# Patient Record
Sex: MALE | Race: ASIAN | NOT HISPANIC OR LATINO | URBAN - METROPOLITAN AREA
[De-identification: names, ages, dates, MRNs, and addresses within clinical notes are randomized per-mention and may not be internally consistent; named-entity substitution may affect disease eponyms.]

---

## 2024-10-27 ENCOUNTER — INPATIENT (INPATIENT)
Facility: HOSPITAL | Age: 69
LOS: 8 days | Discharge: ANOTHER HOSPITAL | End: 2024-11-05
Attending: INTERNAL MEDICINE | Admitting: INTERNAL MEDICINE
Payer: MEDICARE

## 2024-10-27 VITALS
OXYGEN SATURATION: 99 % | HEART RATE: 96 BPM | RESPIRATION RATE: 28 BRPM | DIASTOLIC BLOOD PRESSURE: 79 MMHG | SYSTOLIC BLOOD PRESSURE: 145 MMHG

## 2024-10-27 DIAGNOSIS — J81.0 ACUTE PULMONARY EDEMA: ICD-10-CM

## 2024-10-27 LAB
ADD ON TEST-SPECIMEN IN LAB: SIGNIFICANT CHANGE UP
ALBUMIN SERPL ELPH-MCNC: 3.4 G/DL — SIGNIFICANT CHANGE UP (ref 3.3–5)
ALBUMIN SERPL ELPH-MCNC: 4 G/DL — SIGNIFICANT CHANGE UP (ref 3.3–5)
ALP SERPL-CCNC: 68 U/L — SIGNIFICANT CHANGE UP (ref 40–120)
ALP SERPL-CCNC: 78 U/L — SIGNIFICANT CHANGE UP (ref 40–120)
ALT FLD-CCNC: 33 U/L — SIGNIFICANT CHANGE UP (ref 4–41)
ALT FLD-CCNC: 44 U/L — HIGH (ref 4–41)
ANION GAP SERPL CALC-SCNC: 14 MMOL/L — SIGNIFICANT CHANGE UP (ref 7–14)
ANION GAP SERPL CALC-SCNC: 14 MMOL/L — SIGNIFICANT CHANGE UP (ref 7–14)
APPEARANCE UR: CLEAR — SIGNIFICANT CHANGE UP
APTT BLD: 25 SEC — SIGNIFICANT CHANGE UP (ref 24.5–35.6)
AST SERPL-CCNC: 43 U/L — HIGH (ref 4–40)
AST SERPL-CCNC: 48 U/L — HIGH (ref 4–40)
B PERT DNA SPEC QL NAA+PROBE: SIGNIFICANT CHANGE UP
B PERT+PARAPERT DNA PNL SPEC NAA+PROBE: SIGNIFICANT CHANGE UP
BASOPHILS # BLD AUTO: 0.04 K/UL — SIGNIFICANT CHANGE UP (ref 0–0.2)
BASOPHILS NFR BLD AUTO: 0.2 % — SIGNIFICANT CHANGE UP (ref 0–2)
BILIRUB SERPL-MCNC: 0.8 MG/DL — SIGNIFICANT CHANGE UP (ref 0.2–1.2)
BILIRUB SERPL-MCNC: 0.8 MG/DL — SIGNIFICANT CHANGE UP (ref 0.2–1.2)
BILIRUB UR-MCNC: NEGATIVE — SIGNIFICANT CHANGE UP
BLOOD GAS ARTERIAL COMPREHENSIVE RESULT: SIGNIFICANT CHANGE UP
BLOOD GAS VENOUS COMPREHENSIVE RESULT: SIGNIFICANT CHANGE UP
BUN SERPL-MCNC: 21 MG/DL — SIGNIFICANT CHANGE UP (ref 7–23)
BUN SERPL-MCNC: 23 MG/DL — SIGNIFICANT CHANGE UP (ref 7–23)
C PNEUM DNA SPEC QL NAA+PROBE: SIGNIFICANT CHANGE UP
CALCIUM SERPL-MCNC: 8.4 MG/DL — SIGNIFICANT CHANGE UP (ref 8.4–10.5)
CALCIUM SERPL-MCNC: 9.1 MG/DL — SIGNIFICANT CHANGE UP (ref 8.4–10.5)
CHLORIDE SERPL-SCNC: 100 MMOL/L — SIGNIFICANT CHANGE UP (ref 98–107)
CHLORIDE SERPL-SCNC: 101 MMOL/L — SIGNIFICANT CHANGE UP (ref 98–107)
CK MB CFR SERPL CALC: 9.4 NG/ML — HIGH
CO2 SERPL-SCNC: 22 MMOL/L — SIGNIFICANT CHANGE UP (ref 22–31)
CO2 SERPL-SCNC: 22 MMOL/L — SIGNIFICANT CHANGE UP (ref 22–31)
COLOR SPEC: YELLOW — SIGNIFICANT CHANGE UP
CREAT SERPL-MCNC: 1.19 MG/DL — SIGNIFICANT CHANGE UP (ref 0.5–1.3)
CREAT SERPL-MCNC: 1.33 MG/DL — HIGH (ref 0.5–1.3)
DIFF PNL FLD: NEGATIVE — SIGNIFICANT CHANGE UP
EGFR: 58 ML/MIN/1.73M2 — LOW
EGFR: 66 ML/MIN/1.73M2 — SIGNIFICANT CHANGE UP
EOSINOPHIL # BLD AUTO: 0.01 K/UL — SIGNIFICANT CHANGE UP (ref 0–0.5)
EOSINOPHIL NFR BLD AUTO: 0.1 % — SIGNIFICANT CHANGE UP (ref 0–6)
FLUAV AG NPH QL: SIGNIFICANT CHANGE UP
FLUAV SUBTYP SPEC NAA+PROBE: SIGNIFICANT CHANGE UP
FLUBV AG NPH QL: SIGNIFICANT CHANGE UP
FLUBV RNA SPEC QL NAA+PROBE: SIGNIFICANT CHANGE UP
GLUCOSE BLDC GLUCOMTR-MCNC: 203 MG/DL — HIGH (ref 70–99)
GLUCOSE BLDC GLUCOMTR-MCNC: 232 MG/DL — HIGH (ref 70–99)
GLUCOSE SERPL-MCNC: 221 MG/DL — HIGH (ref 70–99)
GLUCOSE SERPL-MCNC: 252 MG/DL — HIGH (ref 70–99)
GLUCOSE UR QL: 100 MG/DL
HADV DNA SPEC QL NAA+PROBE: SIGNIFICANT CHANGE UP
HCOV 229E RNA SPEC QL NAA+PROBE: SIGNIFICANT CHANGE UP
HCOV HKU1 RNA SPEC QL NAA+PROBE: SIGNIFICANT CHANGE UP
HCOV NL63 RNA SPEC QL NAA+PROBE: SIGNIFICANT CHANGE UP
HCOV OC43 RNA SPEC QL NAA+PROBE: SIGNIFICANT CHANGE UP
HCT VFR BLD CALC: 37.5 % — LOW (ref 39–50)
HGB BLD-MCNC: 12.5 G/DL — LOW (ref 13–17)
HMPV RNA SPEC QL NAA+PROBE: SIGNIFICANT CHANGE UP
HPIV1 RNA SPEC QL NAA+PROBE: SIGNIFICANT CHANGE UP
HPIV2 RNA SPEC QL NAA+PROBE: SIGNIFICANT CHANGE UP
HPIV3 RNA SPEC QL NAA+PROBE: SIGNIFICANT CHANGE UP
HPIV4 RNA SPEC QL NAA+PROBE: SIGNIFICANT CHANGE UP
IANC: 14.7 K/UL — HIGH (ref 1.8–7.4)
IMM GRANULOCYTES NFR BLD AUTO: 0.4 % — SIGNIFICANT CHANGE UP (ref 0–0.9)
INR BLD: 1.16 RATIO — SIGNIFICANT CHANGE UP (ref 0.85–1.16)
KETONES UR-MCNC: NEGATIVE MG/DL — SIGNIFICANT CHANGE UP
LEUKOCYTE ESTERASE UR-ACNC: NEGATIVE — SIGNIFICANT CHANGE UP
LYMPHOCYTES # BLD AUTO: 11.1 % — LOW (ref 13–44)
LYMPHOCYTES # BLD AUTO: 2.01 K/UL — SIGNIFICANT CHANGE UP (ref 1–3.3)
M PNEUMO DNA SPEC QL NAA+PROBE: SIGNIFICANT CHANGE UP
MCHC RBC-ENTMCNC: 28.8 PG — SIGNIFICANT CHANGE UP (ref 27–34)
MCHC RBC-ENTMCNC: 33.3 GM/DL — SIGNIFICANT CHANGE UP (ref 32–36)
MCV RBC AUTO: 86.4 FL — SIGNIFICANT CHANGE UP (ref 80–100)
MONOCYTES # BLD AUTO: 1.35 K/UL — HIGH (ref 0–0.9)
MONOCYTES NFR BLD AUTO: 7.4 % — SIGNIFICANT CHANGE UP (ref 2–14)
NEUTROPHILS # BLD AUTO: 14.7 K/UL — HIGH (ref 1.8–7.4)
NEUTROPHILS NFR BLD AUTO: 80.8 % — HIGH (ref 43–77)
NITRITE UR-MCNC: NEGATIVE — SIGNIFICANT CHANGE UP
NRBC # BLD: 0 /100 WBCS — SIGNIFICANT CHANGE UP (ref 0–0)
NRBC # FLD: 0 K/UL — SIGNIFICANT CHANGE UP (ref 0–0)
PH UR: 6 — SIGNIFICANT CHANGE UP (ref 5–8)
PHOSPHATE SERPL-MCNC: 2.9 MG/DL — SIGNIFICANT CHANGE UP (ref 2.5–4.5)
PLATELET # BLD AUTO: 426 K/UL — HIGH (ref 150–400)
POTASSIUM SERPL-MCNC: 3.3 MMOL/L — LOW (ref 3.5–5.3)
POTASSIUM SERPL-MCNC: 3.9 MMOL/L — SIGNIFICANT CHANGE UP (ref 3.5–5.3)
POTASSIUM SERPL-SCNC: 3.3 MMOL/L — LOW (ref 3.5–5.3)
POTASSIUM SERPL-SCNC: 3.9 MMOL/L — SIGNIFICANT CHANGE UP (ref 3.5–5.3)
PROCALCITONIN SERPL-MCNC: 0.89 NG/ML — HIGH (ref 0.02–0.1)
PROT SERPL-MCNC: 6.6 G/DL — SIGNIFICANT CHANGE UP (ref 6–8.3)
PROT SERPL-MCNC: 7.7 G/DL — SIGNIFICANT CHANGE UP (ref 6–8.3)
PROT UR-MCNC: NEGATIVE MG/DL — SIGNIFICANT CHANGE UP
PROTHROM AB SERPL-ACNC: 13.4 SEC — SIGNIFICANT CHANGE UP (ref 9.9–13.4)
RAPID RVP RESULT: SIGNIFICANT CHANGE UP
RBC # BLD: 4.34 M/UL — SIGNIFICANT CHANGE UP (ref 4.2–5.8)
RBC # FLD: 12.7 % — SIGNIFICANT CHANGE UP (ref 10.3–14.5)
RSV RNA NPH QL NAA+NON-PROBE: SIGNIFICANT CHANGE UP
RSV RNA SPEC QL NAA+PROBE: SIGNIFICANT CHANGE UP
RV+EV RNA SPEC QL NAA+PROBE: SIGNIFICANT CHANGE UP
SARS-COV-2 RNA SPEC QL NAA+PROBE: SIGNIFICANT CHANGE UP
SARS-COV-2 RNA SPEC QL NAA+PROBE: SIGNIFICANT CHANGE UP
SODIUM SERPL-SCNC: 136 MMOL/L — SIGNIFICANT CHANGE UP (ref 135–145)
SODIUM SERPL-SCNC: 137 MMOL/L — SIGNIFICANT CHANGE UP (ref 135–145)
SP GR SPEC: 1.02 — SIGNIFICANT CHANGE UP (ref 1–1.03)
TROPONIN T, HIGH SENSITIVITY RESULT: 148 NG/L — CRITICAL HIGH
TROPONIN T, HIGH SENSITIVITY RESULT: 190 NG/L — CRITICAL HIGH
UROBILINOGEN FLD QL: 0.2 MG/DL — SIGNIFICANT CHANGE UP (ref 0.2–1)
WBC # BLD: 18.18 K/UL — HIGH (ref 3.8–10.5)
WBC # FLD AUTO: 18.18 K/UL — HIGH (ref 3.8–10.5)

## 2024-10-27 PROCEDURE — 99291 CRITICAL CARE FIRST HOUR: CPT | Mod: GC

## 2024-10-27 PROCEDURE — 70498 CT ANGIOGRAPHY NECK: CPT | Mod: 26,MC

## 2024-10-27 PROCEDURE — 99285 EMERGENCY DEPT VISIT HI MDM: CPT

## 2024-10-27 PROCEDURE — 71045 X-RAY EXAM CHEST 1 VIEW: CPT | Mod: 26

## 2024-10-27 PROCEDURE — 71045 X-RAY EXAM CHEST 1 VIEW: CPT | Mod: 26,77

## 2024-10-27 PROCEDURE — 70450 CT HEAD/BRAIN W/O DYE: CPT | Mod: 26,MC,XU

## 2024-10-27 PROCEDURE — 70496 CT ANGIOGRAPHY HEAD: CPT | Mod: 26,MC

## 2024-10-27 PROCEDURE — 0042T: CPT

## 2024-10-27 RX ORDER — ASPIRIN/MAG CARB/ALUMINUM AMIN 325 MG
324 TABLET ORAL ONCE
Refills: 0 | Status: COMPLETED | OUTPATIENT
Start: 2024-10-27 | End: 2024-10-27

## 2024-10-27 RX ORDER — VANCOMYCIN HYDROCHLORIDE 50 MG/ML
KIT ORAL
Refills: 0 | Status: DISCONTINUED | OUTPATIENT
Start: 2024-10-27 | End: 2024-10-28

## 2024-10-27 RX ORDER — ACETAMINOPHEN 500 MG
1000 TABLET ORAL ONCE
Refills: 0 | Status: COMPLETED | OUTPATIENT
Start: 2024-10-27 | End: 2024-10-27

## 2024-10-27 RX ORDER — CLOPIDOGREL 75 MG/1
600 TABLET ORAL ONCE
Refills: 0 | Status: COMPLETED | OUTPATIENT
Start: 2024-10-27 | End: 2024-10-27

## 2024-10-27 RX ORDER — ASPIRIN/MAG CARB/ALUMINUM AMIN 325 MG
81 TABLET ORAL DAILY
Refills: 0 | Status: DISCONTINUED | OUTPATIENT
Start: 2024-10-27 | End: 2024-11-05

## 2024-10-27 RX ORDER — PIPERACILLIN AND TAZOBACTAM .5; 4 G/20ML; G/20ML
3.38 INJECTION, POWDER, LYOPHILIZED, FOR SOLUTION INTRAVENOUS ONCE
Refills: 0 | Status: COMPLETED | OUTPATIENT
Start: 2024-10-28 | End: 2024-10-28

## 2024-10-27 RX ORDER — PIPERACILLIN AND TAZOBACTAM .5; 4 G/20ML; G/20ML
3.38 INJECTION, POWDER, LYOPHILIZED, FOR SOLUTION INTRAVENOUS EVERY 8 HOURS
Refills: 0 | Status: COMPLETED | OUTPATIENT
Start: 2024-10-28 | End: 2024-10-31

## 2024-10-27 RX ORDER — HYDRALAZINE HYDROCHLORIDE 50 MG/1
10 TABLET, FILM COATED ORAL ONCE
Refills: 0 | Status: COMPLETED | OUTPATIENT
Start: 2024-10-27 | End: 2024-10-27

## 2024-10-27 RX ORDER — OLANZAPINE 20 MG/1
2.5 TABLET ORAL ONCE
Refills: 0 | Status: COMPLETED | OUTPATIENT
Start: 2024-10-27 | End: 2024-10-27

## 2024-10-27 RX ORDER — HYDRALAZINE HYDROCHLORIDE 50 MG/1
5 TABLET, FILM COATED ORAL EVERY 6 HOURS
Refills: 0 | Status: DISCONTINUED | OUTPATIENT
Start: 2024-10-27 | End: 2024-11-03

## 2024-10-27 RX ORDER — HYDRALAZINE HYDROCHLORIDE 50 MG/1
5 TABLET, FILM COATED ORAL ONCE
Refills: 0 | Status: COMPLETED | OUTPATIENT
Start: 2024-10-27 | End: 2024-10-27

## 2024-10-27 RX ORDER — CLOPIDOGREL 75 MG/1
75 TABLET ORAL DAILY
Refills: 0 | Status: DISCONTINUED | OUTPATIENT
Start: 2024-10-28 | End: 2024-11-05

## 2024-10-27 RX ORDER — VANCOMYCIN HYDROCHLORIDE 50 MG/ML
1000 KIT ORAL EVERY 12 HOURS
Refills: 0 | Status: DISCONTINUED | OUTPATIENT
Start: 2024-10-28 | End: 2024-10-28

## 2024-10-27 RX ORDER — NITROGLYCERIN 0.6MG/HR
400 PATCH, TRANSDERMAL 24 HOURS TRANSDERMAL
Qty: 50 | Refills: 0 | Status: DISCONTINUED | OUTPATIENT
Start: 2024-10-27 | End: 2024-10-27

## 2024-10-27 RX ORDER — CHLORHEXIDINE GLUCONATE 40 MG/ML
1 SOLUTION TOPICAL DAILY
Refills: 0 | Status: DISCONTINUED | OUTPATIENT
Start: 2024-10-27 | End: 2024-11-05

## 2024-10-27 RX ORDER — FUROSEMIDE 40 MG
40 TABLET ORAL ONCE
Refills: 0 | Status: COMPLETED | OUTPATIENT
Start: 2024-10-27 | End: 2024-10-27

## 2024-10-27 RX ORDER — PIPERACILLIN AND TAZOBACTAM .5; 4 G/20ML; G/20ML
3.38 INJECTION, POWDER, LYOPHILIZED, FOR SOLUTION INTRAVENOUS ONCE
Refills: 0 | Status: COMPLETED | OUTPATIENT
Start: 2024-10-27 | End: 2024-10-27

## 2024-10-27 RX ORDER — VANCOMYCIN HYDROCHLORIDE 50 MG/ML
1000 KIT ORAL ONCE
Refills: 0 | Status: COMPLETED | OUTPATIENT
Start: 2024-10-27 | End: 2024-10-27

## 2024-10-27 RX ORDER — ALBUTEROL 90 MCG
2 AEROSOL (GRAM) INHALATION
Refills: 0 | Status: DISCONTINUED | OUTPATIENT
Start: 2024-10-27 | End: 2024-11-05

## 2024-10-27 RX ORDER — HYDRALAZINE HYDROCHLORIDE 50 MG/1
5 TABLET, FILM COATED ORAL EVERY 6 HOURS
Refills: 0 | Status: DISCONTINUED | OUTPATIENT
Start: 2024-10-27 | End: 2024-10-27

## 2024-10-27 RX ORDER — INSULIN LISPRO 100/ML
VIAL (ML) SUBCUTANEOUS
Refills: 0 | Status: DISCONTINUED | OUTPATIENT
Start: 2024-10-27 | End: 2024-10-28

## 2024-10-27 RX ORDER — IPRATROPIUM BROMIDE AND ALBUTEROL SULFATE .5; 2.5 MG/3ML; MG/3ML
3 SOLUTION RESPIRATORY (INHALATION) EVERY 6 HOURS
Refills: 0 | Status: DISCONTINUED | OUTPATIENT
Start: 2024-10-27 | End: 2024-11-05

## 2024-10-27 RX ORDER — INSULIN LISPRO 100/ML
VIAL (ML) SUBCUTANEOUS AT BEDTIME
Refills: 0 | Status: DISCONTINUED | OUTPATIENT
Start: 2024-10-27 | End: 2024-10-28

## 2024-10-27 RX ORDER — NITROGLYCERIN 0.6MG/HR
400 PATCH, TRANSDERMAL 24 HOURS TRANSDERMAL
Qty: 50 | Refills: 0 | Status: DISCONTINUED | OUTPATIENT
Start: 2024-10-27 | End: 2024-10-28

## 2024-10-27 RX ADMIN — PIPERACILLIN AND TAZOBACTAM 200 GRAM(S): .5; 4 INJECTION, POWDER, LYOPHILIZED, FOR SOLUTION INTRAVENOUS at 15:45

## 2024-10-27 RX ADMIN — HYDRALAZINE HYDROCHLORIDE 5 MILLIGRAM(S): 50 TABLET, FILM COATED ORAL at 20:27

## 2024-10-27 RX ADMIN — Medication 120 MICROGRAM(S)/MIN: at 11:14

## 2024-10-27 RX ADMIN — HYDRALAZINE HYDROCHLORIDE 10 MILLIGRAM(S): 50 TABLET, FILM COATED ORAL at 18:07

## 2024-10-27 RX ADMIN — Medication 400 MILLIGRAM(S): at 21:58

## 2024-10-27 RX ADMIN — Medication 40 MILLIGRAM(S): at 15:00

## 2024-10-27 RX ADMIN — IPRATROPIUM BROMIDE AND ALBUTEROL SULFATE 3 MILLILITER(S): .5; 2.5 SOLUTION RESPIRATORY (INHALATION) at 21:16

## 2024-10-27 RX ADMIN — Medication 120 MICROGRAM(S)/MIN: at 15:00

## 2024-10-27 RX ADMIN — VANCOMYCIN HYDROCHLORIDE 250 MILLIGRAM(S): KIT at 17:42

## 2024-10-27 RX ADMIN — IPRATROPIUM BROMIDE AND ALBUTEROL SULFATE 3 MILLILITER(S): .5; 2.5 SOLUTION RESPIRATORY (INHALATION) at 15:26

## 2024-10-27 RX ADMIN — OLANZAPINE 2.5 MILLIGRAM(S): 20 TABLET ORAL at 20:20

## 2024-10-27 RX ADMIN — Medication 1000 MILLIGRAM(S): at 22:28

## 2024-10-27 RX ADMIN — Medication 324 MILLIGRAM(S): at 14:05

## 2024-10-27 RX ADMIN — PIPERACILLIN AND TAZOBACTAM 25 GRAM(S): .5; 4 INJECTION, POWDER, LYOPHILIZED, FOR SOLUTION INTRAVENOUS at 20:19

## 2024-10-27 RX ADMIN — CLOPIDOGREL 600 MILLIGRAM(S): 75 TABLET ORAL at 14:05

## 2024-10-27 RX ADMIN — Medication 40 MILLIGRAM(S): at 11:13

## 2024-10-27 NOTE — H&P ADULT - NSHPLABSRESULTS_GEN_ALL_CORE
< from: CT Angio Neck w/ IV Cont (10.27.24 @ 12:20) >    IMPRESSION:    1. BRAIN:  No intracranial hemorrhage is appreciated.  No intracranial   mass lesion is appreciated.    2.  RIGHT CAROTID SYSTEM:    Atherosclerotic plaque common carotid and   internal carotid artery with maximal stenosis approximately 50%.    3.   LEFT CAROTID SYSTEM:     Atherosclerotic plaque common carotid and   internal carotid artery with maximal stenosis approximately 90%.    4.   VERTEBRAL CIRCULATION:    Patent. 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 Flow directionality of the left vertebral artery is not   determined by this technique. Consider ultrasound in this evaluation, as   required    5.  ANTERIOR INTRACRANIAL CIRCULATION:Intracranial atherosclerosis   cavernous and clinoid segments of the internal carotid arteries, moderate   on the right and severe on the left.    6.  POSTERIOR INTRACRANIAL CIRCULATION:    Patent right vertebral basilar   and posterior cerebral arteries. 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    7.  No large vessel occlusion    8.  BRAIN PERFUSION:   No acute infarction of the brain is convincingly   demonstrated.  Relative ischemia is demonstrated in the left cerebral   hemispheric anterior corona radiata white matter.    9. Pulmonary edema pattern    < end of copied text >

## 2024-10-27 NOTE — CONSULT NOTE ADULT - ASSESSMENT
INCOMPLETE    Assessment: ***. Initial VS in ED: ***. Exam: ***.      mRS: ***  LKN: ***  NIHSS: ***    *** a tenecteplase candidate due to ***.  *** a mechanical thrombectomy candidate due to ***.    Impression: ***    Recommendations:    Diagnostics:  [] MRI brain without contrast   [] MRA brain without contrast; MRA neck with contrast   [] TTE w/ bubble   [] Implantable loop recorder  [] Lipid panel, HbA1c  [] CBC, CMP, coagulation panel, troponin    Medications:  [] ASA 81 mg PO (325 per rectum if fails dysphagia)   [] Atorvastatin 80mg (titrate to LDL < 70)   [] Lovenox SQ for DVT prophylaxis     Miscellaneous:  [] NPO unless passes dysphagia screen; swallow eval if fails  [] Keep BP permissive up to 220/110 for 48 hours followed by gradual normotension over 2-3 days   [] Telemonitoring  [] Neurological checks and vital signs per unit protocol  [] BGM goals 140-180  [] PT/OT; S/S evaluation    * Case and plan not final until Attending attestation * 69 RH Swedish speaking male PMHx COPD, HTN, DM presented for hypoxic respiratory distress due to COPD exacerbation vs cardiogenic etiology. In ED, on BiPAP, given lasix, and started nitro drip due to /110. Code stroke called for right side weakness first noticed by family this AM, LKN yesterday evening. Initial VS in ED: /110 prior to nitroglycerin drip, during neurology exam /106. Exam: On BiPAP, AAOx1, following simple commands with occasional confusion, left gaze preference, mild R facial droop, right upper and lower extremity drift, no sensory changes. CTA demonstrated diffuse intracranial and extracranial atherosclerosis, including notably 90% maximal stenosis in carotid bulb and focal occlusion in proximal left vertebral artery. CTA also noted for segmental occlusion of left subclavian artery between thoracic aorta and left vertebral origin.     pre-mRS: 2  LKN: 10/26 @2100  NIHSS: 7 (LOC question, L gaze preference, R facial, RUE/RLE drift, aphasia)    Not a tenecteplase candidate due to out of window.  Not a mechanical thrombectomy candidate due to no retrievable large vessel occlusion.    Impression: Right hemiparesis likely due to left brain dysfunction. Mechanism large artery atherosclerosis vs ESUS. Encephalopathy likely metabolic in setting of acute hypoxic respiratory distress.    Recommendations:    Diagnostics:  [] MRI brain without contrast   [] TTE without bubble study  [] Vertebral artery doppler to rule out subclavian steal in setting of segmental subclavian artery occlusion, would consult vascular surgery if imaging is suggestive of flow reversal  [] Lipid panel, HbA1c  [] CBC, CMP, coagulation panel, troponin    Medications:  [] ASA 81 mg PO. If unable to tolerate PO due to failed dysphagia screen or need for BiPAP would administer 325 mg rectally  [] Load plavix 300 mg when patient tolerating PO, continue plavix 75 mg for 21 days per CHANCE trial  [] If patient to be started on therapeutic anticoagulation from cardiac standpoint, would defer DAPT until after patient is off alternative anticoagulation  [] Atorvastatin 80mg (titrate to LDL < 70)   [] Lovenox SQ for DVT prophylaxis     Miscellaneous:  [] NPO unless passes dysphagia screen; swallow eval if fails  [] S/p nitroglycerin drip patient is currently normotensive. Would hold antihypertensive medication unless needed to treat other medical conditions  [] BP goal 140-180 systolic   [] Telemonitoring  [] Neurological checks q4h  [] BGM goals 140-180  [] PT/OT; S/S evaluation    Discussed with telestroke attending Dr. Castano. To be formally seen with attending on AM rounds. Case and plan not final until Attending attestation 69 RH Serbian speaking male PMHx COPD, HTN, DM presented for hypoxic respiratory distress due to COPD exacerbation vs cardiogenic etiology. In ED, on BiPAP, given lasix, and started nitro drip due to /110. Code stroke called for right side weakness first noticed by family this AM, LKN yesterday evening. Initial VS in ED: /110 prior to nitroglycerin drip, during neurology exam /106. Exam: On BiPAP, AAOx1, following simple commands with occasional confusion, left gaze preference, mild R facial droop, right upper and lower extremity drift, no sensory changes. CTA demonstrated diffuse intracranial and extracranial atherosclerosis, including notably 90% maximal stenosis in carotid bulb and focal occlusion in proximal left vertebral artery. CTA also noted for segmental occlusion of left subclavian artery between thoracic aorta and left vertebral origin.     pre-mRS: 2  LKN: 10/26 @2100  NIHSS: 7 (LOC question, L gaze preference, R facial, RUE/RLE drift, aphasia)    Not a tenecteplase candidate due to out of window.  Not a mechanical thrombectomy candidate due to no retrievable large vessel occlusion.    Impression: Right hemiparesis likely due to left brain dysfunction. Mechanism large artery atherosclerosis vs ESUS. Encephalopathy likely metabolic in setting of acute hypoxic respiratory distress.    Recommendations:    Diagnostics:  [] MRI brain without contrast   [] TTE without bubble study  [] Loop recorder prior to discharge  [] Vertebral artery doppler to rule out subclavian steal in setting of segmental subclavian artery occlusion, would consult vascular surgery if imaging is suggestive of flow reversal  [] Lipid panel, HbA1c  [] CBC, CMP, coagulation panel, troponin    Medications:  [] ASA 81 mg PO. If unable to tolerate PO due to failed dysphagia screen or need for BiPAP would administer 325 mg rectally  [] Load plavix 300 mg when patient tolerating PO, continue plavix 75 mg for 21 days per CHANCE trial  [] If patient to be started on therapeutic anticoagulation from cardiac standpoint, would defer DAPT until after patient is off alternative anticoagulation  [] Atorvastatin 80mg (titrate to LDL < 70)   [] Lovenox SQ for DVT prophylaxis     Miscellaneous:  [] NPO unless passes dysphagia screen; swallow eval if fails  [] S/p nitroglycerin drip patient is currently normotensive. Would hold antihypertensive medication unless needed to treat other medical conditions  [] BP goal 140-180 systolic   [] Telemonitoring  [] Neurological checks q4h  [] BGM goals 140-180  [] PT/OT; S/S evaluation    Discussed with telestroke attending Dr. Castano. To be formally seen with attending on AM rounds. Case and plan not final until Attending attestation 69 RH Azeri speaking male PMHx COPD, HTN, DM presented for hypoxic respiratory distress due to COPD exacerbation vs cardiogenic etiology. In ED, on BiPAP, given lasix, and started nitro drip due to /110. Code stroke called for right side weakness first noticed by family this AM, LKN yesterday evening. Initial VS in ED: /110 prior to nitroglycerin drip, during neurology exam /106. Exam: On BiPAP, AAOx1, following simple commands with occasional confusion, left gaze preference, mild R facial droop, right upper and lower extremity drift, no sensory changes. CTA demonstrated diffuse intracranial and extracranial atherosclerosis, including notably 90% maximal stenosis in carotid bulb and focal occlusion in proximal left vertebral artery. CTA also noted for segmental occlusion of left subclavian artery between thoracic aorta and left vertebral origin.     pre-mRS: 2  LKN: 10/26 @2100  NIHSS: 7 (LOC question, L gaze preference, R facial, RUE/RLE drift, aphasia)    Not a tenecteplase candidate due to out of window.  Not a mechanical thrombectomy candidate due to no retrievable large vessel occlusion.    Impression: Right hemiparesis likely due to left brain dysfunction. Mechanism large artery atherosclerosis vs ESUS. Encephalopathy likely metabolic in setting of acute hypoxic respiratory failure.    Recommendations:    Diagnostics:  [] MRI brain without contrast   [] TTE without bubble study  [] Loop recorder prior to discharge  [] Vertebral artery doppler to rule out subclavian steal in setting of segmental subclavian artery occlusion, would consult vascular surgery if imaging is suggestive of flow reversal  [] Lipid panel, HbA1c  [] CBC, CMP, coagulation panel, troponin    Medications:  [] ASA 81 mg PO. If unable to tolerate PO due to failed dysphagia screen or need for BiPAP would administer 325 mg rectally  [] Load plavix 300 mg when patient tolerating PO, continue plavix 75 mg for 21 days per CHANCE trial  [] If patient to be started on therapeutic anticoagulation from cardiac standpoint, would defer DAPT until after patient is off alternative anticoagulation  [] Atorvastatin 80mg (titrate to LDL < 70)   [] Lovenox SQ for DVT prophylaxis     Miscellaneous:  [] NPO unless passes dysphagia screen; swallow eval if fails  [] S/p nitroglycerin drip patient is currently normotensive. Would hold antihypertensive medication unless needed to treat other medical conditions  [] BP goal 140-180 systolic   [] Telemonitoring  [] Neurological checks q4h  [] BGM goals 140-180  [] PT/OT; S/S evaluation    Discussed with telestroke attending Dr. Castano. To be formally seen with attending on AM rounds. Case and plan not final until Attending attestation

## 2024-10-27 NOTE — ED PROVIDER NOTE - CLINICAL SUMMARY MEDICAL DECISION MAKING FREE TEXT BOX
DO Ez, EM Attendin-year-old male with a past medical history of hypertension, diabetes, presenting due to concern of difficulty breathing this morning.  At home patient was complaining of worsening shortness of breath. Differential diagnosis includes but is not limited to cardiogenic shock vs. SCAPE vs ACS. Pt presenting in acute distress, BP elevated transition to BiPAP on arrival to the ED.  Started on nitro glycerin drip at 400 to lower BP.  Given 40 of Lasix IV push.  EKG with T wave inversion in lead V5 V6, no prior EKGs available to compare, QTc prolonged at 487. labs sent including cardiac biomarkers. will consult CCU. while being stabilized c/f facial droop and post hemiparesis w/ gaze pref., once more hemodynamically stable on bipap, code stroke called. CTs performed. awaiting CCU for final disposition. per neuro admit for stroke management.

## 2024-10-27 NOTE — ED PROVIDER NOTE - PROGRESS NOTE DETAILS
MD CHO:  I was called for code cva eval for this pt as primary provider Dr Hui was involved with a critically ill patient and unavailable.  On my exam, pt has motor deficit to rue and rle.  Per pt's sons, he woke up without fnd at 6am.  Then at 7am developed inability to move rue or rle at all.  Now able to move but much weaker vs left.  I activated code cva and called neurology to make aware of pt.  I updated primary team with regards to code cva activation. Gabriela PGY3: Patient off nitro gtt as of 20 mins ago; Most recent pressure 105/85. Still tolerating BiPAP at 12/8. Gabriela PGY3: Patient accepted by CCU under Dr. Alon iraheta for admission.  Spoke with neurology resident who recommends anticoagulation. CCU is still deciding whether or not to start heparin but is okay with aspirin and Plavix loading.

## 2024-10-27 NOTE — H&P ADULT - HISTORY OF PRESENT ILLNESS
Patient is a 75 year old Albanian speaking male with PMH of HTN, HLD, DM, presents with shortness of breath over 3-4 days which acutely worsened today. Patient also initially complained of chest pain and noted with elevated BP to 190/110. Acute management in the ED included BiPAP, lasix. Patient was placed on nitroglycerin drip due to concern for possible ACS. Chest xray noted with bilateral pulmonary edema which appears cardiac in origin. Patient's respiratory status stabilized on BiPAP. While in the ED, patient's sons reported that patient also began to have right arm weakness first noticed this morning. During this time, patient also appeared slightly confused and required assistance to walk. Patient was last seen at his baseline yesterday evening at 2100 when he was AAOx3 and did not have difficulties using his right side. Code stroke was called for right sided weakness.   Upon stroke team assessment, patient is on BiPAP and does not appear in respiratory distress. BP at time of assessment is 130s systolic (nitroglycerin drip was previously discontinued). Patient denies history of stroke. Patient takes aspirin for cardioprotection. Ambulates independently at baseline, lives with family and requires some assistance with taking medications.     CCU consulted because patient requiring BIPAP for difficulty breathing and requiring nitro gtt for BP elevated to 220/110.

## 2024-10-27 NOTE — H&P ADULT - NSHPPHYSICALEXAM_GEN_ALL_CORE
PHYSICAL EXAM:  GENERAL: NAD, well-developed  HEAD:  Atraumatic, Normocephalic  EYES: EOMI, PERRLA, conjunctiva and sclera clear  NECK: Supple, No JVD  CHEST/LUNG: bilateral wheezing and crackles  HEART: Regular rate and rhythm; S1, S2; No murmurs, rubs, or gallops  ABDOMEN: Softly distended abodmen  EXTREMITIES:  2+ Peripheral Pulses, No clubbing, cyanosis, or edema  PSYCH: A&Ox3  NEUROLOGY: non-focal  SKIN: No rashes or lesions

## 2024-10-27 NOTE — ED ADULT NURSE NOTE - OBJECTIVE STATEMENT
Group Topic: BH CBT    Date: 1/11/2023  Start Time: 1300  End Time: 1330  Facilitators: MEHNAZ Peterson    Focus: Resilience   Number in attendance: 8    Method: Group  Attendance: Present  Participation: Minimal  Patient Response: Quiet  Mood: Normal  Affect: Type: Euthymic (normal mood)   Range: Blunted/flat   Congruency: Congruent   Stability: Stable  Behavior/Socialization: Withdrawn  Thought Process: Unremarkable  Task Performance: Needs cueing  Patient Evaluation: Encouragement - needs prompts       70 y/o M arrives to [facility] as a notification for SOB on CPAP. A&Ox4, ambulatory at baseline, neg chest pain, neg N/V/D, denies recent fever. C/o SOB and dry cough x2 days, worsening this AM. Pt is Croatian-speaking, prefers for son Maxwell to translate. Arrives with bilateral 18g IVs. Facilitator RN at bedside. Also with weakness to right arm since 07:00 AM. PHx: DM, HTN.

## 2024-10-27 NOTE — ED ADULT NURSE NOTE - HOW PATIENT ADDRESSED, PROFILE
Maxwell Carac Pregnancy And Lactation Text: This medication is Pregnancy Category X and contraindicated in pregnancy and in women who may become pregnant. It is unknown if this medication is excreted in breast milk.

## 2024-10-27 NOTE — PATIENT PROFILE ADULT - FALL HARM RISK - RISK INTERVENTIONS
Assistance OOB with selected safe patient handling equipment/Communicate Fall Risk and Risk Factors to all staff, patient, and family/Reinforce activity limits and safety measures with patient and family/Visual Cue: Yellow wristband/Bed in lowest position, wheels locked, appropriate side rails in place/Call bell, personal items and telephone in reach/Instruct patient to call for assistance before getting out of bed or chair/Non-slip footwear when patient is out of bed/Bode to call system/Physically safe environment - no spills, clutter or unnecessary equipment/Purposeful Proactive Rounding/Room/bathroom lighting operational, light cord in reach

## 2024-10-27 NOTE — ED ADULT TRIAGE NOTE - CHIEF COMPLAINT QUOTE
pt notification for respiratory distress, rule out pulmonary edema. SpO2 80% on RA. Improved to 99% on CPAP. Pt brought directly to [facility]

## 2024-10-27 NOTE — ED ADULT NURSE REASSESSMENT NOTE - NS ED NURSE REASSESS COMMENT FT1
Pt assessed by MD Cho for stroke eval for right UE & right LE weakness since 07:00 AM (LKN 06:00 AM) as per son Maxwell. Code stroke called at 11:39. CT completed. Float RN at bedside. Neuro following.

## 2024-10-27 NOTE — H&P ADULT - CRITICAL CARE ATTENDING COMMENT
Maxwell Taylor is a 75-year-old Chinese speaking man with history of HTN, HLD and DM2. He presented with shortness of breath over 3-4 days which acutely worsened 10/27/24. There was chest pain with /110 mm Hg. Acute management in the ED included BiPAP, Lasix and nitroglycerin drip due to concern for hypertensive urgency due to ACS. Chest Xray noted bilateral pulmonary edema. There was right arm weakness first noticed the morning of 10/27/24. During this time, patient also appeared slightly confused and required assistance to walk. Right hemiparesis likely due to left brain dysfunction, with large artery atherosclerosis vs ESUS.. CTA demonstrated diffuse intracranial and extracranial atherosclerosis, including notably 90% maximal stenosis in carotid bulb and focal occlusion in proximal left vertebral artery. CTA also noted for segmental occlusion of left subclavian artery between thoracic aorta and left vertebral origin. CTA noted no hemorrhage, no intracranial lesions. There was 50% right Carotid stenosis and 90% left Carotid stenosis. He was out of the time window for tenecteplase administration and was not a mechanical thrombectomy candidate due to no retrievable large vessel occlusion. He presented with Acute Pulmonary Edema in the setting of HTN emergency with acute stroke. Pulmonary management includes BiPAP 14/7 and Fio2 35% with aim to maintain SPO2 >95%. ECG with NSR with ST depression in multiple leads, indications subendocardial ischemia, though may be cardiac manifestation of acute stroke. There were PVCs in pattern of bigeminy on telemetry. Initial HSTropT 148 ng/L. The patient received load of aspirin and clopidogrel (Plavix). Heparin was not given in order to avoid risk of hemorrhagic conversion of acute stroke. Atorvastatin (Lipitor) 80 mg daily started. Maintain nitroglycerin gtt to reduce systolic BP to no less than 140 mm Hg. TTE in progress.

## 2024-10-27 NOTE — ED ADULT NURSE NOTE - TEMPLATE
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
General

## 2024-10-27 NOTE — ED PROVIDER NOTE - PHYSICAL EXAMINATION
POCUS: b lines throughout all lung fields, R pleural effusion> L pleural effusion    General: unwell appearing, moderate resp distress  Head: Atraumatic, normocephalic  Eyes: EOM grossly in tact, no scleral icterus, no discharge  Respiratory: inc resp effort, crackles throughout all lung fields  CV: RRR, good s1/s2, no S3, Extremities warm and well perfused  Abdominal: Soft, non-distended, non-tender, no masses  Extremities: No LE edema, no deformities  Skin: warm and dry. No rashes

## 2024-10-27 NOTE — ED ADULT NURSE REASSESSMENT NOTE - NS ED NURSE REASSESS COMMENT FT1
Mobile Critical Care RN: Pt A&Ox4. English speaking- requesting son Maxwell at bedside to translate. No c/o pain at this time. No neurological deficits on neuro exam. No drift on b/l upper and lower extremities. 3mm pupils, equal, brisk. No s/s respiratory distress. Maintaining SpO2 >96% on BiPAP 12/8 40%. B/l crackles. Afebrile. NSR on cardiac monitor. Nitro gtt held per CCU MD Smith for hypotension. Maintaining MAPs >65. No edema noted. +2 radial and DP pulses. Skin intact. Soft abdomen, non tender. Last BM this AM. 500cc clear yellow urine in bedside urinal. B/l AC 18g flushing w/ +blood return. CCU consulted. Pending dispo

## 2024-10-27 NOTE — ED PROVIDER NOTE - DATE/TIME 3
Hospitalist Progress Note    Name : Tor Muller      Sex : male   MRN : [id number]    : 1963 Age : 57 year old   Date of admission : 2020      Today's date : 12/3/2020  _______________________________________________________________________________          Assessment/Plan:     Acute right lower extremity DVT and right-sided PE:  Besides prolonged to periods of sitting down (patient is an  and spends long periods sitting down working most days), no other clear risk factors.  Presenting symptoms have improved.  No clinical signs of cor pulmonale.  Transition to oral apixaban.  Hypercoagulable workup not done prior to starting anticoagulation.  Recommend treatment duration of at least 6 months.  Will recommend hematology follow-up at discharge    Right lower extremity cellulitis:  With fever.  Improving.  Continue antibiotics.  Anticipate discharge on oral antibiotics.    Mild anemia:  Hemoglobin stable    Obesity:  BMI more than 30                 Consults: None    Diet: General    Code status: Full Resuscitation    Venous Thromboembolism (VTE) prophylaxis: Apixaban      Length of stay:  LOS: 2 days       Anticipated Discharge Destination: Home      Estimated number of days until discharge: 1 - would like to watch at least 24 hours since last fever.  Would also like to watch blood culture at least 24 hours     Medical Milestones for Discharge:  Remains afebrile, negative blood culture.     Team communication: Plan discussed with care team                Subjective:     Chief complaint:  DVT/PE      Summary:  Tor Muller a pleasant 57-year-old gentleman with no significant past medical history who presented with right lower extremity edema.  He was diagnosed with extensive DVT of the right lower extremity pain.  Case was discussed with Interventional Radiology who ordered CT venogram of the abdomen pelvis in which revealed no evidence of clot throughout the IVC.  Due to lack of severe  symptoms and lack of clot extension into the iliac veins, IR did not recommend thrombolysis.  He was incidentally noted to have pulmonary embolus in the right pulmonary artery.  After discussion with Interventional Radiology, Dr. Ifeoma Powell, it was decided to continue current management and if patient developed additional respiratory symptoms, CT scan of the chest could be obtained to assess PE burden.  He developed fever with temp up to 102.7° F on 12/02/2020.  This coincided with increased redness of the right lower extremity.  He was started on IV cefepime and vancomycin.       Pt seen and examined.  Had minimal pain behind right knee.  Fever has subsided      ROS:   Constitutional: No fever or chills  CVS: No chest pain  Resp: No SOB, no chest pain   GI: No abdominal pain, no nausea, no vomiting    Pertinent Reviewed:    Past Medical History: Yes  Past Surgical History: Yes  Social History: Yes  Family History: Yes      Objective:     Vital signs: Blood pressure 132/80, pulse 78, temperature 99.5 °F (37.5 °C), temperature source Oral, resp. rate 18, height 6' (1.829 m), weight 102.4 kg, SpO2 94 %.      Intake/Output Summary (Last 24 hours) at 12/3/2020 1247  Last data filed at 12/3/2020 1009  Gross per 24 hour   Intake 2514 ml   Output 1700 ml   Net 814 ml       Physical Exam:   General Alert and interactive, NAD   Respiratory Unlabored breathing. Lung fields clear bilaterally   Cardiac HS I and II. Rhythm regular. Rate normal. Pulses+   GI/abd Non-distended. Soft. Non-tender. BS+   Ext/MSK Right lower extremity slightly bigger in girth compared to the left    Neuro No focal weakness   Skin Skin of the right leg slightly red and warm    Psych A/O x 3. Affect normal       Labs: reviewed.     Recent Labs     12/02/20  0337 12/02/20  2019 12/03/20  0702   WBC 11.0 9.7 10.0   HGB 12.6* 12.1* 12.1*   HCT 38.0* 36.6* 36.3*   RBC 4.33* 4.17* 4.16*   MCV 87.8 87.8 87.3       Sodium (mmol/L)   Date Value   12/03/2020  138     Potassium (mmol/L)   Date Value   12/03/2020 4.0     Chloride (mmol/L)   Date Value   12/03/2020 105     Glucose (mg/dL)   Date Value   12/03/2020 126 (H)     Calcium (mg/dL)   Date Value   12/03/2020 8.7     Carbon Dioxide (mmol/L)   Date Value   12/03/2020 25     BUN (mg/dL)   Date Value   12/03/2020 16     Creatinine (mg/dL)   Date Value   12/03/2020 0.84       GOT/AST (Units/L)   Date Value   12/01/2020 47 (H)     GPT/ALT (Units/L)   Date Value   12/01/2020 83 (H)     No results found for: GGTP  Alkaline Phosphatase (Units/L)   Date Value   12/01/2020 63     Bilirubin, Total (mg/dL)   Date Value   12/01/2020 0.6           Microbiology: Reviewed - blood culture NGTD       Imaging: Reviewed.     CT VENOGRAM ABDOMEN AND PELVIS   Final Result   Impression:   1. CT venogram demonstrates no evidence of clot throughout the IVC. There   is clot identified in the right femoral vein and right common femoral vein.      2. Incidentally noted there is a pulmonary embolus in the right pulmonary   artery.      Results were communicated to Sly Souza on 12/2/20 at 12:00.\"               IR Consult Service to IR   Final Result      US Lower Extremity Venous Duplex Right   Final Result   IMPRESSION: Extensive acute and occlusive right lower extremity deep venous   thrombosis.      XR Chest AP or PA   Final Result   IMPRESSION:  Some minimal atelectasis is seen in the left lung base                  Medications: Reviewed.       • apixaBAN  10 mg Oral 2 times per day   • VANCOMYCIN - PHARMACIST MONITORED   Does not apply See Admin Instructions   • cefepime (MAXIPIME) IVPB  1,000 mg Intravenous 3 times per day   • vancomycin (VANCOCIN) IVPB  1,250 mg Intravenous 2 times per day   • sodium chloride (PF)  2 mL Intracatheter 2 times per day   • Potassium Standard Replacement Protocol   Does not apply See Admin Instructions   • Magnesium Standard Replacement Protocol   Does not apply See Admin Instructions   • Phosphorus  Standard Replacement Protocol   Does not apply See Admin Instructions         Allergies: Reviewed.    ALLERGIES:  No Known Allergies                            Fe Molina MD  Hospitalist       12/3/2020  12:47 PM   27-Oct-2024 13:37

## 2024-10-27 NOTE — ED ADULT NURSE NOTE - NSFALLRISKINTERV_ED_ALL_ED

## 2024-10-27 NOTE — H&P ADULT - ASSESSMENT
Patient is a 75 year old Persian speaking male with PMH of HTN, HLD, DM, presents with Acute Pulmonary Edema in the setting of HTN emergency with possible acute stroke.    PLAN:  NEURO:   #Code stroke in the ER  -CTA: No hemorrhage, no intracranial lesions  -Rt Carotid: 50% stenosis  -Lt Carotid: 90% stenosis  -vertebral circulation: left subclavian artery with segmental occulsion betweeen the throacic aorta and the left vertebral origin  -Anterior intracranial circulation: atherosclerosis, moderate on rt and severe on left  -posterior circulation: patent rt basilar and posterior arteries, left vertebral with reveraold of flow from the basilar to its PICA  -on exam patient drifts to the left side, sort of ignores the right side  -right hand weaker than left  -will continue with neuro checks  -will follow up with neuro recommendations  -holding off on heparin gtt as per neuro    PULMONARY:  Upon arrival to CCU, patient with a flash pulmonary edema  -BIPAP 14/7 and Fio2 35%  -maintain      Patient is a 75 year old Tajik speaking male with PMH of HTN, HLD, DM, presents with Acute Pulmonary Edema in the setting of HTN emergency with possible acute stroke.    PLAN:  NEURO:   #Code stroke in the ER  -CTA: No hemorrhage, no intracranial lesions  -Rt Carotid: 50% stenosis  -Lt Carotid: 90% stenosis  -vertebral circulation: left subclavian artery with segmental occulsion betweeen the throacic aorta and the left vertebral origin  -Anterior intracranial circulation: atherosclerosis, moderate on rt and severe on left  -posterior circulation: patent rt basilar and posterior arteries, left vertebral with reveraold of flow from the basilar to its PICA  -on exam patient drifts to the left side, sort of ignores the right side  -right hand weaker than left  -will continue with neuro checks  -will follow up with neuro recommendations  -holding off on heparin gtt as per neuro  -loaded with asa and plavix in ER    PULMONARY:  Upon arrival to CCU, patient with a flash pulmonary edema  -BIPAP 14/7 and Fio2 35%  -maintain SPO2 >95%  -will repeat CXR in am    #Concern for Aspiration   -patient with cough after medications were given  -speech and swallow ordered    CARDIAC:  EKG with NSR LVH, ST depressions in multiple leads  bigeminy and PVCx on tele  #NSTEMI:  -elevated troponin of 148  NSR on tele  loaded with ASA and plavix in the ER, as per neuro, would hold hep gtt for now  -lipitor 80 added  -nitro gtt to reduce systolic BP from 180 to 140 goal systolic  -ECHO done, results are pending  -rachna ltrend enzymes  -would defer cardiac cath in setting of possible acute stroke for now      HTN Emergency:  continue nitro gtt  goal SBP 140s    HLD:  lipito 80  lipid profile pending    GI:  Abdomen is distended    :  Normal renal  Patient is a 75 year old Lao speaking male with PMH of HTN, HLD, DM, presents with Acute Pulmonary Edema in the setting of HTN emergency with possible acute stroke.    PLAN:  NEURO:   #Code stroke in the ER  -CTA: No hemorrhage, no intracranial lesions  -Rt Carotid: 50% stenosis  -Lt Carotid: 90% stenosis  -vertebral circulation: left subclavian artery with segmental occulsion betweeen the throacic aorta and the left vertebral origin  -Anterior intracranial circulation: atherosclerosis, moderate on rt and severe on left  -posterior circulation: patent rt basilar and posterior arteries, left vertebral with reveraold of flow from the basilar to its PICA  -on exam patient drifts to the left side, sort of ignores the right side  -right hand weaker than left  -will continue with neuro checks  -will follow up with neuro recommendations  -holding off on heparin gtt as per neuro  -loaded with asa and plavix in ER    PULMONARY:  Upon arrival to CCU, patient with a flash pulmonary edema  -BIPAP 14/7 and Fio2 35%  -maintain SPO2 >95%  -will repeat CXR in am    #Concern for Aspiration   -patient with cough after medications were given  -speech and swallow ordered    CARDIAC:  EKG with NSR LVH, ST depressions in multiple leads  bigeminy and PVCx on tele  #NSTEMI:  -elevated troponin of 148  NSR on tele  loaded with ASA and plavix in the ER, as per neuro, would hold hep gtt for now  -lipitor 80 added  -nitro gtt to reduce systolic BP from 180 to 140 goal systolic  -ECHO done, results are pending  -rachna ltrend enzymes  -would defer cardiac cath in setting of possible acute stroke for now      HTN Emergency:  continue nitro gtt  goal SBP 140s    HLD:  lipito 80  lipid profile pending    GI:  Abdomen is distended    :  Normal renal indices  strict intake and output  indwelling catheter    ID:  Leukocytosis  elevated temp  Pan cultured  vanc/zosyn initiated  MRSA swab done  COVID and flu neg    ENDO:  DM  HGBA1c is pending    VTE PPX  HSQ Patient is a 75 year old Turkish speaking male with PMH of HTN, HLD, DM, presents with Acute Pulmonary Edema in the setting of HTN emergency with possible acute stroke.    PLAN:  NEURO:   #Code stroke in the ER  -patient with aphasia and gazing to left side  -CTA: No hemorrhage, no intracranial lesions  -Rt Carotid: 50% stenosis  -Lt Carotid: 90% stenosis  -vertebral circulation: left subclavian artery with segmental occulsion betweeen the throacic aorta and the left vertebral origin  -Anterior intracranial circulation: atherosclerosis, moderate on rt and severe on left  -posterior circulation: patent rt basilar and posterior arteries, left vertebral with reveraold of flow from the basilar to its PICA  -on exam patient drifts to the left side, sort of ignores the right side  -right hand weaker than left  -will continue with neuro checks  -will follow up with neuro recommendations  -holding off on heparin gtt as per neuro  -loaded with asa and plavix in ER    PULMONARY:  Upon arrival to CCU, patient with a flash pulmonary edema  -BIPAP 14/7 and Fio2 35%  -maintain SPO2 >95%  -will repeat CXR in am    #Concern for Aspiration   -patient with cough after medications were given  -speech and swallow ordered    CARDIAC:  EKG with NSR LVH, ST depressions in multiple leads  bigeminy and PVCx on tele  #NSTEMI:  -elevated troponin of 148  NSR on tele  loaded with ASA and plavix in the ER, as per neuro, would hold hep gtt for now  -lipitor 80 added  -nitro gtt to reduce systolic BP from 180 to 140 goal systolic  -ECHO done, results are pending  -rachna ltrend enzymes  -would defer cardiac cath in setting of possible acute stroke for now      HTN Emergency:  continue nitro gtt  goal SBP 140s    HLD:  lipito 80  lipid profile pending    GI:  Abdomen is distended    :  Normal renal indices  strict intake and output  indwelling catheter    ID:  Leukocytosis  elevated temp  Pan cultured  vanc/zosyn initiated  MRSA swab done  COVID and flu neg    ENDO:  DM  HGBA1c is pending    VTE PPX  HSQ

## 2024-10-27 NOTE — ED ADULT NURSE NOTE - NSFALLFACTORS_ED_ALL_ED
PHYSICAL THERAPY TREATMENT NOTE - INPATIENT    Room Number: 457/457-A       Presenting Problem:  (weakness, confusion)    Problem List  Active Problems:    Altered mental status    Goals of care, counseling/discussion    Advance care planning      ASSESSM Encouraged pt for left ankle pumps . Pt using left UE throughout session. PT will continue to follow this admission progressing as approp. D/c plans pending progress and further acute management.      Presently pt will require 24 hr assisted skilled Weakness '6-Clicks' INPATIENT SHORT FORM - BASIC MOBILITY  How much difficulty does the patient currently have. ..  -   Turning over in bed (including adjusting bedclothes, sheets and blankets)?: Unable   -   Sitting down on and standing up from a chair with arms (e

## 2024-10-27 NOTE — H&P ADULT - NSHPOUTPATIENTPROVIDERS_GEN_ALL_CORE
Problem: Pain:  Goal: Pain level will decrease  Description: Pain level will decrease  Outcome: Ongoing  Goal: Control of acute pain  Description: Control of acute pain  Outcome: Ongoing  Goal: Control of chronic pain  Description: Control of chronic pain  Outcome: Ongoing     Problem: Wound:  Goal: Will show signs of wound healing; wound closure and no evidence of infection  Description: Will show signs of wound healing; wound closure and no evidence of infection  Outcome: Ongoing     Problem: Blood Glucose:  Goal: Ability to maintain appropriate glucose levels will improve  Description: Ability to maintain appropriate glucose levels will improve  Outcome: Ongoing Dr. Odette Gomez

## 2024-10-27 NOTE — ED PROVIDER NOTE - ATTENDING CONTRIBUTION TO CARE
Ez JARRETT: Please see the HPI, ROS, PE and MDM as authored by me. I personally made the management plan, and take responsibility for the patient's management.

## 2024-10-27 NOTE — CONSULT NOTE ADULT - SUBJECTIVE AND OBJECTIVE BOX
**STROKE CODE CONSULT NOTE**    Last known well time/Time of onset of symptoms: 10/26 @2100    HPI: Maxwell Taylor is 69-year-old right handed male with PMHx COPD, DM, HTN who presented to the ED due to shortness of breath over several days which acutely worsened today requiring BiPAP. While in the ED, family noted that this morning patient appeared confused and was unable to move his right arm while getting dressed. He also seemed weak and unable to walk.  Patient was last seen at his baseline yesterday evening at 2100 when he was AAOx3 and did not have difficulties using his right side.     PAST MEDICAL & SURGICAL HISTORY:      FAMILY HISTORY:      SOCIAL HISTORY:  Smoking Cessation: Discussed    ROS:  Constitutional: No fever, weight loss or fatigue  Eyes: No eye pain, visual disturbances, or discharge  ENMT:  No difficulty hearing, tinnitus, vertigo; No sinus or throat pain  Neck: No pain or stiffness  Respiratory: No cough, wheezing, chills or hemoptysis  Cardiovascular: No chest pain, palpitations, shortness of breath, dizziness or leg swelling  Gastrointestinal: No abdominal pain. No nausea, vomiting or hematemesis; No diarrhea or constipation. Nohematochezia.  Genitourinary: No dysuria, frequency, hematuria or incontinence  Neurological: As per HPI  Skin: No itching, burning, rashes or lesions   Endocrine: No heat or cold intolerance; No hair loss  Musculoskeletal: No joint pain or swelling; No muscle, back or extremity pain  Psychiatric: No depression, anxiety, mood swings or difficulty sleeping  Heme/Lymph: No easy bruising or bleeding gums    MEDICATIONS  (STANDING):  nitroglycerin  Infusion 400 MICROgram(s)/Min (120 mL/Hr) IV Continuous <Continuous>    MEDICATIONS  (PRN):      Allergies    No Known Allergies    Intolerances        Vital Signs Last 24 Hrs  T(C): 36.7 (27 Oct 2024 11:32), Max: 36.7 (27 Oct 2024 11:32)  T(F): 98 (27 Oct 2024 11:32), Max: 98 (27 Oct 2024 11:32)  HR: 92 (27 Oct 2024 11:32) (89 - 98)  BP: 179/90 (27 Oct 2024 11:32) (145/79 - 190/110)  BP(mean): 107 (27 Oct 2024 11:32) (107 - 139)  RR: 27 (27 Oct 2024 11:32) (25 - 30)  SpO2: 100% (27 Oct 2024 11:32) (96% - 100%)    Parameters below as of 27 Oct 2024 11:32  Patient On (Oxygen Delivery Method): BiPAP/CPAP        PHYSICAL EXAM:  Constitutional: WDWN; NAD  Cardiovascular: RRR, no appreciable murmurs; no carotid bruits  Neurologic:  Mental status: Awake, alert and oriented x3.  Recent and remote memory intact.  Naming, repetition and comprehension intact.  Attention/concentration intact.  No dysarthria, no aphasia.  Fund of knowledge appropriate.    Cranial nerves: Fundoscopic exam demonstrated no abnormalities, pupils equally round and reactive to light, visual fields full, no nystagmus, extraocular muscles intact, V1 through V3 intact bilaterally and symmetric, face symmetric, hearing intact to finger rub, palate elevation symmetric, tongue was midline, sternocleidomastoid/shoulder shrug strength bilaterally 5/5.    Motor:  Normal bulk and tone, strength 5/5 in bilateral upper and lower extremities.   strength 5/5.  Rapid alternating movements intact and symmetric.   Sensation: Intact to light touch, proprioception, and pinprick.  No neglect.   Coordination: No dysmetria on finger-to-nose and heel-to-shin.  No clumsiness.  Reflexes: 2+ in upper and lower extremities, downgoing toes bilaterally  Gait: Narrow and steady. No ataxia.  Romberg negative    NIHSS:    Fingerstick Blood Glucose: CAPILLARY BLOOD GLUCOSE  184 (27 Oct 2024 11:52)      POCT Blood Glucose.: 184 mg/dL (27 Oct 2024 10:57)       LABS:                        12.5   18.18 )-----------( 426      ( 27 Oct 2024 11:00 )             37.5     10-27    137  |  101  |  23  ----------------------------<  221[H]  3.9   |  22  |  1.19    Ca    9.1      27 Oct 2024 11:00    TPro  7.7  /  Alb  4.0  /  TBili  0.8  /  DBili  x   /  AST  48[H]  /  ALT  44[H]  /  AlkPhos  78  10-27    PT/INR - ( 27 Oct 2024 11:00 )   PT: 13.4 sec;   INR: 1.16 ratio         PTT - ( 27 Oct 2024 11:00 )  PTT:25.0 sec  CARDIAC MARKERS ( 27 Oct 2024 11:00 )  x     / x     / x     / x     / 14.7 ng/mL      Urinalysis Basic - ( 27 Oct 2024 11:00 )    Color: x / Appearance: x / SG: x / pH: x  Gluc: 221 mg/dL / Ketone: x  / Bili: x / Urobili: x   Blood: x / Protein: x / Nitrite: x   Leuk Esterase: x / RBC: x / WBC x   Sq Epi: x / Non Sq Epi: x / Bacteria: x        RADIOLOGY & ADDITIONAL STUDIES:    IV-tenecteplase(Y/N):                                   Bolus time:  Reason IV-tenecteplase not given:   **STROKE CODE CONSULT NOTE**    Last known well time/Time of onset of symptoms: 10/26 @2100    Interview conducted with patient's sons translating at the bedside.  HPI: Maxwell Taylor is 69-year-old right handed Kinyarwanda-speaking male with PMHx COPD, DM, HTN who presented to the ED due to shortness of breath over 3-4 days which acutely worsened today requiring BiPAP. While in the ED, patient's sons reported that this morning when the patient was attempting to get dressed he was unable to move his right arm. He also appeared slightly confused and required assistance to walk. Patient was last seen at his baseline yesterday evening at 2100 when he was AAOx3 and did not have difficulties using his right side. Initial BP in the ED Patient takes aspirin for cardioprotection. Denies history of stroke.    PAST MEDICAL & SURGICAL HISTORY:      FAMILY HISTORY:      SOCIAL HISTORY:  Smoking Cessation: Discussed    ROS:  Constitutional: No fever, weight loss or fatigue  Eyes: No eye pain, visual disturbances, or discharge  ENMT:  No difficulty hearing, tinnitus, vertigo; No sinus or throat pain  Neck: No pain or stiffness  Respiratory: No cough, wheezing, chills or hemoptysis  Cardiovascular: No chest pain, palpitations, shortness of breath, dizziness or leg swelling  Gastrointestinal: No abdominal pain. No nausea, vomiting or hematemesis; No diarrhea or constipation. Nohematochezia.  Genitourinary: No dysuria, frequency, hematuria or incontinence  Neurological: As per HPI  Skin: No itching, burning, rashes or lesions   Endocrine: No heat or cold intolerance; No hair loss  Musculoskeletal: No joint pain or swelling; No muscle, back or extremity pain  Psychiatric: No depression, anxiety, mood swings or difficulty sleeping  Heme/Lymph: No easy bruising or bleeding gums    MEDICATIONS  (STANDING):  nitroglycerin  Infusion 400 MICROgram(s)/Min (120 mL/Hr) IV Continuous <Continuous>    MEDICATIONS  (PRN):      Allergies    No Known Allergies    Intolerances        Vital Signs Last 24 Hrs  T(C): 36.7 (27 Oct 2024 11:32), Max: 36.7 (27 Oct 2024 11:32)  T(F): 98 (27 Oct 2024 11:32), Max: 98 (27 Oct 2024 11:32)  HR: 92 (27 Oct 2024 11:32) (89 - 98)  BP: 179/90 (27 Oct 2024 11:32) (145/79 - 190/110)  BP(mean): 107 (27 Oct 2024 11:32) (107 - 139)  RR: 27 (27 Oct 2024 11:32) (25 - 30)  SpO2: 100% (27 Oct 2024 11:32) (96% - 100%)    Parameters below as of 27 Oct 2024 11:32  Patient On (Oxygen Delivery Method): BiPAP/CPAP        PHYSICAL EXAM:  Constitutional: WDWN; NAD  Cardiovascular: RRR, no appreciable murmurs; no carotid bruits  Neurologic:  Mental status: Awake, alert and oriented x3.  Recent and remote memory intact.  Naming, repetition and comprehension intact.  Attention/concentration intact.  No dysarthria, no aphasia.  Fund of knowledge appropriate.    Cranial nerves: Fundoscopic exam demonstrated no abnormalities, pupils equally round and reactive to light, visual fields full, no nystagmus, extraocular muscles intact, V1 through V3 intact bilaterally and symmetric, face symmetric, hearing intact to finger rub, palate elevation symmetric, tongue was midline, sternocleidomastoid/shoulder shrug strength bilaterally 5/5.    Motor:  Normal bulk and tone, strength 5/5 in bilateral upper and lower extremities.   strength 5/5.  Rapid alternating movements intact and symmetric.   Sensation: Intact to light touch, proprioception, and pinprick.  No neglect.   Coordination: No dysmetria on finger-to-nose and heel-to-shin.  No clumsiness.  Reflexes: 2+ in upper and lower extremities, downgoing toes bilaterally  Gait: Narrow and steady. No ataxia.  Romberg negative    NIHSS:    Fingerstick Blood Glucose: CAPILLARY BLOOD GLUCOSE  184 (27 Oct 2024 11:52)      POCT Blood Glucose.: 184 mg/dL (27 Oct 2024 10:57)       LABS:                        12.5   18.18 )-----------( 426      ( 27 Oct 2024 11:00 )             37.5     10-27    137  |  101  |  23  ----------------------------<  221[H]  3.9   |  22  |  1.19    Ca    9.1      27 Oct 2024 11:00    TPro  7.7  /  Alb  4.0  /  TBili  0.8  /  DBili  x   /  AST  48[H]  /  ALT  44[H]  /  AlkPhos  78  10-27    PT/INR - ( 27 Oct 2024 11:00 )   PT: 13.4 sec;   INR: 1.16 ratio         PTT - ( 27 Oct 2024 11:00 )  PTT:25.0 sec  CARDIAC MARKERS ( 27 Oct 2024 11:00 )  x     / x     / x     / x     / 14.7 ng/mL      Urinalysis Basic - ( 27 Oct 2024 11:00 )    Color: x / Appearance: x / SG: x / pH: x  Gluc: 221 mg/dL / Ketone: x  / Bili: x / Urobili: x   Blood: x / Protein: x / Nitrite: x   Leuk Esterase: x / RBC: x / WBC x   Sq Epi: x / Non Sq Epi: x / Bacteria: x        RADIOLOGY & ADDITIONAL STUDIES:    IV-tenecteplase(Y/N):                                   Bolus time:  Reason IV-tenecteplase not given:   **STROKE CODE CONSULT NOTE**    Last known well time/Time of onset of symptoms: 10/26 @2100    Interview conducted with patient's sons translating at the bedside.  HPI: Maxwell Taylor is 69-year-old right handed Turkish-speaking male with PMHx COPD, DM, HTN who presented to the ED due to shortness of breath over 3-4 days which acutely worsened today requiring BiPAP. While in the ED, patient's sons reported that this morning when the patient was attempting to get dressed he was unable to move his right arm. He also appeared slightly confused and required assistance to walk. Patient was last seen at his baseline yesterday evening at 2100 when he was AAOx3 and did not have difficulties using his right side. Initial BP in the ED Patient takes aspirin for cardioprotection. Denies history of stroke.    In ED, patient received...    pre-mRS: 2  BP: 190/110  FS: 221    PAST MEDICAL & SURGICAL HISTORY:  COPD  HTN  DM      FAMILY HISTORY:      SOCIAL HISTORY:  Smoking Cessation:    ROS:  +shortness of breath    MEDICATIONS  (STANDING):  nitroglycerin  Infusion 400 MICROgram(s)/Min (120 mL/Hr) IV Continuous <Continuous>    MEDICATIONS  (PRN):      Allergies    No Known Allergies    Intolerances        Vital Signs Last 24 Hrs  T(C): 36.7 (27 Oct 2024 11:32), Max: 36.7 (27 Oct 2024 11:32)  T(F): 98 (27 Oct 2024 11:32), Max: 98 (27 Oct 2024 11:32)  HR: 92 (27 Oct 2024 11:32) (89 - 98)  BP: 179/90 (27 Oct 2024 11:32) (145/79 - 190/110)  BP(mean): 107 (27 Oct 2024 11:32) (107 - 139)  RR: 27 (27 Oct 2024 11:32) (25 - 30)  SpO2: 100% (27 Oct 2024 11:32) (96% - 100%)    Parameters below as of 27 Oct 2024 11:32  Patient On (Oxygen Delivery Method): BiPAP/CPAP        PHYSICAL EXAM:  Constitutional: On BiPAP, no apparent respiratory distress    Neurologic:  Mental status: Awake and alert, oriented only to self. Follows some simple commands but has some difficulty following simple and complex commands. Naming and repetition intact. Occasional word finding difficulties.    Cranial nerves: Pupils equally round and reactive to light, visual fields full, no nystagmus. Left gaze preference, V1 through V3 intact bilaterally and symmetric, R facial droop with smiling, tongue was midline, unable to assess palate due to BiPAP machine, sternocleidomastoid/shoulder shrug strength bilaterally 5/5.    Motor:  Normal bulk and tone, mild drift of right upper and lower extremities.   Strength testing            Deltoid(C5)  Biceps(C6)    Triceps(C7)     Wrist Extension    Wrist Flexion (C8)     Interossei  (T1)       R            4                 4                        4                     4-                              4               4                      4  L             5                 5                        5                     5                              5                5                      5              Hip Flexion(L2/3)    Hip Extension (L4/5)   Knee Flexion (L4/5/S1)    Knee Extension (L3/4)   Dorsiflexion (L4/5)   Plantar Flexion (S1)  R              4+                            5                              5                                   4+                               5                           4+  L              5                               5                              5                                   4+                               5                          5    Sensation - Light touch/temperature intact and symmetric in fingers and toes     DTR's -             Biceps      Triceps     Brachioradialis      Patellar    Ankle    Toes/plantar response  R             2+             2+                  2+                3+         2+                 Down  L              2+             2+                 2+                3+         2+                 Down    Coordination - There is no dysmetria on finger-to-nose, unable to perform HTS due to confusion    Gait and station - Deferred due to patient requiring BiPAP    NIHSS: 7    Fingerstick Blood Glucose: CAPILLARY BLOOD GLUCOSE  184 (27 Oct 2024 11:52)      POCT Blood Glucose.: 184 mg/dL (27 Oct 2024 10:57)       LABS:                        12.5   18.18 )-----------( 426      ( 27 Oct 2024 11:00 )             37.5     10-27    137  |  101  |  23  ----------------------------<  221[H]  3.9   |  22  |  1.19    Ca    9.1      27 Oct 2024 11:00    TPro  7.7  /  Alb  4.0  /  TBili  0.8  /  DBili  x   /  AST  48[H]  /  ALT  44[H]  /  AlkPhos  78  10-27    PT/INR - ( 27 Oct 2024 11:00 )   PT: 13.4 sec;   INR: 1.16 ratio         PTT - ( 27 Oct 2024 11:00 )  PTT:25.0 sec  CARDIAC MARKERS ( 27 Oct 2024 11:00 )  x     / x     / x     / x     / 14.7 ng/mL      Urinalysis Basic - ( 27 Oct 2024 11:00 )    Color: x / Appearance: x / SG: x / pH: x  Gluc: 221 mg/dL / Ketone: x  / Bili: x / Urobili: x   Blood: x / Protein: x / Nitrite: x   Leuk Esterase: x / RBC: x / WBC x   Sq Epi: x / Non Sq Epi: x / Bacteria: x        RADIOLOGY & ADDITIONAL STUDIES:    < from: CT Angio Neck w/ IV Cont (10.27.24 @ 12:20) >  IMPRESSION:    1. BRAIN:  No intracranial hemorrhage is appreciated.  No intracranial   mass lesion is appreciated.    2.  RIGHT CAROTID SYSTEM:    Atherosclerotic plaque common carotid and   internal carotid artery with maximal stenosis approximately 50%.    3.   LEFT CAROTID SYSTEM:     Atherosclerotic plaque common carotid and   internal carotid artery with maximal stenosis approximately 90%.    4.   VERTEBRAL CIRCULATION:    Patent. 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 Flow directionality of the left vertebral artery is not   determined by this technique. Consider ultrasound in this evaluation, as   required    5.  ANTERIOR INTRACRANIAL CIRCULATION:Intracranial atherosclerosis   cavernous and clinoid segments of the internal carotid arteries, moderate   on the right and severe on the left.    6.  POSTERIOR INTRACRANIAL CIRCULATION:    Patent right vertebral basilar   and posterior cerebral arteries. 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    7.  No large vessel occlusion    8.  BRAIN PERFUSION:   No acute infarction of the brain is convincingly   demonstrated.  Relative ischemia is demonstrated in the left cerebral   hemispheric anterior corona radiata white matter.    9. Pulmonary edema pattern     **STROKE CODE CONSULT NOTE**    Last known well time/Time of onset of symptoms: 10/26 @2100    Patient's sons at bedside assisting with Armenian translation.  HPI: Maxwell Taylor is 69-year-old right handed Armenian-speaking male with PMHx COPD, DM, HTN who presented to the ED due to shortness of breath over 3-4 days which acutely worsened today. Patient also initially complained of chest pain and noted with elevated BP to 190/110. Acute management in the ED included BiPAP, lasix. Patient was placed on nitroglycerin drip due to concern for possible ACS. Chest xray noted with bilateral pulmonary edema which appears cardiac in origin. Patient's respiratory status stabilized on BiPAP.  While in the ED, patient's sons reported that patient also began to have right arm weakness first noticed this morning. During this time, patient also appeared slightly confused and required assistance to walk. Patient was last seen at his baseline yesterday evening at 2100 when he was AAOx3 and did not have difficulties using his right side. Code stroke was called for right sided weakness.     Upon stroke team assessment, patient is on BiPAP and does not appear in respiratory distress. BP at time of assessment is 130s systolic (nitroglycerin drip was previously discontinued). Patient denies history of stroke. Patient takes aspirin for cardioprotection. Ambulates independently at baseline, lives with family and requires some assistance with taking medications.     pre-mRS: 2  BP: 190/110  FS: 221    PAST MEDICAL & SURGICAL HISTORY:  COPD  HTN  DM      FAMILY HISTORY:      SOCIAL HISTORY:  Smoking Cessation:    ROS:  +shortness of breath    MEDICATIONS  (STANDING):  nitroglycerin  Infusion 400 MICROgram(s)/Min (120 mL/Hr) IV Continuous <Continuous>    MEDICATIONS  (PRN):      Allergies    No Known Allergies    Intolerances        Vital Signs Last 24 Hrs  T(C): 36.7 (27 Oct 2024 11:32), Max: 36.7 (27 Oct 2024 11:32)  T(F): 98 (27 Oct 2024 11:32), Max: 98 (27 Oct 2024 11:32)  HR: 92 (27 Oct 2024 11:32) (89 - 98)  BP: 179/90 (27 Oct 2024 11:32) (145/79 - 190/110)  BP(mean): 107 (27 Oct 2024 11:32) (107 - 139)  RR: 27 (27 Oct 2024 11:32) (25 - 30)  SpO2: 100% (27 Oct 2024 11:32) (96% - 100%)    Parameters below as of 27 Oct 2024 11:32  Patient On (Oxygen Delivery Method): BiPAP/CPAP        PHYSICAL EXAM:  Constitutional: On BiPAP, no apparent respiratory distress    Neurologic:  Mental status: Awake and alert, oriented only to self. Follows some simple commands but has some difficulty following simple and complex commands. Naming and repetition intact. Occasional word finding difficulties.    Cranial nerves: Pupils equally round and reactive to light, visual fields full, no nystagmus. Left gaze preference, V1 through V3 intact bilaterally and symmetric, R facial droop with smiling, tongue was midline, unable to assess palate due to BiPAP machine, sternocleidomastoid/shoulder shrug strength bilaterally 5/5.    Motor:  Normal bulk and tone, mild drift of right upper and lower extremities.   Strength testing            Deltoid(C5)  Biceps(C6)    Triceps(C7)     Wrist Extension    Wrist Flexion (C8)     Interossei  (T1)       R            4                 4                        4                     4-                              4               4                      4  L             5                 5                        5                     5                              5                5                      5              Hip Flexion(L2/3)    Hip Extension (L4/5)   Knee Flexion (L4/5/S1)    Knee Extension (L3/4)   Dorsiflexion (L4/5)   Plantar Flexion (S1)  R              4+                            5                              5                                   4+                               5                           4+  L              5                               5                              5                                   4+                               5                          5    Sensation - Light touch/temperature intact and symmetric in fingers and toes     DTR's -             Biceps      Triceps     Brachioradialis      Patellar    Ankle    Toes/plantar response  R             2+             2+                  2+                3+         2+                 Down  L              2+             2+                 2+                3+         2+                 Down    Coordination - There is no dysmetria on finger-to-nose, unable to perform HTS due to confusion    Gait and station - Deferred due to patient requiring BiPAP    NIHSS: 7    Fingerstick Blood Glucose: CAPILLARY BLOOD GLUCOSE  184 (27 Oct 2024 11:52)      POCT Blood Glucose.: 184 mg/dL (27 Oct 2024 10:57)       LABS:                        12.5   18.18 )-----------( 426      ( 27 Oct 2024 11:00 )             37.5     10-27    137  |  101  |  23  ----------------------------<  221[H]  3.9   |  22  |  1.19    Ca    9.1      27 Oct 2024 11:00    TPro  7.7  /  Alb  4.0  /  TBili  0.8  /  DBili  x   /  AST  48[H]  /  ALT  44[H]  /  AlkPhos  78  10-27    PT/INR - ( 27 Oct 2024 11:00 )   PT: 13.4 sec;   INR: 1.16 ratio         PTT - ( 27 Oct 2024 11:00 )  PTT:25.0 sec  CARDIAC MARKERS ( 27 Oct 2024 11:00 )  x     / x     / x     / x     / 14.7 ng/mL      Urinalysis Basic - ( 27 Oct 2024 11:00 )    Color: x / Appearance: x / SG: x / pH: x  Gluc: 221 mg/dL / Ketone: x  / Bili: x / Urobili: x   Blood: x / Protein: x / Nitrite: x   Leuk Esterase: x / RBC: x / WBC x   Sq Epi: x / Non Sq Epi: x / Bacteria: x        RADIOLOGY & ADDITIONAL STUDIES:    < from: CT Angio Neck w/ IV Cont (10.27.24 @ 12:20) >  IMPRESSION:    1. BRAIN:  No intracranial hemorrhage is appreciated.  No intracranial   mass lesion is appreciated.    2.  RIGHT CAROTID SYSTEM:    Atherosclerotic plaque common carotid and   internal carotid artery with maximal stenosis approximately 50%.    3.   LEFT CAROTID SYSTEM:     Atherosclerotic plaque common carotid and   internal carotid artery with maximal stenosis approximately 90%.    4.   VERTEBRAL CIRCULATION:    Patent. 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 Flow directionality of the left vertebral artery is not   determined by this technique. Consider ultrasound in this evaluation, as   required    5.  ANTERIOR INTRACRANIAL CIRCULATION:Intracranial atherosclerosis   cavernous and clinoid segments of the internal carotid arteries, moderate   on the right and severe on the left.    6.  POSTERIOR INTRACRANIAL CIRCULATION:    Patent right vertebral basilar   and posterior cerebral arteries. 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    7.  No large vessel occlusion    8.  BRAIN PERFUSION:   No acute infarction of the brain is convincingly   demonstrated.  Relative ischemia is demonstrated in the left cerebral   hemispheric anterior corona radiata white matter.    9. Pulmonary edema pattern

## 2024-10-27 NOTE — H&P ADULT - NSHPLANGTRANSLATORFT_GEN_A_CORE
Family preferrred to translate given patient likely had a stroke Family preferred to translate given patient likely had a stroke

## 2024-10-27 NOTE — ED PROVIDER NOTE - OBJECTIVE STATEMENT
DO Ez, EM Attendin-year-old male with a past medical history of hypertension, diabetes, presenting due to concern of difficulty breathing this morning.  At home patient was complaining of worsening shortness of breath.  Family called EMS initial oxygen saturation was 80% on room air.  Placed on CPAP with improvement.  On arrival to the ED patient complaining of chest pain, shortness of breath.  States is improved with the CPAP. According to EMS initial BP was 140s over 90s. On arrival to the ED initial /110.  Patient in moderate respiratory distress tachypneic to 30s, O2 saturation 98% on CPAP. Per EMS some concern for lethargy which they state is improving.,

## 2024-10-28 ENCOUNTER — TRANSCRIPTION ENCOUNTER (OUTPATIENT)
Age: 69
End: 2024-10-28

## 2024-10-28 LAB
A1C WITH ESTIMATED AVERAGE GLUCOSE RESULT: 9.4 % — HIGH (ref 4–5.6)
ADD ON TEST-SPECIMEN IN LAB: SIGNIFICANT CHANGE UP
ADD ON TEST-SPECIMEN IN LAB: SIGNIFICANT CHANGE UP
ALBUMIN SERPL ELPH-MCNC: 3.5 G/DL — SIGNIFICANT CHANGE UP (ref 3.3–5)
ALP SERPL-CCNC: 65 U/L — SIGNIFICANT CHANGE UP (ref 40–120)
ALT FLD-CCNC: 30 U/L — SIGNIFICANT CHANGE UP (ref 4–41)
ANION GAP SERPL CALC-SCNC: 13 MMOL/L — SIGNIFICANT CHANGE UP (ref 7–14)
AST SERPL-CCNC: 39 U/L — SIGNIFICANT CHANGE UP (ref 4–40)
BASOPHILS # BLD AUTO: 0.03 K/UL — SIGNIFICANT CHANGE UP (ref 0–0.2)
BASOPHILS NFR BLD AUTO: 0.2 % — SIGNIFICANT CHANGE UP (ref 0–2)
BILIRUB SERPL-MCNC: 0.8 MG/DL — SIGNIFICANT CHANGE UP (ref 0.2–1.2)
BUN SERPL-MCNC: 22 MG/DL — SIGNIFICANT CHANGE UP (ref 7–23)
CALCIUM SERPL-MCNC: 8.5 MG/DL — SIGNIFICANT CHANGE UP (ref 8.4–10.5)
CHLORIDE SERPL-SCNC: 99 MMOL/L — SIGNIFICANT CHANGE UP (ref 98–107)
CHOLEST SERPL-MCNC: 130 MG/DL — SIGNIFICANT CHANGE UP
CK SERPL-CCNC: 770 U/L — HIGH (ref 30–200)
CO2 SERPL-SCNC: 24 MMOL/L — SIGNIFICANT CHANGE UP (ref 22–31)
CREAT SERPL-MCNC: 1.47 MG/DL — HIGH (ref 0.5–1.3)
EGFR: 51 ML/MIN/1.73M2 — LOW
EOSINOPHIL # BLD AUTO: 0.05 K/UL — SIGNIFICANT CHANGE UP (ref 0–0.5)
EOSINOPHIL NFR BLD AUTO: 0.4 % — SIGNIFICANT CHANGE UP (ref 0–6)
ESTIMATED AVERAGE GLUCOSE: 223 — SIGNIFICANT CHANGE UP
GLUCOSE BLDC GLUCOMTR-MCNC: 141 MG/DL — HIGH (ref 70–99)
GLUCOSE BLDC GLUCOMTR-MCNC: 185 MG/DL — HIGH (ref 70–99)
GLUCOSE BLDC GLUCOMTR-MCNC: 185 MG/DL — HIGH (ref 70–99)
GLUCOSE BLDC GLUCOMTR-MCNC: 236 MG/DL — HIGH (ref 70–99)
GLUCOSE SERPL-MCNC: 196 MG/DL — HIGH (ref 70–99)
HCT VFR BLD CALC: 33.9 % — LOW (ref 39–50)
HDLC SERPL-MCNC: 26 MG/DL — LOW
HGB BLD-MCNC: 11.1 G/DL — LOW (ref 13–17)
IANC: 9.01 K/UL — HIGH (ref 1.8–7.4)
IMM GRANULOCYTES NFR BLD AUTO: 0.5 % — SIGNIFICANT CHANGE UP (ref 0–0.9)
LACTATE SERPL-SCNC: 1.4 MMOL/L — SIGNIFICANT CHANGE UP (ref 0.5–2)
LIPID PNL WITH DIRECT LDL SERPL: 87 MG/DL — SIGNIFICANT CHANGE UP
LYMPHOCYTES # BLD AUTO: 17.2 % — SIGNIFICANT CHANGE UP (ref 13–44)
LYMPHOCYTES # BLD AUTO: 2.14 K/UL — SIGNIFICANT CHANGE UP (ref 1–3.3)
MAGNESIUM SERPL-MCNC: 1.9 MG/DL — SIGNIFICANT CHANGE UP (ref 1.6–2.6)
MCHC RBC-ENTMCNC: 28.5 PG — SIGNIFICANT CHANGE UP (ref 27–34)
MCHC RBC-ENTMCNC: 32.7 GM/DL — SIGNIFICANT CHANGE UP (ref 32–36)
MCV RBC AUTO: 86.9 FL — SIGNIFICANT CHANGE UP (ref 80–100)
MONOCYTES # BLD AUTO: 1.13 K/UL — HIGH (ref 0–0.9)
MONOCYTES NFR BLD AUTO: 9.1 % — SIGNIFICANT CHANGE UP (ref 2–14)
MRSA PCR RESULT.: SIGNIFICANT CHANGE UP
NEUTROPHILS # BLD AUTO: 9.01 K/UL — HIGH (ref 1.8–7.4)
NEUTROPHILS NFR BLD AUTO: 72.6 % — SIGNIFICANT CHANGE UP (ref 43–77)
NON HDL CHOLESTEROL: 104 MG/DL — SIGNIFICANT CHANGE UP
NRBC # BLD: 0 /100 WBCS — SIGNIFICANT CHANGE UP (ref 0–0)
NRBC # FLD: 0 K/UL — SIGNIFICANT CHANGE UP (ref 0–0)
PHOSPHATE SERPL-MCNC: 4.1 MG/DL — SIGNIFICANT CHANGE UP (ref 2.5–4.5)
PLATELET # BLD AUTO: 383 K/UL — SIGNIFICANT CHANGE UP (ref 150–400)
POTASSIUM SERPL-MCNC: 3.2 MMOL/L — LOW (ref 3.5–5.3)
POTASSIUM SERPL-SCNC: 3.2 MMOL/L — LOW (ref 3.5–5.3)
PROT SERPL-MCNC: 6.4 G/DL — SIGNIFICANT CHANGE UP (ref 6–8.3)
RBC # BLD: 3.9 M/UL — LOW (ref 4.2–5.8)
RBC # FLD: 13.3 % — SIGNIFICANT CHANGE UP (ref 10.3–14.5)
S AUREUS DNA NOSE QL NAA+PROBE: SIGNIFICANT CHANGE UP
SODIUM SERPL-SCNC: 136 MMOL/L — SIGNIFICANT CHANGE UP (ref 135–145)
TRIGL SERPL-MCNC: 87 MG/DL — SIGNIFICANT CHANGE UP
TROPONIN T, HIGH SENSITIVITY RESULT: 236 NG/L — CRITICAL HIGH
WBC # BLD: 12.42 K/UL — HIGH (ref 3.8–10.5)
WBC # FLD AUTO: 12.42 K/UL — HIGH (ref 3.8–10.5)

## 2024-10-28 PROCEDURE — 71045 X-RAY EXAM CHEST 1 VIEW: CPT | Mod: 26

## 2024-10-28 PROCEDURE — 99291 CRITICAL CARE FIRST HOUR: CPT

## 2024-10-28 PROCEDURE — 99223 1ST HOSP IP/OBS HIGH 75: CPT

## 2024-10-28 RX ORDER — HEPARIN SODIUM 10000 [USP'U]/ML
5000 INJECTION INTRAVENOUS; SUBCUTANEOUS EVERY 8 HOURS
Refills: 0 | Status: DISCONTINUED | OUTPATIENT
Start: 2024-10-28 | End: 2024-10-31

## 2024-10-28 RX ORDER — INSULIN LISPRO 100/ML
VIAL (ML) SUBCUTANEOUS EVERY 6 HOURS
Refills: 0 | Status: DISCONTINUED | OUTPATIENT
Start: 2024-10-28 | End: 2024-10-29

## 2024-10-28 RX ORDER — ACETAMINOPHEN 500 MG
650 TABLET ORAL EVERY 6 HOURS
Refills: 0 | Status: DISCONTINUED | OUTPATIENT
Start: 2024-10-28 | End: 2024-11-05

## 2024-10-28 RX ORDER — POTASSIUM CHLORIDE 10 MEQ
10 TABLET, EXTENDED RELEASE ORAL ONCE
Refills: 0 | Status: COMPLETED | OUTPATIENT
Start: 2024-10-28 | End: 2024-10-28

## 2024-10-28 RX ORDER — FUROSEMIDE 40 MG
80 TABLET ORAL ONCE
Refills: 0 | Status: COMPLETED | OUTPATIENT
Start: 2024-10-28 | End: 2024-10-28

## 2024-10-28 RX ORDER — POTASSIUM CHLORIDE 10 MEQ
40 TABLET, EXTENDED RELEASE ORAL EVERY 4 HOURS
Refills: 0 | Status: DISCONTINUED | OUTPATIENT
Start: 2024-10-28 | End: 2024-10-28

## 2024-10-28 RX ORDER — FUROSEMIDE 40 MG
80 TABLET ORAL
Refills: 0 | Status: DISCONTINUED | OUTPATIENT
Start: 2024-10-28 | End: 2024-10-30

## 2024-10-28 RX ORDER — MAGNESIUM SULFATE IN 0.9% NACL 2 G/50 ML
1 INTRAVENOUS SOLUTION, PIGGYBACK (ML) INTRAVENOUS ONCE
Refills: 0 | Status: COMPLETED | OUTPATIENT
Start: 2024-10-28 | End: 2024-10-28

## 2024-10-28 RX ORDER — CARVEDILOL 25 MG/1
12.5 TABLET, FILM COATED ORAL EVERY 12 HOURS
Refills: 0 | Status: DISCONTINUED | OUTPATIENT
Start: 2024-10-28 | End: 2024-10-29

## 2024-10-28 RX ADMIN — PIPERACILLIN AND TAZOBACTAM 25 GRAM(S): .5; 4 INJECTION, POWDER, LYOPHILIZED, FOR SOLUTION INTRAVENOUS at 02:40

## 2024-10-28 RX ADMIN — HEPARIN SODIUM 5000 UNIT(S): 10000 INJECTION INTRAVENOUS; SUBCUTANEOUS at 17:03

## 2024-10-28 RX ADMIN — IPRATROPIUM BROMIDE AND ALBUTEROL SULFATE 3 MILLILITER(S): .5; 2.5 SOLUTION RESPIRATORY (INHALATION) at 21:00

## 2024-10-28 RX ADMIN — Medication 4: at 07:51

## 2024-10-28 RX ADMIN — Medication 80 MILLIGRAM(S): at 21:16

## 2024-10-28 RX ADMIN — PIPERACILLIN AND TAZOBACTAM 25 GRAM(S): .5; 4 INJECTION, POWDER, LYOPHILIZED, FOR SOLUTION INTRAVENOUS at 14:45

## 2024-10-28 RX ADMIN — Medication 81 MILLIGRAM(S): at 14:46

## 2024-10-28 RX ADMIN — IPRATROPIUM BROMIDE AND ALBUTEROL SULFATE 3 MILLILITER(S): .5; 2.5 SOLUTION RESPIRATORY (INHALATION) at 08:06

## 2024-10-28 RX ADMIN — CHLORHEXIDINE GLUCONATE 1 APPLICATION(S): 40 SOLUTION TOPICAL at 12:06

## 2024-10-28 RX ADMIN — Medication 100 GRAM(S): at 04:10

## 2024-10-28 RX ADMIN — HEPARIN SODIUM 5000 UNIT(S): 10000 INJECTION INTRAVENOUS; SUBCUTANEOUS at 09:51

## 2024-10-28 RX ADMIN — CARVEDILOL 12.5 MILLIGRAM(S): 25 TABLET, FILM COATED ORAL at 16:19

## 2024-10-28 RX ADMIN — Medication 2: at 17:03

## 2024-10-28 RX ADMIN — CLOPIDOGREL 75 MILLIGRAM(S): 75 TABLET ORAL at 14:46

## 2024-10-28 RX ADMIN — HYDRALAZINE HYDROCHLORIDE 5 MILLIGRAM(S): 50 TABLET, FILM COATED ORAL at 08:57

## 2024-10-28 RX ADMIN — PIPERACILLIN AND TAZOBACTAM 25 GRAM(S): .5; 4 INJECTION, POWDER, LYOPHILIZED, FOR SOLUTION INTRAVENOUS at 22:02

## 2024-10-28 RX ADMIN — Medication 80 MILLIGRAM(S): at 09:51

## 2024-10-28 RX ADMIN — Medication 100 MILLIEQUIVALENT(S): at 05:06

## 2024-10-28 RX ADMIN — VANCOMYCIN HYDROCHLORIDE 250 MILLIGRAM(S): KIT at 05:59

## 2024-10-28 RX ADMIN — IPRATROPIUM BROMIDE AND ALBUTEROL SULFATE 3 MILLILITER(S): .5; 2.5 SOLUTION RESPIRATORY (INHALATION) at 04:43

## 2024-10-28 RX ADMIN — PIPERACILLIN AND TAZOBACTAM 25 GRAM(S): .5; 4 INJECTION, POWDER, LYOPHILIZED, FOR SOLUTION INTRAVENOUS at 07:50

## 2024-10-28 RX ADMIN — Medication 2: at 12:05

## 2024-10-28 RX ADMIN — Medication 650 MILLIGRAM(S): at 21:13

## 2024-10-28 RX ADMIN — Medication 650 MILLIGRAM(S): at 20:13

## 2024-10-28 RX ADMIN — IPRATROPIUM BROMIDE AND ALBUTEROL SULFATE 3 MILLILITER(S): .5; 2.5 SOLUTION RESPIRATORY (INHALATION) at 16:04

## 2024-10-28 NOTE — SWALLOW BEDSIDE ASSESSMENT ADULT - SLP PATIENT PROFILE REVIEW
yes Progress Note-CCU 10/28: "Patient is a 75 year old Sami speaking male with PMH of HTN, HLD, DM, presents with Acute Pulmonary Edema in the setting of HTN emergency with possible acute stroke."

## 2024-10-28 NOTE — PROGRESS NOTE ADULT - CRITICAL CARE ATTENDING COMMENT
75 year old man with history of HTN who presented with hypertensive emergency and right sided weakness complicated with acute diastolic heart failure and flash pulmonary edema. Placed on BIPAP and transferred to CCU for further care.    TTE 10/27/24:  CONCLUSIONS:   1. Left ventricular systolic function is mildly decreased with an ejection fraction visually estimated at 45 to 50 %. Regional wall motion abnormalities present.   2. Basal and mid anterior wall, basal and mid anterolateral wall, basal and mid inferolateral wall, and basal inferior segment are abnormal.   3. Left atrium is normal in size.   4. The right atrium is normal in size.   5. Normal right ventricular cavity size, with normal wall thickness, and probably normal right ventricular systolic function.  **Left Ventricle:  Left ventricular systolic function is mildly decreased with an ejection fraction visually estimated at 45 to 50%. There are regional wall motion abnormalities present.    Meds:  ASA  Atorvastatin  Plavix  SQ heparin  IV hydral  Insulin  Vanco  Zosyn    #Neuro- Acute CVA and carotid disease  Neuro input appreciated  #Pulm- BiPAP as needed  Continue Lasix  Resume 80 mg IV BID  #CV- Hypertensive emergency with demand ischemia  No ac per neuro- will we reevaluate  Acute on chronic combined systolic and diastolic heart failure  Resume IV diuretic  Continue DAPT  #GI- NPO awaiting speech and swallow  #Renal- monitor Cr  #ID- Elevated WBC, febrile  Continue Zosyn and Vanco- dose by level.  Await CX  #Endo- Sliding scale, follow FS  #DVT ppx- on sq heparin

## 2024-10-28 NOTE — PROGRESS NOTE ADULT - SUBJECTIVE AND OBJECTIVE BOX
Patient is a 69y old  Male who presents with a chief complaint of concern for stroke (27 Oct 2024 16:04)    HPI:  Patient is a 75 year old Romanian speaking male with PMH of HTN, HLD, DM, presents with shortness of breath over 3-4 days which acutely worsened today. Patient also initially complained of chest pain and noted with elevated BP to 190/110. Acute management in the ED included BiPAP, lasix. Patient was placed on nitroglycerin drip due to concern for possible ACS. Chest xray noted with bilateral pulmonary edema which appears cardiac in origin. Patient's respiratory status stabilized on BiPAP. While in the ED, patient's sons reported that patient also began to have right arm weakness first noticed this morning. During this time, patient also appeared slightly confused and required assistance to walk. Patient was last seen at his baseline yesterday evening at 2100 when he was AAOx3 and did not have difficulties using his right side. Code stroke was called for right sided weakness.   Upon stroke team assessment, patient is on BiPAP and does not appear in respiratory distress. BP at time of assessment is 130s systolic (nitroglycerin drip was previously discontinued). Patient denies history of stroke. Patient takes aspirin for cardioprotection. Ambulates independently at baseline, lives with family and requires some assistance with taking medications.     CCU consulted because patient requiring BIPAP for difficulty breathing and requiring nitro gtt for BP elevated to 220/110.   (27 Oct 2024 16:04)       INTERVAL HPI/OVERNIGHT EVENTS:   No overnight events   Afebrile, hemodynamically stable     Subjective:    ICU Vital Signs Last 24 Hrs  T(C): 37.3 (28 Oct 2024 04:00), Max: 38.4 (27 Oct 2024 21:35)  T(F): 99.1 (28 Oct 2024 04:00), Max: 101.1 (27 Oct 2024 21:35)  HR: 72 (28 Oct 2024 07:00) (68 - 119)  BP: 122/66 (27 Oct 2024 19:59) (83/70 - 190/110)  BP(mean): 82 (27 Oct 2024 19:59) (75 - 139)  ABP: 141/50 (28 Oct 2024 07:00) (115/40 - 235/77)  ABP(mean): 75 (28 Oct 2024 07:00) (60 - 117)  RR: 24 (28 Oct 2024 07:00) (20 - 32)  SpO2: 100% (28 Oct 2024 07:00) (95% - 100%)    O2 Parameters below as of 28 Oct 2024 07:00  Patient On (Oxygen Delivery Method): BiPAP/CPAP          I&O's Summary    27 Oct 2024 07:01  -  28 Oct 2024 07:00  --------------------------------------------------------  IN: 1046 mL / OUT: 2610 mL / NET: -1564 mL          Daily Height in cm: 160.02 (27 Oct 2024 15:00)    Daily Weight in k.1 (28 Oct 2024 06:00)    Adult Advanced Hemodynamics Last 24 Hrs  CVP(mm Hg): --  CVP(cm H2O): --  CO: --  CI: --  PA: --  PA(mean): --  PCWP: --  SVR: --  SVRI: --  PVR: --  PVRI: --    EKG/Telemetry Events:    MEDICATIONS  (STANDING):  albuterol    90 MICROgram(s) HFA Inhaler 2 Puff(s) Inhalation two times a day  albuterol/ipratropium for Nebulization 3 milliLiter(s) Nebulizer every 6 hours  aspirin  chewable 81 milliGRAM(s) Oral daily  atorvastatin 80 milliGRAM(s) Oral at bedtime  chlorhexidine 2% Cloths 1 Application(s) Topical daily  clopidogrel Tablet 75 milliGRAM(s) Oral daily  dextrose 50% Injectable 25 Gram(s) IV Push once  dextrose 50% Injectable 12.5 Gram(s) IV Push once  insulin lispro (ADMELOG) corrective regimen sliding scale   SubCutaneous three times a day before meals  insulin lispro (ADMELOG) corrective regimen sliding scale   SubCutaneous at bedtime  nitroglycerin  Infusion 400 MICROgram(s)/Min (120 mL/Hr) IV Continuous <Continuous>  piperacillin/tazobactam IVPB.- 3.375 Gram(s) IV Intermittent once  piperacillin/tazobactam IVPB.. 3.375 Gram(s) IV Intermittent every 8 hours  vancomycin  IVPB 1000 milliGRAM(s) IV Intermittent every 12 hours  vancomycin  IVPB        MEDICATIONS  (PRN):  hydrALAZINE Injectable 5 milliGRAM(s) IV Push every 6 hours PRN HTN      PHYSICAL EXAM:  GENERAL:   HEAD:  Atraumatic, Normocephalic  EYES: EOMI, PERRLA, conjunctiva and sclera clear  NECK: Supple, No JVD, Normal thyroid, no enlarged nodes  NERVOUS SYSTEM:  Alert & Awake.   CHEST/LUNG: B/L good air entry; No rales, rhonchi, or wheezing  HEART: S1S2 normal, no S3, Regular rate and rhythm; No murmurs  ABDOMEN: Soft, Nontender, Nondistended; Bowel sounds present  EXTREMITIES:  2+ Peripheral Pulses, No clubbing, cyanosis, or edema  LYMPH: No lymphadenopathy noted  SKIN: No rashes or lesions    LABS:                        11.1   12.42 )-----------( 383      ( 28 Oct 2024 03:06 )             33.9     10-28    136  |  99  |  22  ----------------------------<  196[H]  3.2[L]   |  24  |  1.47[H]    Ca    8.5      28 Oct 2024 03:06  Phos  4.1     10-28  Mg     1.90     10-28    TPro  6.4  /  Alb  3.5  /  TBili  0.8  /  DBili  x   /  AST  39  /  ALT  30  /  AlkPhos  65  10-28    LIVER FUNCTIONS - ( 28 Oct 2024 03:06 )  Alb: 3.5 g/dL / Pro: 6.4 g/dL / ALK PHOS: 65 U/L / ALT: 30 U/L / AST: 39 U/L / GGT: x           PT/INR - ( 27 Oct 2024 11:00 )   PT: 13.4 sec;   INR: 1.16 ratio         PTT - ( 27 Oct 2024 11:00 )  PTT:25.0 sec  CAPILLARY BLOOD GLUCOSE  184 (27 Oct 2024 11:52)      POCT Blood Glucose.: 236 mg/dL (28 Oct 2024 07:43)  POCT Blood Glucose.: 232 mg/dL (27 Oct 2024 22:22)  POCT Blood Glucose.: 203 mg/dL (27 Oct 2024 16:28)  POCT Blood Glucose.: 184 mg/dL (27 Oct 2024 10:57)    ABG - ( 27 Oct 2024 21:44 )  pH, Arterial: 7.49  pH, Blood: x     /  pCO2: 31    /  pO2: 152   / HCO3: 24    / Base Excess: 0.8   /  SaO2: 98.2              CKMB Units: 9.4 ng/mL (10-27 @ 21:41)  CKMB Units: 14.7 ng/mL (10-27 @ 11:00)    CARDIAC MARKERS ( 27 Oct 2024 21:41 )  x     / x     / x     / x     / 9.4 ng/mL  CARDIAC MARKERS ( 27 Oct 2024 11:00 )  x     / x     / x     / x     / 14.7 ng/mL      Urinalysis Basic - ( 28 Oct 2024 03:06 )    Color: x / Appearance: x / SG: x / pH: x  Gluc: 196 mg/dL / Ketone: x  / Bili: x / Urobili: x   Blood: x / Protein: x / Nitrite: x   Leuk Esterase: x / RBC: x / WBC x   Sq Epi: x / Non Sq Epi: x / Bacteria: x          RADIOLOGY & ADDITIONAL TESTS:  CXR:        Care Discussed with Consultants/Other Providers [ x] YES  [ ] NO           Patient is a 69y old  Male who presents with a chief complaint of concern for stroke (27 Oct 2024 16:04)    HPI:  Patient is a 75 year old Hebrew speaking male with PMH of HTN, HLD, DM, presents with shortness of breath over 3-4 days which acutely worsened today. Patient also initially complained of chest pain and noted with elevated BP to 190/110. Acute management in the ED included BiPAP, lasix. Patient was placed on nitroglycerin drip due to concern for possible ACS. Chest xray noted with bilateral pulmonary edema which appears cardiac in origin. Patient's respiratory status stabilized on BiPAP. While in the ED, patient's sons reported that patient also began to have right arm weakness first noticed this morning. During this time, patient also appeared slightly confused and required assistance to walk. Patient was last seen at his baseline yesterday evening at 2100 when he was AAOx3 and did not have difficulties using his right side. Code stroke was called for right sided weakness.   Upon stroke team assessment, patient is on BiPAP and does not appear in respiratory distress. BP at time of assessment is 130s systolic (nitroglycerin drip was previously discontinued). Patient denies history of stroke. Patient takes aspirin for cardioprotection. Ambulates independently at baseline, lives with family and requires some assistance with taking medications.     CCU consulted because patient requiring BIPAP for difficulty breathing and requiring nitro gtt for BP elevated to 220/110.   (27 Oct 2024 16:04)       INTERVAL HPI/OVERNIGHT EVENTS:   Received hydralazine 5 IVP for SBP in 260s. Salty fever 101.1 around 9:30 PM, IV tylenol given. Pt got zyprexa around 8 PM for agitation. Pt is unable to be wean off from BIPAP but has been off nitro gtt since midnight. Discrepancy in BP was noted - R side SBP 180s-200 and left side SBP 120s c/f subclavian steal syndrome. Overnight Tele showed several episodes of PVCs with ST depression which was known.     Subjective:    ICU Vital Signs Last 24 Hrs  T(C): 37.3 (28 Oct 2024 04:00), Max: 38.4 (27 Oct 2024 21:35)  T(F): 99.1 (28 Oct 2024 04:00), Max: 101.1 (27 Oct 2024 21:35)  HR: 72 (28 Oct 2024 07:00) (68 - 119)  BP: 122/66 (27 Oct 2024 19:59) (83/70 - 190/110)  BP(mean): 82 (27 Oct 2024 19:59) (75 - 139)  ABP: 141/50 (28 Oct 2024 07:00) (115/40 - 235/77)  ABP(mean): 75 (28 Oct 2024 07:00) (60 - 117)  RR: 24 (28 Oct 2024 07:00) (20 - 32)  SpO2: 100% (28 Oct 2024 07:00) (95% - 100%)    O2 Parameters below as of 28 Oct 2024 07:00  Patient On (Oxygen Delivery Method): BiPAP/CPAP          I&O's Summary    27 Oct 2024 07:01  -  28 Oct 2024 07:00  --------------------------------------------------------  IN: 1046 mL / OUT: 2610 mL / NET: -1564 mL          Daily Height in cm: 160.02 (27 Oct 2024 15:00)    Daily Weight in k.1 (28 Oct 2024 06:00)    Adult Advanced Hemodynamics Last 24 Hrs  CVP(mm Hg): --  CVP(cm H2O): --  CO: --  CI: --  PA: --  PA(mean): --  PCWP: --  SVR: --  SVRI: --  PVR: --  PVRI: --    EKG/Telemetry Events:    MEDICATIONS  (STANDING):  albuterol    90 MICROgram(s) HFA Inhaler 2 Puff(s) Inhalation two times a day  albuterol/ipratropium for Nebulization 3 milliLiter(s) Nebulizer every 6 hours  aspirin  chewable 81 milliGRAM(s) Oral daily  atorvastatin 80 milliGRAM(s) Oral at bedtime  chlorhexidine 2% Cloths 1 Application(s) Topical daily  clopidogrel Tablet 75 milliGRAM(s) Oral daily  dextrose 50% Injectable 25 Gram(s) IV Push once  dextrose 50% Injectable 12.5 Gram(s) IV Push once  insulin lispro (ADMELOG) corrective regimen sliding scale   SubCutaneous three times a day before meals  insulin lispro (ADMELOG) corrective regimen sliding scale   SubCutaneous at bedtime  nitroglycerin  Infusion 400 MICROgram(s)/Min (120 mL/Hr) IV Continuous <Continuous>  piperacillin/tazobactam IVPB.- 3.375 Gram(s) IV Intermittent once  piperacillin/tazobactam IVPB.. 3.375 Gram(s) IV Intermittent every 8 hours  vancomycin  IVPB 1000 milliGRAM(s) IV Intermittent every 12 hours  vancomycin  IVPB        MEDICATIONS  (PRN):  hydrALAZINE Injectable 5 milliGRAM(s) IV Push every 6 hours PRN HTN      PHYSICAL EXAM:  GENERAL: lethargic, drifts to left side   HEAD:  Atraumatic, Normocephalic  EYES: Eyes closed, unable to examine  NERVOUS SYSTEM:  lethargic, drifts to left side   CHEST/LUNG: coarse breath sounds b/l with crackles in b/l bases   HEART: S1S2 normal, regular rate and rhythm; No murmurs  ABDOMEN: Soft, mildly distended, +BS  EXTREMITIES:  No b/l LE edema      LABS:                        11.1   12.42 )-----------( 383      ( 28 Oct 2024 03:06 )             33.9     10-    136  |  99  |  22  ----------------------------<  196[H]  3.2[L]   |  24  |  1.47[H]    Ca    8.5      28 Oct 2024 03:06  Phos  4.1     10-28  Mg     1.90     10-28    TPro  6.4  /  Alb  3.5  /  TBili  0.8  /  DBili  x   /  AST  39  /  ALT  30  /  AlkPhos  65  10-28    LIVER FUNCTIONS - ( 28 Oct 2024 03:06 )  Alb: 3.5 g/dL / Pro: 6.4 g/dL / ALK PHOS: 65 U/L / ALT: 30 U/L / AST: 39 U/L / GGT: x           PT/INR - ( 27 Oct 2024 11:00 )   PT: 13.4 sec;   INR: 1.16 ratio         PTT - ( 27 Oct 2024 11:00 )  PTT:25.0 sec  CAPILLARY BLOOD GLUCOSE  184 (27 Oct 2024 11:52)      POCT Blood Glucose.: 236 mg/dL (28 Oct 2024 07:43)  POCT Blood Glucose.: 232 mg/dL (27 Oct 2024 22:22)  POCT Blood Glucose.: 203 mg/dL (27 Oct 2024 16:28)  POCT Blood Glucose.: 184 mg/dL (27 Oct 2024 10:57)    ABG - ( 27 Oct 2024 21:44 )  pH, Arterial: 7.49  pH, Blood: x     /  pCO2: 31    /  pO2: 152   / HCO3: 24    / Base Excess: 0.8   /  SaO2: 98.2              CKMB Units: 9.4 ng/mL (10-27 @ 21:41)  CKMB Units: 14.7 ng/mL (10-27 @ 11:00)    CARDIAC MARKERS ( 27 Oct 2024 21:41 )  x     / x     / x     / x     / 9.4 ng/mL  CARDIAC MARKERS ( 27 Oct 2024 11:00 )  x     / x     / x     / x     / 14.7 ng/mL      Urinalysis Basic - ( 28 Oct 2024 03:06 )    Color: x / Appearance: x / SG: x / pH: x  Gluc: 196 mg/dL / Ketone: x  / Bili: x / Urobili: x   Blood: x / Protein: x / Nitrite: x   Leuk Esterase: x / RBC: x / WBC x   Sq Epi: x / Non Sq Epi: x / Bacteria: x          RADIOLOGY & ADDITIONAL TESTS:  CXR:        Care Discussed with Consultants/Other Providers [ x] YES  [ ] NO

## 2024-10-28 NOTE — DISCHARGE NOTE PROVIDER - HOSPITAL COURSE
Patient is a 75 year old Urdu speaking male with PMH of HTN, HLD, DM, presents with shortness of breath over 3-4 days which acutely worsened. At home, he was having symptoms of stroke. He was taken to [facility] and CODE stroke was called. CT head was negative for ICH; negative for intracranial mass lesion. CTA Head/Neck 10/27 was significant for   Rt Carotid: 50% stenosis Lt Carotid: 90% stenosis Vertebral circulation: L subclavian artery w/ segmental occulsion btwn throacic aorta and L vertebral originAnterior intracranial circulation: +Atherosclerosis, moderate on R and severe on L. Posterior intracranial circulation: Patent R basilar and posterior arteries, L vertebral w/ reversal of flow from the basilar to its PICA. Neurology following and treating for a probable acute stroke. Patient simultaneously had a systolic blood pressure of     ECHO: LV systolic function mildly decreased with EF 45-50 %. +Regional wall motion abnormalities present. Basal and mid anterior wall, basal and mid anterolateral wall, basal and mid inferolateral wall, and basal inferior segment are abnormal. Normal RV.   Patient is a 75 year old French speaking male with PMH of HTN, HLD, DM, presents with shortness of breath over 3-4 days which acutely worsened. At home, he was having symptoms of stroke. He was taken to [facility] and CODE stroke was called. Patient is currently being treated as acute stroke.  CT head was negative for ICH; negative for intracranial mass lesion.   CTA Head/Neck 10/27 was significant for   Rt Carotid: 50% stenosis Lt Carotid: 90% stenosis Vertebral circulation: L subclavian artery w/ segmental occulsion btwn throacic aorta and L vertebral originAnterior intracranial circulation: +Atherosclerosis, moderate on R and severe on L. Posterior intracranial circulation: Patent R basilar and posterior arteries, L vertebral w/ reversal of flow from the basilar to its PICA. Neurology following and treating for a probable acute stroke. Patient simultaneously had a systolic blood pressure of   ECHO: LV systolic function mildly decreased with EF 45-50 %. +Regional wall motion abnormalities present. Basal and mid anterior wall, basal and mid anterolateral wall, basal and mid inferolateral wall, and basal inferior segment are abnormal. Normal RV.    In addition to a stroke, patient had flash pulmonary edema in setting of hypertensive emergency. He was treated with BIPAP and hydralazine IV pushes. Patient is now tolerating the nasal canula. He had speech and swallow today, awaiting results. He was started on coreg 12.5 bid. BP is stable. Patient is a 75 year old Turkmen speaking male with PMH of HTN, HLD, DM, presents with shortness of breath over 3-4 days which acutely worsened. At home, he was having symptoms of stroke. He was taken to [facility] and CODE stroke was called. Patient is currently being treated as acute stroke.  CT head was negative for ICH; negative for intracranial mass lesion.  CTA Head/Neck 10/27 was significant for   Rt Carotid: 50% stenosis Lt Carotid: 90% stenosis Vertebral circulation: L subclavian artery w/ segmental occlusion between thoracic aorta and L vertebral origin. Anterior intracranial circulation: +Atherosclerosis, moderate on R and severe on L. Posterior intracranial circulation: Patent R basilar and posterior arteries, L vertebral w/ reversal of flow from the basilar to its PICA. Neurology following and treating for a probable acute stroke. Patient simultaneously had a systolic blood pressure of     ECHO: LV systolic function mildly decreased with EF 45-50 %. +Regional wall motion abnormalities present. Basal and mid anterior wall, basal and mid anterolateral wall, basal and mid inferolateral wall, and basal inferior segment are abnormal. Normal RV.    In addition to a stroke, patient had flash pulmonary edema in setting of hypertensive emergency. He was treated with BIPAP, lasix, nitroglycerin IV infusion, and given hydralazine IV pushes. Patient is now tolerating the room air. The nitroglycerin IV infusion was stopped. The patient was started on coreg 12.5 twice per day. BP is stable.    The patient has residual right sided weakness of he right arm and leg. He had speech and swallow evaluation, with recommendation for soft and bite sized diet. Speech and Swallow specialist recommends speech therapy.     The patient was started on a heparin IV infusion for DVT prophylaxis.     Awaiting a repeat CTA on Tuesday 11/5.   74 yo M Turkmen speaking w/ PMH of HTN, HLD, DM, p/w shortness of breath admitted for NSTEMI and flash pulmonary edema due to HTN emergency. taken to CCU required nitro drip. Pt also noted to have Right sided weakness 2/2 CVA CTA RT carotid 50%. LT carotid 90% the left subclavian artery has segmental occlusion between the thoracic aorta and the left vertebral origin. IC atherosclerosis of IC moderate on Rt and severe on LT. RT vertebral occlusion. placed on heparin drip. MRI LT MCA infarcts. MRA head Apparent moderate stenosis of the distal left precavernous ICA and moderate to severe stenosis of the distal left cavernous ICA. Diminished flow related signal within the supraclinoid left ICA compared to the right Somewhat diminished flow related signal within the left M1 segment with normal flow-related enhancement of the more distal left MCA. MRA NECK: Approximately 70% short segment stenosis of the origin of the right internal carotid artery. Mildly diminished flow related signal and enhancement of the more distal vessel. Lack of flow related enhancement of the left subclavian artery beginning just distal to its origin. The vessel reconstitutes and demonstrates normal flow-related enhancement more distally.         Repeat CTA NS to decide AC vs stent vs aggressive intervention. CTA tomm   74 yo M Lao speaking w/ PMH of HTN, HLD, DM, p/w shortness of breath admitted for NSTEMI and flash pulmonary edema due to HTN emergency. taken to CCU required nitro drip. Pt also noted to have Right sided weakness 2/2 CVA CTA RT carotid 50%. LT carotid 90% the left subclavian artery has segmental occlusion between the thoracic aorta and the left vertebral origin. IC atherosclerosis of IC moderate on Rt and severe on LT. RT vertebral occlusion. placed on heparin drip. MRI LT MCA infarcts. MRA head Apparent moderate stenosis of the distal left precavernous ICA and moderate to severe stenosis of the distal left cavernous ICA. Diminished flow related signal within the supraclinoid left ICA compared to the right Somewhat diminished flow related signal within the left M1 segment with normal flow-related enhancement of the more distal left MCA. MRA NECK: Approximately 70% short segment stenosis of the origin of the right internal carotid artery. Mildly diminished flow related signal and enhancement of the more distal vessel. Lack of flow related enhancement of the left subclavian artery beginning just distal to its origin. The vessel reconstitutes and demonstrates normal flow-related enhancement more distally. Repeat CTA with LT ICA high grade stenosis. Pt transferred to [facility] for carotid stent with NS.

## 2024-10-28 NOTE — SWALLOW BEDSIDE ASSESSMENT ADULT - H & P REVIEW
yes Neuro Consult 10/27: "Impression: Right hemiparesis likely due to left brain dysfunction. Mechanism large artery atherosclerosis vs ESUS. Encephalopathy likely metabolic in setting of acute hypoxic respiratory failure."

## 2024-10-28 NOTE — PROGRESS NOTE ADULT - ASSESSMENT
Patient is a 75 year old Syriac speaking male with PMH of HTN, HLD, DM, presents with Acute Pulmonary Edema in the setting of HTN emergency with possible acute stroke.    PLAN:  NEURO:   #Code stroke in the ER  -patient with aphasia and gazing to left side  -CTA: No hemorrhage, no intracranial lesions  -Rt Carotid: 50% stenosis  -Lt Carotid: 90% stenosis  -vertebral circulation: left subclavian artery with segmental occulsion betweeen the throacic aorta and the left vertebral origin  -Anterior intracranial circulation: atherosclerosis, moderate on rt and severe on left  -posterior circulation: patent rt basilar and posterior arteries, left vertebral with reveraold of flow from the basilar to its PICA  -on exam patient drifts to the left side, sort of ignores the right side  -right hand weaker than left  -will continue with neuro checks  -will follow up with neuro recommendations  -holding off on heparin gtt as per neuro  -loaded with asa and plavix in ER    PULMONARY:  Upon arrival to CCU, patient with a flash pulmonary edema  -BIPAP 14/7 and Fio2 35%  -maintain SPO2 >95%  -will repeat CXR in am    #Concern for Aspiration   -patient with cough after medications were given  -speech and swallow ordered    CARDIAC:  EKG with NSR LVH, ST depressions in multiple leads  bigeminy and PVCx on tele  #NSTEMI:  -elevated troponin of 148  NSR on tele  loaded with ASA and plavix in the ER, as per neuro, would hold hep gtt for now  -lipitor 80 added  -nitro gtt to reduce systolic BP from 180 to 140 goal systolic  -ECHO done, results are pending  -rachna ltrend enzymes  -would defer cardiac cath in setting of possible acute stroke for now      HTN Emergency:  continue nitro gtt  goal SBP 140s    HLD:  lipito 80  lipid profile pending    GI:  Abdomen is distended    :  Normal renal indices  strict intake and output  indwelling catheter    ID:  Leukocytosis  elevated temp  Pan cultured  vanc/zosyn initiated  MRSA swab done  COVID and flu neg    ENDO:  DM  HGBA1c is pending    VTE PPX  HSQ Patient is a 75 year old Maori speaking male with PMH of HTN, HLD, DM, presents with Acute Pulmonary Edema in the setting of HTN emergency with possible acute stroke.    PLAN:  NEURO:   #Code stroke in the ER  -patient with aphasia and gazing to left side  -CTA: No hemorrhage, no intracranial lesions  -Rt Carotid: 50% stenosis  -Lt Carotid: 90% stenosis  -vertebral circulation: left subclavian artery with segmental occulsion betweeen the throacic aorta and the left vertebral origin  -Anterior intracranial circulation: atherosclerosis, moderate on rt and severe on left  -posterior circulation: patent rt basilar and posterior arteries, left vertebral with reveraold of flow from the basilar to its PICA  -on exam patient drifts to the left side, sort of ignores the right side, right hand weaker than left  -will continue with neuro checks Q4  - f/u PT/OT/S&S recs   - f/u A1C, lipid panel   - neuro following - recommended MRI brain (pending), TTE (performed, see results below), Loop Recorder prior to discharge, Vertebral artery doppler to r/o subclavian steal i.s.o segmental subclavian artery occlusion and consult vascular if suggestive of flow reversal   -holding off on heparin gtt as per neuro due to concern for hemorrhagic conversion of stroke  -loaded with asa and plavix in ER per neuro  - c/w ASA, plavix and statin     PULMONARY:  Upon arrival to CCU, patient with a flash pulmonary edema  -BIPAP 14/7 and Fio2 35%  -maintain SPO2 >95%  -pending repeat CXR in AM     #Concern for Aspiration   -patient with cough after medications were given  -speech and swallow ordered    CARDIAC:  EKG with NSR LVH, ST depressions in multiple leads  bigeminy and PVCx on tele  #NSTEMI:  -elevated troponin of 148  NSR on tele  -loaded with ASA and plavix in the ER, as per neuro, would hold hep gtt for now given concern for hemorrhagic conversion of the stroke   - c/w lipitor 80, ASA 81, and plavix 75  - was on nitro gtt previously to reduce systolic BP from 180 to 140 goal systolic, now off nitro gtt since midnight, BP within goal   -TTE 10/27 showed mildly decreased LVSF w/ EF of 45 to 50 %, with regional wall motion abnormalities. Basal and mid anterior wall, basal and mid anterolateral wall, basal and mid inferolateral wall, and basal inferior segment are abnormal.  -Troponin 104 -> 148 -> 190   -would defer cardiac cath in setting of possible acute stroke for now      HTN Emergency:  - was on nitro gtt to maintaince SBPs 140-180s, now off nitro gtt since midnight       HLD:  - c/w lipitor 80  - lipid profile pending    GI:  - NPO pending S&S eval   - PE revealed soft, mildly distended abdomen      :  Normal renal indices  strict intake and output  indwelling catheter    ID:  Leukocytosis  elevated temp  Pan cultured  vanc/zosyn initiated  MRSA swab done  COVID and flu neg    ENDO:  DM  HGBA1c is pending    VTE PPX  HSQ Patient is a 75 year old Hungarian speaking male with PMH of HTN, HLD, DM, presents with Acute Pulmonary Edema in the setting of HTN emergency with possible acute stroke.    PLAN:  NEURO:   #Code stroke in the ER  -patient with aphasia and gazing to left side  -CTA: No hemorrhage, no intracranial lesions  -Rt Carotid: 50% stenosis  -Lt Carotid: 90% stenosis  -vertebral circulation: left subclavian artery with segmental occulsion betweeen the throacic aorta and the left vertebral origin  -Anterior intracranial circulation: atherosclerosis, moderate on rt and severe on left  -posterior circulation: patent rt basilar and posterior arteries, left vertebral with reveraold of flow from the basilar to its PICA  -on exam patient drifts to the left side, sort of ignores the right side, right hand weaker than left  -will continue with neuro checks Q4  - f/u PT/OT/S&S recs   - f/u A1C, lipid panel   - neuro following - recommended MRI brain (pending), TTE (performed, see results below), Loop Recorder prior to discharge, Vertebral artery doppler to r/o subclavian steal i.s.o segmental subclavian artery occlusion and consult vascular if suggestive of flow reversal   -holding off on heparin gtt as per neuro due to concern for hemorrhagic conversion of stroke  -loaded with asa and plavix in ER per neuro  - c/w ASA, plavix and statin     PULMONARY:  # flash pulmonary edema  - CXR in ED showed discrete right perihilar opacities with bilateral effusions   - s/p lasix 80 in ED   - currently on BIPAP 14/7 and Fio2 30%  - maintain SPO2 >95%  - pending repeat CXR in AM   - start lasix 80 BID     #Concern for Aspiration   - patient with cough after medications were given  - speech and swallow ordered  - NPO pending S&S eval     CARDIAC:  EKG with NSR LVH, ST depressions in multiple leads  bigeminy and PVCx on tele  #NSTEMI:  -elevated troponin 104 -> 148 ->190   NSR on tele  -loaded with ASA and plavix in the ER, as per neuro, would hold hep gtt for now given concern for hemorrhagic conversion of the stroke   - c/w lipitor 80, ASA 81, and plavix 75  - was on nitro gtt previously to reduce systolic BP from 180 to 140 goal systolic, now off nitro gtt since midnight  -TTE 10/27 showed mildly decreased LVSF w/ EF of 45 to 50 %, with regional wall motion abnormalities. Basal and mid anterior wall, basal and mid anterolateral wall, basal and mid inferolateral wall, and basal inferior segment are abnormal.  -Troponin 104 -> 148 -> 190   -would defer cardiac cath in setting of possible acute stroke for now  - continue to trend troponin     #HTN Emergency:  - was on nitro gtt to maintain SBPs 140-180s, now off nitro gtt since midnight   - on hydralazine PRN   - start lasix 80 BID   - CTM   - may consider adding anti-hypertensive agents if requiring multiple Hydralazine PRN       #HLD:  - c/w lipitor 80  - lipid profile pending    GI:  # Diet   - NPO pending S&S eval     #PE revealed soft, mildly distended abdomen  - CTM       RENAL:   #NOVA   - Cr uptrending 1.19 -> 1.33 -> 1.47, likely due to hypertensive emergency   - Strict Is and Os   - Indwelling catheter in place   - CTM     ID:  #Leukocytosis  #Aspiration PNA?  - spiked fever overnight   - f/u BCx   - f/u MRSA swab   - Full RVP neg   - c/w vanc and zosyn for broad coverage       ENDO:  #DM  - f/u HGBA1c   - ISS     VTE PPX  - HSQ Patient is a 75 year old Indonesian speaking male with PMH of HTN, HLD, DM, presents with Acute Pulmonary Edema in the setting of HTN emergency with possible acute stroke.    PLAN:  NEURO:   #Code stroke in the ER  -patient with aphasia and gazing to left side  -CTA: No hemorrhage, no intracranial lesions  -Rt Carotid: 50% stenosis  -Lt Carotid: 90% stenosis  -vertebral circulation: left subclavian artery with segmental occulsion betweeen the throacic aorta and the left vertebral origin  -Anterior intracranial circulation: atherosclerosis, moderate on rt and severe on left  -posterior circulation: patent rt basilar and posterior arteries, left vertebral with reveraold of flow from the basilar to its PICA  -on exam patient drifts to the left side, sort of ignores the right side, right hand weaker than left  -will continue with neuro checks Q4  - f/u PT/OT/S&S recs   - f/u A1C, lipid panel   - neuro following - recommended MRI brain (pending), TTE (performed, see results below), Loop Recorder prior to discharge, Vertebral artery doppler to r/o subclavian steal i.s.o segmental subclavian artery occlusion and consult vascular if suggestive of flow reversal   -holding off on heparin gtt as per neuro due to concern for hemorrhagic conversion of possible stroke  -loaded with asa and plavix in ER per neuro  - c/w ASA, plavix and statin     PULMONARY:  # flash pulmonary edema  - CXR in ED showed discrete right perihilar opacities with bilateral effusions   - s/p lasix 80 in ED   - currently on BIPAP 14/7 and Fio2 30%  - maintain SPO2 >95%  - pending repeat CXR in AM   - start lasix 80 BID     #Concern for Aspiration   - patient with cough after medications were given  - speech and swallow ordered  - NPO pending S&S eval     CARDIAC:  EKG with NSR LVH, ST depressions in multiple leads  bigeminy and PVCx on tele  #NSTEMI:  -elevated troponin 104 -> 148 ->190   NSR on tele  -loaded with ASA and plavix in the ER, as per neuro, would hold hep gtt for now given concern for hemorrhagic conversion of the possible stroke   - c/w lipitor 80, ASA 81, and plavix 75  - was on nitro gtt previously to reduce systolic BP from 180 to 140 goal systolic, now off nitro gtt since midnight  -TTE 10/27 showed mildly decreased LVSF w/ EF of 45 to 50 %, with regional wall motion abnormalities. Basal and mid anterior wall, basal and mid anterolateral wall, basal and mid inferolateral wall, and basal inferior segment are abnormal.  -Troponin 104 -> 148 -> 190   -would defer cardiac cath in setting of possible acute stroke for now  - continue to trend troponin     #HTN Emergency:  - was on nitro gtt to maintain SBPs 140-180s, now off nitro gtt since midnight   - on hydralazine PRN   - start lasix 80 BID   - CTM   - may consider adding anti-hypertensive agents if requiring multiple Hydralazine PRN       #HLD:  - c/w lipitor 80  - lipid profile pending    GI:  # Diet   - NPO pending S&S eval     #PE revealed soft, mildly distended abdomen  - CTM       RENAL:   #NOVA   - Cr uptrending 1.19 -> 1.33 -> 1.47, likely due to hypertensive emergency   - Strict Is and Os   - Indwelling catheter in place   - CTM     ID:  #Leukocytosis  #Aspiration PNA?  - spiked fever overnight   - f/u BCx   - f/u MRSA swab   - Full RVP neg   - c/w vanc and zosyn for broad coverage       ENDO:  #DM  - f/u HGBA1c   - ISS     VTE PPX  - HSQ

## 2024-10-28 NOTE — DISCHARGE NOTE PROVIDER - NSDCMRMEDTOKEN_GEN_ALL_CORE_FT
Aspir 81 oral delayed release tablet: 1 tab(s) orally once a day  Basaglar KwikPen 100 units/mL subcutaneous solution: 15 unit(s) subcutaneous once a day (at bedtime)  gabapentin 300 mg oral capsule: 1 cap(s) orally 2 times a day  Lipitor 20 mg oral tablet: 1 tab(s) orally once a day  metFORMIN 850 mg oral tablet: 1 tab(s) orally once a day  repaglinide 2 mg oral tablet: 1 tab(s) orally 3 times a day   acetaminophen 325 mg oral tablet: 2 tab(s) orally every 6 hours As needed Temp greater or equal to 38C (100.4F), Moderate Pain (4 - 6), Severe Pain (7 - 10)  albuterol 90 mcg/inh inhalation aerosol: 2 puff(s) inhaled 2 times a day  aspirin 81 mg oral tablet, chewable: 1 tab(s) orally once a day  atorvastatin 80 mg oral tablet: 1 tab(s) orally once a day (at bedtime)  carvedilol 3.125 mg oral tablet: 1 tab(s) orally every 12 hours  clopidogrel 75 mg oral tablet: 1 tab(s) orally once a day  insulin glargine 100 units/mL subcutaneous solution: 12 unit(s) subcutaneous once a day (at bedtime)  insulin lispro 100 units/mL injectable solution: 10 injectable 3 times a day

## 2024-10-28 NOTE — SWALLOW BEDSIDE ASSESSMENT ADULT - COMMENTS
Progress Note-CCU 10/28: "Patient is a 75 year old Hebrew speaking male with PMH of HTN, HLD, DM, presents with Acute Pulmonary Edema in the setting of HTN emergency with possible acute stroke."    Neuro Consult 10/27: "Impression: Right hemiparesis likely due to left brain dysfunction. Mechanism large artery atherosclerosis vs ESUS. Encephalopathy likely metabolic in setting of acute hypoxic respiratory failure."    CTA Head 10/27: "IMPRESSION: 1. BRAIN:  No intracranial hemorrhage is appreciated.  No intracranial mass lesion is appreciated. 2.  RIGHT CAROTID SYSTEM:    Atherosclerotic plaque common carotid and internal carotid artery with maximal stenosis approximately 50%. 3.   LEFT CAROTID SYSTEM:     Atherosclerotic plaque common carotid and internal carotid artery with maximal stenosis approximately 90%. 4.   VERTEBRAL CIRCULATION:    Patent. 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 Flow directionality of the left vertebral artery is not determined by this technique. Consider ultrasound in this evaluation, as required 5.  ANTERIOR INTRACRANIAL CIRCULATION:     Intracranial atherosclerosis cavernous and clinoid segments of the internal carotid arteries, moderate on the right and severe on the left. 6.  POSTERIOR INTRACRANIAL CIRCULATION:    Patent right vertebral basilar and posterior cerebral arteries. 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 7.  No large vessel occlusion 8.  BRAIN PERFUSION:   No acute infarction of the brain is convincingly demonstrated.  Relative ischemia is demonstrated in the left cerebral hemispheric anterior corona radiata white matter. 9. Pulmonary edema pattern"    CXR 10/27: "IMPRESSION: Discrete right perihilar opacities with bilateral effusions more likely cardiogenic in origin although infection not excluded." CTA Head 10/27: "IMPRESSION: 1. BRAIN:  No intracranial hemorrhage is appreciated.  No intracranial mass lesion is appreciated. 2.  RIGHT CAROTID SYSTEM:    Atherosclerotic plaque common carotid and internal carotid artery with maximal stenosis approximately 50%. 3.   LEFT CAROTID SYSTEM:     Atherosclerotic plaque common carotid and internal carotid artery with maximal stenosis approximately 90%. 4.   VERTEBRAL CIRCULATION:    Patent. 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 Flow directionality of the left vertebral artery is not determined by this technique. Consider ultrasound in this evaluation, as required 5.  ANTERIOR INTRACRANIAL CIRCULATION:     Intracranial atherosclerosis cavernous and clinoid segments of the internal carotid arteries, moderate on the right and severe on the left. 6.  POSTERIOR INTRACRANIAL CIRCULATION:    Patent right vertebral basilar and posterior cerebral arteries. 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 7.  No large vessel occlusion 8.  BRAIN PERFUSION:   No acute infarction of the brain is convincingly demonstrated.  Relative ischemia is demonstrated in the left cerebral hemispheric anterior corona radiata white matter. 9. Pulmonary edema pattern"    CXR 10/27: "IMPRESSION: Discrete right perihilar opacities with bilateral effusions more likely cardiogenic in origin although infection not excluded."    Order for Clinical Swallow Evaluation received and chart review completed. SLP communicated with RN, Adore, upon arrival to Unit. Per RN, patient, "lethargic and on BiPAP," requiring high oxygen demand and unable to be weaned to high-flow/nasal cannula at this time for PO acceptance. Medical team advised to re-consult this department as patient becomes medically optimized and achieves nasal cannula/room air status to assess for safe oral feeding diet.

## 2024-10-28 NOTE — DISCHARGE NOTE PROVIDER - NSDCACTIVITY_GEN_ALL_CORE
Bathing allowed/Showering allowed/Walking - Indoors allowed/Walking - Outdoors allowed/Follow Instructions Provided by your Surgical Team/Activity as tolerated

## 2024-10-28 NOTE — DISCHARGE NOTE PROVIDER - NSDCCPCAREPLAN_GEN_ALL_CORE_FT
PRINCIPAL DISCHARGE DIAGNOSIS  Diagnosis: Stroke  Assessment and Plan of Treatment:       SECONDARY DISCHARGE DIAGNOSES  Diagnosis: Weakness  Assessment and Plan of Treatment:     Diagnosis: Acute pulmonary edema  Assessment and Plan of Treatment:      PRINCIPAL DISCHARGE DIAGNOSIS  Diagnosis: CVA (cerebrovascular accident)  Assessment and Plan of Treatment: You were found to have weakness of RT side and MRI showed a LT sided stroke.      SECONDARY DISCHARGE DIAGNOSES  Diagnosis: Hypertensive emergency  Assessment and Plan of Treatment: Your blood pressure was noted to be elevated your BP meds were adjusted.    Diagnosis: Non-ST elevation MI (NSTEMI)  Assessment and Plan of Treatment: Your heart enzymes were eleavted and Echo showed abnormal motion of the heart. You were seen by cardiology and further angiogram was not recommmended.     PRINCIPAL DISCHARGE DIAGNOSIS  Diagnosis: CVA (cerebrovascular accident)  Assessment and Plan of Treatment: You were found to have weakness of RT side and MRI showed a LT sided stroke. You were noted to have a severely occluded Internal carotid and a stent was placed.      SECONDARY DISCHARGE DIAGNOSES  Diagnosis: Hypertensive emergency  Assessment and Plan of Treatment: Your blood pressure was noted to be elevated your BP meds were adjusted.    Diagnosis: Non-ST elevation MI (NSTEMI)  Assessment and Plan of Treatment: Your heart enzymes were eleavted and Echo showed abnormal motion of the heart. You were seen by cardiology and further angiogram was not recommmended.

## 2024-10-28 NOTE — DISCHARGE NOTE PROVIDER - NSDCFUADDAPPT_GEN_ALL_CORE_FT
APPTS ARE READY TO BE MADE: [x] YES    Best Family or Patient Contact (if needed):    Additional Information about above appointments (if needed):    1: Reza Sena  2: Norma Andino or Nancy Reilly  3:     Other comments or requests:

## 2024-10-29 ENCOUNTER — RESULT REVIEW (OUTPATIENT)
Age: 69
End: 2024-10-29

## 2024-10-29 DIAGNOSIS — Z86.79 PERSONAL HISTORY OF OTHER DISEASES OF THE CIRCULATORY SYSTEM: ICD-10-CM

## 2024-10-29 LAB
ADD ON TEST-SPECIMEN IN LAB: SIGNIFICANT CHANGE UP
ALBUMIN SERPL ELPH-MCNC: 3.2 G/DL — LOW (ref 3.3–5)
ALP SERPL-CCNC: 59 U/L — SIGNIFICANT CHANGE UP (ref 40–120)
ALT FLD-CCNC: 29 U/L — SIGNIFICANT CHANGE UP (ref 4–41)
ANION GAP SERPL CALC-SCNC: 16 MMOL/L — HIGH (ref 7–14)
AST SERPL-CCNC: 29 U/L — SIGNIFICANT CHANGE UP (ref 4–40)
BASOPHILS # BLD AUTO: 0.06 K/UL — SIGNIFICANT CHANGE UP (ref 0–0.2)
BASOPHILS NFR BLD AUTO: 0.6 % — SIGNIFICANT CHANGE UP (ref 0–2)
BILIRUB SERPL-MCNC: 0.9 MG/DL — SIGNIFICANT CHANGE UP (ref 0.2–1.2)
BUN SERPL-MCNC: 27 MG/DL — HIGH (ref 7–23)
CALCIUM SERPL-MCNC: 8.7 MG/DL — SIGNIFICANT CHANGE UP (ref 8.4–10.5)
CHLORIDE SERPL-SCNC: 99 MMOL/L — SIGNIFICANT CHANGE UP (ref 98–107)
CO2 SERPL-SCNC: 24 MMOL/L — SIGNIFICANT CHANGE UP (ref 22–31)
CREAT SERPL-MCNC: 1.73 MG/DL — HIGH (ref 0.5–1.3)
EGFR: 42 ML/MIN/1.73M2 — LOW
EOSINOPHIL # BLD AUTO: 0.25 K/UL — SIGNIFICANT CHANGE UP (ref 0–0.5)
EOSINOPHIL NFR BLD AUTO: 2.3 % — SIGNIFICANT CHANGE UP (ref 0–6)
GLUCOSE BLDC GLUCOMTR-MCNC: 146 MG/DL — HIGH (ref 70–99)
GLUCOSE BLDC GLUCOMTR-MCNC: 154 MG/DL — HIGH (ref 70–99)
GLUCOSE BLDC GLUCOMTR-MCNC: 193 MG/DL — HIGH (ref 70–99)
GLUCOSE BLDC GLUCOMTR-MCNC: 248 MG/DL — HIGH (ref 70–99)
GLUCOSE SERPL-MCNC: 147 MG/DL — HIGH (ref 70–99)
HCT VFR BLD CALC: 36.2 % — LOW (ref 39–50)
HGB BLD-MCNC: 11.7 G/DL — LOW (ref 13–17)
IANC: 6.62 K/UL — SIGNIFICANT CHANGE UP (ref 1.8–7.4)
IMM GRANULOCYTES NFR BLD AUTO: 0.3 % — SIGNIFICANT CHANGE UP (ref 0–0.9)
LYMPHOCYTES # BLD AUTO: 2.73 K/UL — SIGNIFICANT CHANGE UP (ref 1–3.3)
LYMPHOCYTES # BLD AUTO: 25.3 % — SIGNIFICANT CHANGE UP (ref 13–44)
MAGNESIUM SERPL-MCNC: 2.3 MG/DL — SIGNIFICANT CHANGE UP (ref 1.6–2.6)
MCHC RBC-ENTMCNC: 28.2 PG — SIGNIFICANT CHANGE UP (ref 27–34)
MCHC RBC-ENTMCNC: 32.3 GM/DL — SIGNIFICANT CHANGE UP (ref 32–36)
MCV RBC AUTO: 87.2 FL — SIGNIFICANT CHANGE UP (ref 80–100)
MONOCYTES # BLD AUTO: 1.11 K/UL — HIGH (ref 0–0.9)
MONOCYTES NFR BLD AUTO: 10.3 % — SIGNIFICANT CHANGE UP (ref 2–14)
NEUTROPHILS # BLD AUTO: 6.62 K/UL — SIGNIFICANT CHANGE UP (ref 1.8–7.4)
NEUTROPHILS NFR BLD AUTO: 61.2 % — SIGNIFICANT CHANGE UP (ref 43–77)
NRBC # BLD: 0 /100 WBCS — SIGNIFICANT CHANGE UP (ref 0–0)
NRBC # FLD: 0 K/UL — SIGNIFICANT CHANGE UP (ref 0–0)
PHOSPHATE SERPL-MCNC: 4.6 MG/DL — HIGH (ref 2.5–4.5)
PLATELET # BLD AUTO: 425 K/UL — HIGH (ref 150–400)
POTASSIUM SERPL-MCNC: 3 MMOL/L — LOW (ref 3.5–5.3)
POTASSIUM SERPL-SCNC: 3 MMOL/L — LOW (ref 3.5–5.3)
PROT SERPL-MCNC: 6.6 G/DL — SIGNIFICANT CHANGE UP (ref 6–8.3)
RBC # BLD: 4.15 M/UL — LOW (ref 4.2–5.8)
RBC # FLD: 13.4 % — SIGNIFICANT CHANGE UP (ref 10.3–14.5)
SODIUM SERPL-SCNC: 139 MMOL/L — SIGNIFICANT CHANGE UP (ref 135–145)
TROPONIN T, HIGH SENSITIVITY RESULT: 312 NG/L — CRITICAL HIGH
WBC # BLD: 10.8 K/UL — HIGH (ref 3.8–10.5)
WBC # FLD AUTO: 10.8 K/UL — HIGH (ref 3.8–10.5)

## 2024-10-29 PROCEDURE — 70551 MRI BRAIN STEM W/O DYE: CPT | Mod: 26

## 2024-10-29 PROCEDURE — 99291 CRITICAL CARE FIRST HOUR: CPT

## 2024-10-29 PROCEDURE — 93880 EXTRACRANIAL BILAT STUDY: CPT | Mod: 26

## 2024-10-29 RX ORDER — POTASSIUM CHLORIDE 10 MEQ
40 TABLET, EXTENDED RELEASE ORAL EVERY 4 HOURS
Refills: 0 | Status: DISCONTINUED | OUTPATIENT
Start: 2024-10-29 | End: 2024-10-29

## 2024-10-29 RX ORDER — INSULIN LISPRO 100/ML
VIAL (ML) SUBCUTANEOUS
Refills: 0 | Status: DISCONTINUED | OUTPATIENT
Start: 2024-10-29 | End: 2024-10-30

## 2024-10-29 RX ORDER — GLUCAGON INJECTION, SOLUTION 1 MG/.2ML
1 INJECTION, SOLUTION SUBCUTANEOUS ONCE
Refills: 0 | Status: DISCONTINUED | OUTPATIENT
Start: 2024-10-29 | End: 2024-11-05

## 2024-10-29 RX ORDER — INSULIN LISPRO 100/ML
VIAL (ML) SUBCUTANEOUS AT BEDTIME
Refills: 0 | Status: DISCONTINUED | OUTPATIENT
Start: 2024-10-29 | End: 2024-10-30

## 2024-10-29 RX ORDER — CARVEDILOL 25 MG/1
6.25 TABLET, FILM COATED ORAL EVERY 12 HOURS
Refills: 0 | Status: DISCONTINUED | OUTPATIENT
Start: 2024-10-29 | End: 2024-10-30

## 2024-10-29 RX ORDER — POTASSIUM CHLORIDE 10 MEQ
40 TABLET, EXTENDED RELEASE ORAL
Refills: 0 | Status: COMPLETED | OUTPATIENT
Start: 2024-10-29 | End: 2024-10-29

## 2024-10-29 RX ADMIN — Medication 40 MILLIEQUIVALENT(S): at 10:06

## 2024-10-29 RX ADMIN — IPRATROPIUM BROMIDE AND ALBUTEROL SULFATE 3 MILLILITER(S): .5; 2.5 SOLUTION RESPIRATORY (INHALATION) at 21:53

## 2024-10-29 RX ADMIN — Medication 1: at 11:57

## 2024-10-29 RX ADMIN — Medication 2: at 21:24

## 2024-10-29 RX ADMIN — Medication 81 MILLIGRAM(S): at 11:56

## 2024-10-29 RX ADMIN — Medication 80 MILLIGRAM(S): at 11:57

## 2024-10-29 RX ADMIN — Medication 40 MILLIEQUIVALENT(S): at 08:12

## 2024-10-29 RX ADMIN — HEPARIN SODIUM 5000 UNIT(S): 10000 INJECTION INTRAVENOUS; SUBCUTANEOUS at 17:01

## 2024-10-29 RX ADMIN — IPRATROPIUM BROMIDE AND ALBUTEROL SULFATE 3 MILLILITER(S): .5; 2.5 SOLUTION RESPIRATORY (INHALATION) at 15:02

## 2024-10-29 RX ADMIN — Medication 80 MILLIGRAM(S): at 20:59

## 2024-10-29 RX ADMIN — CHLORHEXIDINE GLUCONATE 1 APPLICATION(S): 40 SOLUTION TOPICAL at 11:57

## 2024-10-29 RX ADMIN — CARVEDILOL 6.25 MILLIGRAM(S): 25 TABLET, FILM COATED ORAL at 05:27

## 2024-10-29 RX ADMIN — HEPARIN SODIUM 5000 UNIT(S): 10000 INJECTION INTRAVENOUS; SUBCUTANEOUS at 00:00

## 2024-10-29 RX ADMIN — PIPERACILLIN AND TAZOBACTAM 25 GRAM(S): .5; 4 INJECTION, POWDER, LYOPHILIZED, FOR SOLUTION INTRAVENOUS at 14:19

## 2024-10-29 RX ADMIN — PIPERACILLIN AND TAZOBACTAM 25 GRAM(S): .5; 4 INJECTION, POWDER, LYOPHILIZED, FOR SOLUTION INTRAVENOUS at 21:00

## 2024-10-29 RX ADMIN — Medication 80 MILLIGRAM(S): at 00:01

## 2024-10-29 RX ADMIN — CLOPIDOGREL 75 MILLIGRAM(S): 75 TABLET ORAL at 11:56

## 2024-10-29 RX ADMIN — HEPARIN SODIUM 5000 UNIT(S): 10000 INJECTION INTRAVENOUS; SUBCUTANEOUS at 08:12

## 2024-10-29 RX ADMIN — PIPERACILLIN AND TAZOBACTAM 25 GRAM(S): .5; 4 INJECTION, POWDER, LYOPHILIZED, FOR SOLUTION INTRAVENOUS at 05:29

## 2024-10-29 RX ADMIN — Medication 40 MILLIEQUIVALENT(S): at 05:31

## 2024-10-29 RX ADMIN — Medication 80 MILLIGRAM(S): at 21:00

## 2024-10-29 RX ADMIN — Medication 1: at 17:01

## 2024-10-29 RX ADMIN — IPRATROPIUM BROMIDE AND ALBUTEROL SULFATE 3 MILLILITER(S): .5; 2.5 SOLUTION RESPIRATORY (INHALATION) at 03:25

## 2024-10-29 RX ADMIN — CARVEDILOL 6.25 MILLIGRAM(S): 25 TABLET, FILM COATED ORAL at 17:02

## 2024-10-29 NOTE — OCCUPATIONAL THERAPY INITIAL EVALUATION ADULT - PLANNED THERAPY INTERVENTIONS, OT EVAL
ADL retraining/balance training/bed mobility training/cognitive, visual perceptual/fine motor coordination training/motor coordination training/neuromuscular re-education/orthotic fitting/training/parent/caregiver training.../ROM/strengthening/transfer training

## 2024-10-29 NOTE — OCCUPATIONAL THERAPY INITIAL EVALUATION ADULT - PERTINENT HX OF CURRENT PROBLEM, REHAB EVAL
As per H&P: "69 RH Turkmen speaking male PMHx COPD, HTN, DM presented for hypoxic respiratory distress due to COPD exacerbation vs cardiogenic etiology. In ED, on BiPAP, given lasix, and started nitro drip due to /110. Code stroke called for right side weakness first noticed by family this AM, LKN yesterday evening. Initial VS in ED: /110 prior to nitroglycerin drip, during neurology exam /106. Exam: On BiPAP, AAOx1, following simple commands with occasional confusion, left gaze preference, mild R facial droop, right upper and lower extremity drift, no sensory changes. CTA demonstrated diffuse intracranial and extracranial atherosclerosis, including notably 90% maximal stenosis in carotid bulb and focal occlusion in proximal left vertebral artery. CTA also noted for segmental occlusion of left subclavian artery between thoracic aorta and left vertebral origin. "    Admit dx: Acute Pulmonary edema, Code stroke  X-Ray Chest: "Resolving pulmonary edema"

## 2024-10-29 NOTE — OCCUPATIONAL THERAPY INITIAL EVALUATION ADULT - GENERAL OBSERVATIONS, REHAB EVAL
Pt found supine in bed +IV +kaye +pulse ox +3L O2 via n.c. Pt OK to be seen for OT per RN Meghan. SpO2 99% on O2, HR 78 BPM.

## 2024-10-29 NOTE — PHYSICAL THERAPY INITIAL EVALUATION ADULT - MD/RN NOTIFIED
HISTORY OF PRESENT ILLNESS  Cristian Patel is a 39 y.o. female. HPI   Pt is c/o sinus congestion, ST, fever, chills, HA, and malaise x 1 days. Denies CP, SOB, cough, rash, otalgia, edema, palpitations, dizziness, and N/v/D. Allergies   Allergen Reactions    Latex Anaphylaxis    Iodine Anaphylaxis    Monistat 1 (Tioconazole) [Tioconazole] Swelling     Caused swelling in vaginal area    Doxycycline Hives    Erythromycin Hives    Sulfa (Sulfonamide Antibiotics) Hives       Current Outpatient Medications   Medication Sig    acetaminophen (TYLENOL) 325 mg tablet Take  by mouth every four (4) hours as needed for Pain.  amoxicillin-clavulanate (AUGMENTIN) 600-42.9 mg/5 mL suspension 7mL bid    buPROPion XL (WELLBUTRIN XL) 150 mg tablet Take 1 Tab by mouth every morning.  clonazePAM (KLONOPIN) 1 mg tablet TAKE 1 TABLET BY MOUTH TWICE A DAY    PNV No12-Iron-FA-DSS-OM-3 29 mg iron-1 mg -50 mg CPKD Take  by mouth.  ibuprofen (MOTRIN) 800 mg tablet 800 mg. No current facility-administered medications for this visit. Review of Systems   Constitutional: Positive for chills and malaise/fatigue. Negative for fever. HENT: Positive for congestion and sore throat. Negative for ear pain. Respiratory: Positive for cough. Negative for sputum production, shortness of breath and wheezing. Cardiovascular: Negative. Negative for chest pain, palpitations and leg swelling. Genitourinary: Negative. Musculoskeletal: Negative. Skin: Negative. Negative for itching and rash. Neurological: Negative. Negative for headaches. Psychiatric/Behavioral: Negative. Visit Vitals  /85 (BP 1 Location: Left arm, BP Patient Position: Sitting)   Pulse 76   Temp 98.1 °F (36.7 °C) (Oral)   Resp 16   Ht 5' 8\" (1.727 m)   Wt 244 lb (110.7 kg)   SpO2 96%   BMI 37.10 kg/m²       Physical Exam   Constitutional: She is oriented to person, place, and time. She appears well-developed and well-nourished. No distress. HENT:   Head: Normocephalic and atraumatic. Right Ear: Tympanic membrane, external ear and ear canal normal.   Left Ear: Tympanic membrane, external ear and ear canal normal.   Nose: Mucosal edema and rhinorrhea present. Right sinus exhibits maxillary sinus tenderness. Right sinus exhibits no frontal sinus tenderness. Left sinus exhibits maxillary sinus tenderness. Left sinus exhibits no frontal sinus tenderness. Mouth/Throat: Uvula is midline and mucous membranes are normal. Posterior oropharyngeal erythema present. No oropharyngeal exudate or posterior oropharyngeal edema. Cardiovascular: Normal rate, regular rhythm, normal heart sounds and intact distal pulses. Exam reveals no gallop and no friction rub. No murmur heard. Pulmonary/Chest: Effort normal and breath sounds normal. No respiratory distress. She has no wheezes. She has no rales. Lymphadenopathy:     She has no cervical adenopathy. Neurological: She is alert and oriented to person, place, and time. Skin: Skin is warm and dry. She is not diaphoretic. Psychiatric: She has a normal mood and affect. Her behavior is normal.       ASSESSMENT and PLAN    ICD-10-CM ICD-9-CM    1. Acute non-recurrent maxillary sinusitis J01.00 461.0 amoxicillin-clavulanate (AUGMENTIN) 600-42.9 mg/5 mL suspension     Follow-up and Dispositions    · Return if symptoms worsen or fail to improve. Pt expressed understanding of visit summary and plans for any follow ups or referrals as well as any medications prescribed. yes

## 2024-10-29 NOTE — PROGRESS NOTE ADULT - ASSESSMENT
Patient is a 75 year old Indonesian speaking male with PMH of HTN, HLD, DM, presents with Acute Pulmonary Edema in the setting of HTN emergency with possible acute stroke.    PLAN:  NEURO:   #Code stroke in the ER  -patient with aphasia and gazing to left side  -CTA: No hemorrhage, no intracranial lesions  -Rt Carotid: 50% stenosis  -Lt Carotid: 90% stenosis  -vertebral circulation: left subclavian artery with segmental occulsion betweeen the throacic aorta and the left vertebral origin  -Anterior intracranial circulation: atherosclerosis, moderate on rt and severe on left  -posterior circulation: patent rt basilar and posterior arteries, left vertebral with reveraold of flow from the basilar to its PICA  -on exam patient drifts to the left side, sort of ignores the right side, right hand weaker than left  -will continue with neuro checks Q4  - f/u PT/OT/S&S recs   - f/u A1C, lipid panel   - neuro following - recommended MRI brain (pending), TTE (performed, see results below), Loop Recorder prior to discharge, Vertebral artery doppler to r/o subclavian steal i.s.o segmental subclavian artery occlusion and consult vascular if suggestive of flow reversal   -holding off on heparin gtt as per neuro due to concern for hemorrhagic conversion of possible stroke  -loaded with asa and plavix in ER per neuro  - c/w ASA, plavix and statin     PULMONARY:  # flash pulmonary edema  - CXR in ED showed discrete right perihilar opacities with bilateral effusions   - s/p lasix 80 in ED   - currently on BIPAP 14/7 and Fio2 30%  - maintain SPO2 >95%  - pending repeat CXR in AM   - start lasix 80 BID     #Concern for Aspiration   - patient with cough after medications were given  - speech and swallow ordered  - NPO pending S&S eval     CARDIAC:  EKG with NSR LVH, ST depressions in multiple leads  bigeminy and PVCx on tele  #NSTEMI:  -elevated troponin 104 -> 148 ->190   NSR on tele  -loaded with ASA and plavix in the ER, as per neuro, would hold hep gtt for now given concern for hemorrhagic conversion of the possible stroke   - c/w lipitor 80, ASA 81, and plavix 75  - was on nitro gtt previously to reduce systolic BP from 180 to 140 goal systolic, now off nitro gtt since midnight  -TTE 10/27 showed mildly decreased LVSF w/ EF of 45 to 50 %, with regional wall motion abnormalities. Basal and mid anterior wall, basal and mid anterolateral wall, basal and mid inferolateral wall, and basal inferior segment are abnormal.  -Troponin 104 -> 148 -> 190   -would defer cardiac cath in setting of possible acute stroke for now  - continue to trend troponin     #HTN Emergency:  - was on nitro gtt to maintain SBPs 140-180s, now off nitro gtt since midnight   - on hydralazine PRN   - start lasix 80 BID   - CTM   - may consider adding anti-hypertensive agents if requiring multiple Hydralazine PRN       #HLD:  - c/w lipitor 80  - lipid profile pending    GI:  # Diet   - NPO pending S&S eval     #PE revealed soft, mildly distended abdomen  - CTM       RENAL:   #NOVA   - Cr uptrending 1.19 -> 1.33 -> 1.47, likely due to hypertensive emergency   - Strict Is and Os   - Indwelling catheter in place   - CTM     ID:  #Leukocytosis  #Aspiration PNA?  - spiked fever overnight   - f/u BCx   - f/u MRSA swab   - Full RVP neg   - c/w vanc and zosyn for broad coverage       ENDO:  #DM  - f/u HGBA1c   - ISS     VTE PPX  - HSQ Patient is a 69 year old Belarusian speaking male with PMH of HTN, HLD, DM, presents with Acute Pulmonary Edema in the setting of HTN emergency with acute stroke.    PLAN:  NEURO:   #Code stroke in the ER  -patient with aphasia and gazing to left side  -CTA: No hemorrhage, no intracranial lesions  -Rt Carotid: 50% stenosis  -Lt Carotid: 90% stenosis  -vertebral circulation: left subclavian artery with segmental occulsion betweeen the throacic aorta and the left vertebral origin  -Anterior intracranial circulation: atherosclerosis, moderate on rt and severe on left  -posterior circulation: patent rt basilar and posterior arteries, left vertebral with reveraold of flow from the basilar to its PICA  - today's PE revealed right side neglect, RUE/RLE weakness, slurred speech   -will continue with neuro checks Q4  - A1C 9.4   - lipid profile - LDL 87  - f/u PT/OT/S&S recs   - neuro following - recommended MRI brain (see results below), TTE (performed, see results below), Loop Recorder prior to discharge, Vertebral artery doppler to r/o subclavian steal i.s.o segmental subclavian artery occlusion and consult vascular if suggestive of flow reversal   -holding off on heparin gtt as per neuro due to concern for hemorrhagic conversion of possible stroke  - MRI of Brain 10/29 showed Acute infarcts in the high left MCA watershed distribution. Irregular flow-void in the cavernous segment of the left ICA. Chronic ischemic changes.  -loaded with asa and plavix in ER per neuro  - c/w ASA, plavix and statin   - neuro following, appreciate recs   - pending vertebral artery doppler     PULMONARY:  # flash pulmonary edema  - CXR in ED showed discrete right perihilar opacities with bilateral effusions   - s/p lasix 80 in ED   - CXR from 10/28 AM showed Mild resolution of the bilateral pulmonary opacities likely pulmonary edema. Probable small left effusion  - 10/28 - Weaned down from BIPAP to 3L of NC  - pending repeat CXR in AM   - c/w lasix 80 BID     #Concern for Aspiration   - patient with cough after medications were given  - pt passed bedside dysphagia screening   - NPO except meds, pending S&S eval     CARDIAC:  EKG with NSR LVH, ST depressions in multiple leads  bigeminy and PVCx on tele  #NSTEMI:  #elevated troponin   NSR on tele  -loaded with ASA and plavix in the ER, as per neuro, would hold hep gtt for now given concern for hemorrhagic conversion of the possible stroke   - c/w lipitor 80, ASA 81, and plavix 75  - was on nitro gtt previously to reduce systolic BP from 180 to 140 goal systolic, now off nitro gtt since 10/28  -TTE 10/27 showed mildly decreased LVSF w/ EF of 45 to 50 %, with regional wall motion abnormalities. Basal and mid anterior wall, basal and mid anterolateral wall, basal and mid inferolateral wall, and basal inferior segment are abnormal.  -Troponin 104 -> 148 -> 190 -> 236 -> 312   - would defer cardiac cath in setting of possible acute stroke for now  - continue to trend troponin daily     #HTN Emergency:  - was on nitro gtt to maintain SBPs 140-180s, now off nitro gtt since 10/28  - on hydralazine PRN   - c/w lasix 80 BID   - started Coreg 12.5 BID, decreased to 6.25 BID to maintain SBP 140s       #HLD:  - c/w lipitor 80  - lipid profile showed LDL 87     GI:  # Diet   - passed bedside dysphagia screening  - NPO except meds pending S&S eval     #PE revealed soft, mildly distended abdomen  - Today's PE revealed non-distended abdomen   - CTM       RENAL:   #NOVA   - Cr uptrending 1.19 -> 1.33 -> 1.47 -> 1.73, likely due to hypertensive emergency   - Strict Is and Os   - Indwelling catheter in place   - CTM     ID:  #Leukocytosis  #Aspiration PNA?  - spiked fever overnight   - BCx - NG at 24 hrs  - MRSA swab - neg  - Full RVP neg   - d/c vanc given neg MRSA swab   - c/w zosyn       ENDO:  #DM  -  HGBA1c 10/28 - 9.4   - ISS     VTE PPX  - HSQ

## 2024-10-29 NOTE — SWALLOW BEDSIDE ASSESSMENT ADULT - COMMENTS
Of Note: SLP service attempted to see patient yesterday 10/28; however, patient on BIPAP with plan to follow as patient becomes medically optimized/ schedule permits. CCU informed SLP service patient weaned to nasal cannula yesterday and has been tolerating. SLP attempted to see patient earlier today; however, patient leaving unit for MRI.     Patient received upright awake/ alert in bed, family present at bedside. Patient primary language Ukrainian , son provided translation at bedside per patient/ family wishes. Patient noted to communicate with basic English. Speech language evaluation also completed (see note).

## 2024-10-29 NOTE — SPEECH LANGUAGE PATHOLOGY EVALUATION - COMMENTS
Of Note: SLP service attempted to see patient yesterday 10/28; however, patient on BIPAP with plan to follow as patient becomes medically optimized/ schedule permits. CCU informed SLP service patient weaned to nasal cannula yesterday and has been tolerating. SLP attempted to see patient earlier today for a bedside swallow evaluation; however, patient leaving unit for MRI. Discussed with CCU, speech language evaluation to also be completed given stroke workup.     Patient received upright awake/ alert in bed, family present at bedside. Patient primary language Lithuanian , son provided translation at bedside per patient/ family wishes for bedside swallow evaluation. Patient noted to communicate with basic English. SLP called [employer] and utilized  for speech language evaluation (ID #[id number]) with family providing assistance as needed. (See bedside swallow evaluation). Per patient's son, patient has been significant improvement in speech/ language skills since admission; however, not yet at baseline. Patient may benefit from speech language therapy as schedule permits while inpatient and comprehensive speech language evaluation/therapy pending discharge plans (i.e., Rehab center vs. Homecare vs. Outpatient at [facility] [phone])

## 2024-10-29 NOTE — CONSULT NOTE ADULT - SUBJECTIVE AND OBJECTIVE BOX
HPI:  Patient is a 75 year old Arabic speaking male with PMH of HTN, HLD, DM, presents with shortness of breath over 3-4 days which acutely worsened today. Patient also initially complained of chest pain and noted with elevated BP to 190/110. Acute management in the ED included BiPAP, lasix. Patient was placed on nitroglycerin drip due to concern for possible ACS. Chest xray noted with bilateral pulmonary edema which appears cardiac in origin. Patient's respiratory status stabilized on BiPAP. While in the ED, patient's sons reported that patient also began to have right arm weakness first noticed this morning. During this time, patient also appeared slightly confused and required assistance to walk. Patient was last seen at his baseline yesterday evening at 2100 when he was AAOx3 and did not have difficulties using his right side. Code stroke was called for right sided weakness.   Upon stroke team assessment, patient is on BiPAP and does not appear in respiratory distress. BP at time of assessment is 130s systolic (nitroglycerin drip was previously discontinued). Patient denies history of stroke. Patient takes aspirin for cardioprotection. Ambulates independently at baseline, lives with family and requires some assistance with taking medications.     CCU consulted because patient requiring BIPAP for difficulty breathing and requiring nitro gtt for BP elevated to 220/110.   (27 Oct 2024 16:04)    PAST MEDICAL & SURGICAL HISTORY:  Diabetes mellitus      HTN (hypertension)      Hyperlipidemia        FAMILY HISTORY:    Home Medications:  Aspir 81 oral delayed release tablet: 1 tab(s) orally once a day (27 Oct 2024 16:20)  Basaglar KwikPen 100 units/mL subcutaneous solution: 15 unit(s) subcutaneous once a day (at bedtime) (27 Oct 2024 16:22)  gabapentin 300 mg oral capsule: 1 cap(s) orally 2 times a day (27 Oct 2024 16:21)  Lipitor 20 mg oral tablet: 1 tab(s) orally once a day (27 Oct 2024 16:20)  metFORMIN 850 mg oral tablet: 1 tab(s) orally once a day (27 Oct 2024 16:21)  repaglinide 2 mg oral tablet: 1 tab(s) orally 3 times a day (27 Oct 2024 16:20)      MEDICATIONS  (STANDING):  albuterol    90 MICROgram(s) HFA Inhaler 2 Puff(s) Inhalation two times a day  albuterol/ipratropium for Nebulization 3 milliLiter(s) Nebulizer every 6 hours  aspirin  chewable 81 milliGRAM(s) Oral daily  atorvastatin 80 milliGRAM(s) Oral at bedtime  carvedilol 6.25 milliGRAM(s) Oral every 12 hours  chlorhexidine 2% Cloths 1 Application(s) Topical daily  clopidogrel Tablet 75 milliGRAM(s) Oral daily  dextrose 50% Injectable 25 Gram(s) IV Push once  dextrose 50% Injectable 12.5 Gram(s) IV Push once  furosemide   Injectable 80 milliGRAM(s) IV Push two times a day  heparin   Injectable 5000 Unit(s) SubCutaneous every 8 hours  insulin lispro (ADMELOG) corrective regimen sliding scale   SubCutaneous every 6 hours  piperacillin/tazobactam IVPB.. 3.375 Gram(s) IV Intermittent every 8 hours    MEDICATIONS  (PRN):  acetaminophen     Tablet .. 650 milliGRAM(s) Oral every 6 hours PRN Temp greater or equal to 38C (100.4F), Moderate Pain (4 - 6), Severe Pain (7 - 10)  hydrALAZINE Injectable 5 milliGRAM(s) IV Push every 6 hours PRN HTN    Vital Signs Last 24 Hrs  T(C): 36.7 (29 Oct 2024 16:00), Max: 38.1 (28 Oct 2024 20:00)  T(F): 98.1 (29 Oct 2024 16:00), Max: 100.5 (28 Oct 2024 20:00)  HR: 87 (29 Oct 2024 17:00) (60 - 93)  BP: --  BP(mean): --  RR: 27 (29 Oct 2024 17:00) (19 - 30)  SpO2: 100% (29 Oct 2024 17:00) (94% - 100%)    Parameters below as of 29 Oct 2024 17:00  Patient On (Oxygen Delivery Method): nasal cannula  O2 Flow (L/min): 3      PHYSICAL EXAM:  Awake Alert Oriented x 3 No distress, Speech is clear Slovenian speaking  PERRL, EOMI, Tongue midline, mild lower facial  Motor:             RUE 0/5        LUE 5/5             RLE 4-/5         LLE 5/5  Sensory inac to light touch  No dysmetria  No drift    LABS:  Urinalysis Basic - ( 29 Oct 2024 03:16 )    Color: x / Appearance: x / SG: x / pH: x  Gluc: 147 mg/dL / Ketone: x  / Bili: x / Urobili: x   Blood: x / Protein: x / Nitrite: x   Leuk Esterase: x / RBC: x / WBC x   Sq Epi: x / Non Sq Epi: x / Bacteria: x                              11.7   10.80 )-----------( 425      ( 29 Oct 2024 03:16 )             36.2     10-29    139  |  99  |  27[H]  ----------------------------<  147[H]  3.0[L]   |  24  |  1.73[H]    Ca    8.7      29 Oct 2024 03:16  Phos  4.6     10-29  Mg     2.30     10-29    TPro  6.6  /  Alb  3.2[L]  /  TBili  0.9  /  DBili  x   /  AST  29  /  ALT  29  /  AlkPhos  59  10-29      RADIOLOGY:  < from: MR Head No Cont (10.29.24 @ 10:56) >    IMPRESSION:    1.  Acute infarcts in the high left MCA watershed distribution.  2.  Irregular flow-void in the cavernous segment of the left ICA. This   could be attributed to slow/turbulent flow. A severe stenosis is not   entirely excluded. Correlate with recent CTA of the head for further   evaluation.  3.  Chronic ischemic changes.    < end of copied text >  < from: CT Angio Neck w/ IV Cont (10.27.24 @ 12:20) >    IMPRESSION:    1. BRAIN:  No intracranial hemorrhage is appreciated.  No intracranial   mass lesion is appreciated.    2.  RIGHT CAROTID SYSTEM:    Atherosclerotic plaque common carotid and   internal carotid artery with maximal stenosis approximately 50%.    3.   LEFT CAROTID SYSTEM:     Atherosclerotic plaque common carotid and   internal carotid artery with maximal stenosis approximately 90%.    4.   VERTEBRAL CIRCULATION:    Patent. 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 Flow directionality of the left vertebral artery is not   determined by this technique. Consider ultrasound in this evaluation, as   required    5.  ANTERIOR INTRACRANIAL CIRCULATION:Intracranial atherosclerosis   cavernous and clinoid segments of the internal carotid arteries, moderate   on the right and severe on the left.    6.  POSTERIOR INTRACRANIAL CIRCULATION:    Patent right vertebral basilar   and posterior cerebral arteries. 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    7.  No large vessel occlusion    8.  BRAIN PERFUSION:   No acute infarction of the brain is convincingly   demonstrated.  Relative ischemia is demonstrated in the left cerebral   hemispheric anterior corona radiata white matter.    9. Pulmonary edema pattern

## 2024-10-29 NOTE — PROGRESS NOTE ADULT - SUBJECTIVE AND OBJECTIVE BOX
Patient is a 69y old  Male who presents with a chief complaint of concern for stroke (28 Oct 2024 16:48)    HPI:  Patient is a 69 year old Arabic speaking male with PMH of HTN, HLD, DM, presents with shortness of breath over 3-4 days which acutely worsened today. Patient also initially complained of chest pain and noted with elevated BP to 190/110. Acute management in the ED included BiPAP, lasix. Patient was placed on nitroglycerin drip due to concern for possible ACS. Chest xray noted with bilateral pulmonary edema which appears cardiac in origin. Patient's respiratory status stabilized on BiPAP. While in the ED, patient's sons reported that patient also began to have right arm weakness first noticed this morning. During this time, patient also appeared slightly confused and required assistance to walk. Patient was last seen at his baseline yesterday evening at 2100 when he was AAOx3 and did not have difficulties using his right side. Code stroke was called for right sided weakness.   Upon stroke team assessment, patient is on BiPAP and does not appear in respiratory distress. BP at time of assessment is 130s systolic (nitroglycerin drip was previously discontinued). Patient denies history of stroke. Patient takes aspirin for cardioprotection. Ambulates independently at baseline, lives with family and requires some assistance with taking medications.     CCU consulted because patient requiring BIPAP for difficulty breathing and requiring nitro gtt for BP elevated to 220/110.   (27 Oct 2024 16:04)       INTERVAL HPI/OVERNIGHT EVENTS: 1.9L UOP with net negative 1.5 L, stable on NC     Subjective:    ICU Vital Signs Last 24 Hrs  T(C): 37.1 (29 Oct 2024 04:00), Max: 38.1 (28 Oct 2024 20:00)  T(F): 98.7 (29 Oct 2024 04:00), Max: 100.5 (28 Oct 2024 20:00)  HR: 74 (29 Oct 2024 06:59) (60 - 96)  BP: --  BP(mean): --  ABP: 150/62 (29 Oct 2024 06:00) (118/42 - 204/71)  ABP(mean): 90 (29 Oct 2024 06:00) (63 - 107)  RR: 24 (29 Oct 2024 06:59) (19 - 34)  SpO2: 95% (29 Oct 2024 06:59) (94% - 100%)    O2 Parameters below as of 29 Oct 2024 06:59  Patient On (Oxygen Delivery Method): nasal cannula  O2 Flow (L/min): 3        I&O's Summary    28 Oct 2024 07:01  -  29 Oct 2024 07:00  --------------------------------------------------------  IN: 360 mL / OUT: 1915 mL / NET: -1555 mL          Daily     Daily Weight in k.8 (29 Oct 2024 04:00)    Adult Advanced Hemodynamics Last 24 Hrs  CVP(mm Hg): --  CVP(cm H2O): --  CO: --  CI: --  PA: --  PA(mean): --  PCWP: --  SVR: --  SVRI: --  PVR: --  PVRI: --    EKG/Telemetry Events:    MEDICATIONS  (STANDING):  albuterol    90 MICROgram(s) HFA Inhaler 2 Puff(s) Inhalation two times a day  albuterol/ipratropium for Nebulization 3 milliLiter(s) Nebulizer every 6 hours  aspirin  chewable 81 milliGRAM(s) Oral daily  atorvastatin 80 milliGRAM(s) Oral at bedtime  carvedilol 6.25 milliGRAM(s) Oral every 12 hours  chlorhexidine 2% Cloths 1 Application(s) Topical daily  clopidogrel Tablet 75 milliGRAM(s) Oral daily  dextrose 50% Injectable 25 Gram(s) IV Push once  dextrose 50% Injectable 12.5 Gram(s) IV Push once  furosemide   Injectable 80 milliGRAM(s) IV Push two times a day  heparin   Injectable 5000 Unit(s) SubCutaneous every 8 hours  insulin lispro (ADMELOG) corrective regimen sliding scale   SubCutaneous every 6 hours  piperacillin/tazobactam IVPB.. 3.375 Gram(s) IV Intermittent every 8 hours  potassium chloride   Powder 40 milliEquivalent(s) Oral every 2 hours    MEDICATIONS  (PRN):  acetaminophen     Tablet .. 650 milliGRAM(s) Oral every 6 hours PRN Temp greater or equal to 38C (100.4F), Moderate Pain (4 - 6), Severe Pain (7 - 10)  hydrALAZINE Injectable 5 milliGRAM(s) IV Push every 6 hours PRN HTN      PHYSICAL EXAM:  GENERAL:   HEAD:  Atraumatic, Normocephalic  EYES: EOMI, PERRLA, conjunctiva and sclera clear  NECK: Supple, No JVD, Normal thyroid, no enlarged nodes  NERVOUS SYSTEM:  Alert & Awake.   CHEST/LUNG: B/L good air entry; No rales, rhonchi, or wheezing  HEART: S1S2 normal, no S3, Regular rate and rhythm; No murmurs  ABDOMEN: Soft, Nontender, Nondistended; Bowel sounds present  EXTREMITIES:  2+ Peripheral Pulses, No clubbing, cyanosis, or edema  LYMPH: No lymphadenopathy noted  SKIN: No rashes or lesions    LABS:                        11.7   10.80 )-----------( 425      ( 29 Oct 2024 03:16 )             36.2     10    139  |  99  |  27[H]  ----------------------------<  147[H]  3.0[L]   |  24  |  1.73[H]    Ca    8.7      29 Oct 2024 03:16  Phos  4.6     10-  Mg     2.30     10-29    TPro  6.6  /  Alb  3.2[L]  /  TBili  0.9  /  DBili  x   /  AST  29  /  ALT  29  /  AlkPhos  59  10-29    LIVER FUNCTIONS - ( 29 Oct 2024 03:16 )  Alb: 3.2 g/dL / Pro: 6.6 g/dL / ALK PHOS: 59 U/L / ALT: 29 U/L / AST: 29 U/L / GGT: x           PT/INR - ( 27 Oct 2024 11:00 )   PT: 13.4 sec;   INR: 1.16 ratio         PTT - ( 27 Oct 2024 11:00 )  PTT:25.0 sec  CAPILLARY BLOOD GLUCOSE      POCT Blood Glucose.: 146 mg/dL (29 Oct 2024 05:29)  POCT Blood Glucose.: 141 mg/dL (28 Oct 2024 23:54)  POCT Blood Glucose.: 185 mg/dL (28 Oct 2024 16:52)  POCT Blood Glucose.: 185 mg/dL (28 Oct 2024 11:58)  POCT Blood Glucose.: 236 mg/dL (28 Oct 2024 07:43)    ABG - ( 27 Oct 2024 21:44 )  pH, Arterial: 7.49  pH, Blood: x     /  pCO2: 31    /  pO2: 152   / HCO3: 24    / Base Excess: 0.8   /  SaO2: 98.2                CARDIAC MARKERS ( 27 Oct 2024 21:41 )  x     / x     / x     / x     / 9.4 ng/mL  CARDIAC MARKERS ( 27 Oct 2024 11:00 )  x     / x     / x     / x     / 14.7 ng/mL      Urinalysis Basic - ( 29 Oct 2024 03:16 )    Color: x / Appearance: x / SG: x / pH: x  Gluc: 147 mg/dL / Ketone: x  / Bili: x / Urobili: x   Blood: x / Protein: x / Nitrite: x   Leuk Esterase: x / RBC: x / WBC x   Sq Epi: x / Non Sq Epi: x / Bacteria: x          RADIOLOGY & ADDITIONAL TESTS:  CXR:        Care Discussed with Consultants/Other Providers [ x] YES  [ ] NO           Patient is a 69y old  Male who presents with a chief complaint of concern for stroke (28 Oct 2024 16:48)    HPI:  Patient is a 69 year old Arabic speaking male with PMH of HTN, HLD, DM, presents with shortness of breath over 3-4 days which acutely worsened today. Patient also initially complained of chest pain and noted with elevated BP to 190/110. Acute management in the ED included BiPAP, lasix. Patient was placed on nitroglycerin drip due to concern for possible ACS. Chest xray noted with bilateral pulmonary edema which appears cardiac in origin. Patient's respiratory status stabilized on BiPAP. While in the ED, patient's sons reported that patient also began to have right arm weakness first noticed this morning. During this time, patient also appeared slightly confused and required assistance to walk. Patient was last seen at his baseline yesterday evening at 2100 when he was AAOx3 and did not have difficulties using his right side. Code stroke was called for right sided weakness.   Upon stroke team assessment, patient is on BiPAP and does not appear in respiratory distress. BP at time of assessment is 130s systolic (nitroglycerin drip was previously discontinued). Patient denies history of stroke. Patient takes aspirin for cardioprotection. Ambulates independently at baseline, lives with family and requires some assistance with taking medications.     CCU consulted because patient requiring BIPAP for difficulty breathing and requiring nitro gtt for BP elevated to 220/110.   (27 Oct 2024 16:04)       INTERVAL HPI/OVERNIGHT EVENTS: 1.9L UOP with net negative 1.5 L, stable on NC     Subjective: Pt seen and examined at bedside, reports feelings better. MS improved compared to yesterday, currently AOX2 (self and place)     ICU Vital Signs Last 24 Hrs  T(C): 37.1 (29 Oct 2024 04:00), Max: 38.1 (28 Oct 2024 20:00)  T(F): 98.7 (29 Oct 2024 04:00), Max: 100.5 (28 Oct 2024 20:00)  HR: 74 (29 Oct 2024 06:59) (60 - 96)  BP: --  BP(mean): --  ABP: 150/62 (29 Oct 2024 06:00) (118/42 - 204/71)  ABP(mean): 90 (29 Oct 2024 06:00) (63 - 107)  RR: 24 (29 Oct 2024 06:59) (19 - 34)  SpO2: 95% (29 Oct 2024 06:59) (94% - 100%)    O2 Parameters below as of 29 Oct 2024 06:59  Patient On (Oxygen Delivery Method): nasal cannula  O2 Flow (L/min): 3        I&O's Summary    28 Oct 2024 07:01  -  29 Oct 2024 07:00  --------------------------------------------------------  IN: 360 mL / OUT: 1915 mL / NET: -1555 mL          Daily     Daily Weight in k.8 (29 Oct 2024 04:00)    Adult Advanced Hemodynamics Last 24 Hrs  CVP(mm Hg): --  CVP(cm H2O): --  CO: --  CI: --  PA: --  PA(mean): --  PCWP: --  SVR: --  SVRI: --  PVR: --  PVRI: --    EKG/Telemetry Events:    MEDICATIONS  (STANDING):  albuterol    90 MICROgram(s) HFA Inhaler 2 Puff(s) Inhalation two times a day  albuterol/ipratropium for Nebulization 3 milliLiter(s) Nebulizer every 6 hours  aspirin  chewable 81 milliGRAM(s) Oral daily  atorvastatin 80 milliGRAM(s) Oral at bedtime  carvedilol 6.25 milliGRAM(s) Oral every 12 hours  chlorhexidine 2% Cloths 1 Application(s) Topical daily  clopidogrel Tablet 75 milliGRAM(s) Oral daily  dextrose 50% Injectable 25 Gram(s) IV Push once  dextrose 50% Injectable 12.5 Gram(s) IV Push once  furosemide   Injectable 80 milliGRAM(s) IV Push two times a day  heparin   Injectable 5000 Unit(s) SubCutaneous every 8 hours  insulin lispro (ADMELOG) corrective regimen sliding scale   SubCutaneous every 6 hours  piperacillin/tazobactam IVPB.. 3.375 Gram(s) IV Intermittent every 8 hours  potassium chloride   Powder 40 milliEquivalent(s) Oral every 2 hours    MEDICATIONS  (PRN):  acetaminophen     Tablet .. 650 milliGRAM(s) Oral every 6 hours PRN Temp greater or equal to 38C (100.4F), Moderate Pain (4 - 6), Severe Pain (7 - 10)  hydrALAZINE Injectable 5 milliGRAM(s) IV Push every 6 hours PRN HTN      PHYSICAL EXAM:  GENERAL: lying in the bed in NAD  HEAD:  Atraumatic, Normocephalic  EYES: conjunctiva and sclera clear  NERVOUS SYSTEM:  Alert & Awake. AOX2, per  ID [id number]- pt has slurred speech and tends to repeat words   CHEST/LUNG: mild crackles in b/l lung base  HEART: RRR, no M/G/R   ABDOMEN: Soft, non-tender, non-distended   EXTREMITIES: No b/l LE edema, unable to move RUE and RLE      LABS:                        11.7   10.80 )-----------( 425      ( 29 Oct 2024 03:16 )             36.2     10-    139  |  99  |  27[H]  ----------------------------<  147[H]  3.0[L]   |  24  |  1.73[H]    Ca    8.7      29 Oct 2024 03:16  Phos  4.6     10-  Mg     2.30     10-    TPro  6.6  /  Alb  3.2[L]  /  TBili  0.9  /  DBili  x   /  AST  29  /  ALT  29  /  AlkPhos  59  10-29    LIVER FUNCTIONS - ( 29 Oct 2024 03:16 )  Alb: 3.2 g/dL / Pro: 6.6 g/dL / ALK PHOS: 59 U/L / ALT: 29 U/L / AST: 29 U/L / GGT: x           PT/INR - ( 27 Oct 2024 11:00 )   PT: 13.4 sec;   INR: 1.16 ratio         PTT - ( 27 Oct 2024 11:00 )  PTT:25.0 sec  CAPILLARY BLOOD GLUCOSE      POCT Blood Glucose.: 146 mg/dL (29 Oct 2024 05:29)  POCT Blood Glucose.: 141 mg/dL (28 Oct 2024 23:54)  POCT Blood Glucose.: 185 mg/dL (28 Oct 2024 16:52)  POCT Blood Glucose.: 185 mg/dL (28 Oct 2024 11:58)  POCT Blood Glucose.: 236 mg/dL (28 Oct 2024 07:43)    ABG - ( 27 Oct 2024 21:44 )  pH, Arterial: 7.49  pH, Blood: x     /  pCO2: 31    /  pO2: 152   / HCO3: 24    / Base Excess: 0.8   /  SaO2: 98.2                CARDIAC MARKERS ( 27 Oct 2024 21:41 )  x     / x     / x     / x     / 9.4 ng/mL  CARDIAC MARKERS ( 27 Oct 2024 11:00 )  x     / x     / x     / x     / 14.7 ng/mL      Urinalysis Basic - ( 29 Oct 2024 03:16 )    Color: x / Appearance: x / SG: x / pH: x  Gluc: 147 mg/dL / Ketone: x  / Bili: x / Urobili: x   Blood: x / Protein: x / Nitrite: x   Leuk Esterase: x / RBC: x / WBC x   Sq Epi: x / Non Sq Epi: x / Bacteria: x          RADIOLOGY & ADDITIONAL TESTS:  CXR:        Care Discussed with Consultants/Other Providers [ x] YES  [ ] NO

## 2024-10-29 NOTE — PHYSICAL THERAPY INITIAL EVALUATION ADULT - IMPAIRMENTS FOUND, PT EVAL
cognitive impairment/decreased midline orientation/ergonomics and body mechanics/fine motor/gait, locomotion, and balance/gross motor/muscle strength/neuromotor development and sensory integration/poor safety awareness/ROM/tone

## 2024-10-29 NOTE — SWALLOW BEDSIDE ASSESSMENT ADULT - H & P REVIEW
CTA Head 10/27: IMPRESSION: 1. BRAIN: No intracranial hemorrhage is appreciated. No intracranial mass lesion is appreciated. 2.  RIGHT CAROTID SYSTEM: Atherosclerotic plaque common carotid and internal carotid artery with maximal stenosis approximately 50%. 3. LEFT CAROTID SYSTEM: Atherosclerotic plaque common carotid and internal carotid artery with maximal stenosis approximately 90%. 4. VERTEBRAL CIRCULATION: Patent. 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 Flow directionality of the left vertebral artery is not determined by this technique. Consider ultrasound in this evaluation, as required 5. ANTERIOR INTRACRANIAL CIRCULATION: Intracranial atherosclerosis cavernous and clinoid segments of the internal carotid arteries, moderate on the right and severe on the left. 6.  POSTERIOR INTRACRANIAL CIRCULATION: Patent right vertebral basilar and posterior cerebral arteries. 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 7. No large vessel occlusion 8. BRAIN PERFUSION: No acute infarction of the brain is convincingly demonstrated. Relative ischemia is demonstrated in the left cerebral hemispheric anterior corona radiata white matter. 9. Pulmonary edema pattern/yes

## 2024-10-29 NOTE — SWALLOW BEDSIDE ASSESSMENT ADULT - COMMENTS
CXR 10/28: IMPRESSION: Resolving pulmonary edema.    Of Note: SLP service attempted to see patient yesterday 10/28; however, patient on BIPAP with plan to follow as patient becomes medically optimized/ schedule permits. CCU informed SLP service patient weaned to nasal cannula yesterday and has been tolerating.    Patient received in CCU on stretcher; per RN patient awaiting transport for MRI but ok to see patient prior while patient waiting. RN reports patient took medications earlier without difficulty. Son reports patient without baseline dysphagia. RN assisted in repositioning patient upright in bed; however, at that time transport arrived to take patient to MRI therefore bedside swallow evaluation NOT completed at this time. Discussed with CCU team will follow up as schedule permits. Per discussion with CCU team, plan to place order for speech language evaluation as well to be completed as schedule permits.

## 2024-10-29 NOTE — SWALLOW BEDSIDE ASSESSMENT ADULT - H & P REVIEW
Neuro 10/27, "Impression: Right hemiparesis likely due to left brain dysfunction. Mechanism large artery atherosclerosis vs ESUS. Encephalopathy likely metabolic in setting of acute hypoxic respiratory failure."/yes

## 2024-10-29 NOTE — PHYSICAL THERAPY INITIAL EVALUATION ADULT - PERTINENT HX OF CURRENT PROBLEM, REHAB EVAL
Pt is a 69 year old male presenting with SOB, chest pain, and hypertension associated with right side weakness. CXR showed discrete right perihilar opacities with bilateral effusions. CTA head and neck negative for acute findings; however, significant for relative ischemia in the left cerebral hemispheric anterior corona radiata white matter. EKG significant for ST depressions in multiple leads. Pt admitted for hypertensive emergency, NSTEMI, and possible acute stroke.

## 2024-10-29 NOTE — OCCUPATIONAL THERAPY INITIAL EVALUATION ADULT - FINE MOTOR COORDINATION EXAM
left upper extremity fine motor coordination was WFL. Pt unable to / "squeeze" hand with right upper extremity. No active movement of right upper extremity digits observed./Left UE

## 2024-10-29 NOTE — SPEECH LANGUAGE PATHOLOGY EVALUATION - SLP DIAGNOSIS
Mild expressive language skills and functional receptive language skills characterized by adequate auditory comprehension for simple directives/ basic questions, adequate repetition skills at the word and phrase level, mild anomia noted (delay in response, benefited from extended weight time) with patient ultimately achieving success and fluent language out put. Patient noted to be switching between Armenian and English. Motor speech skills characterized by adequate articulation and rate of speech noted at the word and conversational level. Mild expressive language skills and functional receptive language skills characterized by adequate auditory comprehension for simple directives/ basic questions, adequate repetition skills at the word and phrase level, mild anomia noted (delay in response, benefited from extended weight time) with patient ultimately achieving success and fluent language out put. Patient noted to be switching between Maltese and English. Limited assessment of motor speech skills secondary to patient reporting having had a bowel movement and requesting to be cleaned; however, motor speech skills characterized by adequate articulation and rate of speech noted at the word and conversational level during language tasks.

## 2024-10-29 NOTE — OCCUPATIONAL THERAPY INITIAL EVALUATION ADULT - DIAGNOSIS, OT EVAL
Pt with impaired right upper extremity ROM and impaired cognition impacting ability to complete ADLs, IADLs, functional mobility/transfers.

## 2024-10-29 NOTE — OCCUPATIONAL THERAPY INITIAL EVALUATION ADULT - NSOTDISCHREC_GEN_A_CORE
Unable to make formal discharge recommendation at this time due to limited functional evaluation; OT to continue to follow

## 2024-10-29 NOTE — SWALLOW BEDSIDE ASSESSMENT ADULT - SLP PATIENT PROFILE REVIEW
yes  CCU 10/29, "Patient is a 69 year old Romansh speaking male with PMH of HTN, HLD, DM, presents with shortness of breath over 3-4 days which acutely worsened today. Patient also initially complained of chest pain and noted with elevated BP to 190/110. Acute management in the ED included BiPAP, lasix. Patient was placed on nitroglycerin drip due to concern for possible ACS. Chest xray noted with bilateral pulmonary edema which appears cardiac in origin. Patient's respiratory status stabilized on BiPAP. While in the ED, patient's sons reported that patient also began to have right arm weakness first noticed this morning. During this time, patient also appeared slightly confused and required assistance to walk. Patient was last seen at his baseline yesterday evening at 2100 when he was AAOx3 and did not have difficulties using his right side. Code stroke was called for right sided weakness.  Upon stroke team assessment, patient is on BiPAP and does not appear in respiratory distress. BP at time of assessment is 130s systolic (nitroglycerin drip was previously discontinued). Patient denies history of stroke. Patient takes aspirin for cardioprotection. Ambulates independently at baseline, lives with family and requires some assistance with taking medications. CCU consulted because patient requiring BIPAP for difficulty breathing and requiring nitro gtt for BP elevated to 220/110... #Concern for Aspiration - patient with cough after medications were given - pt passed bedside dysphagia screening - NPO except meds, pending S&S eval... #Aspiration PNA? - spiked fever overnight."/yes

## 2024-10-29 NOTE — OCCUPATIONAL THERAPY INITIAL EVALUATION ADULT - LEVEL OF INDEPENDENCE: SUPINE/SIT, REHAB EVAL
Deferred out of bed activity at this time due to pt with high systolic BP (200). EVE Christianson notified and aware./unable to perform

## 2024-10-29 NOTE — OCCUPATIONAL THERAPY INITIAL EVALUATION ADULT - RANGE OF MOTION EXAMINATION, UPPER EXTREMITY
Pt with no active movement of right upper extremity shoulder to digits/Left UE Active ROM was WFL (within functional limits)/Right UE Passive ROM was WFL  (within functional limits)

## 2024-10-29 NOTE — PROGRESS NOTE ADULT - CRITICAL CARE ATTENDING COMMENT
75 year old man with history of HTN who presented with hypertensive emergency and right sided weakness complicated with acute diastolic heart failure and flash pulmonary edema. Placed on BIPAP and transferred to CCU for further care.    TTE 10/27/24:  CONCLUSIONS:   1. Left ventricular systolic function is mildly decreased with an ejection fraction visually estimated at 45 to 50 %. Regional wall motion abnormalities present.   2. Basal and mid anterior wall, basal and mid anterolateral wall, basal and mid inferolateral wall, and basal inferior segment are abnormal.   3. Left atrium is normal in size.   4. The right atrium is normal in size.   5. Normal right ventricular cavity size, with normal wall thickness, and probably normal right ventricular systolic function.  **Left Ventricle:  Left ventricular systolic function is mildly decreased with an ejection fraction visually estimated at 45 to 50%. There are regional wall motion abnormalities present.    Meds:  ASA  Atorvastatin  Plavix  SQ heparin  Coreg 6.25 mg BID  Lasix 80 mg IV BID  Insulin  Zosyn    #Neuro- Acute CVA and carotid disease  Neuro input appreciated    #Pulm- BiPAP as needed  Continue Lasix    #CV- Hypertensive emergency with demand ischemia  No ac per neuro- will we reevaluate  Acute on chronic combined systolic and diastolic heart failure  Resumed IV diuretic    #Renal- monitor Cr, NOVA worsening- may reduce lasix pending volume status    #ID- Elevated WBC, febrile  Continue Zosyn  Await CX    #Endo- Sliding scale, follow FS    #DVT ppx- on sq heparin

## 2024-10-29 NOTE — PHYSICAL THERAPY INITIAL EVALUATION ADULT - ADDITIONAL COMMENTS
Pt confused; does not respond to questions when asked regarding previous level of function and living situation. Per H&P, patient lives with family and was previously independent with ambulation.

## 2024-10-29 NOTE — CONSULT NOTE ADULT - PROBLEM SELECTOR RECOMMENDATION 9
- Will review case with both Dr. Promise Collins and Dr. Farhat Ulrich to determine if angiogram with stent vs CEA is needed  - C/w ASA and Plavix for now  - Neurosurgery will follow up   - Neuro checks q 4 hours

## 2024-10-29 NOTE — SWALLOW BEDSIDE ASSESSMENT ADULT - CONSISTENCIES ADMINISTERED
~8 oz/thin liquid 2 oz/pureed half cracker/regular solid 3 oz/mildly thick 2 oz/moderately thick 3 tsp trials/soft & bite-sized

## 2024-10-29 NOTE — CONSULT NOTE ADULT - ASSESSMENT
69 RH Maltese speaking male PMHx COPD, HTN, DM presented for hypoxic respiratory distress due to COPD exacerbation vs cardiogenic etiology. In ED, on BiPAP, given lasix, and started nitro drip due to /110. Code stroke called for right side weakness first noticed by family this AM, LKN yesterday evening. Initial VS in ED: /110 prior to nitroglycerin drip, during neurology exam /106. Exam: On BiPAP, AAOx1, following simple commands with occasional confusion, left gaze preference, mild R facial droop, right upper and lower extremity drift, no sensory changes. CTA demonstrated diffuse intracranial and extracranial atherosclerosis, including notably 90% maximal stenosis in carotid bulb and focal occlusion in proximal left vertebral artery. CTA also noted for segmental occlusion of left subclavian artery between thoracic aorta and left vertebral origin.     Patient loaded with Plavix 600mg and ASA 325mg, on Plavix 75mg and ASA 81mg  Right Carotid Doppler 50% and left Carotid Dopplers 90%      MRI brain showed high left MCA infarct

## 2024-10-29 NOTE — PHYSICAL THERAPY INITIAL EVALUATION ADULT - LEVEL OF INDEPENDENCE: SUPINE/SIT, REHAB EVAL
Deferred as SBP increased to 190-200 with minimal movement in bed during evaluation and patient currently soiled. EVE Avitia aware.

## 2024-10-29 NOTE — SWALLOW BEDSIDE ASSESSMENT ADULT - SLP PATIENT PROFILE REVIEW
CCU 10/29, "Patient is a 69 year old Setswana speaking male with PMH of HTN, HLD, DM, presents with shortness of breath over 3-4 days which acutely worsened today. Patient also initially complained of chest pain and noted with elevated BP to 190/110. Acute management in the ED included BiPAP, lasix. Patient was placed on nitroglycerin drip due to concern for possible ACS. Chest xray noted with bilateral pulmonary edema which appears cardiac in origin. Patient's respiratory status stabilized on BiPAP. While in the ED, patient's sons reported that patient also began to have right arm weakness first noticed this morning. During this time, patient also appeared slightly confused and required assistance to walk. Patient was last seen at his baseline yesterday evening at 2100 when he was AAOx3 and did not have difficulties using his right side. Code stroke was called for right sided weakness.  Upon stroke team assessment, patient is on BiPAP and does not appear in respiratory distress. BP at time of assessment is 130s systolic (nitroglycerin drip was previously discontinued). Patient denies history of stroke. Patient takes aspirin for cardioprotection. Ambulates independently at baseline, lives with family and requires some assistance with taking medications. CCU consulted because patient requiring BIPAP for difficulty breathing and requiring nitro gtt for BP elevated to 220/110."/yes

## 2024-10-29 NOTE — SPEECH LANGUAGE PATHOLOGY EVALUATION - SLP PATIENT PROFILE REVIEW
CCU 10/29, "Patient is a 69 year old Kiswahili speaking male with PMH of HTN, HLD, DM, presents with shortness of breath over 3-4 days which acutely worsened today. Patient also initially complained of chest pain and noted with elevated BP to 190/110. Acute management in the ED included BiPAP, lasix. Patient was placed on nitroglycerin drip due to concern for possible ACS. Chest xray noted with bilateral pulmonary edema which appears cardiac in origin. Patient's respiratory status stabilized on BiPAP. While in the ED, patient's sons reported that patient also began to have right arm weakness first noticed this morning. During this time, patient also appeared slightly confused and required assistance to walk. Patient was last seen at his baseline yesterday evening at 2100 when he was AAOx3 and did not have difficulties using his right side. Code stroke was called for right sided weakness.  Upon stroke team assessment, patient is on BiPAP and does not appear in respiratory distress. BP at time of assessment is 130s systolic (nitroglycerin drip was previously discontinued). Patient denies history of stroke. Patient takes aspirin for cardioprotection. Ambulates independently at baseline, lives with family and requires some assistance with taking medications. CCU consulted because patient requiring BIPAP for difficulty breathing and requiring nitro gtt for BP elevated to 220/110... #Concern for Aspiration - patient with cough after medications were given - pt passed bedside dysphagia screening - NPO except meds, pending S&S eval... #Aspiration PNA? - spiked fever overnight."/yes Oriented - self; Oriented - place; Oriented - time

## 2024-10-29 NOTE — SPEECH LANGUAGE PATHOLOGY EVALUATION - SLP AUTOMATIC SPEECH
with extended wait time (i.e., initial days provided patient completed sequence with success)/counting/days of the week

## 2024-10-29 NOTE — SPEECH LANGUAGE PATHOLOGY EVALUATION - SLP PERTINENT HISTORY OF CURRENT PROBLEM
MR Head 10/29: IMPRESSION: 1.  Acute infarcts in the high left MCA watershed distribution. 2.  Irregular flow-void in the cavernous segment of the left ICA. This could be attributed to slow/turbulent flow. A severe stenosis is not entirely excluded. Correlate with recent CTA of the head for further evaluation. 3.  Chronic ischemic changes.

## 2024-10-29 NOTE — PHYSICAL THERAPY INITIAL EVALUATION ADULT - GENERAL OBSERVATIONS, REHAB EVAL
Upon entry, pt semi-supine in bed in NAD; + telemetry, + nasal cannula, + arterial line. /77 HR 80 bpm SpO2 99% on 3L. Pt left as received with all tubes/lines intact, bed alarm on, call bell in reach and in NAD.

## 2024-10-29 NOTE — OCCUPATIONAL THERAPY INITIAL EVALUATION ADULT - ADDITIONAL COMMENTS
Pt is confused and is poor historian. Pt reports he was not ambulating prior to admission and was unable to provide social history. As per EMR, pt lives with his wife. "Patient was last seen at his baseline yesterday evening at 2100 when he was AAOx3 and did not have difficulties using his right side." As per RN, RN reports pt's son reports right upper extremity was weak at baseline but become increasingly weaker upon hospital admission. Please follow care coordination notes for more information regarding social history.   Patient left semisupine in bed in NAD, HOB 30 degrees. All lines intact and bed alarm on.

## 2024-10-29 NOTE — SWALLOW BEDSIDE ASSESSMENT ADULT - SWALLOW EVAL: PROGNOSIS
4. Moderate pharyngeal dysphagia suspected for thin liquids characterized by adequate initiation of the pharyngeal swallow and hyolaryngeal excursion upon digital palpation with a delayed cough (subjectively weak) post swallow following ~6 oz of thin liquids. Patient given additional 2 oz with delayed cough noted again.

## 2024-10-30 LAB
ADD ON TEST-SPECIMEN IN LAB: SIGNIFICANT CHANGE UP
ALBUMIN SERPL ELPH-MCNC: 3.4 G/DL — SIGNIFICANT CHANGE UP (ref 3.3–5)
ALP SERPL-CCNC: 63 U/L — SIGNIFICANT CHANGE UP (ref 40–120)
ALT FLD-CCNC: 37 U/L — SIGNIFICANT CHANGE UP (ref 4–41)
ANION GAP SERPL CALC-SCNC: 18 MMOL/L — HIGH (ref 7–14)
AST SERPL-CCNC: 38 U/L — SIGNIFICANT CHANGE UP (ref 4–40)
BASOPHILS # BLD AUTO: 0.05 K/UL — SIGNIFICANT CHANGE UP (ref 0–0.2)
BASOPHILS NFR BLD AUTO: 0.5 % — SIGNIFICANT CHANGE UP (ref 0–2)
BILIRUB SERPL-MCNC: 0.7 MG/DL — SIGNIFICANT CHANGE UP (ref 0.2–1.2)
BUN SERPL-MCNC: 38 MG/DL — HIGH (ref 7–23)
CALCIUM SERPL-MCNC: 9.4 MG/DL — SIGNIFICANT CHANGE UP (ref 8.4–10.5)
CHLORIDE SERPL-SCNC: 97 MMOL/L — LOW (ref 98–107)
CHOLEST SERPL-MCNC: 138 MG/DL — SIGNIFICANT CHANGE UP
CO2 SERPL-SCNC: 22 MMOL/L — SIGNIFICANT CHANGE UP (ref 22–31)
CREAT SERPL-MCNC: 1.87 MG/DL — HIGH (ref 0.5–1.3)
EGFR: 38 ML/MIN/1.73M2 — LOW
EOSINOPHIL # BLD AUTO: 0.25 K/UL — SIGNIFICANT CHANGE UP (ref 0–0.5)
EOSINOPHIL NFR BLD AUTO: 2.7 % — SIGNIFICANT CHANGE UP (ref 0–6)
GLUCOSE BLDC GLUCOMTR-MCNC: 237 MG/DL — HIGH (ref 70–99)
GLUCOSE BLDC GLUCOMTR-MCNC: 257 MG/DL — HIGH (ref 70–99)
GLUCOSE BLDC GLUCOMTR-MCNC: 278 MG/DL — HIGH (ref 70–99)
GLUCOSE BLDC GLUCOMTR-MCNC: 279 MG/DL — HIGH (ref 70–99)
GLUCOSE SERPL-MCNC: 240 MG/DL — HIGH (ref 70–99)
HCT VFR BLD CALC: 39.9 % — SIGNIFICANT CHANGE UP (ref 39–50)
HDLC SERPL-MCNC: 28 MG/DL — LOW
HGB BLD-MCNC: 12.5 G/DL — LOW (ref 13–17)
IANC: 5.84 K/UL — SIGNIFICANT CHANGE UP (ref 1.8–7.4)
IMM GRANULOCYTES NFR BLD AUTO: 0.4 % — SIGNIFICANT CHANGE UP (ref 0–0.9)
LIPID PNL WITH DIRECT LDL SERPL: 77 MG/DL — SIGNIFICANT CHANGE UP
LYMPHOCYTES # BLD AUTO: 2.02 K/UL — SIGNIFICANT CHANGE UP (ref 1–3.3)
LYMPHOCYTES # BLD AUTO: 22 % — SIGNIFICANT CHANGE UP (ref 13–44)
MAGNESIUM SERPL-MCNC: 2.5 MG/DL — SIGNIFICANT CHANGE UP (ref 1.6–2.6)
MCHC RBC-ENTMCNC: 27.5 PG — SIGNIFICANT CHANGE UP (ref 27–34)
MCHC RBC-ENTMCNC: 31.3 G/DL — LOW (ref 32–36)
MCV RBC AUTO: 87.9 FL — SIGNIFICANT CHANGE UP (ref 80–100)
MONOCYTES # BLD AUTO: 0.97 K/UL — HIGH (ref 0–0.9)
MONOCYTES NFR BLD AUTO: 10.6 % — SIGNIFICANT CHANGE UP (ref 2–14)
NEUTROPHILS # BLD AUTO: 5.84 K/UL — SIGNIFICANT CHANGE UP (ref 1.8–7.4)
NEUTROPHILS NFR BLD AUTO: 63.8 % — SIGNIFICANT CHANGE UP (ref 43–77)
NON HDL CHOLESTEROL: 110 MG/DL — SIGNIFICANT CHANGE UP
NRBC # BLD: 0 /100 WBCS — SIGNIFICANT CHANGE UP (ref 0–0)
NRBC # FLD: 0 K/UL — SIGNIFICANT CHANGE UP (ref 0–0)
PHOSPHATE SERPL-MCNC: 4.7 MG/DL — HIGH (ref 2.5–4.5)
PLATELET # BLD AUTO: 504 K/UL — HIGH (ref 150–400)
POTASSIUM SERPL-MCNC: 3.9 MMOL/L — SIGNIFICANT CHANGE UP (ref 3.5–5.3)
POTASSIUM SERPL-SCNC: 3.9 MMOL/L — SIGNIFICANT CHANGE UP (ref 3.5–5.3)
PROT SERPL-MCNC: 7.2 G/DL — SIGNIFICANT CHANGE UP (ref 6–8.3)
RBC # BLD: 4.54 M/UL — SIGNIFICANT CHANGE UP (ref 4.2–5.8)
RBC # FLD: 13.3 % — SIGNIFICANT CHANGE UP (ref 10.3–14.5)
SODIUM SERPL-SCNC: 137 MMOL/L — SIGNIFICANT CHANGE UP (ref 135–145)
TRIGL SERPL-MCNC: 163 MG/DL — HIGH
TROPONIN T, HIGH SENSITIVITY RESULT: 298 NG/L — CRITICAL HIGH
TSH SERPL-MCNC: 1.27 UIU/ML — SIGNIFICANT CHANGE UP (ref 0.27–4.2)
WBC # BLD: 9.17 K/UL — SIGNIFICANT CHANGE UP (ref 3.8–10.5)
WBC # FLD AUTO: 9.17 K/UL — SIGNIFICANT CHANGE UP (ref 3.8–10.5)

## 2024-10-30 PROCEDURE — 99291 CRITICAL CARE FIRST HOUR: CPT

## 2024-10-30 PROCEDURE — 71045 X-RAY EXAM CHEST 1 VIEW: CPT | Mod: 26

## 2024-10-30 PROCEDURE — 99233 SBSQ HOSP IP/OBS HIGH 50: CPT

## 2024-10-30 RX ORDER — CARVEDILOL 25 MG/1
12.5 TABLET, FILM COATED ORAL EVERY 12 HOURS
Refills: 0 | Status: DISCONTINUED | OUTPATIENT
Start: 2024-10-30 | End: 2024-11-03

## 2024-10-30 RX ORDER — CARVEDILOL 25 MG/1
12.5 TABLET, FILM COATED ORAL EVERY 12 HOURS
Refills: 0 | Status: DISCONTINUED | OUTPATIENT
Start: 2024-10-30 | End: 2024-10-30

## 2024-10-30 RX ORDER — INSULIN LISPRO 100/ML
2 VIAL (ML) SUBCUTANEOUS
Refills: 0 | Status: DISCONTINUED | OUTPATIENT
Start: 2024-10-30 | End: 2024-10-31

## 2024-10-30 RX ORDER — INSULIN LISPRO 100/ML
VIAL (ML) SUBCUTANEOUS
Refills: 0 | Status: DISCONTINUED | OUTPATIENT
Start: 2024-10-30 | End: 2024-10-31

## 2024-10-30 RX ORDER — POTASSIUM CHLORIDE 10 MEQ
20 TABLET, EXTENDED RELEASE ORAL ONCE
Refills: 0 | Status: COMPLETED | OUTPATIENT
Start: 2024-10-30 | End: 2024-10-30

## 2024-10-30 RX ORDER — INSULIN GLARGINE,HUM.REC.ANLOG 100/ML
4 VIAL (ML) SUBCUTANEOUS AT BEDTIME
Refills: 0 | Status: DISCONTINUED | OUTPATIENT
Start: 2024-10-30 | End: 2024-10-31

## 2024-10-30 RX ADMIN — CLOPIDOGREL 75 MILLIGRAM(S): 75 TABLET ORAL at 12:21

## 2024-10-30 RX ADMIN — Medication 81 MILLIGRAM(S): at 12:21

## 2024-10-30 RX ADMIN — Medication 80 MILLIGRAM(S): at 22:08

## 2024-10-30 RX ADMIN — Medication 2: at 07:50

## 2024-10-30 RX ADMIN — IPRATROPIUM BROMIDE AND ALBUTEROL SULFATE 3 MILLILITER(S): .5; 2.5 SOLUTION RESPIRATORY (INHALATION) at 16:27

## 2024-10-30 RX ADMIN — IPRATROPIUM BROMIDE AND ALBUTEROL SULFATE 3 MILLILITER(S): .5; 2.5 SOLUTION RESPIRATORY (INHALATION) at 22:09

## 2024-10-30 RX ADMIN — HEPARIN SODIUM 5000 UNIT(S): 10000 INJECTION INTRAVENOUS; SUBCUTANEOUS at 07:51

## 2024-10-30 RX ADMIN — CHLORHEXIDINE GLUCONATE 1 APPLICATION(S): 40 SOLUTION TOPICAL at 12:23

## 2024-10-30 RX ADMIN — CARVEDILOL 6.25 MILLIGRAM(S): 25 TABLET, FILM COATED ORAL at 05:21

## 2024-10-30 RX ADMIN — PIPERACILLIN AND TAZOBACTAM 25 GRAM(S): .5; 4 INJECTION, POWDER, LYOPHILIZED, FOR SOLUTION INTRAVENOUS at 05:22

## 2024-10-30 RX ADMIN — Medication 20 MILLIEQUIVALENT(S): at 05:21

## 2024-10-30 RX ADMIN — HEPARIN SODIUM 5000 UNIT(S): 10000 INJECTION INTRAVENOUS; SUBCUTANEOUS at 17:13

## 2024-10-30 RX ADMIN — IPRATROPIUM BROMIDE AND ALBUTEROL SULFATE 3 MILLILITER(S): .5; 2.5 SOLUTION RESPIRATORY (INHALATION) at 03:36

## 2024-10-30 RX ADMIN — Medication 4 UNIT(S): at 22:12

## 2024-10-30 RX ADMIN — IPRATROPIUM BROMIDE AND ALBUTEROL SULFATE 3 MILLILITER(S): .5; 2.5 SOLUTION RESPIRATORY (INHALATION) at 09:17

## 2024-10-30 RX ADMIN — Medication 3: at 11:55

## 2024-10-30 RX ADMIN — Medication 3: at 16:56

## 2024-10-30 RX ADMIN — HEPARIN SODIUM 5000 UNIT(S): 10000 INJECTION INTRAVENOUS; SUBCUTANEOUS at 01:42

## 2024-10-30 RX ADMIN — Medication 80 MILLIGRAM(S): at 05:21

## 2024-10-30 RX ADMIN — PIPERACILLIN AND TAZOBACTAM 25 GRAM(S): .5; 4 INJECTION, POWDER, LYOPHILIZED, FOR SOLUTION INTRAVENOUS at 13:16

## 2024-10-30 RX ADMIN — CARVEDILOL 12.5 MILLIGRAM(S): 25 TABLET, FILM COATED ORAL at 17:25

## 2024-10-30 RX ADMIN — PIPERACILLIN AND TAZOBACTAM 25 GRAM(S): .5; 4 INJECTION, POWDER, LYOPHILIZED, FOR SOLUTION INTRAVENOUS at 22:07

## 2024-10-30 NOTE — PROGRESS NOTE ADULT - SUBJECTIVE AND OBJECTIVE BOX
Patient is a 69y old  Male who presents with a chief complaint of concern for stroke (29 Oct 2024 17:53)    HPI:  Patient is a 75 year old Thai speaking male with PMH of HTN, HLD, DM, presents with shortness of breath over 3-4 days which acutely worsened today. Patient also initially complained of chest pain and noted with elevated BP to 190/110. Acute management in the ED included BiPAP, lasix. Patient was placed on nitroglycerin drip due to concern for possible ACS. Chest xray noted with bilateral pulmonary edema which appears cardiac in origin. Patient's respiratory status stabilized on BiPAP. While in the ED, patient's sons reported that patient also began to have right arm weakness first noticed this morning. During this time, patient also appeared slightly confused and required assistance to walk. Patient was last seen at his baseline yesterday evening at 2100 when he was AAOx3 and did not have difficulties using his right side. Code stroke was called for right sided weakness.   Upon stroke team assessment, patient is on BiPAP and does not appear in respiratory distress. BP at time of assessment is 130s systolic (nitroglycerin drip was previously discontinued). Patient denies history of stroke. Patient takes aspirin for cardioprotection. Ambulates independently at baseline, lives with family and requires some assistance with taking medications.     CCU consulted because patient requiring BIPAP for difficulty breathing and requiring nitro gtt for BP elevated to 220/110.   (27 Oct 2024 16:04)       INTERVAL HPI/OVERNIGHT EVENTS:   No overnight events   Afebrile, hemodynamically stable     Subjective:    ICU Vital Signs Last 24 Hrs  T(C): 37.5 (30 Oct 2024 04:15), Max: 37.5 (30 Oct 2024 04:15)  T(F): 99.5 (30 Oct 2024 04:15), Max: 99.5 (30 Oct 2024 04:15)  HR: 68 (30 Oct 2024 07:00) (62 - 94)  BP: --  BP(mean): --  ABP: 136/60 (30 Oct 2024 07:00) (130/54 - 188/76)  ABP(mean): 83 (30 Oct 2024 07:00) (71 - 114)  RR: 24 (30 Oct 2024 07:00) (15 - 30)  SpO2: 96% (30 Oct 2024 07:00) (94% - 100%)    O2 Parameters below as of 30 Oct 2024 07:00  Patient On (Oxygen Delivery Method): nasal cannula  O2 Flow (L/min): 3        I&O's Summary    29 Oct 2024 07:01  -  30 Oct 2024 07:00  --------------------------------------------------------  IN: 610 mL / OUT: 2435 mL / NET: -1825 mL          Daily     Daily     Adult Advanced Hemodynamics Last 24 Hrs  CVP(mm Hg): --  CVP(cm H2O): --  CO: --  CI: --  PA: --  PA(mean): --  PCWP: --  SVR: --  SVRI: --  PVR: --  PVRI: --    EKG/Telemetry Events:    MEDICATIONS  (STANDING):  albuterol    90 MICROgram(s) HFA Inhaler 2 Puff(s) Inhalation two times a day  albuterol/ipratropium for Nebulization 3 milliLiter(s) Nebulizer every 6 hours  aspirin  chewable 81 milliGRAM(s) Oral daily  atorvastatin 80 milliGRAM(s) Oral at bedtime  carvedilol 6.25 milliGRAM(s) Oral every 12 hours  chlorhexidine 2% Cloths 1 Application(s) Topical daily  clopidogrel Tablet 75 milliGRAM(s) Oral daily  dextrose 5%. 1000 milliLiter(s) (100 mL/Hr) IV Continuous <Continuous>  dextrose 5%. 1000 milliLiter(s) (50 mL/Hr) IV Continuous <Continuous>  dextrose 50% Injectable 25 Gram(s) IV Push once  dextrose 50% Injectable 12.5 Gram(s) IV Push once  furosemide   Injectable 80 milliGRAM(s) IV Push two times a day  glucagon  Injectable 1 milliGRAM(s) IntraMuscular once  heparin   Injectable 5000 Unit(s) SubCutaneous every 8 hours  insulin lispro (ADMELOG) corrective regimen sliding scale   SubCutaneous at bedtime  insulin lispro (ADMELOG) corrective regimen sliding scale   SubCutaneous three times a day before meals  piperacillin/tazobactam IVPB.. 3.375 Gram(s) IV Intermittent every 8 hours    MEDICATIONS  (PRN):  acetaminophen     Tablet .. 650 milliGRAM(s) Oral every 6 hours PRN Temp greater or equal to 38C (100.4F), Moderate Pain (4 - 6), Severe Pain (7 - 10)  dextrose Oral Gel 15 Gram(s) Oral once PRN Blood Glucose LESS THAN 70 milliGRAM(s)/deciliter  hydrALAZINE Injectable 5 milliGRAM(s) IV Push every 6 hours PRN HTN      PHYSICAL EXAM:  GENERAL:   HEAD:  Atraumatic, Normocephalic  EYES: EOMI, PERRLA, conjunctiva and sclera clear  NECK: Supple, No JVD, Normal thyroid, no enlarged nodes  NERVOUS SYSTEM:  Alert & Awake.   CHEST/LUNG: B/L good air entry; No rales, rhonchi, or wheezing  HEART: S1S2 normal, no S3, Regular rate and rhythm; No murmurs  ABDOMEN: Soft, Nontender, Nondistended; Bowel sounds present  EXTREMITIES:  2+ Peripheral Pulses, No clubbing, cyanosis, or edema  LYMPH: No lymphadenopathy noted  SKIN: No rashes or lesions    LABS:                        12.5   9.17  )-----------( 504      ( 30 Oct 2024 04:18 )             39.9     10-30    137  |  97[L]  |  38[H]  ----------------------------<  240[H]  3.9   |  22  |  1.87[H]    Ca    9.4      30 Oct 2024 04:18  Phos  4.7     10-30  Mg     2.50     10-30    TPro  7.2  /  Alb  3.4  /  TBili  0.7  /  DBili  x   /  AST  38  /  ALT  37  /  AlkPhos  63  10-30    LIVER FUNCTIONS - ( 30 Oct 2024 04:18 )  Alb: 3.4 g/dL / Pro: 7.2 g/dL / ALK PHOS: 63 U/L / ALT: 37 U/L / AST: 38 U/L / GGT: x             CAPILLARY BLOOD GLUCOSE      POCT Blood Glucose.: 248 mg/dL (29 Oct 2024 21:15)  POCT Blood Glucose.: 193 mg/dL (29 Oct 2024 16:53)  POCT Blood Glucose.: 154 mg/dL (29 Oct 2024 11:30)            Urinalysis Basic - ( 30 Oct 2024 04:18 )    Color: x / Appearance: x / SG: x / pH: x  Gluc: 240 mg/dL / Ketone: x  / Bili: x / Urobili: x   Blood: x / Protein: x / Nitrite: x   Leuk Esterase: x / RBC: x / WBC x   Sq Epi: x / Non Sq Epi: x / Bacteria: x          RADIOLOGY & ADDITIONAL TESTS:  CXR:        Care Discussed with Consultants/Other Providers [ x] YES  [ ] NO           Patient is a 69y old  Male who presents with a chief complaint of concern for stroke (29 Oct 2024 17:53)    HPI:  Patient is a 75 year old French speaking male with PMH of HTN, HLD, DM, presents with shortness of breath over 3-4 days which acutely worsened today. Patient also initially complained of chest pain and noted with elevated BP to 190/110. Acute management in the ED included BiPAP, lasix. Patient was placed on nitroglycerin drip due to concern for possible ACS. Chest xray noted with bilateral pulmonary edema which appears cardiac in origin. Patient's respiratory status stabilized on BiPAP. While in the ED, patient's sons reported that patient also began to have right arm weakness first noticed this morning. During this time, patient also appeared slightly confused and required assistance to walk. Patient was last seen at his baseline yesterday evening at 2100 when he was AAOx3 and did not have difficulties using his right side. Code stroke was called for right sided weakness.   Upon stroke team assessment, patient is on BiPAP and does not appear in respiratory distress. BP at time of assessment is 130s systolic (nitroglycerin drip was previously discontinued). Patient denies history of stroke. Patient takes aspirin for cardioprotection. Ambulates independently at baseline, lives with family and requires some assistance with taking medications.     CCU consulted because patient requiring BIPAP for difficulty breathing and requiring nitro gtt for BP elevated to 220/110.   (27 Oct 2024 16:04)       INTERVAL HPI/OVERNIGHT EVENTS: Pt hemodynamically stable overnight, mental status wax and weans per night team. Tolerates BIPAP well overnight, was placed 3L of NC since 6 am and now saturating well on RA. UOP 2.4 L for the past 24 hrs and net negative 1.8L.     Subjective: Pt seen and examined at beside. Pt was eating breakfast with the left arm. AOX1 (to place), slow to answer questions. Unable to move the RUE or RLE. Conversation conducted through French  ID [id number].     ICU Vital Signs Last 24 Hrs  T(C): 37.5 (30 Oct 2024 04:15), Max: 37.5 (30 Oct 2024 04:15)  T(F): 99.5 (30 Oct 2024 04:15), Max: 99.5 (30 Oct 2024 04:15)  HR: 68 (30 Oct 2024 07:00) (62 - 94)  BP: --  BP(mean): --  ABP: 136/60 (30 Oct 2024 07:00) (130/54 - 188/76)  ABP(mean): 83 (30 Oct 2024 07:00) (71 - 114)  RR: 24 (30 Oct 2024 07:00) (15 - 30)  SpO2: 96% (30 Oct 2024 07:00) (94% - 100%)    O2 Parameters below as of 30 Oct 2024 07:00  Patient On (Oxygen Delivery Method): nasal cannula  O2 Flow (L/min): 3        I&O's Summary    29 Oct 2024 07:01  -  30 Oct 2024 07:00  --------------------------------------------------------  IN: 610 mL / OUT: 2435 mL / NET: -1825 mL          Daily     Daily     Adult Advanced Hemodynamics Last 24 Hrs  CVP(mm Hg): --  CVP(cm H2O): --  CO: --  CI: --  PA: --  PA(mean): --  PCWP: --  SVR: --  SVRI: --  PVR: --  PVRI: --    EKG/Telemetry Events:    MEDICATIONS  (STANDING):  albuterol    90 MICROgram(s) HFA Inhaler 2 Puff(s) Inhalation two times a day  albuterol/ipratropium for Nebulization 3 milliLiter(s) Nebulizer every 6 hours  aspirin  chewable 81 milliGRAM(s) Oral daily  atorvastatin 80 milliGRAM(s) Oral at bedtime  carvedilol 6.25 milliGRAM(s) Oral every 12 hours  chlorhexidine 2% Cloths 1 Application(s) Topical daily  clopidogrel Tablet 75 milliGRAM(s) Oral daily  dextrose 5%. 1000 milliLiter(s) (100 mL/Hr) IV Continuous <Continuous>  dextrose 5%. 1000 milliLiter(s) (50 mL/Hr) IV Continuous <Continuous>  dextrose 50% Injectable 25 Gram(s) IV Push once  dextrose 50% Injectable 12.5 Gram(s) IV Push once  furosemide   Injectable 80 milliGRAM(s) IV Push two times a day  glucagon  Injectable 1 milliGRAM(s) IntraMuscular once  heparin   Injectable 5000 Unit(s) SubCutaneous every 8 hours  insulin lispro (ADMELOG) corrective regimen sliding scale   SubCutaneous at bedtime  insulin lispro (ADMELOG) corrective regimen sliding scale   SubCutaneous three times a day before meals  piperacillin/tazobactam IVPB.. 3.375 Gram(s) IV Intermittent every 8 hours    MEDICATIONS  (PRN):  acetaminophen     Tablet .. 650 milliGRAM(s) Oral every 6 hours PRN Temp greater or equal to 38C (100.4F), Moderate Pain (4 - 6), Severe Pain (7 - 10)  dextrose Oral Gel 15 Gram(s) Oral once PRN Blood Glucose LESS THAN 70 milliGRAM(s)/deciliter  hydrALAZINE Injectable 5 milliGRAM(s) IV Push every 6 hours PRN HTN      PHYSICAL EXAM:  GENERAL: NAD, comfortably sitting up on the bed   HEAD:  Atraumatic, Normocephalic  EYES:  conjunctiva and sclera clear  NERVOUS SYSTEM:  Alert & Awake. AOX1 to place, unable to move RUE and RLE   CHEST/LUNG: B/L good air entry; minimal crackles at the b/l lung bases  HEART: RRR, no m/g/r  ABDOMEN: Soft, Nontender, Nondistended  EXTREMITIES:  No b/l LE edema     LABS:                        12.5   9.17  )-----------( 504      ( 30 Oct 2024 04:18 )             39.9     10-30    137  |  97[L]  |  38[H]  ----------------------------<  240[H]  3.9   |  22  |  1.87[H]    Ca    9.4      30 Oct 2024 04:18  Phos  4.7     10-30  Mg     2.50     10-30    TPro  7.2  /  Alb  3.4  /  TBili  0.7  /  DBili  x   /  AST  38  /  ALT  37  /  AlkPhos  63  10-30    LIVER FUNCTIONS - ( 30 Oct 2024 04:18 )  Alb: 3.4 g/dL / Pro: 7.2 g/dL / ALK PHOS: 63 U/L / ALT: 37 U/L / AST: 38 U/L / GGT: x             CAPILLARY BLOOD GLUCOSE      POCT Blood Glucose.: 248 mg/dL (29 Oct 2024 21:15)  POCT Blood Glucose.: 193 mg/dL (29 Oct 2024 16:53)  POCT Blood Glucose.: 154 mg/dL (29 Oct 2024 11:30)            Urinalysis Basic - ( 30 Oct 2024 04:18 )    Color: x / Appearance: x / SG: x / pH: x  Gluc: 240 mg/dL / Ketone: x  / Bili: x / Urobili: x   Blood: x / Protein: x / Nitrite: x   Leuk Esterase: x / RBC: x / WBC x   Sq Epi: x / Non Sq Epi: x / Bacteria: x          RADIOLOGY & ADDITIONAL TESTS:  CXR:        Care Discussed with Consultants/Other Providers [ x] YES  [ ] NO

## 2024-10-30 NOTE — PROGRESS NOTE ADULT - CRITICAL CARE ATTENDING COMMENT
75 year old man with history of HTN who presented with hypertensive emergency and right sided weakness complicated with acute diastolic heart failure and flash pulmonary edema. Placed on BIPAP and transferred to CCU for further care.    TTE 10/27/24:  CONCLUSIONS:   1. Left ventricular systolic function is mildly decreased with an ejection fraction visually estimated at 45 to 50 %. Regional wall motion abnormalities present.   2. Basal and mid anterior wall, basal and mid anterolateral wall, basal and mid inferolateral wall, and basal inferior segment are abnormal.   3. Left atrium is normal in size.   4. The right atrium is normal in size.   5. Normal right ventricular cavity size, with normal wall thickness, and probably normal right ventricular systolic function.  **Left Ventricle:  Left ventricular systolic function is mildly decreased with an ejection fraction visually estimated at 45 to 50%. There are regional wall motion abnormalities present.    Meds:  ASA  Atorvastatin  Plavix  SQ heparin  Coreg 6.25 mg BID  Lasix 80 mg IV BID  Insulin  Zosyn    #Neuro- Acute CVA and carotid disease  Neuro input appreciated    #Pulm- BiPAP as needed  Improved    #CV- Hypertensive emergency with demand ischemia  No ac per neuro- will we reevaluate  Acute on chronic combined systolic and diastolic heart failure  WIll hold IV diuresis now ans reassess  Continue Coreg- will discuss with neuro- BP goals to initiate GDMT    #Renal- monitor Cr, NOVA worsening- dc lasix    #ID- Elevated WBC, febrile  Continue Zosyn  Await CX    #Endo- Sliding scale, follow FS    #DVT ppx- on sq heparin

## 2024-10-30 NOTE — PROGRESS NOTE ADULT - SUBJECTIVE AND OBJECTIVE BOX
Neurology - Consult Note    -  Spectra: [phone] ([facility]), [id number] ([facility]  -    HPI: Patient PEPE KIM is a 69y (1955) wo/man with a PMHx significant for ***      Review of Systems:  INCOMPLETE   CONSTITUTIONAL: No fevers or chills  EYES AND ENT: No visual changes or no throat pain   NECK: No pain or stiffness  RESPIRATORY: No hemoptysis or shortness of breath  CARDIOVASCULAR: No chest pain or palpitations  GASTROINTESTINAL: No melena or hematochezia  GENITOURINARY: No dysuria or hematuria  NEUROLOGICAL: +As stated in HPI above  SKIN: No itching, burning, rashes, or lesions   All other review of systems is negative unless indicated above.    Allergies:  No Known Allergies      PMHx/PSHx/Family Hx: As above, otherwise see below   Diabetes mellitus    HTN (hypertension)    HLD (hyperlipidemia)    Hyperlipidemia        Social Hx:  No current use of tobacco, alcohol, or illicit drugs  Lives with ***    Medications:  MEDICATIONS  (STANDING):  albuterol    90 MICROgram(s) HFA Inhaler 2 Puff(s) Inhalation two times a day  albuterol/ipratropium for Nebulization 3 milliLiter(s) Nebulizer every 6 hours  aspirin  chewable 81 milliGRAM(s) Oral daily  atorvastatin 80 milliGRAM(s) Oral at bedtime  carvedilol 12.5 milliGRAM(s) Oral every 12 hours  chlorhexidine 2% Cloths 1 Application(s) Topical daily  clopidogrel Tablet 75 milliGRAM(s) Oral daily  dextrose 5%. 1000 milliLiter(s) (100 mL/Hr) IV Continuous <Continuous>  dextrose 5%. 1000 milliLiter(s) (50 mL/Hr) IV Continuous <Continuous>  dextrose 50% Injectable 25 Gram(s) IV Push once  dextrose 50% Injectable 12.5 Gram(s) IV Push once  glucagon  Injectable 1 milliGRAM(s) IntraMuscular once  heparin   Injectable 5000 Unit(s) SubCutaneous every 8 hours  insulin lispro (ADMELOG) corrective regimen sliding scale   SubCutaneous three times a day before meals  piperacillin/tazobactam IVPB.. 3.375 Gram(s) IV Intermittent every 8 hours    MEDICATIONS  (PRN):  acetaminophen     Tablet .. 650 milliGRAM(s) Oral every 6 hours PRN Temp greater or equal to 38C (100.4F), Moderate Pain (4 - 6), Severe Pain (7 - 10)  dextrose Oral Gel 15 Gram(s) Oral once PRN Blood Glucose LESS THAN 70 milliGRAM(s)/deciliter  hydrALAZINE Injectable 5 milliGRAM(s) IV Push every 6 hours PRN HTN      Vitals:  T(C): 37.5 (10-30-24 @ 04:15), Max: 37.5 (10-30-24 @ 04:15)  HR: 85 (10-30-24 @ 11:00) (62 - 94)  BP: --  RR: 20 (10-30-24 @ 11:00) (15 - 30)  SpO2: 96% (10-30-24 @ 11:00) (90% - 100%)    Physical Examination:   General - NAD  Eyes: Normal Fundoscopic exam   Heart and lungs: normal heart sounds, clear lungs    Neurologic Exam:  Mental status - Awake, Alert, Oriented to self only, says year is 2025 does not correct, doesn't know day or month, knows he is in hospital,  doesn't know the name of hospital. Speech mildly dysarthric, nonfluent, able to name, unable to repeat sentence, follows most simple commands    Cranial nerves - PERRL, Decreased BTT on right, EOMI, face sensation (V1-V3) intact b/l, R NLF and slow to activation (Per family, face appears baseline), hearing intact b/l, palate with symmetric elevation, trapezius 5/5 strength b/l, tongue midline on protrusion with full lateral movement    Motor -Difficulty participating with pronator drift assessment   Strength testing    Right arm 0/5, no movement  left arm 5/5 bicep and tricep      left leg able to lift antigravity without drift   Right leg 2/5, able to move within plane of bed                            Sensation - Light touch intact throughout    Coordination -  No tremors appreciated    Gait and station - Deffered due to fall/safety risk     Labs:                        12.5   9.17  )-----------( 504      ( 30 Oct 2024 04:18 )             39.9     10-30    137  |  97[L]  |  38[H]  ----------------------------<  240[H]  3.9   |  22  |  1.87[H]    Ca    9.4      30 Oct 2024 04:18  Phos  4.7     10-30  Mg     2.50     10-30    TPro  7.2  /  Alb  3.4  /  TBili  0.7  /  DBili  x   /  AST  38  /  ALT  37  /  AlkPhos  63  10-30    CAPILLARY BLOOD GLUCOSE      POCT Blood Glucose.: 237 mg/dL (30 Oct 2024 07:44)    LIVER FUNCTIONS - ( 30 Oct 2024 04:18 )  Alb: 3.4 g/dL / Pro: 7.2 g/dL / ALK PHOS: 63 U/L / ALT: 37 U/L / AST: 38 U/L / GGT: x             Culture - Blood (collected 27 Oct 2024 17:37)  Source: .Blood BLOOD  Preliminary Report (29 Oct 2024 19:02):    No growth at 48 Hours    Culture - Blood (collected 27 Oct 2024 14:00)  Source: .Blood BLOOD  Preliminary Report (29 Oct 2024 19:02):    No growth at 48 Hours          Radiology:  MR Head No Cont:  (29 Oct 2024 10:56)  < from: MR Head No Cont (10.29.24 @ 10:56) >  IMPRESSION:    1.  Acute infarcts in the high left MCA watershed distribution.  2.  Irregular flow-void in the cavernous segment of the left ICA. This   could be attributed to slow/turbulent flow. A severe stenosis is not   entirely excluded. Correlate with recent CTA of the head for further   evaluation.  3.  Chronic ischemic changes.    < from: CT Angio Neck w/ IV Cont (10.27.24 @ 12:20) >  IMPRESSION:    1. BRAIN:  No intracranial hemorrhage is appreciated.  No intracranial   mass lesion is appreciated.    2.  RIGHT CAROTID SYSTEM:    Atherosclerotic plaque common carotid and   internal carotid artery with maximal stenosis approximately 50%.    3.   LEFT CAROTID SYSTEM:     Atherosclerotic plaque common carotid and   internal carotid artery with maximal stenosis approximately 90%.    4.   VERTEBRAL CIRCULATION:    Patent. 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 Flow directionality of the left vertebral artery is not   determined by this technique. Consider ultrasound in this evaluation, as   required    5.  ANTERIOR INTRACRANIAL CIRCULATION:Intracranial atherosclerosis   cavernous and clinoid segments of the internal carotid arteries, moderate   on the right and severe on the left.    6.  POSTERIOR INTRACRANIAL CIRCULATION:    Patent right vertebral basilar   and posterior cerebral arteries. 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    7.  No large vessel occlusion    8.  BRAIN PERFUSION:   No acute infarction of the brain is convincingly   demonstrated.  Relative ischemia is demonstrated in the left cerebral   hemispheric anterior corona radiata white matter.    9. Pulmonary edema pattern    < end of copied text >       Neurology - Progress Note    -  Spectra: [phone] ([facility]), [phone] ([facility])  -    Interval History: More awake and alert      Review of Systems:    Per HPI    Allergies:  No Known Allergies      PMHx/PSHx/Family Hx: As above, otherwise see below   Diabetes mellitus    HTN (hypertension)    HLD (hyperlipidemia)    Hyperlipidemia        Social Hx:  No current use of tobacco, alcohol, or illicit drugs  Lives with ***    Medications:  MEDICATIONS  (STANDING):  albuterol    90 MICROgram(s) HFA Inhaler 2 Puff(s) Inhalation two times a day  albuterol/ipratropium for Nebulization 3 milliLiter(s) Nebulizer every 6 hours  aspirin  chewable 81 milliGRAM(s) Oral daily  atorvastatin 80 milliGRAM(s) Oral at bedtime  carvedilol 12.5 milliGRAM(s) Oral every 12 hours  chlorhexidine 2% Cloths 1 Application(s) Topical daily  clopidogrel Tablet 75 milliGRAM(s) Oral daily  dextrose 5%. 1000 milliLiter(s) (100 mL/Hr) IV Continuous <Continuous>  dextrose 5%. 1000 milliLiter(s) (50 mL/Hr) IV Continuous <Continuous>  dextrose 50% Injectable 25 Gram(s) IV Push once  dextrose 50% Injectable 12.5 Gram(s) IV Push once  glucagon  Injectable 1 milliGRAM(s) IntraMuscular once  heparin   Injectable 5000 Unit(s) SubCutaneous every 8 hours  insulin lispro (ADMELOG) corrective regimen sliding scale   SubCutaneous three times a day before meals  piperacillin/tazobactam IVPB.. 3.375 Gram(s) IV Intermittent every 8 hours    MEDICATIONS  (PRN):  acetaminophen     Tablet .. 650 milliGRAM(s) Oral every 6 hours PRN Temp greater or equal to 38C (100.4F), Moderate Pain (4 - 6), Severe Pain (7 - 10)  dextrose Oral Gel 15 Gram(s) Oral once PRN Blood Glucose LESS THAN 70 milliGRAM(s)/deciliter  hydrALAZINE Injectable 5 milliGRAM(s) IV Push every 6 hours PRN HTN      Vitals:  T(C): 37.5 (10-30-24 @ 04:15), Max: 37.5 (10-30-24 @ 04:15)  HR: 85 (10-30-24 @ 11:00) (62 - 94)  BP: --  RR: 20 (10-30-24 @ 11:00) (15 - 30)  SpO2: 96% (10-30-24 @ 11:00) (90% - 100%)    Physical Examination:   General - NAD  Eyes: Normal Fundoscopic exam   Heart and lungs: normal heart sounds, clear lungs    Neurologic Exam:  Mental status - Awake, Alert, Oriented to self only, says year is 2025 does not correct, doesn't know day or month, knows he is in hospital,  doesn't know the name of hospital. Speech mildly dysarthric, nonfluent, able to name, unable to repeat sentence, follows most simple commands    Cranial nerves - PERRL, Decreased BTT on right, EOMI, face sensation (V1-V3) intact b/l, R NLF and slow to activation (Per family, face appears baseline), hearing intact b/l, palate with symmetric elevation, trapezius 5/5 strength b/l, tongue midline on protrusion with full lateral movement    Motor -Difficulty participating with pronator drift assessment   Strength testing    Right arm 0/5, no movement  left arm 5/5 bicep and tricep      left leg able to lift antigravity without drift   Right leg 2/5, able to move within plane of bed                            Sensation - Light touch intact throughout    Coordination -  No tremors appreciated    Gait and station - Deffered due to fall/safety risk     Labs:                        12.5   9.17  )-----------( 504      ( 30 Oct 2024 04:18 )             39.9     10-30    137  |  97[L]  |  38[H]  ----------------------------<  240[H]  3.9   |  22  |  1.87[H]    Ca    9.4      30 Oct 2024 04:18  Phos  4.7     10-30  Mg     2.50     10-30    TPro  7.2  /  Alb  3.4  /  TBili  0.7  /  DBili  x   /  AST  38  /  ALT  37  /  AlkPhos  63  10-30    CAPILLARY BLOOD GLUCOSE      POCT Blood Glucose.: 237 mg/dL (30 Oct 2024 07:44)    LIVER FUNCTIONS - ( 30 Oct 2024 04:18 )  Alb: 3.4 g/dL / Pro: 7.2 g/dL / ALK PHOS: 63 U/L / ALT: 37 U/L / AST: 38 U/L / GGT: x             Culture - Blood (collected 27 Oct 2024 17:37)  Source: .Blood BLOOD  Preliminary Report (29 Oct 2024 19:02):    No growth at 48 Hours    Culture - Blood (collected 27 Oct 2024 14:00)  Source: .Blood BLOOD  Preliminary Report (29 Oct 2024 19:02):    No growth at 48 Hours          Radiology:  MR Head No Cont:  (29 Oct 2024 10:56)  < from: MR Head No Cont (10.29.24 @ 10:56) >  IMPRESSION:    1.  Acute infarcts in the high left MCA watershed distribution.  2.  Irregular flow-void in the cavernous segment of the left ICA. This   could be attributed to slow/turbulent flow. A severe stenosis is not   entirely excluded. Correlate with recent CTA of the head for further   evaluation.  3.  Chronic ischemic changes.    < from: CT Angio Neck w/ IV Cont (10.27.24 @ 12:20) >  IMPRESSION:    1. BRAIN:  No intracranial hemorrhage is appreciated.  No intracranial   mass lesion is appreciated.    2.  RIGHT CAROTID SYSTEM:    Atherosclerotic plaque common carotid and   internal carotid artery with maximal stenosis approximately 50%.    3.   LEFT CAROTID SYSTEM:     Atherosclerotic plaque common carotid and   internal carotid artery with maximal stenosis approximately 90%.    4.   VERTEBRAL CIRCULATION:    Patent. 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 Flow directionality of the left vertebral artery is not   determined by this technique. Consider ultrasound in this evaluation, as   required    5.  ANTERIOR INTRACRANIAL CIRCULATION:Intracranial atherosclerosis   cavernous and clinoid segments of the internal carotid arteries, moderate   on the right and severe on the left.    6.  POSTERIOR INTRACRANIAL CIRCULATION:    Patent right vertebral basilar   and posterior cerebral arteries. 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    7.  No large vessel occlusion    8.  BRAIN PERFUSION:   No acute infarction of the brain is convincingly   demonstrated.  Relative ischemia is demonstrated in the left cerebral   hemispheric anterior corona radiata white matter.    9. Pulmonary edema pattern    < end of copied text >

## 2024-10-30 NOTE — PROGRESS NOTE ADULT - ASSESSMENT
Patient is a 69 year old Kazakh speaking male with PMH of HTN, HLD, DM, presents with Acute Pulmonary Edema in the setting of HTN emergency with acute stroke.    PLAN:  NEURO:   #Code stroke in the ER  -patient with aphasia and gazing to left side  -CTA: No hemorrhage, no intracranial lesions  -Rt Carotid: 50% stenosis  -Lt Carotid: 90% stenosis  -vertebral circulation: left subclavian artery with segmental occulsion betweeen the throacic aorta and the left vertebral origin  -Anterior intracranial circulation: atherosclerosis, moderate on rt and severe on left  -posterior circulation: patent rt basilar and posterior arteries, left vertebral with reveraold of flow from the basilar to its PICA  - today's PE revealed right side neglect, RUE/RLE weakness, slurred speech   -will continue with neuro checks Q4  - A1C 9.4   - lipid profile - LDL 87  - f/u PT/OT/S&S recs   - neuro following - recommended MRI brain (see results below), TTE (performed, see results below), Loop Recorder prior to discharge, Vertebral artery doppler to r/o subclavian steal i.s.o segmental subclavian artery occlusion and consult vascular if suggestive of flow reversal   -holding off on heparin gtt as per neuro due to concern for hemorrhagic conversion of possible stroke  - MRI of Brain 10/29 showed Acute infarcts in the high left MCA watershed distribution. Irregular flow-void in the cavernous segment of the left ICA. Chronic ischemic changes.  -loaded with asa and plavix in ER per neuro  - c/w ASA, plavix and statin   - neuro following, appreciate recs   - pending vertebral artery doppler     PULMONARY:  # flash pulmonary edema  - CXR in ED showed discrete right perihilar opacities with bilateral effusions   - s/p lasix 80 in ED   - CXR from 10/28 AM showed Mild resolution of the bilateral pulmonary opacities likely pulmonary edema. Probable small left effusion  - 10/28 - Weaned down from BIPAP to 3L of NC  - pending repeat CXR in AM   - c/w lasix 80 BID     #Concern for Aspiration   - patient with cough after medications were given  - pt passed bedside dysphagia screening   - NPO except meds, pending S&S eval     CARDIAC:  EKG with NSR LVH, ST depressions in multiple leads  bigeminy and PVCx on tele  #NSTEMI:  #elevated troponin   NSR on tele  -loaded with ASA and plavix in the ER, as per neuro, would hold hep gtt for now given concern for hemorrhagic conversion of the possible stroke   - c/w lipitor 80, ASA 81, and plavix 75  - was on nitro gtt previously to reduce systolic BP from 180 to 140 goal systolic, now off nitro gtt since 10/28  -TTE 10/27 showed mildly decreased LVSF w/ EF of 45 to 50 %, with regional wall motion abnormalities. Basal and mid anterior wall, basal and mid anterolateral wall, basal and mid inferolateral wall, and basal inferior segment are abnormal.  -Troponin 104 -> 148 -> 190 -> 236 -> 312   - would defer cardiac cath in setting of possible acute stroke for now  - continue to trend troponin daily     #HTN Emergency:  - was on nitro gtt to maintain SBPs 140-180s, now off nitro gtt since 10/28  - on hydralazine PRN   - c/w lasix 80 BID   - started Coreg 12.5 BID, decreased to 6.25 BID to maintain SBP 140s       #HLD:  - c/w lipitor 80  - lipid profile showed LDL 87     GI:  # Diet   - passed bedside dysphagia screening  - NPO except meds pending S&S eval     #PE revealed soft, mildly distended abdomen  - Today's PE revealed non-distended abdomen   - CTM       RENAL:   #NOVA   - Cr uptrending 1.19 -> 1.33 -> 1.47 -> 1.73, likely due to hypertensive emergency   - Strict Is and Os   - Indwelling catheter in place   - CTM     ID:  #Leukocytosis  #Aspiration PNA?  - spiked fever overnight   - BCx - NG at 24 hrs  - MRSA swab - neg  - Full RVP neg   - d/c vanc given neg MRSA swab   - c/w zosyn       ENDO:  #DM  -  HGBA1c 10/28 - 9.4   - ISS     VTE PPX  - HSQ Patient is a 69 year old Japanese speaking male with PMH of HTN, HLD, DM, presents with Acute Pulmonary Edema in the setting of HTN emergency with acute stroke.    PLAN:  NEURO:   #Code stroke in the ER  -patient with aphasia and gazing to left side  -CTA: No hemorrhage, no intracranial lesions  -Rt Carotid: 50% stenosis  -Lt Carotid: 90% stenosis  -vertebral circulation: left subclavian artery with segmental occulsion betweeen the throacic aorta and the left vertebral origin  -Anterior intracranial circulation: atherosclerosis, moderate on rt and severe on left  -posterior circulation: patent rt basilar and posterior arteries, left vertebral with reveraold of flow from the basilar to its PICA  - today's PE revealed right side neglect, RUE/RLE weakness, slurred speech   -will continue with neuro checks Q4  - A1C 9.4   - lipid profile - LDL 87  - f/u PT/OT/S&S recs   - neuro following - recommended MRI brain (see results below), TTE (performed, see results below), Loop Recorder prior to discharge, Vertebral artery doppler to r/o subclavian steal i.s.o segmental subclavian artery occlusion and consult vascular if suggestive of flow reversal   -holding off on heparin gtt as per neuro due to concern for hemorrhagic conversion of possible stroke  - MRI of Brain 10/29 showed Acute infarcts in the high left MCA watershed distribution. Irregular flow-void in the cavernous segment of the left ICA. Chronic ischemic changes.  -loaded with asa and plavix in ER per neuro  - c/w ASA, plavix and statin   - neuro following, appreciate recs   - Vertebral artery doppler/VA duplex carotids showed 16-49% stenosis in proximial right ICA. 80-99% stenosis in proximal left ICA. Moderate, heterogenous atherosclerotic plaque in proximal left ECA. VERT, retrograde dopper flow.  - Neurosurgery consulted, appreciate recs     PULMONARY:  # flash pulmonary edema  - CXR in ED showed discrete right perihilar opacities with bilateral effusions   - s/p lasix 80 in ED   - CXR from 10/28 AM showed Mild resolution of the bilateral pulmonary opacities likely pulmonary edema. Probable small left effusion  - 10/28 - Weaned down from BIPAP to 3L of NC  - pending repeat CXR in AM   - c/w lasix 80 BID     #Concern for Aspiration   - patient with cough after medications were given  - pt passed bedside dysphagia screening   - NPO except meds, pending S&S eval     CARDIAC:  EKG with NSR LVH, ST depressions in multiple leads  bigeminy and PVCx on tele  #NSTEMI:  #elevated troponin   NSR on tele  -loaded with ASA and plavix in the ER, as per neuro, would hold hep gtt for now given concern for hemorrhagic conversion of the possible stroke   - c/w lipitor 80, ASA 81, and plavix 75  - was on nitro gtt previously to reduce systolic BP from 180 to 140 goal systolic, now off nitro gtt since 10/28  -TTE 10/27 showed mildly decreased LVSF w/ EF of 45 to 50 %, with regional wall motion abnormalities. Basal and mid anterior wall, basal and mid anterolateral wall, basal and mid inferolateral wall, and basal inferior segment are abnormal.  -Troponin 104 -> 148 -> 190 -> 236 -> 312   - would defer cardiac cath in setting of possible acute stroke for now  - continue to trend troponin daily     #HTN Emergency:  - was on nitro gtt to maintain SBPs 140-180s, now off nitro gtt since 10/28  - on hydralazine PRN   - c/w lasix 80 BID   - started Coreg 12.5 BID, decreased to 6.25 BID to maintain SBP 140s       #HLD:  - c/w lipitor 80  - lipid profile showed LDL 87     GI:  # Diet   - passed bedside dysphagia screening  - NPO except meds pending S&S eval     #PE revealed soft, mildly distended abdomen  - Today's PE revealed non-distended abdomen   - CTM       RENAL:   #NOVA   - Cr uptrending 1.19 -> 1.33 -> 1.47 -> 1.73, likely due to hypertensive emergency   - Strict Is and Os   - Indwelling catheter in place   - CTM     ID:  #Leukocytosis  #Aspiration PNA?  - spiked fever overnight   - BCx - NG at 24 hrs  - MRSA swab - neg  - Full RVP neg   - d/c vanc given neg MRSA swab   - c/w zosyn       ENDO:  #DM  -  HGBA1c 10/28 - 9.4   - ISS     VTE PPX  - HSQ Patient is a 69 year old Occitan speaking male with PMH of HTN, HLD, DM, presents with Acute Pulmonary Edema in the setting of HTN emergency with acute stroke.    PLAN:  NEURO:   #Code stroke in the ER  -patient with aphasia and gazing to left side  -CTA: No hemorrhage, no intracranial lesions  -Rt Carotid: 50% stenosis  -Lt Carotid: 90% stenosis  -vertebral circulation: left subclavian artery with segmental occulsion betweeen the throacic aorta and the left vertebral origin  -Anterior intracranial circulation: atherosclerosis, moderate on rt and severe on left  -posterior circulation: patent rt basilar and posterior arteries, left vertebral with reveraold of flow from the basilar to its PICA  - today's PE revealed right side neglect, RUE/RLE weakness, slurred speech   -will continue with neuro checks Q4  - A1C 9.4   - lipid profile - LDL 87  - neuro following - recommended MRI brain (see results below), TTE (performed, see results below), Loop Recorder prior to discharge, Vertebral artery doppler to r/o subclavian steal i.s.o segmental subclavian artery occlusion and consult vascular if suggestive of flow reversal   -holding off on heparin gtt as per neuro due to concern for hemorrhagic conversion of possible stroke  - MRI of Brain 10/29 showed Acute infarcts in the high left MCA watershed distribution. Irregular flow-void in the cavernous segment of the left ICA. Chronic ischemic changes.  -loaded with asa and plavix in ER per neuro  - c/w ASA, plavix and statin   - neuro following, appreciate recs   - Vertebral artery doppler/VA duplex carotids showed 16-49% stenosis in proximial right ICA. 80-99% stenosis in proximal left ICA. Moderate, heterogenous atherosclerotic plaque in proximal left ECA. VERT, retrograde dopper flow.  - Neurosurgery consulted, appreciate recs   - per S&S, pt can be started on soft and bite sized diet with mildly thick liquids, will benefit from speech therapy   - PT recommends subacute Rehab   - PT- pending final discharge recs    PULMONARY:  # flash pulmonary edema  - CXR in ED showed discrete right perihilar opacities with bilateral effusions   - s/p lasix 80 in ED   - CXR from 10/28 AM showed Mild resolution of the bilateral pulmonary opacities likely pulmonary edema. Probable small left effusion  - 10/28 - Weaned down from BIPAP to 3L of NC  - pending read for repeat CXR in AM   - 10/30 - weaned from 3L of NC to RA, currently saturating well   - d/c lasix 80 BID given pt saturating well on RA and uptrending Cr    #Concern for Aspiration   - patient with cough after medications were given  - pt passed bedside dysphagia screening   - started on soft and bite  sized diet with mildly thick liquids per S&S eval   - per S&S, pt will benefit from speech therapy     CARDIAC:  EKG with NSR LVH, ST depressions in multiple leads  bigeminy and PVCx on tele  #NSTEMI:  #elevated troponin   NSR on tele  -loaded with ASA and plavix in the ER, as per neuro, would hold hep gtt for now given concern for hemorrhagic conversion of the possible stroke   - c/w lipitor 80, ASA 81, and plavix 75  - was on nitro gtt previously to reduce systolic BP from 180 to 140 goal systolic, now off nitro gtt since 10/28  -TTE 10/27 showed mildly decreased LVSF w/ EF of 45 to 50 %, with regional wall motion abnormalities. Basal and mid anterior wall, basal and mid anterolateral wall, basal and mid inferolateral wall, and basal inferior segment are abnormal.  -Troponin 104 -> 148 -> 190 -> 236 -> 312 -> 298, d/c trending   - would defer cardiac cath in setting of possible acute stroke for now      #HTN Emergency:  - was on nitro gtt to maintain SBPs 140-180s, now off nitro gtt since 10/28  - on hydralazine PRN   - d/c lasix 80 BID given uptrending Cr   - started Coreg 12.5 BID, decreased to 6.25 BID to maintain SBP 140s       #HLD:  - c/w lipitor 80  - lipid profile showed LDL 87     GI:  # Diet   - passed bedside dysphagia screening  - Per S&S,     #PE revealed soft, mildly distended abdomen  - Today's PE revealed non-distended abdomen   - CTM       RENAL:   #NOVA   - Cr uptrending 1.19 -> 1.33 -> 1.47 -> 1.73, likely due to hypertensive emergency   - Strict Is and Os   - Indwelling catheter in place   - CTM     ID:  #Leukocytosis  #Aspiration PNA?  - spiked fever overnight   - BCx - NG at 24 hrs  - MRSA swab - neg  - Full RVP neg   - d/c vanc given neg MRSA swab   - c/w zosyn       ENDO:  #DM  -  HGBA1c 10/28 - 9.4   - ISS     VTE PPX  - HSQ Patient is a 69 year old Amharic speaking male with PMH of HTN, HLD, DM, presents with Acute Pulmonary Edema in the setting of HTN emergency with acute stroke.    PLAN:  NEURO:   #Code stroke in the ER  -patient with aphasia and gazing to left side  -CTA: No hemorrhage, no intracranial lesions  -Rt Carotid: 50% stenosis  -Lt Carotid: 90% stenosis  -vertebral circulation: left subclavian artery with segmental occulsion betweeen the throacic aorta and the left vertebral origin  -Anterior intracranial circulation: atherosclerosis, moderate on rt and severe on left  -posterior circulation: patent rt basilar and posterior arteries, left vertebral with reveraold of flow from the basilar to its PICA  -will continue with neuro checks Q4  - A1C 9.4   - lipid profile - LDL 87  - neuro following - recommended MRI brain (see results below), TTE (performed, see results below), Loop Recorder prior to discharge, Vertebral artery doppler to r/o subclavian steal i.s.o segmental subclavian artery occlusion and consult vascular if suggestive of flow reversal   -per neuro - no contraindication for heparin  - MRI of Brain 10/29 showed Acute infarcts in the high left MCA watershed distribution. Irregular flow-void in the cavernous segment of the left ICA. Chronic ischemic changes.  -loaded with asa and plavix in ER per neuro  - c/w ASA, plavix and statin   - neuro following, appreciate recs   - Vertebral artery doppler/VA duplex carotids showed 16-49% stenosis in proximial right ICA. 80-99% stenosis in proximal left ICA. Moderate, heterogenous atherosclerotic plaque in proximal left ECA. VERT, retrograde dopper flow.  - Neurosurgery consulted, appreciate recs   - per S&S, pt can be started on soft and bite sized diet with mildly thick liquids, will benefit from speech therapy   - PT recommends subacute Rehab   - PT- pending final discharge recs    PULMONARY:  # flash pulmonary edema  - CXR in ED showed discrete right perihilar opacities with bilateral effusions   - s/p lasix 80 in ED   - CXR from 10/28 AM showed Mild resolution of the bilateral pulmonary opacities likely pulmonary edema. Probable small left effusion  - 10/28 - Weaned down from BIPAP to 3L of NC  - pending read for repeat CXR in AM   - 10/30 - weaned from 3L of NC to RA, currently saturating well   - d/c lasix 80 BID given pt saturating well on RA and uptrending Cr    #Concern for Aspiration   - patient with cough after medications were given  - pt passed bedside dysphagia screening   - started on soft and bite  sized diet with mildly thick liquids per S&S eval   - per S&S, pt will benefit from speech therapy     CARDIAC:  EKG with NSR LVH, ST depressions in multiple leads  bigeminy and PVCx on tele  #NSTEMI:  #elevated troponin   NSR on tele  -loaded with ASA and plavix in the ER, as per neuro, would hold hep gtt for now given concern for hemorrhagic conversion of the possible stroke   - c/w lipitor 80, ASA 81, and plavix 75  - was on nitro gtt previously to reduce systolic BP from 180 to 140 goal systolic, now off nitro gtt since 10/28  -TTE 10/27 showed mildly decreased LVSF w/ EF of 45 to 50 %, with regional wall motion abnormalities. Basal and mid anterior wall, basal and mid anterolateral wall, basal and mid inferolateral wall, and basal inferior segment are abnormal.  -Troponin 104 -> 148 -> 190 -> 236 -> 312 -> 298, d/c trending   - would defer cardiac cath in setting of possible acute stroke for now      #HTN Emergency:  - was on nitro gtt to maintain SBPs 140-180s, now off nitro gtt since 10/28  - on hydralazine PRN   - d/c lasix 80 BID given uptrending Cr   - Increase Coreg to12.5 BID   - maintain new SBP goal of 140-160    #HLD:  - c/w lipitor 80  - lipid profile showed LDL 87     GI:  # Diet   - passed bedside dysphagia screening  - started on soft and bite  sized diet with mildly thick liquids per S&S eval       #PE revealed soft, mildly distended abdomen  - Today's PE revealed non-distended abdomen   - CTM       RENAL:   #NOVA   - Cr uptrending 1.19 -> 1.33 -> 1.47 -> 1.73->1.87, likely due to hypertensive emergency   - Strict Is and Os   - Indwelling catheter in place   - avoid nephrotoxins   - hold lasix 80 BID   - CTM     ID:  #Leukocytosis  #Aspiration PNA?  - spiked fever overnight   - BCx - NG at 24 hrs  - MRSA swab - neg  - Full RVP neg   - d/c vanc given neg MRSA swab   - c/w zosyn (10/27-10/31) to finish 5 days course       ENDO:  #DM  -  HGBA1c 10/28 - 9.4   - ISS     VTE PPX  - HSQ

## 2024-10-30 NOTE — PROGRESS NOTE ADULT - ASSESSMENT
69 RH Luxembourgish speaking male PMHx COPD, HTN, DM presented for hypoxic respiratory distress due to COPD exacerbation vs cardiogenic etiology. Found to have right sided weakness and dysarthria. MRI shows Acute infarcts in the high left MCA watershed distribution. CTA H/N showed  common carotid and Left internal carotid artery with maximal stenosis approximately 90%. On exam, patient is awake and alert, oriented x 2, right arm 0/5 and right leg 2/5.     Impression: Right hemiparesis, mixed aphasia with dysarthria and Right visual field cut due to left MCA acute ischemic stroke. Mechanism likely JAVAD      Plan:  [] Appreciate neurosurgery input on L ICA stenosis    [] ANTITHROMBOTIC THERAPY: on ASA 81 mg and Plavix 75 mg   [] Loop recorder prior to discharge  [] Telemonitoring  [] Q4 Neurochecks   [x] MRI  [x] Vessel imaging  [x] TTE                   [x] LDL: 87, HbA1c:9.4  [x] Atorvastatin 80 mg HS Titrate LDL < 70  [x]  Physical therapy/OT/Speech Language: TESSIE    SBP goal: -180  Other:    Case & Plan discussed with patient and family. All questions answered. Plan discussed with primary team       CORE MEASURES:         Admission NIHSS: 7     Tenecteplase: [] YES [x] NO     Statin Therapy: [x] YES [] NO     Smoking [] YES [x] NO     Afib [] YES [x] NO     Stroke Education [x] YES [] NO    Dysphagia screen : [x] Passed, Soft bite sized      DVT ppx: Venodynes [x] Heparin s.c [] LMWH

## 2024-10-31 LAB
ALBUMIN SERPL ELPH-MCNC: 3.5 G/DL — SIGNIFICANT CHANGE UP (ref 3.3–5)
ALP SERPL-CCNC: 66 U/L — SIGNIFICANT CHANGE UP (ref 40–120)
ALT FLD-CCNC: 49 U/L — HIGH (ref 4–41)
ANION GAP SERPL CALC-SCNC: 13 MMOL/L — SIGNIFICANT CHANGE UP (ref 7–14)
APTT BLD: 30.5 SEC — SIGNIFICANT CHANGE UP (ref 24.5–35.6)
APTT BLD: 87 SEC — HIGH (ref 24.5–35.6)
AST SERPL-CCNC: 42 U/L — HIGH (ref 4–40)
BASOPHILS # BLD AUTO: 0.05 K/UL — SIGNIFICANT CHANGE UP (ref 0–0.2)
BASOPHILS NFR BLD AUTO: 0.6 % — SIGNIFICANT CHANGE UP (ref 0–2)
BILIRUB SERPL-MCNC: 0.6 MG/DL — SIGNIFICANT CHANGE UP (ref 0.2–1.2)
BUN SERPL-MCNC: 42 MG/DL — HIGH (ref 7–23)
CALCIUM SERPL-MCNC: 9.6 MG/DL — SIGNIFICANT CHANGE UP (ref 8.4–10.5)
CHLORIDE SERPL-SCNC: 100 MMOL/L — SIGNIFICANT CHANGE UP (ref 98–107)
CO2 SERPL-SCNC: 23 MMOL/L — SIGNIFICANT CHANGE UP (ref 22–31)
CREAT SERPL-MCNC: 1.59 MG/DL — HIGH (ref 0.5–1.3)
EGFR: 47 ML/MIN/1.73M2 — LOW
EOSINOPHIL # BLD AUTO: 0.43 K/UL — SIGNIFICANT CHANGE UP (ref 0–0.5)
EOSINOPHIL NFR BLD AUTO: 4.8 % — SIGNIFICANT CHANGE UP (ref 0–6)
GLUCOSE BLDC GLUCOMTR-MCNC: 274 MG/DL — HIGH (ref 70–99)
GLUCOSE BLDC GLUCOMTR-MCNC: 305 MG/DL — HIGH (ref 70–99)
GLUCOSE BLDC GLUCOMTR-MCNC: 324 MG/DL — HIGH (ref 70–99)
GLUCOSE BLDC GLUCOMTR-MCNC: 326 MG/DL — HIGH (ref 70–99)
GLUCOSE SERPL-MCNC: 321 MG/DL — HIGH (ref 70–99)
HCT VFR BLD CALC: 40.5 % — SIGNIFICANT CHANGE UP (ref 39–50)
HGB BLD-MCNC: 13.5 G/DL — SIGNIFICANT CHANGE UP (ref 13–17)
IANC: 5.28 K/UL — SIGNIFICANT CHANGE UP (ref 1.8–7.4)
IMM GRANULOCYTES NFR BLD AUTO: 0.3 % — SIGNIFICANT CHANGE UP (ref 0–0.9)
INR BLD: 1.06 RATIO — SIGNIFICANT CHANGE UP (ref 0.85–1.16)
LYMPHOCYTES # BLD AUTO: 2.15 K/UL — SIGNIFICANT CHANGE UP (ref 1–3.3)
LYMPHOCYTES # BLD AUTO: 24.2 % — SIGNIFICANT CHANGE UP (ref 13–44)
MAGNESIUM SERPL-MCNC: 2.5 MG/DL — SIGNIFICANT CHANGE UP (ref 1.6–2.6)
MCHC RBC-ENTMCNC: 28.4 PG — SIGNIFICANT CHANGE UP (ref 27–34)
MCHC RBC-ENTMCNC: 33.3 G/DL — SIGNIFICANT CHANGE UP (ref 32–36)
MCV RBC AUTO: 85.3 FL — SIGNIFICANT CHANGE UP (ref 80–100)
MONOCYTES # BLD AUTO: 0.94 K/UL — HIGH (ref 0–0.9)
MONOCYTES NFR BLD AUTO: 10.6 % — SIGNIFICANT CHANGE UP (ref 2–14)
NEUTROPHILS # BLD AUTO: 5.28 K/UL — SIGNIFICANT CHANGE UP (ref 1.8–7.4)
NEUTROPHILS NFR BLD AUTO: 59.5 % — SIGNIFICANT CHANGE UP (ref 43–77)
NRBC # BLD: 0 /100 WBCS — SIGNIFICANT CHANGE UP (ref 0–0)
NRBC # FLD: 0 K/UL — SIGNIFICANT CHANGE UP (ref 0–0)
PHOSPHATE SERPL-MCNC: 4.4 MG/DL — SIGNIFICANT CHANGE UP (ref 2.5–4.5)
PLATELET # BLD AUTO: 512 K/UL — HIGH (ref 150–400)
POTASSIUM SERPL-MCNC: 3.8 MMOL/L — SIGNIFICANT CHANGE UP (ref 3.5–5.3)
POTASSIUM SERPL-SCNC: 3.8 MMOL/L — SIGNIFICANT CHANGE UP (ref 3.5–5.3)
PROT SERPL-MCNC: 7.4 G/DL — SIGNIFICANT CHANGE UP (ref 6–8.3)
PROTHROM AB SERPL-ACNC: 12.3 SEC — SIGNIFICANT CHANGE UP (ref 9.9–13.4)
RBC # BLD: 4.75 M/UL — SIGNIFICANT CHANGE UP (ref 4.2–5.8)
RBC # FLD: 12.9 % — SIGNIFICANT CHANGE UP (ref 10.3–14.5)
SODIUM SERPL-SCNC: 136 MMOL/L — SIGNIFICANT CHANGE UP (ref 135–145)
WBC # BLD: 8.88 K/UL — SIGNIFICANT CHANGE UP (ref 3.8–10.5)
WBC # FLD AUTO: 8.88 K/UL — SIGNIFICANT CHANGE UP (ref 3.8–10.5)

## 2024-10-31 PROCEDURE — 70546 MR ANGIOGRAPH HEAD W/O&W/DYE: CPT | Mod: 26

## 2024-10-31 PROCEDURE — 99291 CRITICAL CARE FIRST HOUR: CPT

## 2024-10-31 PROCEDURE — 70549 MR ANGIOGRAPH NECK W/O&W/DYE: CPT | Mod: 26

## 2024-10-31 PROCEDURE — 70450 CT HEAD/BRAIN W/O DYE: CPT | Mod: 26

## 2024-10-31 RX ORDER — INSULIN LISPRO 100/ML
VIAL (ML) SUBCUTANEOUS
Refills: 0 | Status: DISCONTINUED | OUTPATIENT
Start: 2024-10-31 | End: 2024-11-01

## 2024-10-31 RX ORDER — INSULIN GLARGINE,HUM.REC.ANLOG 100/ML
8 VIAL (ML) SUBCUTANEOUS AT BEDTIME
Refills: 0 | Status: DISCONTINUED | OUTPATIENT
Start: 2024-10-31 | End: 2024-11-01

## 2024-10-31 RX ORDER — INSULIN LISPRO 100/ML
4 VIAL (ML) SUBCUTANEOUS
Refills: 0 | Status: DISCONTINUED | OUTPATIENT
Start: 2024-10-31 | End: 2024-11-01

## 2024-10-31 RX ORDER — POTASSIUM CHLORIDE 10 MEQ
20 TABLET, EXTENDED RELEASE ORAL ONCE
Refills: 0 | Status: COMPLETED | OUTPATIENT
Start: 2024-10-31 | End: 2024-10-31

## 2024-10-31 RX ORDER — HEPARIN SODIUM 10000 [USP'U]/ML
900 INJECTION INTRAVENOUS; SUBCUTANEOUS
Qty: 25000 | Refills: 0 | Status: DISCONTINUED | OUTPATIENT
Start: 2024-10-31 | End: 2024-11-01

## 2024-10-31 RX ADMIN — IPRATROPIUM BROMIDE AND ALBUTEROL SULFATE 3 MILLILITER(S): .5; 2.5 SOLUTION RESPIRATORY (INHALATION) at 03:41

## 2024-10-31 RX ADMIN — HEPARIN SODIUM 9 UNIT(S)/HR: 10000 INJECTION INTRAVENOUS; SUBCUTANEOUS at 15:32

## 2024-10-31 RX ADMIN — Medication 4: at 11:38

## 2024-10-31 RX ADMIN — Medication 4 UNIT(S): at 17:20

## 2024-10-31 RX ADMIN — PIPERACILLIN AND TAZOBACTAM 25 GRAM(S): .5; 4 INJECTION, POWDER, LYOPHILIZED, FOR SOLUTION INTRAVENOUS at 05:23

## 2024-10-31 RX ADMIN — CHLORHEXIDINE GLUCONATE 1 APPLICATION(S): 40 SOLUTION TOPICAL at 11:56

## 2024-10-31 RX ADMIN — IPRATROPIUM BROMIDE AND ALBUTEROL SULFATE 3 MILLILITER(S): .5; 2.5 SOLUTION RESPIRATORY (INHALATION) at 21:10

## 2024-10-31 RX ADMIN — Medication 2 UNIT(S): at 07:37

## 2024-10-31 RX ADMIN — Medication 4: at 07:37

## 2024-10-31 RX ADMIN — CARVEDILOL 12.5 MILLIGRAM(S): 25 TABLET, FILM COATED ORAL at 05:23

## 2024-10-31 RX ADMIN — HEPARIN SODIUM 5000 UNIT(S): 10000 INJECTION INTRAVENOUS; SUBCUTANEOUS at 07:38

## 2024-10-31 RX ADMIN — Medication 20 MILLIEQUIVALENT(S): at 06:26

## 2024-10-31 RX ADMIN — PIPERACILLIN AND TAZOBACTAM 25 GRAM(S): .5; 4 INJECTION, POWDER, LYOPHILIZED, FOR SOLUTION INTRAVENOUS at 21:38

## 2024-10-31 RX ADMIN — Medication 81 MILLIGRAM(S): at 11:56

## 2024-10-31 RX ADMIN — HEPARIN SODIUM 8 UNIT(S)/HR: 10000 INJECTION INTRAVENOUS; SUBCUTANEOUS at 22:14

## 2024-10-31 RX ADMIN — Medication 80 MILLIGRAM(S): at 21:38

## 2024-10-31 RX ADMIN — IPRATROPIUM BROMIDE AND ALBUTEROL SULFATE 3 MILLILITER(S): .5; 2.5 SOLUTION RESPIRATORY (INHALATION) at 15:57

## 2024-10-31 RX ADMIN — Medication 4: at 17:19

## 2024-10-31 RX ADMIN — Medication 2 UNIT(S): at 11:38

## 2024-10-31 RX ADMIN — Medication 8 UNIT(S): at 21:38

## 2024-10-31 RX ADMIN — HEPARIN SODIUM 5000 UNIT(S): 10000 INJECTION INTRAVENOUS; SUBCUTANEOUS at 00:50

## 2024-10-31 RX ADMIN — IPRATROPIUM BROMIDE AND ALBUTEROL SULFATE 3 MILLILITER(S): .5; 2.5 SOLUTION RESPIRATORY (INHALATION) at 10:40

## 2024-10-31 RX ADMIN — CARVEDILOL 12.5 MILLIGRAM(S): 25 TABLET, FILM COATED ORAL at 17:18

## 2024-10-31 RX ADMIN — PIPERACILLIN AND TAZOBACTAM 25 GRAM(S): .5; 4 INJECTION, POWDER, LYOPHILIZED, FOR SOLUTION INTRAVENOUS at 13:42

## 2024-10-31 RX ADMIN — HEPARIN SODIUM 9 UNIT(S)/HR: 10000 INJECTION INTRAVENOUS; SUBCUTANEOUS at 15:31

## 2024-10-31 RX ADMIN — CLOPIDOGREL 75 MILLIGRAM(S): 75 TABLET ORAL at 11:56

## 2024-10-31 NOTE — PROGRESS NOTE ADULT - SUBJECTIVE AND OBJECTIVE BOX
Patient is a 69y old  Male who presents with a chief complaint of concern for stroke (30 Oct 2024 11:29)    HPI:  Patient is a 75 year old Lithuanian speaking male with PMH of HTN, HLD, DM, presents with shortness of breath over 3-4 days which acutely worsened today. Patient also initially complained of chest pain and noted with elevated BP to 190/110. Acute management in the ED included BiPAP, lasix. Patient was placed on nitroglycerin drip due to concern for possible ACS. Chest xray noted with bilateral pulmonary edema which appears cardiac in origin. Patient's respiratory status stabilized on BiPAP. While in the ED, patient's sons reported that patient also began to have right arm weakness first noticed this morning. During this time, patient also appeared slightly confused and required assistance to walk. Patient was last seen at his baseline yesterday evening at 2100 when he was AAOx3 and did not have difficulties using his right side. Code stroke was called for right sided weakness.   Upon stroke team assessment, patient is on BiPAP and does not appear in respiratory distress. BP at time of assessment is 130s systolic (nitroglycerin drip was previously discontinued). Patient denies history of stroke. Patient takes aspirin for cardioprotection. Ambulates independently at baseline, lives with family and requires some assistance with taking medications.     CCU consulted because patient requiring BIPAP for difficulty breathing and requiring nitro gtt for BP elevated to 220/110.   (27 Oct 2024 16:04)       INTERVAL HPI/OVERNIGHT EVENTS:   No overnight events   Afebrile, hemodynamically stable     Subjective:    ICU Vital Signs Last 24 Hrs  T(C): 36.3 (31 Oct 2024 04:00), Max: 37 (30 Oct 2024 16:00)  T(F): 97.3 (31 Oct 2024 04:00), Max: 98.6 (30 Oct 2024 16:00)  HR: 83 (31 Oct 2024 06:00) (69 - 94)  BP: 159/78 (31 Oct 2024 06:00) (102/70 - 191/79)  BP(mean): 101 (31 Oct 2024 06:00) (80 - 119)  ABP: 179/73 (30 Oct 2024 13:00) (159/61 - 179/73)  ABP(mean): 106 (30 Oct 2024 13:00) (89 - 109)  RR: 17 (31 Oct 2024 06:00) (15 - 32)  SpO2: 96% (31 Oct 2024 06:00) (83% - 100%)    O2 Parameters below as of 31 Oct 2024 03:44  Patient On (Oxygen Delivery Method): nasal cannula          I&O's Summary    30 Oct 2024 07:01  -  31 Oct 2024 07:00  --------------------------------------------------------  IN: 930 mL / OUT: 1460 mL / NET: -530 mL          Daily     Daily Weight in k.1 (31 Oct 2024 05:00)    Adult Advanced Hemodynamics Last 24 Hrs  CVP(mm Hg): --  CVP(cm H2O): --  CO: --  CI: --  PA: --  PA(mean): --  PCWP: --  SVR: --  SVRI: --  PVR: --  PVRI: --    EKG/Telemetry Events:    MEDICATIONS  (STANDING):  albuterol    90 MICROgram(s) HFA Inhaler 2 Puff(s) Inhalation two times a day  albuterol/ipratropium for Nebulization 3 milliLiter(s) Nebulizer every 6 hours  aspirin  chewable 81 milliGRAM(s) Oral daily  atorvastatin 80 milliGRAM(s) Oral at bedtime  carvedilol 12.5 milliGRAM(s) Oral every 12 hours  chlorhexidine 2% Cloths 1 Application(s) Topical daily  clopidogrel Tablet 75 milliGRAM(s) Oral daily  dextrose 5%. 1000 milliLiter(s) (50 mL/Hr) IV Continuous <Continuous>  dextrose 5%. 1000 milliLiter(s) (100 mL/Hr) IV Continuous <Continuous>  dextrose 50% Injectable 25 Gram(s) IV Push once  dextrose 50% Injectable 12.5 Gram(s) IV Push once  glucagon  Injectable 1 milliGRAM(s) IntraMuscular once  heparin   Injectable 5000 Unit(s) SubCutaneous every 8 hours  insulin glargine Injectable (LANTUS) 4 Unit(s) SubCutaneous at bedtime  insulin lispro (ADMELOG) corrective regimen sliding scale   SubCutaneous three times a day before meals  insulin lispro Injectable (ADMELOG) 2 Unit(s) SubCutaneous three times a day before meals  piperacillin/tazobactam IVPB.. 3.375 Gram(s) IV Intermittent every 8 hours    MEDICATIONS  (PRN):  acetaminophen     Tablet .. 650 milliGRAM(s) Oral every 6 hours PRN Temp greater or equal to 38C (100.4F), Moderate Pain (4 - 6), Severe Pain (7 - 10)  dextrose Oral Gel 15 Gram(s) Oral once PRN Blood Glucose LESS THAN 70 milliGRAM(s)/deciliter  hydrALAZINE Injectable 5 milliGRAM(s) IV Push every 6 hours PRN HTN      PHYSICAL EXAM:  GENERAL:   HEAD:  Atraumatic, Normocephalic  EYES: EOMI, PERRLA, conjunctiva and sclera clear  NECK: Supple, No JVD, Normal thyroid, no enlarged nodes  NERVOUS SYSTEM:  Alert & Awake.   CHEST/LUNG: B/L good air entry; No rales, rhonchi, or wheezing  HEART: S1S2 normal, no S3, Regular rate and rhythm; No murmurs  ABDOMEN: Soft, Nontender, Nondistended; Bowel sounds present  EXTREMITIES:  2+ Peripheral Pulses, No clubbing, cyanosis, or edema  LYMPH: No lymphadenopathy noted  SKIN: No rashes or lesions    LABS:                        13.5   8.88  )-----------( 512      ( 31 Oct 2024 04:40 )             40.5     10-31    136  |  100  |  42[H]  ----------------------------<  321[H]  3.8   |  23  |  1.59[H]    Ca    9.6      31 Oct 2024 04:40  Phos  4.4     10-31  Mg     2.50     10-31    TPro  7.4  /  Alb  3.5  /  TBili  0.6  /  DBili  x   /  AST  42[H]  /  ALT  49[H]  /  AlkPhos  66  10-31    LIVER FUNCTIONS - ( 31 Oct 2024 04:40 )  Alb: 3.5 g/dL / Pro: 7.4 g/dL / ALK PHOS: 66 U/L / ALT: 49 U/L / AST: 42 U/L / GGT: x             CAPILLARY BLOOD GLUCOSE      POCT Blood Glucose.: 324 mg/dL (31 Oct 2024 07:19)  POCT Blood Glucose.: 278 mg/dL (30 Oct 2024 22:06)  POCT Blood Glucose.: 279 mg/dL (30 Oct 2024 16:50)  POCT Blood Glucose.: 257 mg/dL (30 Oct 2024 11:49)  POCT Blood Glucose.: 237 mg/dL (30 Oct 2024 07:44)            Urinalysis Basic - ( 31 Oct 2024 04:40 )    Color: x / Appearance: x / SG: x / pH: x  Gluc: 321 mg/dL / Ketone: x  / Bili: x / Urobili: x   Blood: x / Protein: x / Nitrite: x   Leuk Esterase: x / RBC: x / WBC x   Sq Epi: x / Non Sq Epi: x / Bacteria: x          RADIOLOGY & ADDITIONAL TESTS:  CXR:        Care Discussed with Consultants/Other Providers [ x] YES  [ ] NO           Patient is a 69y old  Male who presents with a chief complaint of concern for stroke (30 Oct 2024 11:29)    HPI:  Patient is a 75 year old [place] speaking male with PMH of HTN, HLD, DM, presents with shortness of breath over 3-4 days which acutely worsened today. Patient also initially complained of chest pain and noted with elevated BP to 190/110. Acute management in the ED included BiPAP, lasix. Patient was placed on nitroglycerin drip due to concern for possible ACS. Chest xray noted with bilateral pulmonary edema which appears cardiac in origin. Patient's respiratory status stabilized on BiPAP. While in the ED, patient's sons reported that patient also began to have right arm weakness first noticed this morning. During this time, patient also appeared slightly confused and required assistance to walk. Patient was last seen at his baseline yesterday evening at 2100 when he was AAOx3 and did not have difficulties using his right side. Code stroke was called for right sided weakness.   Upon stroke team assessment, patient is on BiPAP and does not appear in respiratory distress. BP at time of assessment is 130s systolic (nitroglycerin drip was previously discontinued). Patient denies history of stroke. Patient takes aspirin for cardioprotection. Ambulates independently at baseline, lives with family and requires some assistance with taking medications.     CCU consulted because patient requiring BIPAP for difficulty breathing and requiring nitro gtt for BP elevated to 220/110.   (27 Oct 2024 16:04)       INTERVAL HPI/OVERNIGHT EVENTS:   No overnight events   Afebrile, hemodynamically stable     Subjective: Pt seen and examined at bedside. Pt reports "feeling good" with no acute complaints including CP, SOB, abd pain, n/v. Conversation was conducted through Uzbek interpretor ID [id number]. Pt is AOX3, able to lift LUE and LLE but unable to move the ride side.     ICU Vital Signs Last 24 Hrs  T(C): 36.3 (31 Oct 2024 04:00), Max: 37 (30 Oct 2024 16:00)  T(F): 97.3 (31 Oct 2024 04:00), Max: 98.6 (30 Oct 2024 16:00)  HR: 83 (31 Oct 2024 06:00) (69 - 94)  BP: 159/78 (31 Oct 2024 06:00) (102/70 - 191/79)  BP(mean): 101 (31 Oct 2024 06:00) (80 - 119)  ABP: 179/73 (30 Oct 2024 13:00) (159/61 - 179/73)  ABP(mean): 106 (30 Oct 2024 13:00) (89 - 109)  RR: 17 (31 Oct 2024 06:00) (15 - 32)  SpO2: 96% (31 Oct 2024 06:00) (83% - 100%)    O2 Parameters below as of 31 Oct 2024 03:44  Patient On (Oxygen Delivery Method): nasal cannula          I&O's Summary    30 Oct 2024 07:01  -  31 Oct 2024 07:00  --------------------------------------------------------  IN: 930 mL / OUT: 1460 mL / NET: -530 mL          Daily     Daily Weight in k.1 (31 Oct 2024 05:00)    Adult Advanced Hemodynamics Last 24 Hrs  CVP(mm Hg): --  CVP(cm H2O): --  CO: --  CI: --  PA: --  PA(mean): --  PCWP: --  SVR: --  SVRI: --  PVR: --  PVRI: --    EKG/Telemetry Events:    MEDICATIONS  (STANDING):  albuterol    90 MICROgram(s) HFA Inhaler 2 Puff(s) Inhalation two times a day  albuterol/ipratropium for Nebulization 3 milliLiter(s) Nebulizer every 6 hours  aspirin  chewable 81 milliGRAM(s) Oral daily  atorvastatin 80 milliGRAM(s) Oral at bedtime  carvedilol 12.5 milliGRAM(s) Oral every 12 hours  chlorhexidine 2% Cloths 1 Application(s) Topical daily  clopidogrel Tablet 75 milliGRAM(s) Oral daily  dextrose 5%. 1000 milliLiter(s) (50 mL/Hr) IV Continuous <Continuous>  dextrose 5%. 1000 milliLiter(s) (100 mL/Hr) IV Continuous <Continuous>  dextrose 50% Injectable 25 Gram(s) IV Push once  dextrose 50% Injectable 12.5 Gram(s) IV Push once  glucagon  Injectable 1 milliGRAM(s) IntraMuscular once  heparin   Injectable 5000 Unit(s) SubCutaneous every 8 hours  insulin glargine Injectable (LANTUS) 4 Unit(s) SubCutaneous at bedtime  insulin lispro (ADMELOG) corrective regimen sliding scale   SubCutaneous three times a day before meals  insulin lispro Injectable (ADMELOG) 2 Unit(s) SubCutaneous three times a day before meals  piperacillin/tazobactam IVPB.. 3.375 Gram(s) IV Intermittent every 8 hours    MEDICATIONS  (PRN):  acetaminophen     Tablet .. 650 milliGRAM(s) Oral every 6 hours PRN Temp greater or equal to 38C (100.4F), Moderate Pain (4 - 6), Severe Pain (7 - 10)  dextrose Oral Gel 15 Gram(s) Oral once PRN Blood Glucose LESS THAN 70 milliGRAM(s)/deciliter  hydrALAZINE Injectable 5 milliGRAM(s) IV Push every 6 hours PRN HTN      PHYSICAL EXAM:  GENERAL: NAD  HEAD:  Atraumatic, Normocephalic  EYES: EOMI, conjunctiva and sclera clear  NERVOUS SYSTEM:  Alert & Awake. AOX3, able to lift LUE and LLE but unable to lift the right side   CHEST/LUNG: B/L good air entry   HEART: Regular rate and rhythm; No murmurs  ABDOMEN: Soft, Nontender, Nondistended  EXTREMITIES:  No b/l LE edema      LABS:                        13.5   8.88  )-----------( 512      ( 31 Oct 2024 04:40 )             40.5     10-31    136  |  100  |  42[H]  ----------------------------<  321[H]  3.8   |  23  |  1.59[H]    Ca    9.6      31 Oct 2024 04:40  Phos  4.4     10-31  Mg     2.50     10-31    TPro  7.4  /  Alb  3.5  /  TBili  0.6  /  DBili  x   /  AST  42[H]  /  ALT  49[H]  /  AlkPhos  66  10-31    LIVER FUNCTIONS - ( 31 Oct 2024 04:40 )  Alb: 3.5 g/dL / Pro: 7.4 g/dL / ALK PHOS: 66 U/L / ALT: 49 U/L / AST: 42 U/L / GGT: x             CAPILLARY BLOOD GLUCOSE      POCT Blood Glucose.: 324 mg/dL (31 Oct 2024 07:19)  POCT Blood Glucose.: 278 mg/dL (30 Oct 2024 22:06)  POCT Blood Glucose.: 279 mg/dL (30 Oct 2024 16:50)  POCT Blood Glucose.: 257 mg/dL (30 Oct 2024 11:49)  POCT Blood Glucose.: 237 mg/dL (30 Oct 2024 07:44)            Urinalysis Basic - ( 31 Oct 2024 04:40 )    Color: x / Appearance: x / SG: x / pH: x  Gluc: 321 mg/dL / Ketone: x  / Bili: x / Urobili: x   Blood: x / Protein: x / Nitrite: x   Leuk Esterase: x / RBC: x / WBC x   Sq Epi: x / Non Sq Epi: x / Bacteria: x          RADIOLOGY & ADDITIONAL TESTS:  CXR:        Care Discussed with Consultants/Other Providers [ x] YES  [ ] NO

## 2024-10-31 NOTE — CHART NOTE - NSCHARTNOTEFT_GEN_A_CORE
OK to start heparin drip from neurosurgical perspective. PTT goals and AC/AP management per neurology. Please obtain CT head when patient is therapeutic on heparin drip.    Thank you  Neurosurgery   [id number] OK to start heparin drip from neurosurgical perspective. PTT goals and AC/AP management per neurology. Please obtain CT head when patient is therapeutic on heparin drip. Plan to repeat CTA H/N in 7-10 days.    Thank you  Neurosurgery   [id number] done/left hernia surgery as per pt

## 2024-10-31 NOTE — CHART NOTE - NSCHARTNOTEFT_GEN_A_CORE
Please contact neurosurgery when MRA is complete. Will consider heparin drip based on this imaging.    Thank you,     Neurosurgery   [id number]

## 2024-10-31 NOTE — PROGRESS NOTE ADULT - CRITICAL CARE ATTENDING COMMENT
75 year old man with history of HTN who presented with hypertensive emergency and right sided weakness complicated with acute diastolic heart failure and flash pulmonary edema. Placed on BIPAP and transferred to CCU for further care.    TTE 10/27/24:  CONCLUSIONS:   1. Left ventricular systolic function is mildly decreased with an ejection fraction visually estimated at 45 to 50 %. Regional wall motion abnormalities present.   2. Basal and mid anterior wall, basal and mid anterolateral wall, basal and mid inferolateral wall, and basal inferior segment are abnormal.   3. Left atrium is normal in size.   4. The right atrium is normal in size.   5. Normal right ventricular cavity size, with normal wall thickness, and probably normal right ventricular systolic function.  **Left Ventricle:  Left ventricular systolic function is mildly decreased with an ejection fraction visually estimated at 45 to 50%. There are regional wall motion abnormalities present.    Meds:  ASA  Atorvastatin  Plavix  SQ heparin  Coreg 6.25 mg BID  Insulin  Zosyn    #Neuro- Acute CVA and carotid disease  MRA pending  Neurosurgery input appreciated- after MRA will decide need for systemic AC with heparin  Neuro input appreciated    #Pulm- BiPAP as needed  Improved    #CV- Hypertensive emergency with demand ischemia  No ac per neuro- will we reevaluate  Acute on chronic combined systolic and diastolic heart failure  WIll hold IV diuresis now and reassess  Continue Coreg- will discuss with neuro- BP goals to initiate GDMT    #Renal- monitor Cr, NOVA improved today- dc lasix    #ID- Elevated WBC, febrile  Continue Zosyn- finish today  Await CX    #Endo- Sliding scale, follow FS    #DVT ppx- on sq heparin 75 year old man with history of HTN who presented with hypertensive emergency and right sided weakness complicated with acute diastolic heart failure and flash pulmonary edema. Placed on BIPAP and transferred to CCU for further care.    TTE 10/27/24:  CONCLUSIONS:   1. Left ventricular systolic function is mildly decreased with an ejection fraction visually estimated at 45 to 50 %. Regional wall motion abnormalities present.   2. Basal and mid anterior wall, basal and mid anterolateral wall, basal and mid inferolateral wall, and basal inferior segment are abnormal.   3. Left atrium is normal in size.   4. The right atrium is normal in size.   5. Normal right ventricular cavity size, with normal wall thickness, and probably normal right ventricular systolic function.  **Left Ventricle:  Left ventricular systolic function is mildly decreased with an ejection fraction visually estimated at 45 to 50%. There are regional wall motion abnormalities present.    Meds:  ASA  Atorvastatin  Plavix  SQ heparin  Coreg 6.25 mg BID  Insulin  Zosyn    #Neuro- Acute CVA and carotid disease  MRA pending  Neurosurgery input appreciated- after MRA will decide need for systemic AC with heparin  Neuro input appreciated    #Pulm- BiPAP as needed  Improved    #CV- Hypertensive emergency with demand ischemia  No ac per neuro- will we reevaluate  Acute on chronic combined systolic and diastolic heart failure  WIll hold IV diuresis now and reassess  Continue Coreg- will discuss with neuro- BP goals to initiate GDMT    #Renal- monitor Cr, NOVA improved today  #ID- Elevated WBC, febrile  Continue Zosyn- finish today  Await CX    #Endo- Sliding scale, follow FS    #DVT ppx- on sq heparin

## 2024-10-31 NOTE — PROGRESS NOTE ADULT - ASSESSMENT
Patient is a 69 year old Swedish speaking male with PMH of HTN, HLD, DM, presents with Acute Pulmonary Edema in the setting of HTN emergency with acute stroke.    PLAN:  NEURO:   #Code stroke in the ER  -patient with aphasia and gazing to left side  -CTA: No hemorrhage, no intracranial lesions  -Rt Carotid: 50% stenosis  -Lt Carotid: 90% stenosis  -vertebral circulation: left subclavian artery with segmental occulsion betweeen the throacic aorta and the left vertebral origin  -Anterior intracranial circulation: atherosclerosis, moderate on rt and severe on left  -posterior circulation: patent rt basilar and posterior arteries, left vertebral with reveraold of flow from the basilar to its PICA  -will continue with neuro checks Q4  - A1C 9.4   - lipid profile - LDL 87  - neuro following - recommended MRI brain (see results below), TTE (performed, see results below), Loop Recorder prior to discharge, Vertebral artery doppler to r/o subclavian steal i.s.o segmental subclavian artery occlusion and consult vascular if suggestive of flow reversal   -per neuro - no contraindication for heparin  - MRI of Brain 10/29 showed Acute infarcts in the high left MCA watershed distribution. Irregular flow-void in the cavernous segment of the left ICA. Chronic ischemic changes.  -loaded with asa and plavix in ER per neuro  - c/w ASA, plavix and statin   - neuro following, appreciate recs   - Vertebral artery doppler/VA duplex carotids showed 16-49% stenosis in proximial right ICA. 80-99% stenosis in proximal left ICA. Moderate, heterogenous atherosclerotic plaque in proximal left ECA. VERT, retrograde dopper flow.  - Neurosurgery consulted, appreciate recs   - per S&S, pt can be started on soft and bite sized diet with mildly thick liquids, will benefit from speech therapy   - PT recommends subacute Rehab   - PT- pending final discharge recs    PULMONARY:  # flash pulmonary edema  - CXR in ED showed discrete right perihilar opacities with bilateral effusions   - s/p lasix 80 in ED   - CXR from 10/28 AM showed Mild resolution of the bilateral pulmonary opacities likely pulmonary edema. Probable small left effusion  - 10/28 - Weaned down from BIPAP to 3L of NC  - pending read for repeat CXR in AM   - 10/30 - weaned from 3L of NC to RA, currently saturating well   - d/c lasix 80 BID given pt saturating well on RA and uptrending Cr    #Concern for Aspiration   - patient with cough after medications were given  - pt passed bedside dysphagia screening   - started on soft and bite  sized diet with mildly thick liquids per S&S eval   - per S&S, pt will benefit from speech therapy     CARDIAC:  EKG with NSR LVH, ST depressions in multiple leads  bigeminy and PVCx on tele  #NSTEMI:  #elevated troponin   NSR on tele  -loaded with ASA and plavix in the ER, as per neuro, would hold hep gtt for now given concern for hemorrhagic conversion of the possible stroke   - c/w lipitor 80, ASA 81, and plavix 75  - was on nitro gtt previously to reduce systolic BP from 180 to 140 goal systolic, now off nitro gtt since 10/28  -TTE 10/27 showed mildly decreased LVSF w/ EF of 45 to 50 %, with regional wall motion abnormalities. Basal and mid anterior wall, basal and mid anterolateral wall, basal and mid inferolateral wall, and basal inferior segment are abnormal.  -Troponin 104 -> 148 -> 190 -> 236 -> 312 -> 298, d/c trending   - would defer cardiac cath in setting of possible acute stroke for now      #HTN Emergency:  - was on nitro gtt to maintain SBPs 140-180s, now off nitro gtt since 10/28  - on hydralazine PRN   - d/c lasix 80 BID given uptrending Cr   - Increase Coreg to12.5 BID   - maintain new SBP goal of 140-160    #HLD:  - c/w lipitor 80  - lipid profile showed LDL 87     GI:  # Diet   - passed bedside dysphagia screening  - started on soft and bite  sized diet with mildly thick liquids per S&S eval       #PE revealed soft, mildly distended abdomen  - Today's PE revealed non-distended abdomen   - CTM       RENAL:   #NOVA   - Cr uptrending 1.19 -> 1.33 -> 1.47 -> 1.73->1.87, likely due to hypertensive emergency   - Strict Is and Os   - Indwelling catheter in place   - avoid nephrotoxins   - hold lasix 80 BID   - CTM     ID:  #Leukocytosis  #Aspiration PNA?  - spiked fever overnight   - BCx - NG at 24 hrs  - MRSA swab - neg  - Full RVP neg   - d/c vanc given neg MRSA swab   - c/w zosyn (10/27-10/31) to finish 5 days course       ENDO:  #DM  -  HGBA1c 10/28 - 9.4   - ISS     VTE PPX  - HSQ Patient is a 69 year old [place] speaking male with PMH of HTN, HLD, DM, presents with Acute Pulmonary Edema in the setting of HTN emergency with acute stroke.    PLAN:  NEURO:   #Code stroke in the ER  -patient with aphasia and gazing to left side  -CTA: No hemorrhage, no intracranial lesions  -Rt Carotid: 50% stenosis  -Lt Carotid: 90% stenosis  -vertebral circulation: left subclavian artery with segmental occulsion betweeen the throacic aorta and the left vertebral origin  -Anterior intracranial circulation: atherosclerosis, moderate on rt and severe on left  -posterior circulation: patent rt basilar and posterior arteries, left vertebral with reveraold of flow from the basilar to its PICA  -will continue with neuro checks Q4  - A1C 9.4   - lipid profile - LDL 87  - neuro following - recommended MRI brain (see results below), TTE (performed, see results below), Loop Recorder prior to discharge, Vertebral artery doppler to r/o subclavian steal i.s.o segmental subclavian artery occlusion and consult vascular if suggestive of flow reversal   -per neuro - no contraindication for heparin  - MRI of Brain 10/29 showed Acute infarcts in the high left MCA watershed distribution. Irregular flow-void in the cavernous segment of the left ICA. Chronic ischemic changes.  -loaded with asa and plavix in ER per neuro  - c/w ASA, plavix and statin   - neuro following, appreciate recs   - Vertebral artery doppler/VA duplex carotids showed 16-49% stenosis in proximial right ICA. 80-99% stenosis in proximal left ICA. Moderate, heterogenous atherosclerotic plaque in proximal left ECA. VERT, retrograde dopper flow.  - Neurosurgery consulted, appreciate recs - recommended MRA head/neck w/wo contrast and may consider starting heparin gtt after reviewing MRA   - per S&S, pt can be started on soft and bite sized diet with mildly thick liquids, will benefit from speech therapy   - PT recommends subacute Rehab   - PT- pending final discharge recs  - pending MRA head/neck w/wo contrast   -New BP goal is 140-160    PULMONARY:  # flash pulmonary edema  - CXR in ED showed discrete right perihilar opacities with bilateral effusions   - s/p lasix 80 in ED   - CXR from 10/28 AM showed Mild resolution of the bilateral pulmonary opacities likely pulmonary edema. Probable small left effusion  - 10/28 - Weaned down from BIPAP to 3L of NC  - pending read for repeat CXR in AM   - 10/30 - weaned from 3L of NC to RA, currently saturating well   - d/c lasix 80 BID given pt saturating well on RA and uptrending Cr    #Concern for Aspiration   - patient with cough after medications were given  - pt passed bedside dysphagia screening   - started on soft and bite  sized diet with mildly thick liquids per S&S eval   - per S&S, pt will benefit from speech therapy     CARDIAC:  EKG with NSR LVH, ST depressions in multiple leads  bigeminy and PVCx on tele  #NSTEMI:  #elevated troponin   NSR on tele  -loaded with ASA and plavix in the ER, as per neuro, would hold hep gtt for now given concern for hemorrhagic conversion of the possible stroke   - c/w lipitor 80, ASA 81, and plavix 75  - was on nitro gtt previously to reduce systolic BP from 180 to 140 goal systolic, now off nitro gtt since 10/28  -TTE 10/27 showed mildly decreased LVSF w/ EF of 45 to 50 %, with regional wall motion abnormalities. Basal and mid anterior wall, basal and mid anterolateral wall, basal and mid inferolateral wall, and basal inferior segment are abnormal.  -Troponin 104 -> 148 -> 190 -> 236 -> 312 -> 298, d/c trending   - would defer cardiac cath in setting of possible acute stroke for now      #HTN Emergency:  - was on nitro gtt to maintain SBPs 140-180s, now off nitro gtt since 10/28  - on hydralazine PRN   - d/c lasix 80 BID given uptrending Cr   - Increase Coreg to12.5 BID   - maintain new SBP goal of 140-160    #HLD:  - c/w lipitor 80  - lipid profile showed LDL 87     GI:  # Diet   - passed bedside dysphagia screening  - started on soft and bite  sized diet with mildly thick liquids per S&S eval       #PE revealed soft, mildly distended abdomen  - Today's PE revealed non-distended abdomen   - CTM       RENAL:   #NOVA   - Cr started to downtrend 1.19 -> 1.33 -> 1.47 -> 1.73->1.87-> 1.59 , likely due to hypertensive emergency   - Strict Is and Os   - Indwelling catheter in place   - avoid nephrotoxins   - hold lasix 80 BID   - Quick d/c, pt passed TOV last night   - UOP is 1.5 L for past 24 hrs and Net Negative 530 ml   - CTM     ID:  #Leukocytosis  #Aspiration PNA?  - spiked fever overnight   - BCx - NG at 24 hrs  - MRSA swab - neg  - Full RVP neg   - d/c vanc given neg MRSA swab   - c/w zosyn (10/27-10/31) to finish 5 days course       ENDO:  #DM  -  HGBA1c 10/28 - 9.4   - started lantus 4 and premeal 2 TID given rise in glucose   - CTM     VTE PPX  - HSQ Patient is a 69 year old Lao speaking male with PMH of HTN, HLD, DM, presents with Acute Pulmonary Edema in the setting of HTN emergency with acute stroke.    PLAN:  NEURO:   #Code stroke in the ER  -patient with aphasia and gazing to left side  -CTA: No hemorrhage, no intracranial lesions  -Rt Carotid: 50% stenosis  -Lt Carotid: 90% stenosis  -vertebral circulation: left subclavian artery with segmental occulsion betweeen the throacic aorta and the left vertebral origin  -Anterior intracranial circulation: atherosclerosis, moderate on rt and severe on left  -posterior circulation: patent rt basilar and posterior arteries, left vertebral with reveraold of flow from the basilar to its PICA  -will continue with neuro checks Q4  - A1C 9.4   - lipid profile - LDL 87  - neuro following - recommended MRI brain (see results below), TTE (performed, see results below), Loop Recorder prior to discharge, Vertebral artery doppler to r/o subclavian steal i.s.o segmental subclavian artery occlusion and consult vascular if suggestive of flow reversal   -per neuro - no contraindication for heparin  - MRI of Brain 10/29 showed Acute infarcts in the high left MCA watershed distribution. Irregular flow-void in the cavernous segment of the left ICA. Chronic ischemic changes.  -loaded with asa and plavix in ER per neuro  - c/w ASA, plavix and statin   - neuro following, appreciate recs   - Vertebral artery doppler/VA duplex carotids showed 16-49% stenosis in proximial right ICA. 80-99% stenosis in proximal left ICA. Moderate, heterogenous atherosclerotic plaque in proximal left ECA. VERT, retrograde dopper flow.  - Neurosurgery consulted, appreciate recs - recommended MRA head/neck w/wo contrast and may consider starting heparin gtt after reviewing MRA   - per S&S, pt can be started on soft and bite sized diet with mildly thick liquids, will benefit from speech therapy   - PT recommends subacute Rehab   - PT- pending final discharge recs  - pending MRA head/neck w/wo contrast   - New BP goal is 140-160    PULMONARY:  # flash pulmonary edema  - CXR in ED showed discrete right perihilar opacities with bilateral effusions   - s/p lasix 80 in ED   - CXR from 10/28 AM showed Mild resolution of the bilateral pulmonary opacities likely pulmonary edema. Probable small left effusion  - 10/28 - Weaned down from BIPAP to 3L of NC  - pending read for repeat CXR in AM   - 10/30 - weaned from 3L of NC to RA, currently saturating well   - d/c lasix 80 BID given pt saturating well on RA and uptrending Cr  - CTM    #Concern for Aspiration   - patient with cough after medications were given  - pt passed bedside dysphagia screening   - started on soft and bite  sized diet with mildly thick liquids per S&S eval   - per S&S, pt will benefit from speech therapy     CARDIAC:  EKG with NSR LVH, ST depressions in multiple leads  bigeminy and PVCx on tele  #NSTEMI:  #elevated troponin   NSR on tele  -loaded with ASA and plavix in the ER, as per neuro, would hold hep gtt for now given concern for hemorrhagic conversion of the possible stroke   - c/w lipitor 80, ASA 81, and plavix 75  - was on nitro gtt previously to reduce systolic BP from 180 to 140 goal systolic, now off nitro gtt since 10/28  -TTE 10/27 showed mildly decreased LVSF w/ EF of 45 to 50 %, with regional wall motion abnormalities. Basal and mid anterior wall, basal and mid anterolateral wall, basal and mid inferolateral wall, and basal inferior segment are abnormal.  -Troponin 104 -> 148 -> 190 -> 236 -> 312 -> 298, d/c trending   - would defer cardiac cath in setting of possible acute stroke for now      #HTN Emergency:  - was on nitro gtt to maintain SBPs 140-180s, now off nitro gtt since 10/28  - on hydralazine PRN   - d/c lasix 80 BID given uptrending Cr   - c/w Coreg to12.5 BID   - maintain new SBP goal of 140-160    #HLD:  - c/w lipitor 80  - lipid profile showed LDL 87     GI:  # Diet   - passed bedside dysphagia screening  - started on soft and bite  sized diet with mildly thick liquids per S&S eval       #PE revealed soft, mildly distended abdomen  - Today's PE revealed non-distended abdomen   - CTM       RENAL:   #NOVA   - Cr started to downtrend 1.19 -> 1.33 -> 1.47 -> 1.73->1.87-> 1.59 , likely due to hypertensive emergency   - Strict Is and Os   - Indwelling catheter in place   - avoid nephrotoxins   - hold lasix 80 BID   - Quick d/c, pt passed TOV last night   - UOP is 1.5 L for past 24 hrs and Net Negative 530 ml   - CTM     ID:  #Leukocytosis  #Aspiration PNA?  - spiked fever overnight   - BCx - NG at 24 hrs  - MRSA swab - neg  - Full RVP neg   - d/c vanc given neg MRSA swab   - c/w zosyn (10/27-10/31) to finish 5 days course       ENDO:  #DM  -  HGBA1c 10/28 - 9.4   - started lantus 4 and premeal 2 TID given rise in glucose   - CTM     VTE PPX  - HSQ

## 2024-11-01 LAB
ALBUMIN SERPL ELPH-MCNC: 3.4 G/DL — SIGNIFICANT CHANGE UP (ref 3.3–5)
ALP SERPL-CCNC: 58 U/L — SIGNIFICANT CHANGE UP (ref 40–120)
ALT FLD-CCNC: 48 U/L — HIGH (ref 4–41)
ANION GAP SERPL CALC-SCNC: 14 MMOL/L — SIGNIFICANT CHANGE UP (ref 7–14)
APTT BLD: 41.6 SEC — HIGH (ref 24.5–35.6)
APTT BLD: 85.7 SEC — HIGH (ref 24.5–35.6)
AST SERPL-CCNC: 37 U/L — SIGNIFICANT CHANGE UP (ref 4–40)
BILIRUB SERPL-MCNC: 0.4 MG/DL — SIGNIFICANT CHANGE UP (ref 0.2–1.2)
BUN SERPL-MCNC: 36 MG/DL — HIGH (ref 7–23)
CALCIUM SERPL-MCNC: 9.3 MG/DL — SIGNIFICANT CHANGE UP (ref 8.4–10.5)
CHLORIDE SERPL-SCNC: 103 MMOL/L — SIGNIFICANT CHANGE UP (ref 98–107)
CO2 SERPL-SCNC: 22 MMOL/L — SIGNIFICANT CHANGE UP (ref 22–31)
CREAT SERPL-MCNC: 1.39 MG/DL — HIGH (ref 0.5–1.3)
CULTURE RESULTS: SIGNIFICANT CHANGE UP
CULTURE RESULTS: SIGNIFICANT CHANGE UP
EGFR: 55 ML/MIN/1.73M2 — LOW
GLUCOSE BLDC GLUCOMTR-MCNC: 242 MG/DL — HIGH (ref 70–99)
GLUCOSE BLDC GLUCOMTR-MCNC: 262 MG/DL — HIGH (ref 70–99)
GLUCOSE BLDC GLUCOMTR-MCNC: 270 MG/DL — HIGH (ref 70–99)
GLUCOSE BLDC GLUCOMTR-MCNC: 306 MG/DL — HIGH (ref 70–99)
GLUCOSE SERPL-MCNC: 276 MG/DL — HIGH (ref 70–99)
HCT VFR BLD CALC: 40.6 % — SIGNIFICANT CHANGE UP (ref 39–50)
HGB BLD-MCNC: 13.3 G/DL — SIGNIFICANT CHANGE UP (ref 13–17)
MAGNESIUM SERPL-MCNC: 2.3 MG/DL — SIGNIFICANT CHANGE UP (ref 1.6–2.6)
MCHC RBC-ENTMCNC: 28.5 PG — SIGNIFICANT CHANGE UP (ref 27–34)
MCHC RBC-ENTMCNC: 32.8 G/DL — SIGNIFICANT CHANGE UP (ref 32–36)
MCV RBC AUTO: 87.1 FL — SIGNIFICANT CHANGE UP (ref 80–100)
NRBC # BLD: 0 /100 WBCS — SIGNIFICANT CHANGE UP (ref 0–0)
NRBC # FLD: 0 K/UL — SIGNIFICANT CHANGE UP (ref 0–0)
PHOSPHATE SERPL-MCNC: 3.7 MG/DL — SIGNIFICANT CHANGE UP (ref 2.5–4.5)
PLATELET # BLD AUTO: 490 K/UL — HIGH (ref 150–400)
POTASSIUM SERPL-MCNC: 4 MMOL/L — SIGNIFICANT CHANGE UP (ref 3.5–5.3)
POTASSIUM SERPL-SCNC: 4 MMOL/L — SIGNIFICANT CHANGE UP (ref 3.5–5.3)
PROT SERPL-MCNC: 7.2 G/DL — SIGNIFICANT CHANGE UP (ref 6–8.3)
RBC # BLD: 4.66 M/UL — SIGNIFICANT CHANGE UP (ref 4.2–5.8)
RBC # FLD: 12.8 % — SIGNIFICANT CHANGE UP (ref 10.3–14.5)
SODIUM SERPL-SCNC: 139 MMOL/L — SIGNIFICANT CHANGE UP (ref 135–145)
SPECIMEN SOURCE: SIGNIFICANT CHANGE UP
SPECIMEN SOURCE: SIGNIFICANT CHANGE UP
WBC # BLD: 10.4 K/UL — SIGNIFICANT CHANGE UP (ref 3.8–10.5)
WBC # FLD AUTO: 10.4 K/UL — SIGNIFICANT CHANGE UP (ref 3.8–10.5)

## 2024-11-01 PROCEDURE — 99233 SBSQ HOSP IP/OBS HIGH 50: CPT

## 2024-11-01 PROCEDURE — 70450 CT HEAD/BRAIN W/O DYE: CPT | Mod: 26

## 2024-11-01 PROCEDURE — 99291 CRITICAL CARE FIRST HOUR: CPT

## 2024-11-01 RX ORDER — HEPARIN SODIUM 10000 [USP'U]/ML
600 INJECTION INTRAVENOUS; SUBCUTANEOUS
Qty: 25000 | Refills: 0 | Status: DISCONTINUED | OUTPATIENT
Start: 2024-11-01 | End: 2024-11-03

## 2024-11-01 RX ORDER — INSULIN LISPRO 100/ML
6 VIAL (ML) SUBCUTANEOUS
Refills: 0 | Status: DISCONTINUED | OUTPATIENT
Start: 2024-11-01 | End: 2024-11-04

## 2024-11-01 RX ORDER — INSULIN LISPRO 100/ML
VIAL (ML) SUBCUTANEOUS AT BEDTIME
Refills: 0 | Status: DISCONTINUED | OUTPATIENT
Start: 2024-11-01 | End: 2024-11-05

## 2024-11-01 RX ORDER — INSULIN GLARGINE,HUM.REC.ANLOG 100/ML
8 VIAL (ML) SUBCUTANEOUS AT BEDTIME
Refills: 0 | Status: DISCONTINUED | OUTPATIENT
Start: 2024-11-01 | End: 2024-11-01

## 2024-11-01 RX ORDER — INSULIN GLARGINE,HUM.REC.ANLOG 100/ML
10 VIAL (ML) SUBCUTANEOUS AT BEDTIME
Refills: 0 | Status: DISCONTINUED | OUTPATIENT
Start: 2024-11-01 | End: 2024-11-04

## 2024-11-01 RX ORDER — INSULIN LISPRO 100/ML
4 VIAL (ML) SUBCUTANEOUS
Refills: 0 | Status: DISCONTINUED | OUTPATIENT
Start: 2024-11-01 | End: 2024-11-01

## 2024-11-01 RX ORDER — INSULIN LISPRO 100/ML
VIAL (ML) SUBCUTANEOUS
Refills: 0 | Status: DISCONTINUED | OUTPATIENT
Start: 2024-11-01 | End: 2024-11-04

## 2024-11-01 RX ORDER — HEPARIN SODIUM 10000 [USP'U]/ML
600 INJECTION INTRAVENOUS; SUBCUTANEOUS
Qty: 25000 | Refills: 0 | Status: DISCONTINUED | OUTPATIENT
Start: 2024-11-01 | End: 2024-11-01

## 2024-11-01 RX ADMIN — Medication 2: at 22:18

## 2024-11-01 RX ADMIN — Medication 2: at 17:31

## 2024-11-01 RX ADMIN — IPRATROPIUM BROMIDE AND ALBUTEROL SULFATE 3 MILLILITER(S): .5; 2.5 SOLUTION RESPIRATORY (INHALATION) at 21:41

## 2024-11-01 RX ADMIN — Medication 4 UNIT(S): at 07:47

## 2024-11-01 RX ADMIN — HEPARIN SODIUM 6 UNIT(S)/HR: 10000 INJECTION INTRAVENOUS; SUBCUTANEOUS at 21:38

## 2024-11-01 RX ADMIN — Medication 81 MILLIGRAM(S): at 11:12

## 2024-11-01 RX ADMIN — Medication 6 UNIT(S): at 17:34

## 2024-11-01 RX ADMIN — CARVEDILOL 12.5 MILLIGRAM(S): 25 TABLET, FILM COATED ORAL at 17:30

## 2024-11-01 RX ADMIN — CHLORHEXIDINE GLUCONATE 1 APPLICATION(S): 40 SOLUTION TOPICAL at 12:00

## 2024-11-01 RX ADMIN — Medication 10 UNIT(S): at 21:47

## 2024-11-01 RX ADMIN — IPRATROPIUM BROMIDE AND ALBUTEROL SULFATE 3 MILLILITER(S): .5; 2.5 SOLUTION RESPIRATORY (INHALATION) at 15:53

## 2024-11-01 RX ADMIN — Medication 3: at 07:45

## 2024-11-01 RX ADMIN — Medication 4 UNIT(S): at 11:34

## 2024-11-01 RX ADMIN — Medication 3: at 11:32

## 2024-11-01 RX ADMIN — HEPARIN SODIUM 6 UNIT(S)/HR: 10000 INJECTION INTRAVENOUS; SUBCUTANEOUS at 16:13

## 2024-11-01 RX ADMIN — HEPARIN SODIUM 8 UNIT(S)/HR: 10000 INJECTION INTRAVENOUS; SUBCUTANEOUS at 23:52

## 2024-11-01 RX ADMIN — CLOPIDOGREL 75 MILLIGRAM(S): 75 TABLET ORAL at 11:12

## 2024-11-01 RX ADMIN — IPRATROPIUM BROMIDE AND ALBUTEROL SULFATE 3 MILLILITER(S): .5; 2.5 SOLUTION RESPIRATORY (INHALATION) at 03:36

## 2024-11-01 RX ADMIN — CARVEDILOL 12.5 MILLIGRAM(S): 25 TABLET, FILM COATED ORAL at 06:47

## 2024-11-01 RX ADMIN — Medication 80 MILLIGRAM(S): at 21:47

## 2024-11-01 NOTE — PROGRESS NOTE ADULT - ASSESSMENT
69 RH Wol[place] speaking male PMHx COPD, HTN, DM presented for hypoxic respiratory distress due to COPD exacerbation vs cardiogenic etiology. In ED, on BiPAP, given lasix, and started nitro drip due to /110. Code stroke called for right side weakness first noticed by family this AM, LKN yesterday evening. Initial VS in ED: /110 prior to nitroglycerin drip, during neurology exam /106. Exam: On BiPAP, AAOx1, following simple commands with occasional confusion, left gaze preference, mild R facial droop, right upper and lower extremity drift, no sensory changes. CTA demonstrated diffuse intracranial and extracranial atherosclerosis, including notably 90% maximal stenosis in carotid bulb and focal occlusion in proximal left vertebral artery. CTA also noted for segmental occlusion of left subclavian artery between thoracic aorta and left vertebral origin. Patient loaded with Plavix 600mg and ASA 325mg, on Plavix 75mg and ASA 81mg. Right Carotid Doppler 50% and left Carotid Dopplers 90%. MRI brain showed high left MCA infarct.

## 2024-11-01 NOTE — PROGRESS NOTE ADULT - ATTENDING COMMENTS
Patient seen and examined on AM stroke rounds with  Dr. Riley.     Family at the bedside.  Offered language line , but family preference for own interpretation.    Discussed findings on MRI brain.     Patient endorsing continued R arm plegia, and family reporting he is having word finding difficulties.      No new neuro complaints, including headache, or visual difficulty.  Denies chest pain, or shortness of breath.     On exam:  GEN: NAD  CV: RRR, s1, S2  PULM: CTAB  HEENT; no nuchal rigidity, no temporal tenderness.   NEURO:   Awake, alert, disoriented to month, and year, could not name the hospital, able to name most objects shown, but not all, had difficulty with repetition.  Impaired fluency.  Followed simple commands.   Pupils 3-2mm, symmetric, not fully participating in formal visual fields testing, diminished blink to threat on the R, full EOM, conjugate gaze, R sided facial weakness, trace, no dysarthria, tongue midline, palate symmetric.   MOTOR: diminished tone in the R arm, flaccid, plegic, no response to noxious stimulus, R leg moving in plane of the bed, 2/5 strength.  L arm and leg are 5/5 to confrontation throughout  COORDINATION: limited by plegia on the R, normal coordination on the L.   REFLEXES: 2+ patellar, 1+ achilles, L toe is down, R toe is mute.     MRI brain images reviewed; diffusion restriction in the L frontal cortex and white matter, follows possible watershed pattern MARY/MCA cortical, and deep watershed zones.    CTA H&n images reviewed: L carotid 90% stenosis.      AP: 69 year old man with DM, HTN, COPD, presenting initially with shortness of breath, pulmonary edema in setting of NSTEMI, with acute onset R sided weakness on initial treatment in the ED.  On exam now, moderate mixed aphasia, R arm plegia, and R leg severe paresis.  The imaging shows watershed distribution of strokes in the L hemisphere, and L carotid artery that is 90% stenosed.    -continue on dual antiplatelet therapy  -continue on high intensity statin  -neurosurgery evaluation and recommendations regarding revascularization are appreciated  -from neurology perspective, heparin not contraindicated.  If AC is started, would avoid a bolus, and also discontinue one antiplatelet agent.   -PT/OT after acute phase of the stroke  -neurology to follow.  Please page or call with any acute neuro changes, or other issues we can help address.
.Left MCA territory infarction with symptomatic left ICA severe stenosis, recommend revascularization once medically optimized  From neurological standpoint and review of neuroimaging with neuroradiology it appears left ICA has severe stenosis therefore continue antiplatelet therapy high-dose statins and neurosurgical follow-up for revascularization.

## 2024-11-01 NOTE — PROGRESS NOTE ADULT - SUBJECTIVE AND OBJECTIVE BOX
Neurology - Progress Note    -  Spectra: [phone] ([facility]), [phone] ([facility])  -  Interval History: no acute events.           Allergies:  No Known Allergies      PMHx/PSHx/Family Hx: As above, otherwise see below   Diabetes mellitus    HTN (hypertension)    HLD (hyperlipidemia)    Hyperlipidemia        Social Hx:  No current use of tobacco, alcohol, or illicit drugs      Medications:  MEDICATIONS  (STANDING):  albuterol    90 MICROgram(s) HFA Inhaler 2 Puff(s) Inhalation two times a day  albuterol/ipratropium for Nebulization 3 milliLiter(s) Nebulizer every 6 hours  aspirin  chewable 81 milliGRAM(s) Oral daily  atorvastatin 80 milliGRAM(s) Oral at bedtime  carvedilol 12.5 milliGRAM(s) Oral every 12 hours  chlorhexidine 2% Cloths 1 Application(s) Topical daily  clopidogrel Tablet 75 milliGRAM(s) Oral daily  dextrose 5%. 1000 milliLiter(s) (100 mL/Hr) IV Continuous <Continuous>  dextrose 5%. 1000 milliLiter(s) (50 mL/Hr) IV Continuous <Continuous>  dextrose 50% Injectable 25 Gram(s) IV Push once  dextrose 50% Injectable 12.5 Gram(s) IV Push once  glucagon  Injectable 1 milliGRAM(s) IntraMuscular once  insulin glargine Injectable (LANTUS) 8 Unit(s) SubCutaneous at bedtime  insulin lispro (ADMELOG) corrective regimen sliding scale   SubCutaneous three times a day before meals  insulin lispro Injectable (ADMELOG) 4 Unit(s) SubCutaneous three times a day before meals    MEDICATIONS  (PRN):  acetaminophen     Tablet .. 650 milliGRAM(s) Oral every 6 hours PRN Temp greater or equal to 38C (100.4F), Moderate Pain (4 - 6), Severe Pain (7 - 10)  dextrose Oral Gel 15 Gram(s) Oral once PRN Blood Glucose LESS THAN 70 milliGRAM(s)/deciliter  hydrALAZINE Injectable 5 milliGRAM(s) IV Push every 6 hours PRN HTN      Vitals:  T(C): 36.7 (11-01-24 @ 08:36), Max: 37.1 (10-31-24 @ 20:00)  HR: 70 (11-01-24 @ 12:01) (70 - 86)  BP: 158/62 (11-01-24 @ 12:01) (93/60 - 163/77)  RR: 23 (11-01-24 @ 12:01) (13 - 29)  SpO2: 95% (11-01-24 @ 12:01) (89% - 100%)    Physical Examination:   General - NAD    Neurologic Exam:  Mental status - Awake, Alert, Speech mildly dysarthric    Cranial nerves - Decreased BTT on right, EOMI, R NLF    Motor -Strength testing  Right arm 0/5, no movement  left arm moving without drift      left leg able to lift antigravity without drift   Right leg 2/5, able to move within plane of bed              Coordination -  No tremors appreciated    Gait and station - Deffered due to fall safety risk     Labs:                        13.3   10.40 )-----------( 490      ( 01 Nov 2024 04:30 )             40.6     11-01    139  |  103  |  36[H]  ----------------------------<  276[H]  4.0   |  22  |  1.39[H]    Ca    9.3      01 Nov 2024 04:30  Phos  3.7     11-01  Mg     2.30     11-01    TPro  7.2  /  Alb  3.4  /  TBili  0.4  /  DBili  x   /  AST  37  /  ALT  48[H]  /  AlkPhos  58  11-01    CAPILLARY BLOOD GLUCOSE      POCT Blood Glucose.: 270 mg/dL (01 Nov 2024 11:25)    LIVER FUNCTIONS - ( 01 Nov 2024 04:30 )  Alb: 3.4 g/dL / Pro: 7.2 g/dL / ALK PHOS: 58 U/L / ALT: 48 U/L / AST: 37 U/L / GGT: x             PT/INR - ( 31 Oct 2024 15:00 )   PT: 12.3 sec;   INR: 1.06 ratio         PTT - ( 01 Nov 2024 04:30 )  PTT:85.7 sec        Radiology:  < from: CT Head No Cont (11.01.24 @ 10:39) >  Impression: Acute infarcts again seen as described above.    < from: MR Angio Neck w/wo IV Cont (10.31.24 @ 13:34) >  MRA BRAIN:  Apparent moderate stenosis of the distal left precavernous ICA and   moderate to severe stenosis of the distal left cavernous ICA. Diminished   flow related signal within the supraclinoid left ICA compared to the right    Somewhat diminished flow related signal within the left M1 segment with   normal flow-related enhancement of the more distal left MCA.    The left intradural vertebral demonstrates poor flow related enhancement   proximally, but better flow related enhancement distal to the origin of   the left PICA.    No vascular aneurysm.    MRA NECK:  Approximately 70% short segment stenosis of the origin of the right   internal carotid artery. Mildly diminished flow related signal and   enhancement of the more distal vessel. Please note on CTA neck   10/27/2024, the vessel appears nearly occluded atits origin.    Lack of flow related enhancement of the left subclavian artery beginning   just distal to its origin. The vessel reconstitutes and demonstrates   normal flow-related enhancement more distally.

## 2024-11-01 NOTE — CHART NOTE - NSCHARTNOTEFT_GEN_A_CORE
CAse d/w Dr. Ulrich  Plan for stenting if stenosis persist despite heparin  Continue with heparin for now  Repeat CTA on Tuesday. If persistent stenosis will plan for stent at [facility] On wednesday  d/w CCU team, will reach out to family to update

## 2024-11-01 NOTE — PROGRESS NOTE ADULT - PROBLEM SELECTOR PLAN 1
- PTT goals and AC/AP management per neurology.   - CTH at pTT of 85.7 is stable, plan to repeat CTA H/N in 6-9 days.  - STAT CTH if any acute neurologic decline    Thank you    Neurosurgery, ext [phone] - PTT goals and AC/AP management per neurology.   - CTH at pTT of 85.7 is stable, plan to repeat CTA H/N in 6-9 days.  - STAT CTH if any acute neurologic decline    case to be discussed and reviewed with Dr. Collins     Neurosurgery, ext [phone] - PTT goal 60-70  - CTH at pTT of 85.7 is stable, plan to repeat CTA H/N in 6-9 days.  - STAT CTH if any acute neurologic decline    case to be discussed and reviewed with Dr. Collins     Neurosurgery, ext [phone]

## 2024-11-01 NOTE — PROGRESS NOTE ADULT - ASSESSMENT
69 RH Irish speaking male PMHx COPD, HTN, DM admitted for NSTEMI. Patient also with worsening right sided weakness found to have Left MCA acute stroke. CTA shows severe stenosis of L ICA.     Impression: Right hemiparesis, mixed aphasia with dysarthria and Right visual field cut due to left MCA acute ischemic stroke. Mechanism likely JAVAD    Recommendation:  [] Appreciate neurosurgery input for intervention on Left ICA CEA vs Stent  [] ANTITHROMBOTIC THERAPY: on ASA 81 mg and Plavix 75 mg   [] Loop recorder prior to discharge  [] Telemonitoring  [] Q4 Neurochecks   [x] MRI  [x] Vessel imaging  [x] TTE                   [x] LDL: 87, HbA1c:9.4  [x] Atorvastatin 80 mg HS Titrate LDL < 70  [x]  Physical therapy/OT/Speech Language: TESSIE    SBP goal: -180  Other:    Case & Plan discussed with patient.  Plan discussed with primary team.        CORE MEASURES:         Admission NIHSS: 7     Tenecteplase: [] YES [x] NO     Statin Therapy: [x] YES [] NO     Smoking [] YES [x] NO     Afib [] YES [x] NO     Stroke Education [x] YES [] NO    Dysphagia screen : [x] Passed, Soft bite sized    DVT ppx: Chemical DVT ppx and SCDs   69 RH Slovak speaking male PMHx COPD, HTN, DM admitted for NSTEMI. Patient also with worsening right sided weakness found to have Left MCA acute stroke. CTA shows severe stenosis of L ICA.     Impression: Right hemiparesis, mixed aphasia with dysarthria and Right visual field cut due to left MCA acute ischemic stroke. Mechanism likely JAVAD    Recommendation:  [] Appreciate neurosurgery input for early revascularization on Left ICA when medically stable   [] ANTITHROMBOTIC THERAPY: currently on hep drip, PTT goal 40 -60   [] Loop recorder prior to discharge  [] Telemonitoring  [] Q4 Neurochecks   [x] MRI  [x] Vessel imaging  [x] TTE                   [x] LDL: 87, HbA1c:9.4  [x] Atorvastatin 80 mg HS Titrate LDL < 70  [x]  Physical therapy/OT/Speech Language: TESSIE    SBP goal: -180  Other:    Case & Plan discussed with patient.  Plan discussed with primary team.        CORE MEASURES:         Admission NIHSS: 7     Tenecteplase: [] YES [x] NO     Statin Therapy: [x] YES [] NO     Smoking [] YES [x] NO     Afib [] YES [x] NO     Stroke Education [x] YES [] NO    Dysphagia screen : [x] Passed, Soft bite sized    DVT ppx: Chemical DVT ppx and SCDs   69 RH Indonesian speaking male PMHx COPD, HTN, DM admitted for NSTEMI. Patient also with worsening right sided weakness found to have Left MCA acute stroke. CTA shows severe stenosis of L ICA.     Impression: Right hemiparesis, mixed aphasia with dysarthria and Right visual field cut due to left MCA acute ischemic stroke. Mechanism likely JAVAD    Recommendation:  [] Appreciate neurosurgery input for early revascularization on Left ICA when medically stable   [] ANTITHROMBOTIC THERAPY: Continue ASA 81 mg and plavix 75 mg daily   [] Discussed vessel imaging with Dr. Cortez, ICA appears to look like stenosis and not thrombus  [] Loop recorder prior to discharge  [] Telemonitoring  [] Q4 Neurochecks   [x] MRI  [x] TTE                   [x] LDL: 87, HbA1c:9.4  [x] Atorvastatin 80 mg HS Titrate LDL < 70  [x]  Physical therapy/OT/Speech Language: TESSIE    SBP goal: -180  Other:    Case & Plan discussed with patient.  Plan discussed with primary team.        CORE MEASURES:         Admission NIHSS: 7     Tenecteplase: [] YES [x] NO     Statin Therapy: [x] YES [] NO     Smoking [] YES [x] NO     Afib [] YES [x] NO     Stroke Education [x] YES [] NO    Dysphagia screen : [x] Passed, Soft bite sized    DVT ppx: Chemical DVT ppx and SCDs

## 2024-11-01 NOTE — PROGRESS NOTE ADULT - SUBJECTIVE AND OBJECTIVE BOX
PAST 24HR EVENTS: repeat CTH revealed no hemorrhagic transformation. Exam stable. pTT 85.7    Vital Signs Last 24 Hrs  T(C): 36.7 (01 Nov 2024 08:36), Max: 37.1 (31 Oct 2024 20:00)  T(F): 98.1 (01 Nov 2024 08:36), Max: 98.7 (31 Oct 2024 20:00)  HR: 74 (01 Nov 2024 10:52) (70 - 86)  BP: 148/64 (01 Nov 2024 10:00) (93/60 - 163/77)  BP(mean): 90 (01 Nov 2024 10:00) (65 - 107)  RR: 19 (01 Nov 2024 10:00) (13 - 25)  SpO2: 99% (01 Nov 2024 10:52) (89% - 100%)    Parameters below as of 01 Nov 2024 09:00  Patient On (Oxygen Delivery Method): room air        MEDS:   acetaminophen     Tablet .. 650 milliGRAM(s) Oral every 6 hours PRN  albuterol    90 MICROgram(s) HFA Inhaler 2 Puff(s) Inhalation two times a day  albuterol/ipratropium for Nebulization 3 milliLiter(s) Nebulizer every 6 hours  aspirin  chewable 81 milliGRAM(s) Oral daily  atorvastatin 80 milliGRAM(s) Oral at bedtime  carvedilol 12.5 milliGRAM(s) Oral every 12 hours  chlorhexidine 2% Cloths 1 Application(s) Topical daily  clopidogrel Tablet 75 milliGRAM(s) Oral daily  dextrose 5%. 1000 milliLiter(s) IV Continuous <Continuous>  dextrose 5%. 1000 milliLiter(s) IV Continuous <Continuous>  dextrose 50% Injectable 25 Gram(s) IV Push once  dextrose 50% Injectable 12.5 Gram(s) IV Push once  dextrose Oral Gel 15 Gram(s) Oral once PRN  glucagon  Injectable 1 milliGRAM(s) IntraMuscular once  hydrALAZINE Injectable 5 milliGRAM(s) IV Push every 6 hours PRN  insulin glargine Injectable (LANTUS) 8 Unit(s) SubCutaneous at bedtime  insulin lispro (ADMELOG) corrective regimen sliding scale   SubCutaneous three times a day before meals  insulin lispro Injectable (ADMELOG) 4 Unit(s) SubCutaneous three times a day before meals      LABS:                        13.3   10.40 )-----------( 490      ( 01 Nov 2024 04:30 )             40.6     11-01    139  |  103  |  36[H]  ----------------------------<  276[H]  4.0   |  22  |  1.39[H]    Ca    9.3      01 Nov 2024 04:30  Phos  3.7     11-01  Mg     2.30     11-01    TPro  7.2  /  Alb  3.4  /  TBili  0.4  /  DBili  x   /  AST  37  /  ALT  48[H]  /  AlkPhos  58  11-01    PT/INR - ( 31 Oct 2024 15:00 )   PT: 12.3 sec;   INR: 1.06 ratio         PTT - ( 01 Nov 2024 04:30 )  PTT:85.7 sec    RADIOLOGY:     PHYSICAL EXAM:  Awake Alert Oriented x 3 No distress, Speech is clear Hungarian speaking  PERRL, EOMI, Tongue midline, mild lower facial  Motor:             RUE 0/5        LUE 5/5             RLE 4-/5         LLE 5/5  Sensory inac to light touch  No dysmetria  No drift   PAST 24HR EVENTS: repeat CTH revealed no hemorrhagic transformation. Exam stable. pTT 85.7    Vital Signs Last 24 Hrs  T(C): 36.7 (01 Nov 2024 08:36), Max: 37.1 (31 Oct 2024 20:00)  T(F): 98.1 (01 Nov 2024 08:36), Max: 98.7 (31 Oct 2024 20:00)  HR: 74 (01 Nov 2024 10:52) (70 - 86)  BP: 148/64 (01 Nov 2024 10:00) (93/60 - 163/77)  BP(mean): 90 (01 Nov 2024 10:00) (65 - 107)  RR: 19 (01 Nov 2024 10:00) (13 - 25)  SpO2: 99% (01 Nov 2024 10:52) (89% - 100%)    Parameters below as of 01 Nov 2024 09:00  Patient On (Oxygen Delivery Method): room air        MEDS:   acetaminophen     Tablet .. 650 milliGRAM(s) Oral every 6 hours PRN  albuterol    90 MICROgram(s) HFA Inhaler 2 Puff(s) Inhalation two times a day  albuterol/ipratropium for Nebulization 3 milliLiter(s) Nebulizer every 6 hours  aspirin  chewable 81 milliGRAM(s) Oral daily  atorvastatin 80 milliGRAM(s) Oral at bedtime  carvedilol 12.5 milliGRAM(s) Oral every 12 hours  chlorhexidine 2% Cloths 1 Application(s) Topical daily  clopidogrel Tablet 75 milliGRAM(s) Oral daily  dextrose 5%. 1000 milliLiter(s) IV Continuous <Continuous>  dextrose 5%. 1000 milliLiter(s) IV Continuous <Continuous>  dextrose 50% Injectable 25 Gram(s) IV Push once  dextrose 50% Injectable 12.5 Gram(s) IV Push once  dextrose Oral Gel 15 Gram(s) Oral once PRN  glucagon  Injectable 1 milliGRAM(s) IntraMuscular once  hydrALAZINE Injectable 5 milliGRAM(s) IV Push every 6 hours PRN  insulin glargine Injectable (LANTUS) 8 Unit(s) SubCutaneous at bedtime  insulin lispro (ADMELOG) corrective regimen sliding scale   SubCutaneous three times a day before meals  insulin lispro Injectable (ADMELOG) 4 Unit(s) SubCutaneous three times a day before meals      LABS:                        13.3   10.40 )-----------( 490      ( 01 Nov 2024 04:30 )             40.6     11-01    139  |  103  |  36[H]  ----------------------------<  276[H]  4.0   |  22  |  1.39[H]    Ca    9.3      01 Nov 2024 04:30  Phos  3.7     11-01  Mg     2.30     11-01    TPro  7.2  /  Alb  3.4  /  TBili  0.4  /  DBili  x   /  AST  37  /  ALT  48[H]  /  AlkPhos  58  11-01    PT/INR - ( 31 Oct 2024 15:00 )   PT: 12.3 sec;   INR: 1.06 ratio         PTT - ( 01 Nov 2024 04:30 )  PTT:85.7 sec    RADIOLOGY:     PHYSICAL EXAM:  Awake Alert Oriented x 3 No distress, Speech is clear Croatian speaking  PERRL, EOMI, Tongue midline, mild lower facial  Motor:             RUE 0/5        LUE 5/5             RLE 4-/5         LLE 5/5  Sensory intact to light touch  No dysmetria  No drift

## 2024-11-01 NOTE — PROGRESS NOTE ADULT - CRITICAL CARE ATTENDING COMMENT
75 year old man with history of HTN who presented with hypertensive emergency and right sided weakness complicated with acute diastolic heart failure and flash pulmonary edema. Placed on BIPAP and transferred to CCU for further care. Started on Heparin gtt  PTT was~80s overnight- repeat heas CT showed evolving storke and petechial hemmorhages  neuro and neuro surgery aware    TTE 10/27/24:  CONCLUSIONS:   1. Left ventricular systolic function is mildly decreased with an ejection fraction visually estimated at 45 to 50 %. Regional wall motion abnormalities present.   2. Basal and mid anterior wall, basal and mid anterolateral wall, basal and mid inferolateral wall, and basal inferior segment are abnormal.   3. Left atrium is normal in size.   4. The right atrium is normal in size.   5. Normal right ventricular cavity size, with normal wall thickness, and probably normal right ventricular systolic function.  **Left Ventricle:  Left ventricular systolic function is mildly decreased with an ejection fraction visually estimated at 45 to 50%. There are regional wall motion abnormalities present.    Meds:  ASA  Atorvastatin  Plavix  SQ heparin  Coreg 6.25 mg BID  Insulin  Zosyn    #Neuro- Acute CVA and carotid disease  MRA pending  Neurosurgery input appreciated- astarted on heparin gtt yesterday  Neuro input appreciated  Followup head CT    #Pulm- BiPAP as needed  Improved    #CV- Hypertensive emergency with demand ischemia  No ac per neuro- will we reevaluate  Acute on chronic combined systolic and diastolic heart failure  Continue Coreg- will discuss with neuro- BP goals to initiate GDMT    #Renal- monitor Cr, NOVA improved today    #Endo- Sliding scale, follow FS    #DVT ppx- on heparin gtt

## 2024-11-01 NOTE — PROGRESS NOTE ADULT - SUBJECTIVE AND OBJECTIVE BOX
Patient is a 69y old  Male who presents with a chief complaint of concern for stroke (31 Oct 2024 07:33)    HPI:  Patient is a 75 year old Upper sorbian speaking male with PMH of HTN, HLD, DM, presents with shortness of breath over 3-4 days which acutely worsened today. Patient also initially complained of chest pain and noted with elevated BP to 190/110. Acute management in the ED included BiPAP, lasix. Patient was placed on nitroglycerin drip due to concern for possible ACS. Chest xray noted with bilateral pulmonary edema which appears cardiac in origin. Patient's respiratory status stabilized on BiPAP. While in the ED, patient's sons reported that patient also began to have right arm weakness first noticed this morning. During this time, patient also appeared slightly confused and required assistance to walk. Patient was last seen at his baseline yesterday evening at 2100 when he was AAOx3 and did not have difficulties using his right side. Code stroke was called for right sided weakness.   Upon stroke team assessment, patient is on BiPAP and does not appear in respiratory distress. BP at time of assessment is 130s systolic (nitroglycerin drip was previously discontinued). Patient denies history of stroke. Patient takes aspirin for cardioprotection. Ambulates independently at baseline, lives with family and requires some assistance with taking medications.     CCU consulted because patient requiring BIPAP for difficulty breathing and requiring nitro gtt for BP elevated to 220/110.   (27 Oct 2024 16:04)       INTERVAL HPI/OVERNIGHT EVENTS:   No overnight events   Afebrile, hemodynamically stable     Subjective:    ICU Vital Signs Last 24 Hrs  T(C): 37.1 (31 Oct 2024 20:00), Max: 37.1 (31 Oct 2024 20:00)  T(F): 98.7 (31 Oct 2024 20:00), Max: 98.7 (31 Oct 2024 20:00)  HR: 84 (01 Nov 2024 07:28) (69 - 86)  BP: 111/45 (01 Nov 2024 06:00) (93/60 - 163/77)  BP(mean): 65 (01 Nov 2024 06:00) (65 - 107)  ABP: --  ABP(mean): --  RR: 22 (01 Nov 2024 06:00) (16 - 29)  SpO2: 99% (01 Nov 2024 07:28) (89% - 100%)    O2 Parameters below as of 01 Nov 2024 03:58  Patient On (Oxygen Delivery Method): nasal cannula          I&O's Summary    31 Oct 2024 07:01  -  01 Nov 2024 07:00  --------------------------------------------------------  IN: 803.5 mL / OUT: 0 mL / NET: 803.5 mL          Daily     Daily     Adult Advanced Hemodynamics Last 24 Hrs  CVP(mm Hg): --  CVP(cm H2O): --  CO: --  CI: --  PA: --  PA(mean): --  PCWP: --  SVR: --  SVRI: --  PVR: --  PVRI: --    EKG/Telemetry Events:    MEDICATIONS  (STANDING):  albuterol    90 MICROgram(s) HFA Inhaler 2 Puff(s) Inhalation two times a day  albuterol/ipratropium for Nebulization 3 milliLiter(s) Nebulizer every 6 hours  aspirin  chewable 81 milliGRAM(s) Oral daily  atorvastatin 80 milliGRAM(s) Oral at bedtime  carvedilol 12.5 milliGRAM(s) Oral every 12 hours  chlorhexidine 2% Cloths 1 Application(s) Topical daily  clopidogrel Tablet 75 milliGRAM(s) Oral daily  dextrose 5%. 1000 milliLiter(s) (50 mL/Hr) IV Continuous <Continuous>  dextrose 5%. 1000 milliLiter(s) (100 mL/Hr) IV Continuous <Continuous>  dextrose 50% Injectable 25 Gram(s) IV Push once  dextrose 50% Injectable 12.5 Gram(s) IV Push once  glucagon  Injectable 1 milliGRAM(s) IntraMuscular once  insulin glargine Injectable (LANTUS) 8 Unit(s) SubCutaneous at bedtime  insulin lispro (ADMELOG) corrective regimen sliding scale   SubCutaneous three times a day before meals  insulin lispro Injectable (ADMELOG) 4 Unit(s) SubCutaneous three times a day before meals    MEDICATIONS  (PRN):  acetaminophen     Tablet .. 650 milliGRAM(s) Oral every 6 hours PRN Temp greater or equal to 38C (100.4F), Moderate Pain (4 - 6), Severe Pain (7 - 10)  dextrose Oral Gel 15 Gram(s) Oral once PRN Blood Glucose LESS THAN 70 milliGRAM(s)/deciliter  hydrALAZINE Injectable 5 milliGRAM(s) IV Push every 6 hours PRN HTN      PHYSICAL EXAM:  GENERAL:   HEAD:  Atraumatic, Normocephalic  EYES: EOMI, PERRLA, conjunctiva and sclera clear  NECK: Supple, No JVD, Normal thyroid, no enlarged nodes  NERVOUS SYSTEM:  Alert & Awake.   CHEST/LUNG: B/L good air entry; No rales, rhonchi, or wheezing  HEART: S1S2 normal, no S3, Regular rate and rhythm; No murmurs  ABDOMEN: Soft, Nontender, Nondistended; Bowel sounds present  EXTREMITIES:  2+ Peripheral Pulses, No clubbing, cyanosis, or edema  LYMPH: No lymphadenopathy noted  SKIN: No rashes or lesions    LABS:                        13.3   10.40 )-----------( 490      ( 01 Nov 2024 04:30 )             40.6     11-01    139  |  103  |  36[H]  ----------------------------<  276[H]  4.0   |  22  |  1.39[H]    Ca    9.3      01 Nov 2024 04:30  Phos  3.7     11-01  Mg     2.30     11-01    TPro  7.2  /  Alb  3.4  /  TBili  0.4  /  DBili  x   /  AST  37  /  ALT  48[H]  /  AlkPhos  58  11-01    LIVER FUNCTIONS - ( 01 Nov 2024 04:30 )  Alb: 3.4 g/dL / Pro: 7.2 g/dL / ALK PHOS: 58 U/L / ALT: 48 U/L / AST: 37 U/L / GGT: x           PT/INR - ( 31 Oct 2024 15:00 )   PT: 12.3 sec;   INR: 1.06 ratio         PTT - ( 01 Nov 2024 04:30 )  PTT:85.7 sec  CAPILLARY BLOOD GLUCOSE      POCT Blood Glucose.: 262 mg/dL (01 Nov 2024 07:41)  POCT Blood Glucose.: 274 mg/dL (31 Oct 2024 21:37)  POCT Blood Glucose.: 305 mg/dL (31 Oct 2024 16:58)  POCT Blood Glucose.: 326 mg/dL (31 Oct 2024 11:32)            Urinalysis Basic - ( 01 Nov 2024 04:30 )    Color: x / Appearance: x / SG: x / pH: x  Gluc: 276 mg/dL / Ketone: x  / Bili: x / Urobili: x   Blood: x / Protein: x / Nitrite: x   Leuk Esterase: x / RBC: x / WBC x   Sq Epi: x / Non Sq Epi: x / Bacteria: x          RADIOLOGY & ADDITIONAL TESTS:  CXR:        Care Discussed with Consultants/Other Providers [ x] YES  [ ] NO           Patient is a 69y old  Male who presents with a chief complaint of concern for stroke (31 Oct 2024 07:33)    HPI:  Patient is a 75 year old Croatian speaking male with PMH of HTN, HLD, DM, presents with shortness of breath over 3-4 days which acutely worsened today. Patient also initially complained of chest pain and noted with elevated BP to 190/110. Acute management in the ED included BiPAP, lasix. Patient was placed on nitroglycerin drip due to concern for possible ACS. Chest xray noted with bilateral pulmonary edema which appears cardiac in origin. Patient's respiratory status stabilized on BiPAP. While in the ED, patient's sons reported that patient also began to have right arm weakness first noticed this morning. During this time, patient also appeared slightly confused and required assistance to walk. Patient was last seen at his baseline yesterday evening at 2100 when he was AAOx3 and did not have difficulties using his right side. Code stroke was called for right sided weakness.   Upon stroke team assessment, patient is on BiPAP and does not appear in respiratory distress. BP at time of assessment is 130s systolic (nitroglycerin drip was previously discontinued). Patient denies history of stroke. Patient takes aspirin for cardioprotection. Ambulates independently at baseline, lives with family and requires some assistance with taking medications.     CCU consulted because patient requiring BIPAP for difficulty breathing and requiring nitro gtt for BP elevated to 220/110.   (27 Oct 2024 16:04)       INTERVAL HPI/OVERNIGHT EVENTS: Heparin gtt held overnight given CTH at 11 pm suspicious for trace petechial hemorrhage. Repeat CTH in AM showed no hemorrhagic transformation.     Subjective: Pt seen and examined at bedside. Pt is AOX3, reports no acute complaints including CP, SOB, n/v/abd pain.     ICU Vital Signs Last 24 Hrs  T(C): 37.1 (31 Oct 2024 20:00), Max: 37.1 (31 Oct 2024 20:00)  T(F): 98.7 (31 Oct 2024 20:00), Max: 98.7 (31 Oct 2024 20:00)  HR: 84 (01 Nov 2024 07:28) (69 - 86)  BP: 111/45 (01 Nov 2024 06:00) (93/60 - 163/77)  BP(mean): 65 (01 Nov 2024 06:00) (65 - 107)  ABP: --  ABP(mean): --  RR: 22 (01 Nov 2024 06:00) (16 - 29)  SpO2: 99% (01 Nov 2024 07:28) (89% - 100%)    O2 Parameters below as of 01 Nov 2024 03:58  Patient On (Oxygen Delivery Method): nasal cannula          I&O's Summary    31 Oct 2024 07:01  -  01 Nov 2024 07:00  --------------------------------------------------------  IN: 803.5 mL / OUT: 0 mL / NET: 803.5 mL          Daily     Daily     Adult Advanced Hemodynamics Last 24 Hrs  CVP(mm Hg): --  CVP(cm H2O): --  CO: --  CI: --  PA: --  PA(mean): --  PCWP: --  SVR: --  SVRI: --  PVR: --  PVRI: --    EKG/Telemetry Events:    MEDICATIONS  (STANDING):  albuterol    90 MICROgram(s) HFA Inhaler 2 Puff(s) Inhalation two times a day  albuterol/ipratropium for Nebulization 3 milliLiter(s) Nebulizer every 6 hours  aspirin  chewable 81 milliGRAM(s) Oral daily  atorvastatin 80 milliGRAM(s) Oral at bedtime  carvedilol 12.5 milliGRAM(s) Oral every 12 hours  chlorhexidine 2% Cloths 1 Application(s) Topical daily  clopidogrel Tablet 75 milliGRAM(s) Oral daily  dextrose 5%. 1000 milliLiter(s) (50 mL/Hr) IV Continuous <Continuous>  dextrose 5%. 1000 milliLiter(s) (100 mL/Hr) IV Continuous <Continuous>  dextrose 50% Injectable 25 Gram(s) IV Push once  dextrose 50% Injectable 12.5 Gram(s) IV Push once  glucagon  Injectable 1 milliGRAM(s) IntraMuscular once  insulin glargine Injectable (LANTUS) 8 Unit(s) SubCutaneous at bedtime  insulin lispro (ADMELOG) corrective regimen sliding scale   SubCutaneous three times a day before meals  insulin lispro Injectable (ADMELOG) 4 Unit(s) SubCutaneous three times a day before meals    MEDICATIONS  (PRN):  acetaminophen     Tablet .. 650 milliGRAM(s) Oral every 6 hours PRN Temp greater or equal to 38C (100.4F), Moderate Pain (4 - 6), Severe Pain (7 - 10)  dextrose Oral Gel 15 Gram(s) Oral once PRN Blood Glucose LESS THAN 70 milliGRAM(s)/deciliter  hydrALAZINE Injectable 5 milliGRAM(s) IV Push every 6 hours PRN HTN      PHYSICAL EXAM:  GENERAL: NAD  HEAD:  Atraumatic, Normocephalic  EYES: EOMI, conjunctiva and sclera clear  NERVOUS SYSTEM:  Alert & Awake. AOX3, able to lift LUE and LLE but unable to lift the right side   CHEST/LUNG: B/L good air entry   HEART: Regular rate and rhythm; No murmurs  ABDOMEN: Soft, Nontender, Nondistended  EXTREMITIES:  No b/l LE edema    LABS:                        13.3   10.40 )-----------( 490      ( 01 Nov 2024 04:30 )             40.6     11-01    139  |  103  |  36[H]  ----------------------------<  276[H]  4.0   |  22  |  1.39[H]    Ca    9.3      01 Nov 2024 04:30  Phos  3.7     11-01  Mg     2.30     11-01    TPro  7.2  /  Alb  3.4  /  TBili  0.4  /  DBili  x   /  AST  37  /  ALT  48[H]  /  AlkPhos  58  11-01    LIVER FUNCTIONS - ( 01 Nov 2024 04:30 )  Alb: 3.4 g/dL / Pro: 7.2 g/dL / ALK PHOS: 58 U/L / ALT: 48 U/L / AST: 37 U/L / GGT: x           PT/INR - ( 31 Oct 2024 15:00 )   PT: 12.3 sec;   INR: 1.06 ratio         PTT - ( 01 Nov 2024 04:30 )  PTT:85.7 sec  CAPILLARY BLOOD GLUCOSE      POCT Blood Glucose.: 262 mg/dL (01 Nov 2024 07:41)  POCT Blood Glucose.: 274 mg/dL (31 Oct 2024 21:37)  POCT Blood Glucose.: 305 mg/dL (31 Oct 2024 16:58)  POCT Blood Glucose.: 326 mg/dL (31 Oct 2024 11:32)            Urinalysis Basic - ( 01 Nov 2024 04:30 )    Color: x / Appearance: x / SG: x / pH: x  Gluc: 276 mg/dL / Ketone: x  / Bili: x / Urobili: x   Blood: x / Protein: x / Nitrite: x   Leuk Esterase: x / RBC: x / WBC x   Sq Epi: x / Non Sq Epi: x / Bacteria: x          RADIOLOGY & ADDITIONAL TESTS:  CXR:        Care Discussed with Consultants/Other Providers [ x] YES  [ ] NO

## 2024-11-01 NOTE — PROGRESS NOTE ADULT - ASSESSMENT
Patient is a 69 year old Polish speaking male with PMH of HTN, HLD, DM, presents with Acute Pulmonary Edema in the setting of HTN emergency with acute stroke.    PLAN:  NEURO:   #Code stroke in the ER  -patient with aphasia and gazing to left side  -CTA: No hemorrhage, no intracranial lesions  -Rt Carotid: 50% stenosis  -Lt Carotid: 90% stenosis  -vertebral circulation: left subclavian artery with segmental occulsion betweeen the throacic aorta and the left vertebral origin  -Anterior intracranial circulation: atherosclerosis, moderate on rt and severe on left  -posterior circulation: patent rt basilar and posterior arteries, left vertebral with reveraold of flow from the basilar to its PICA  -will continue with neuro checks Q4  - A1C 9.4   - lipid profile - LDL 87  - neuro following - recommended MRI brain (see results below), TTE (performed, see results below), Loop Recorder prior to discharge, Vertebral artery doppler to r/o subclavian steal i.s.o segmental subclavian artery occlusion and consult vascular if suggestive of flow reversal   -per neuro - no contraindication for heparin  - MRI of Brain 10/29 showed Acute infarcts in the high left MCA watershed distribution. Irregular flow-void in the cavernous segment of the left ICA. Chronic ischemic changes.  -loaded with asa and plavix in ER per neuro  - c/w ASA, plavix and statin   - neuro following, appreciate recs   - Vertebral artery doppler/VA duplex carotids showed 16-49% stenosis in proximial right ICA. 80-99% stenosis in proximal left ICA. Moderate, heterogenous atherosclerotic plaque in proximal left ECA. VERT, retrograde dopper flow.  - Neurosurgery consulted, appreciate recs - recommended MRA head/neck w/wo contrast and may consider starting heparin gtt after reviewing MRA   - per S&S, pt can be started on soft and bite sized diet with mildly thick liquids, will benefit from speech therapy   - PT recommends subacute Rehab   - PT- pending final discharge recs  - pending MRA head/neck w/wo contrast   - New BP goal is 140-160    PULMONARY:  # flash pulmonary edema  - CXR in ED showed discrete right perihilar opacities with bilateral effusions   - s/p lasix 80 in ED   - CXR from 10/28 AM showed Mild resolution of the bilateral pulmonary opacities likely pulmonary edema. Probable small left effusion  - 10/28 - Weaned down from BIPAP to 3L of NC  - pending read for repeat CXR in AM   - 10/30 - weaned from 3L of NC to RA, currently saturating well   - d/c lasix 80 BID given pt saturating well on RA and uptrending Cr  - CTM    #Concern for Aspiration   - patient with cough after medications were given  - pt passed bedside dysphagia screening   - started on soft and bite  sized diet with mildly thick liquids per S&S eval   - per S&S, pt will benefit from speech therapy     CARDIAC:  EKG with NSR LVH, ST depressions in multiple leads  bigeminy and PVCx on tele  #NSTEMI:  #elevated troponin   NSR on tele  -loaded with ASA and plavix in the ER, as per neuro, would hold hep gtt for now given concern for hemorrhagic conversion of the possible stroke   - c/w lipitor 80, ASA 81, and plavix 75  - was on nitro gtt previously to reduce systolic BP from 180 to 140 goal systolic, now off nitro gtt since 10/28  -TTE 10/27 showed mildly decreased LVSF w/ EF of 45 to 50 %, with regional wall motion abnormalities. Basal and mid anterior wall, basal and mid anterolateral wall, basal and mid inferolateral wall, and basal inferior segment are abnormal.  -Troponin 104 -> 148 -> 190 -> 236 -> 312 -> 298, d/c trending   - would defer cardiac cath in setting of possible acute stroke for now      #HTN Emergency:  - was on nitro gtt to maintain SBPs 140-180s, now off nitro gtt since 10/28  - on hydralazine PRN   - d/c lasix 80 BID given uptrending Cr   - c/w Coreg to12.5 BID   - maintain new SBP goal of 140-160    #HLD:  - c/w lipitor 80  - lipid profile showed LDL 87     GI:  # Diet   - passed bedside dysphagia screening  - started on soft and bite  sized diet with mildly thick liquids per S&S eval       #PE revealed soft, mildly distended abdomen  - Today's PE revealed non-distended abdomen   - CTM       RENAL:   #NOVA   - Cr started to downtrend 1.19 -> 1.33 -> 1.47 -> 1.73->1.87-> 1.59 , likely due to hypertensive emergency   - Strict Is and Os   - Indwelling catheter in place   - avoid nephrotoxins   - hold lasix 80 BID   - Quick d/c, pt passed TOV last night   - UOP is 1.5 L for past 24 hrs and Net Negative 530 ml   - CTM     ID:  #Leukocytosis  #Aspiration PNA?  - spiked fever overnight   - BCx - NG at 24 hrs  - MRSA swab - neg  - Full RVP neg   - d/c vanc given neg MRSA swab   - c/w zosyn (10/27-10/31) to finish 5 days course       ENDO:  #DM  -  HGBA1c 10/28 - 9.4   - started lantus 4 and premeal 2 TID given rise in glucose   - CTM     VTE PPX  - HSQ Patient is a 69 year old English speaking male with PMH of HTN, HLD, DM, presents with Acute Pulmonary Edema in the setting of HTN emergency with acute stroke.    PLAN:  NEURO:   #Code stroke in the ER  -patient with aphasia and gazing to left side  -CTA: No hemorrhage, no intracranial lesions  -Rt Carotid: 50% stenosis  -Lt Carotid: 90% stenosis  -vertebral circulation: left subclavian artery with segmental occulsion betweeen the throacic aorta and the left vertebral origin  -Anterior intracranial circulation: atherosclerosis, moderate on rt and severe on left  -posterior circulation: patent rt basilar and posterior arteries, left vertebral with reveraold of flow from the basilar to its PICA  -will continue with neuro checks Q4  - A1C 9.4   - lipid profile - LDL 87  - neuro following - recommended MRI brain (see results below), TTE (performed, see results below), Loop Recorder prior to discharge, Vertebral artery doppler to r/o subclavian steal i.s.o segmental subclavian artery occlusion   - per neuro - no contraindication for heparin gtt   - MRI of Brain 10/29 showed Acute infarcts in the high left MCA watershed distribution. Irregular flow-void in the cavernous segment of the left ICA. Chronic ischemic changes.  - loaded with asa and plavix in ER per neuro  - c/w ASA, plavix and statin   - neuro following, appreciate recs   - Vertebral artery doppler/VA duplex carotids showed 16-49% stenosis in proximial right ICA. 80-99% stenosis in proximal left ICA. Moderate, heterogenous atherosclerotic plaque in proximal left ECA. VERT, retrograde dopper flow.  - Neurosurgery consulted, appreciate recs - recommended MRA head/neck w/wo contrast and may consider starting heparin gtt after reviewing MRA   - per S&S, pt can be started on soft and bite sized diet with mildly thick liquids, will benefit from speech therapy   - PT recommends subacute Rehab   - PT- pending final discharge recs  - MRA head/neck w/wo contrast - moderate stenosis of the distal left precavernous ICA and moderate to severe stenosis of the distal left cavernous ICA. Diminished flow related signal within the supraclinoid left ICA compared to the right  - New BP goal is 140-160  - 11/1 per neuro - per their discussion with neuroradiologist Dr. Cortez, the MRA is c/w stenosis   - 11/1 per neurosurgery - c/f thrombus, c/w heparin gtt with NEW GOAL 40-60, repeat CTA on Tues, if persistent stenosis, plan for Stent at [facility] on Wed   - c/w heparin gtt with Goal 40-60 per neurosurgery   - repeat CTH for any acute neurological decline     PULMONARY:  # flash pulmonary edema  - CXR in ED showed discrete right perihilar opacities with bilateral effusions   - s/p lasix 80 in ED   - CXR from 10/28 AM showed Mild resolution of the bilateral pulmonary opacities likely pulmonary edema. Probable small left effusion  - 10/28 - Weaned down from BIPAP to 3L of NC  - pending read for repeat CXR in AM   - 10/30 - weaned from 3L of NC to RA, currently saturating well   - d/c lasix 80 BID given pt saturating well on RA and uptrending Cr  - CTM    #Concern for Aspiration   - patient with cough after medications were given  - pt passed bedside dysphagia screening   - started on soft and bite  sized diet with mildly thick liquids per S&S eval   - per S&S, pt will benefit from speech therapy     CARDIAC:  EKG with NSR LVH, ST depressions in multiple leads  bigeminy and PVCx on tele  #NSTEMI:  #elevated troponin   NSR on tele  -loaded with ASA and plavix in the ER, as per neuro, would hold hep gtt for now given concern for hemorrhagic conversion of the possible stroke   - c/w lipitor 80, ASA 81, and plavix 75  - was on nitro gtt previously to reduce systolic BP from 180 to 140 goal systolic, now off nitro gtt since 10/28  -TTE 10/27 showed mildly decreased LVSF w/ EF of 45 to 50 %, with regional wall motion abnormalities. Basal and mid anterior wall, basal and mid anterolateral wall, basal and mid inferolateral wall, and basal inferior segment are abnormal.  -Troponin 104 -> 148 -> 190 -> 236 -> 312 -> 298, d/c trending   - would defer cardiac cath in setting of possible acute stroke for now      #HTN Emergency:  - was on nitro gtt to maintain SBPs 140-180s, now off nitro gtt since 10/28  - on hydralazine PRN   - d/c lasix 80 BID given uptrending Cr   - c/w Coreg to12.5 BID   - maintain new SBP goal of 140-160    #HLD:  - c/w lipitor 80  - lipid profile showed LDL 87     GI:  # Diet   - passed bedside dysphagia screening  - started on soft and bite  sized diet with mildly thick liquids per S&S eval       #PE revealed soft, mildly distended abdomen  - Today's PE revealed non-distended abdomen   - CTM       RENAL:   #NOVA   - Cr started to downtrend 1.19 -> 1.33 -> 1.47 -> 1.73->1.87-> 1.59 , likely due to hypertensive emergency   - Strict Is and Os   - Indwelling catheter in place   - avoid nephrotoxins   - hold lasix 80 BID   - Quick d/c, pt passed TOV last night   - CTM     ID:  #Leukocytosis  #Aspiration PNA?  - spiked fever overnight   - BCx - NG at 24 hrs  - MRSA swab - neg  - Full RVP neg   - d/c vanc given neg MRSA swab   - c/w zosyn (10/27-10/31) to finish 5 days course       ENDO:  #DM  -  HGBA1c 10/28 - 9.4   - increase to lantus 10 qhs and admelog 6 TID   - CTM     VTE PPX  - Heparin gtt  Patient is a 69 year old Azeri speaking male with PMH of HTN, HLD, DM, presents with Acute Pulmonary Edema in the setting of HTN emergency with acute stroke.    PLAN:  NEURO:   #Code stroke in the ER  -patient with aphasia and gazing to left side  -CTA: No hemorrhage, no intracranial lesions  -Rt Carotid: 50% stenosis  -Lt Carotid: 90% stenosis  -vertebral circulation: left subclavian artery with segmental occulsion betweeen the throacic aorta and the left vertebral origin  -Anterior intracranial circulation: atherosclerosis, moderate on rt and severe on left  -posterior circulation: patent rt basilar and posterior arteries, left vertebral with reveraold of flow from the basilar to its PICA  -will continue with neuro checks Q4  - A1C 9.4   - lipid profile - LDL 87  - neuro following - recommended MRI brain (see results below), TTE (performed, see results below), Loop Recorder prior to discharge, Vertebral artery doppler to r/o subclavian steal i.s.o segmental subclavian artery occlusion   - per neuro - no contraindication for heparin gtt   - MRI of Brain 10/29 showed Acute infarcts in the high left MCA watershed distribution. Irregular flow-void in the cavernous segment of the left ICA. Chronic ischemic changes.  - loaded with asa and plavix in ER per neuro  - c/w ASA, plavix and statin   - neuro following, appreciate recs   - Vertebral artery doppler/VA duplex carotids showed 16-49% stenosis in proximial right ICA. 80-99% stenosis in proximal left ICA. Moderate, heterogenous atherosclerotic plaque in proximal left ECA. VERT, retrograde dopper flow.  - Neurosurgery consulted, appreciate recs - recommended MRA head/neck w/wo contrast and may consider starting heparin gtt after reviewing MRA   - per S&S, pt can be started on soft and bite sized diet with mildly thick liquids, will benefit from speech therapy   - PT recommends subacute Rehab   - PT- pending final discharge recs  - MRA head/neck w/wo contrast - moderate stenosis of the distal left precavernous ICA and moderate to severe stenosis of the distal left cavernous ICA. Diminished flow related signal within the supraclinoid left ICA compared to the right  - New BP goal is 140-160  - 11/1 per neuro - per their discussion with neuroradiologist Dr. Cortez, the MRA is c/w stenosis   - 11/1 per neurosurgery - c/f thrombus, c/w heparin gtt with NEW GOAL 60-70, repeat CTA on Tues, if persistent stenosis, plan for Stent at [facility] on Wed   - c/w heparin gtt with Goal 40-60 per neurosurgery   - repeat CTH for any acute neurological decline     PULMONARY:  # flash pulmonary edema  - CXR in ED showed discrete right perihilar opacities with bilateral effusions   - s/p lasix 80 in ED   - CXR from 10/28 AM showed Mild resolution of the bilateral pulmonary opacities likely pulmonary edema. Probable small left effusion  - 10/28 - Weaned down from BIPAP to 3L of NC  - pending read for repeat CXR in AM   - 10/30 - weaned from 3L of NC to RA, currently saturating well   - d/c lasix 80 BID given pt saturating well on RA and uptrending Cr  - CTM    #Concern for Aspiration   - patient with cough after medications were given  - pt passed bedside dysphagia screening   - started on soft and bite  sized diet with mildly thick liquids per S&S eval   - per S&S, pt will benefit from speech therapy     CARDIAC:  EKG with NSR LVH, ST depressions in multiple leads  bigeminy and PVCx on tele  #NSTEMI:  #elevated troponin   NSR on tele  -loaded with ASA and plavix in the ER, as per neuro, would hold hep gtt for now given concern for hemorrhagic conversion of the possible stroke   - c/w lipitor 80, ASA 81, and plavix 75  - was on nitro gtt previously to reduce systolic BP from 180 to 140 goal systolic, now off nitro gtt since 10/28  -TTE 10/27 showed mildly decreased LVSF w/ EF of 45 to 50 %, with regional wall motion abnormalities. Basal and mid anterior wall, basal and mid anterolateral wall, basal and mid inferolateral wall, and basal inferior segment are abnormal.  -Troponin 104 -> 148 -> 190 -> 236 -> 312 -> 298, d/c trending   - would defer cardiac cath in setting of possible acute stroke for now      #HTN Emergency:  - was on nitro gtt to maintain SBPs 140-180s, now off nitro gtt since 10/28  - on hydralazine PRN   - d/c lasix 80 BID given uptrending Cr   - c/w Coreg to12.5 BID   - maintain new SBP goal of 140-160    #HLD:  - c/w lipitor 80  - lipid profile showed LDL 87     GI:  # Diet   - passed bedside dysphagia screening  - started on soft and bite  sized diet with mildly thick liquids per S&S eval       #PE revealed soft, mildly distended abdomen  - Today's PE revealed non-distended abdomen   - CTM       RENAL:   #NOVA   - Cr started to downtrend 1.19 -> 1.33 -> 1.47 -> 1.73->1.87-> 1.59 , likely due to hypertensive emergency   - Strict Is and Os   - Indwelling catheter in place   - avoid nephrotoxins   - hold lasix 80 BID   - Quick d/c, pt passed TOV   - CTM     ID:  #Leukocytosis  #Aspiration PNA?  - spiked fever overnight   - BCx - NG at 24 hrs  - MRSA swab - neg  - Full RVP neg   - d/c vanc given neg MRSA swab   - c/w zosyn (10/27-10/31) to finish 5 days course       ENDO:  #DM  -  HGBA1c 10/28 - 9.4   - increase to lantus 10 qhs and admelog 6 TID   - CTM     VTE PPX  - Heparin gtt

## 2024-11-02 LAB
ALBUMIN SERPL ELPH-MCNC: 3.3 G/DL — SIGNIFICANT CHANGE UP (ref 3.3–5)
ALP SERPL-CCNC: 59 U/L — SIGNIFICANT CHANGE UP (ref 40–120)
ALT FLD-CCNC: 51 U/L — HIGH (ref 4–41)
ANION GAP SERPL CALC-SCNC: 13 MMOL/L — SIGNIFICANT CHANGE UP (ref 7–14)
APTT BLD: 67.9 SEC — HIGH (ref 24.5–35.6)
APTT BLD: 72.4 SEC — HIGH (ref 24.5–35.6)
AST SERPL-CCNC: 42 U/L — HIGH (ref 4–40)
BASOPHILS # BLD AUTO: 0.07 K/UL — SIGNIFICANT CHANGE UP (ref 0–0.2)
BASOPHILS NFR BLD AUTO: 0.6 % — SIGNIFICANT CHANGE UP (ref 0–2)
BILIRUB SERPL-MCNC: 0.4 MG/DL — SIGNIFICANT CHANGE UP (ref 0.2–1.2)
BUN SERPL-MCNC: 31 MG/DL — HIGH (ref 7–23)
CALCIUM SERPL-MCNC: 9.3 MG/DL — SIGNIFICANT CHANGE UP (ref 8.4–10.5)
CHLORIDE SERPL-SCNC: 101 MMOL/L — SIGNIFICANT CHANGE UP (ref 98–107)
CO2 SERPL-SCNC: 24 MMOL/L — SIGNIFICANT CHANGE UP (ref 22–31)
CREAT SERPL-MCNC: 1.11 MG/DL — SIGNIFICANT CHANGE UP (ref 0.5–1.3)
EGFR: 72 ML/MIN/1.73M2 — SIGNIFICANT CHANGE UP
EOSINOPHIL # BLD AUTO: 0.59 K/UL — HIGH (ref 0–0.5)
EOSINOPHIL NFR BLD AUTO: 5.4 % — SIGNIFICANT CHANGE UP (ref 0–6)
GLUCOSE BLDC GLUCOMTR-MCNC: 156 MG/DL — HIGH (ref 70–99)
GLUCOSE BLDC GLUCOMTR-MCNC: 159 MG/DL — HIGH (ref 70–99)
GLUCOSE BLDC GLUCOMTR-MCNC: 239 MG/DL — HIGH (ref 70–99)
GLUCOSE BLDC GLUCOMTR-MCNC: 259 MG/DL — HIGH (ref 70–99)
GLUCOSE SERPL-MCNC: 255 MG/DL — HIGH (ref 70–99)
HCT VFR BLD CALC: 38.8 % — LOW (ref 39–50)
HGB BLD-MCNC: 12.7 G/DL — LOW (ref 13–17)
IANC: 5.52 K/UL — SIGNIFICANT CHANGE UP (ref 1.8–7.4)
IMM GRANULOCYTES NFR BLD AUTO: 0.5 % — SIGNIFICANT CHANGE UP (ref 0–0.9)
LYMPHOCYTES # BLD AUTO: 3.56 K/UL — HIGH (ref 1–3.3)
LYMPHOCYTES # BLD AUTO: 32.8 % — SIGNIFICANT CHANGE UP (ref 13–44)
MAGNESIUM SERPL-MCNC: 2.4 MG/DL — SIGNIFICANT CHANGE UP (ref 1.6–2.6)
MCHC RBC-ENTMCNC: 28.6 PG — SIGNIFICANT CHANGE UP (ref 27–34)
MCHC RBC-ENTMCNC: 32.7 G/DL — SIGNIFICANT CHANGE UP (ref 32–36)
MCV RBC AUTO: 87.4 FL — SIGNIFICANT CHANGE UP (ref 80–100)
MONOCYTES # BLD AUTO: 1.07 K/UL — HIGH (ref 0–0.9)
MONOCYTES NFR BLD AUTO: 9.9 % — SIGNIFICANT CHANGE UP (ref 2–14)
NEUTROPHILS # BLD AUTO: 5.52 K/UL — SIGNIFICANT CHANGE UP (ref 1.8–7.4)
NEUTROPHILS NFR BLD AUTO: 50.8 % — SIGNIFICANT CHANGE UP (ref 43–77)
NRBC # BLD: 0 /100 WBCS — SIGNIFICANT CHANGE UP (ref 0–0)
NRBC # FLD: 0 K/UL — SIGNIFICANT CHANGE UP (ref 0–0)
PHOSPHATE SERPL-MCNC: 3.4 MG/DL — SIGNIFICANT CHANGE UP (ref 2.5–4.5)
PLATELET # BLD AUTO: 517 K/UL — HIGH (ref 150–400)
POTASSIUM SERPL-MCNC: 4.3 MMOL/L — SIGNIFICANT CHANGE UP (ref 3.5–5.3)
POTASSIUM SERPL-SCNC: 4.3 MMOL/L — SIGNIFICANT CHANGE UP (ref 3.5–5.3)
PROT SERPL-MCNC: 7.1 G/DL — SIGNIFICANT CHANGE UP (ref 6–8.3)
RBC # BLD: 4.44 M/UL — SIGNIFICANT CHANGE UP (ref 4.2–5.8)
RBC # FLD: 12.8 % — SIGNIFICANT CHANGE UP (ref 10.3–14.5)
SODIUM SERPL-SCNC: 138 MMOL/L — SIGNIFICANT CHANGE UP (ref 135–145)
WBC # BLD: 10.86 K/UL — HIGH (ref 3.8–10.5)
WBC # FLD AUTO: 10.86 K/UL — HIGH (ref 3.8–10.5)

## 2024-11-02 PROCEDURE — 99232 SBSQ HOSP IP/OBS MODERATE 35: CPT

## 2024-11-02 RX ADMIN — CLOPIDOGREL 75 MILLIGRAM(S): 75 TABLET ORAL at 11:51

## 2024-11-02 RX ADMIN — Medication 6 UNIT(S): at 07:30

## 2024-11-02 RX ADMIN — Medication 80 MILLIGRAM(S): at 22:33

## 2024-11-02 RX ADMIN — Medication 6 UNIT(S): at 18:07

## 2024-11-02 RX ADMIN — IPRATROPIUM BROMIDE AND ALBUTEROL SULFATE 3 MILLILITER(S): .5; 2.5 SOLUTION RESPIRATORY (INHALATION) at 03:47

## 2024-11-02 RX ADMIN — Medication 6: at 11:51

## 2024-11-02 RX ADMIN — HEPARIN SODIUM 8 UNIT(S)/HR: 10000 INJECTION INTRAVENOUS; SUBCUTANEOUS at 20:39

## 2024-11-02 RX ADMIN — Medication 2: at 18:08

## 2024-11-02 RX ADMIN — CARVEDILOL 12.5 MILLIGRAM(S): 25 TABLET, FILM COATED ORAL at 05:24

## 2024-11-02 RX ADMIN — CARVEDILOL 12.5 MILLIGRAM(S): 25 TABLET, FILM COATED ORAL at 18:01

## 2024-11-02 RX ADMIN — Medication 6 UNIT(S): at 11:50

## 2024-11-02 RX ADMIN — IPRATROPIUM BROMIDE AND ALBUTEROL SULFATE 3 MILLILITER(S): .5; 2.5 SOLUTION RESPIRATORY (INHALATION) at 09:42

## 2024-11-02 RX ADMIN — HEPARIN SODIUM 8 UNIT(S)/HR: 10000 INJECTION INTRAVENOUS; SUBCUTANEOUS at 17:39

## 2024-11-02 RX ADMIN — Medication 10 UNIT(S): at 23:54

## 2024-11-02 RX ADMIN — CHLORHEXIDINE GLUCONATE 1 APPLICATION(S): 40 SOLUTION TOPICAL at 14:26

## 2024-11-02 RX ADMIN — Medication 4: at 07:30

## 2024-11-02 RX ADMIN — Medication 81 MILLIGRAM(S): at 11:51

## 2024-11-02 NOTE — CHART NOTE - NSCHARTNOTEFT_GEN_A_CORE
MAR Accept Note    Transfer from: CCU  Transfer to: TELEMETRY  Accepting Attending Physician: Duran Nichols   Assigned Room: [address] [place] [address]    Patient seen and examined.   Labs and data reviewed.   No findings precluding transfer of service.       HPI/CICU COURSE:   Please refer to CICU transfer note for full details. Briefly, this is a patient is a 75 year old Wolof speaking male with PMH of HTN, HLD, DM, presents with shortness of breath over 3-4 days which acutely worsened. At home, he was having symptoms of stroke. He was taken to [facility] and CODE stroke was called. Patient is currently being treated as acute stroke. CT head was negative for ICH; negative for intracranial mass lesion. CTA Head/Neck 10/27 was significant for Rt Carotid: 50% stenosis Lt Carotid: 90% stenosis Vertebral circulation: L subclavian artery w/ segmental occlusion between thoracic aorta and L vertebral origin. Anterior intracranial circulation: +Atherosclerosis, moderate on R and severe on L. Posterior intracranial circulation: Patent R basilar and posterior arteries, L vertebral w/ reversal of flow from the basilar to its PICA. Neurology following and treating for a probable acute stroke. ECHO: LV systolic function mildly decreased with EF 45-50 %. +Regional wall motion abnormalities present. Basal and mid anterior wall, basal and mid anterolateral wall, basal and mid inferolateral wall, and basal inferior segment are abnormal. Normal RV.    In addition to a stroke, patient had flash pulmonary edema in setting of hypertensive emergency and was brought to the CICU in this setting. He was treated with BIPAP, lasix, nitroglycerin IV infusion, and given hydralazine IV pushes. Patient is now tolerating the room air. The nitroglycerin IV infusion was stopped. The patient was started on coreg 12.5 twice per day. BP is stable. The patient has residual right sided weakness of he right arm and leg. He had speech and swallow evaluation, with recommendation for soft and bite sized diet. Speech and Swallow specialist recommends speech therapy. The patient was started on a heparin IV infusion for DVT prophylaxis.       FOR FOLLOW-UP:  [ ] repeat CTA on Tuesday 11/5, neurosx recs thereafter; potential transfer to [facility] for poss carotid stent  [ ] monitor Cr, diuresis PRN  [ ] c/w management of HTN, SBP goal 140-160  [ ] ILR prior to discharge        Bryon Smith, PGY3

## 2024-11-02 NOTE — PROGRESS NOTE ADULT - SUBJECTIVE AND OBJECTIVE BOX
Subjective/Objective:     Overnight event: No overnight events noted.     Tele event: NSR with PVC's, occasionally in [place]      MEDICATIONS    aspirin  chewable 81 milliGRAM(s) Oral daily  carvedilol 12.5 milliGRAM(s) Oral every 12 hours  clopidogrel Tablet 75 milliGRAM(s) Oral daily  heparin  Infusion 600 Unit(s)/Hr IV Continuous <Continuous>  hydrALAZINE Injectable 5 milliGRAM(s) IV Push every 6 hours PRN  albuterol    90 MICROgram(s) HFA Inhaler 2 Puff(s) Inhalation two times a day  albuterol/ipratropium for Nebulization 3 milliLiter(s) Nebulizer every 6 hours  acetaminophen     Tablet .. 650 milliGRAM(s) Oral every 6 hours PRN  atorvastatin 80 milliGRAM(s) Oral at bedtime  dextrose 50% Injectable 25 Gram(s) IV Push once  dextrose 50% Injectable 12.5 Gram(s) IV Push once  dextrose Oral Gel 15 Gram(s) Oral once PRN  dextrose Oral Gel 15 Gram(s) Oral once PRN  glucagon  Injectable 1 milliGRAM(s) IntraMuscular once  insulin glargine Injectable (LANTUS) 10 Unit(s) SubCutaneous at bedtime  insulin lispro (ADMELOG) corrective regimen sliding scale   SubCutaneous three times a day before meals  insulin lispro (ADMELOG) corrective regimen sliding scale   SubCutaneous at bedtime  insulin lispro Injectable (ADMELOG) 6 Unit(s) SubCutaneous three times a day before meals  chlorhexidine 2% Cloths 1 Application(s) Topical daily  dextrose 5%. 1000 milliLiter(s) IV Continuous <Continuous>  dextrose 5%. 1000 milliLiter(s) IV Continuous <Continuous>      REVIEW OF SYSTEMS:  Complete 10point ROS negative.    ICU Vital Signs Last 24 Hrs  T(C): 36.7 (02 Nov 2024 01:00), Max: 37.1 (01 Nov 2024 16:41)  T(F): 98.1 (02 Nov 2024 01:00), Max: 98.8 (01 Nov 2024 16:41)  HR: 71 (02 Nov 2024 07:00) (67 - 87)  BP: 164/81 (02 Nov 2024 07:00) (99/75 - 183/86)  BP(mean): 104 (02 Nov 2024 07:00) (84 - 129)  RR: 26 (02 Nov 2024 07:00) (19 - 34)  SpO2: 99% (02 Nov 2024 07:00) (93% - 100%)  O2 Parameters below as of 02 Nov 2024 07:00  Patient On (Oxygen Delivery Method): room air      PHYSICAL EXAMINATION  Appearance: NAD, no distress  HEENT: Moist Mucous Membranes, Anicteric, EOMI  Cardiovascular: Regular rate and rhythm, Normal S1 S2, No JVD,   Respiratory: Lungs clear to auscultation. No rales, No rhonchi, No wheezing. No tenderness to palpation  Gastrointestinal:  Soft, Non-tender, + BS  Neurologic: A&O x3, Right lower extremity weakness 4/5 strength, decreased sensation, Right upper extremity 0/5 strength, decreased sensation. Left Upper and Lower extremity 5/5 strength and normal sensation.   Skin: Warm and dry, No rashes, No ecchymosis, No cyanosis  Musculoskeletal: No clubbing, No cyanosis, No edema  Vascular: Peripheral pulses palpable 2+ bilaterally  Psychiatry: Mood & affect appropriate      I&O's Summary    01 Nov 2024 07:01  -  02 Nov 2024 07:00  --------------------------------------------------------  IN: 344 mL / OUT: 1450 mL / NET: -1106 mL    02 Nov 2024 08:01  -  02 Nov 2024 07:53  --------------------------------------------------------  IN: 8 mL / OUT: 0 mL / NET: 8 mL    	   LABS:	  LABORATORY VALUES	 	                          12.7   10.86 )-----------( 517      ( 02 Nov 2024 05:15 )             38.8       11-02    138  |  101  |  31[H]  ----------------------------<  255[H]  4.3   |  24  |  1.11  11-01    139  |  103  |  36[H]  ----------------------------<  276[H]  4.0   |  22  |  1.39[H]    Ca    9.3      02 Nov 2024 05:15  Ca    9.3      01 Nov 2024 04:30  Phos  3.4     11-02  Phos  3.7     11-01  Mg     2.40     11-02  Mg     2.30     11-01    TPro  7.1  /  Alb  3.3  /  TBili  0.4  /  DBili  x   /  AST  42[H]  /  ALT  51[H]  /  AlkPhos  59  11-02  TPro  7.2  /  Alb  3.4  /  TBili  0.4  /  DBili  x   /  AST  37  /  ALT  48[H]  /  AlkPhos  58  11-01    LIVER FUNCTIONS - ( 02 Nov 2024 05:15 )  Alb: 3.3 g/dL / Pro: 7.1 g/dL / ALK PHOS: 59 U/L / ALT: 51 U/L / AST: 42 U/L / GGT: x           Activated Partial Thromboplastin Time: 67.9 sec (11-02 @ 05:15)      CARDIAC MARKERS:  10-30 @ 04:18  Cholesterol, Serum - 138  Direct LDL- --  HDL Cholesterol, Serum- 28  Triglycerides, Serum- 163  Thyroid Stimulating Hormone, Serum: 1.27 uIU/mL (10-30 @ 04:18)    Urinalysis Basic - ( 02 Nov 2024 05:15 )  Color: x / Appearance: x / SG: x / pH: x  Gluc: 255 mg/dL / Ketone: x  / Bili: x / Urobili: x   Blood: x / Protein: x / Nitrite: x   Leuk Esterase: x / RBC: x / WBC x   Sq Epi: x / Non Sq Epi: x / Bacteria: x      CAPILLARY BLOOD GLUCOSE  POCT Blood Glucose.: 239 mg/dL (02 Nov 2024 07:19)      10-27 @ 11:00  229E Corona Virus --  Adenovirus NotDetec  Bordetella pertussis NotDetec  Chlamydia pneumoniae NotDetec  Entero/Rhino Virus NotDetec  HKU1 Coronavirus --  hMPV NotDetec  Influenza A NotDetec  Influenza AH1 --  Influenza AH1 2009 --  Influenza AH3 --  Influenza B NotDetec  Mycoplasma pneumoniae NotDetec  NL63 Coronavirus --  OC43 Corornavirus --  Parainfluenza 1 NotDetec  Parainfluenza 2 NotDetec    EKG: NSR with PVC's, occasionally in [place]        Diagnostic testing:    CT Head:   INTERPRETATION:  CLINICAL INFORMATION: repeat head CT to determine   evolving bleed    COMPARISON: Head CT October 31, 2024.    Multiple axial sections were performed from the base of skull to vertex   without contrast enhancement. Coronal and sagittal instructions were   performed    Parenchymal volume loss and chronic microvessel ischemic changes again   seen.    Abnormal areas of low-attenuation involving the left frontal cortical and   subcortical region is again identified which is compatible with patient's   known acute infarct. No hemorrhagic transformation is seen. No   significant shift or herniation is seen.    Evaluation of the osseouswith the appropriate window appears unremarkable    The visualized paranasal sinuses mastoid and middle ear regions are clear.    Impression: Acute infarcts again seen as described above.      Echo:  CONCLUSIONS:      1. Left ventricular systolic function is mildly decreased with an ejection fraction visually estimated at 45 to 50 %. Regional wall motion abnormalities present.   2. Basal and mid anterior wall, basal and mid anterolateral wall, basal and mid inferolateral wall, and basal inferior segment are abnormal.   3. Left atrium is normal in size.   4. The right atrium is normal in size.   5. Normal right ventricular cavity size, with normal wall thickness, and probably normal right ventricular systolic function.      VA Duplex Carotid Arteries:   CONCLUSIONS:      1. Left: Significantly elevated flow velocities ( cm/sec,  cm/sec), severe heterogenous plaque, and color Doppler turbulence were noted within the proximal left internal carotid artery, supporting the presence of an 80-99% stenosis. This may represent a hemodynamically significant stenosis.   2. Right: There is mild heterogenous plaque noted within the proximal right internal carotid artery, consistent with a 16-49% stenosis (upper end of range based on grayscale imaging). No hemodynamically significant stenosis.   3. Retrograde flow noted in the left vertebral artery. No obvious stenosis noted in the visualized mid left subclavian artery.      MRI head:       IMPRESSION:    MRA BRAIN:  Apparent moderate stenosis of the distal left precavernous ICA and   moderate to severe stenosis of the distal left cavernous ICA. Diminished   flow related signal within the supraclinoid left ICA compared to the right    Somewhat diminished flow related signal within the left M1 segment with   normal flow-related enhancement of the more distal left MCA.    The left intradural vertebral demonstrates poor flow related enhancement   proximally, but better flow related enhancement distal to the origin of   the left PICA.    No vascular aneurysm.    MRA NECK:  Approximately 70% short segment stenosis of the origin of the right   internal carotid artery. Mildly diminished flow related signal and   enhancement of the more distal vessel. Please note on CTA neck   10/27/2024, the vessel appears nearly occluded atits origin.    Lack of flow related enhancement of the left subclavian artery beginning   just distal to its origin. The vessel reconstitutes and demonstrates   normal flow-related enhancement more distally.    Chest X-Ray:   FINDINGS:    Near total resolution of the airspace opacities consistent with resolving   pulmonary edema.  Left hemidiaphragm remains obscured likely small effusion.  No focal consolidations.    COMPARISON: October 28  IMPRESSION: Further resolution of pulmonary edema.     Subjective/Objective: Patient seen and examined. Patient alert awake, Denies chest pain palpitation dizziness, denies difficulty breathing , no orthopnea or PND noted. Tolerating room air      Overnight event: No overnight events noted.     Tele event: NSR with PVC's, occasionally in [place]      MEDICATIONS    aspirin  chewable 81 milliGRAM(s) Oral daily  carvedilol 12.5 milliGRAM(s) Oral every 12 hours  clopidogrel Tablet 75 milliGRAM(s) Oral daily  heparin  Infusion 600 Unit(s)/Hr IV Continuous <Continuous>  hydrALAZINE Injectable 5 milliGRAM(s) IV Push every 6 hours PRN  atorvastatin 80 milliGRAM(s) Oral at bedtime  albuterol    90 MICROgram(s) HFA Inhaler 2 Puff(s) Inhalation two times a day  albuterol/ipratropium for Nebulization 3 milliLiter(s) Nebulizer every 6 hours  acetaminophen     Tablet .. 650 milliGRAM(s) Oral every 6 hours PRN  dextrose 50% Injectable 25 Gram(s) IV Push once  dextrose 50% Injectable 12.5 Gram(s) IV Push once  dextrose Oral Gel 15 Gram(s) Oral once PRN  dextrose Oral Gel 15 Gram(s) Oral once PRN  glucagon  Injectable 1 milliGRAM(s) IntraMuscular once  insulin glargine Injectable (LANTUS) 10 Unit(s) SubCutaneous at bedtime  insulin lispro (ADMELOG) corrective regimen sliding scale   SubCutaneous three times a day before meals  insulin lispro (ADMELOG) corrective regimen sliding scale   SubCutaneous at bedtime  insulin lispro Injectable (ADMELOG) 6 Unit(s) SubCutaneous three times a day before meals  chlorhexidine 2% Cloths 1 Application(s) Topical daily  dextrose 5%. 1000 milliLiter(s) IV Continuous <Continuous>  dextrose 5%. 1000 milliLiter(s) IV Continuous <Continuous>          ICU Vital Signs Last 24 Hrs  T(C): 36.7 (02 Nov 2024 01:00), Max: 37.1 (01 Nov 2024 16:41)  T(F): 98.1 (02 Nov 2024 01:00), Max: 98.8 (01 Nov 2024 16:41)  HR: 71 (02 Nov 2024 07:00) (67 - 87)  BP: 164/81 (02 Nov 2024 07:00) (99/75 - 183/86)  BP(mean): 104 (02 Nov 2024 07:00) (84 - 129)  RR: 26 (02 Nov 2024 07:00) (19 - 34)  SpO2: 99% (02 Nov 2024 07:00) (93% - 100%)  O2 Parameters below as of 02 Nov 2024 07:00  Patient On (Oxygen Delivery Method): room air      PHYSICAL EXAMINATION  Appearance: NAD, no distress  HEENT: Moist Mucous Membranes, Anicteric, EOMI  Cardiovascular: Regular rate and rhythm, Normal S1 S2, No JVD,   Respiratory: Lungs clear to auscultation. No rales, No rhonchi, No wheezing. No tenderness to palpation  Gastrointestinal:  Soft, Non-tender, + BS  Neurologic: A&O x3, Right lower extremity weakness 4/5 strength, decreased sensation, Right upper extremity 0/5 strength, decreased sensation. Left Upper and Lower extremity 5/5 strength and normal sensation.   Skin: Warm and dry, No rashes, No ecchymosis, No cyanosis  Musculoskeletal: No clubbing, No cyanosis, No edema  Vascular: Peripheral pulses palpable 2+ bilaterally  Psychiatry: Mood & affect appropriate      I&O's Summary    01 Nov 2024 07:01  -  02 Nov 2024 07:00  --------------------------------------------------------  IN: 344 mL / OUT: 1450 mL / NET: -1106 mL    02 Nov 2024 08:01  -  02 Nov 2024 07:53  --------------------------------------------------------  IN: 8 mL / OUT: 0 mL / NET: 8 mL    	   LABS:	  LABORATORY VALUES	 	                          12.7   10.86 )-----------( 517      ( 02 Nov 2024 05:15 )             38.8       11-02    138  |  101  |  31[H]  ----------------------------<  255[H]  4.3   |  24  |  1.11  11-01    139  |  103  |  36[H]  ----------------------------<  276[H]  4.0   |  22  |  1.39[H]    Ca    9.3      02 Nov 2024 05:15  Ca    9.3      01 Nov 2024 04:30  Phos  3.4     11-02  Phos  3.7     11-01  Mg     2.40     11-02  Mg     2.30     11-01    TPro  7.1  /  Alb  3.3  /  TBili  0.4  /  DBili  x   /  AST  42[H]  /  ALT  51[H]  /  AlkPhos  59  11-02  TPro  7.2  /  Alb  3.4  /  TBili  0.4  /  DBili  x   /  AST  37  /  ALT  48[H]  /  AlkPhos  58  11-01    LIVER FUNCTIONS - ( 02 Nov 2024 05:15 )  Alb: 3.3 g/dL / Pro: 7.1 g/dL / ALK PHOS: 59 U/L / ALT: 51 U/L / AST: 42 U/L / GGT: x           Activated Partial Thromboplastin Time: 67.9 sec (11-02 @ 05:15)      CARDIAC MARKERS:  10-30 @ 04:18  Cholesterol, Serum - 138  Direct LDL- --  HDL Cholesterol, Serum- 28  Triglycerides, Serum- 163  Thyroid Stimulating Hormone, Serum: 1.27 uIU/mL (10-30 @ 04:18)    Urinalysis Basic - ( 02 Nov 2024 05:15 )  Color: x / Appearance: x / SG: x / pH: x  Gluc: 255 mg/dL / Ketone: x  / Bili: x / Urobili: x   Blood: x / Protein: x / Nitrite: x   Leuk Esterase: x / RBC: x / WBC x   Sq Epi: x / Non Sq Epi: x / Bacteria: x      CAPILLARY BLOOD GLUCOSE  POCT Blood Glucose.: 239 mg/dL (02 Nov 2024 07:19)      10-27 @ 11:00  229E Corona Virus --  Adenovirus NotDetec  Bordetella pertussis NotDetec  Chlamydia pneumoniae NotDetec  Entero/Rhino Virus NotDetec  HKU1 Coronavirus --  hMPV NotDetec  Influenza A NotDetec  Influenza AH1 --  Influenza AH1 2009 --  Influenza AH3 --  Influenza B NotDetec  Mycoplasma pneumoniae NotDetec  NL63 Coronavirus --  OC43 Corornavirus --  Parainfluenza 1 NotDetec  Parainfluenza 2 NotDetec    EKG: NSR with PVC's, occasionally in [place]        Diagnostic testing:    CT Head:   INTERPRETATION:  CLINICAL INFORMATION: repeat head CT to determine   evolving bleed    COMPARISON: Head CT October 31, 2024.    Multiple axial sections were performed from the base of skull to vertex   without contrast enhancement. Coronal and sagittal instructions were   performed    Parenchymal volume loss and chronic microvessel ischemic changes again   seen.    Abnormal areas of low-attenuation involving the left frontal cortical and   subcortical region is again identified which is compatible with patient's   known acute infarct. No hemorrhagic transformation is seen. No   significant shift or herniation is seen.    Evaluation of the osseouswith the appropriate window appears unremarkable    The visualized paranasal sinuses mastoid and middle ear regions are clear.    Impression: Acute infarcts again seen as described above.      Echo:  CONCLUSIONS:      1. Left ventricular systolic function is mildly decreased with an ejection fraction visually estimated at 45 to 50 %. Regional wall motion abnormalities present.   2. Basal and mid anterior wall, basal and mid anterolateral wall, basal and mid inferolateral wall, and basal inferior segment are abnormal.   3. Left atrium is normal in size.   4. The right atrium is normal in size.   5. Normal right ventricular cavity size, with normal wall thickness, and probably normal right ventricular systolic function.      VA Duplex Carotid Arteries:   CONCLUSIONS:      1. Left: Significantly elevated flow velocities ( cm/sec,  cm/sec), severe heterogenous plaque, and color Doppler turbulence were noted within the proximal left internal carotid artery, supporting the presence of an 80-99% stenosis. This may represent a hemodynamically significant stenosis.   2. Right: There is mild heterogenous plaque noted within the proximal right internal carotid artery, consistent with a 16-49% stenosis (upper end of range based on grayscale imaging). No hemodynamically significant stenosis.   3. Retrograde flow noted in the left vertebral artery. No obvious stenosis noted in the visualized mid left subclavian artery.      MRI head:       IMPRESSION:    MRA BRAIN:  Apparent moderate stenosis of the distal left precavernous ICA and   moderate to severe stenosis of the distal left cavernous ICA. Diminished   flow related signal within the supraclinoid left ICA compared to the right    Somewhat diminished flow related signal within the left M1 segment with   normal flow-related enhancement of the more distal left MCA.    The left intradural vertebral demonstrates poor flow related enhancement   proximally, but better flow related enhancement distal to the origin of   the left PICA.    No vascular aneurysm.    MRA NECK:  Approximately 70% short segment stenosis of the origin of the right   internal carotid artery. Mildly diminished flow related signal and   enhancement of the more distal vessel. Please note on CTA neck   10/27/2024, the vessel appears nearly occluded atits origin.    Lack of flow related enhancement of the left subclavian artery beginning   just distal to its origin. The vessel reconstitutes and demonstrates   normal flow-related enhancement more distally.    Chest X-Ray:   FINDINGS:    Near total resolution of the airspace opacities consistent with resolving   pulmonary edema.  Left hemidiaphragm remains obscured likely small effusion.  No focal consolidations.    COMPARISON: October 28  IMPRESSION: Further resolution of pulmonary edema.

## 2024-11-02 NOTE — PROGRESS NOTE ADULT - ASSESSMENT
69 RH Faroese speaking male PMHx COPD, HTN, DM presented for hypoxic respiratory distress due to COPD exacerbation vs cardiogenic etiology. In ED, on BiPAP, given lasix, and started nitro drip due to /110. Code stroke called for right side weakness first noticed by family this AM, LKN yesterday evening. Initial VS in ED: /110 prior to nitroglycerin drip, during neurology exam /106. Exam: On BiPAP, AAOx1, following simple commands with occasional confusion, left gaze preference, mild R facial droop, right upper and lower extremity drift, no sensory changes. CTA demonstrated diffuse intracranial and extracranial atherosclerosis, including notably 90% maximal stenosis in carotid bulb and focal occlusion in proximal left vertebral artery. CTA also noted for segmental occlusion of left subclavian artery between thoracic aorta and left vertebral origin. Patient loaded with Plavix 600mg and ASA 325mg, on Plavix 75mg and ASA 81mg. Right Carotid Doppler 50% and left Carotid Dopplers 90%. MRI brain showed high left MCA infarct.    10/31 Heparin drip started after discussion with Dr. Ulrich and Dr. Collins  11/1 [facility] stable- no acute hemorrhage, ok to resume Heparin drip with PTT goal 60-70

## 2024-11-02 NOTE — CHART NOTE - NSCHARTNOTEFT_GEN_A_CORE
Sign out o HIC  : Dr Duran Nichols Sign out o HIC  : Dr Duran Nichols  MAR: Dr Smith  ACP: Sign out o HIC  : Dr Duran Nichols  MAR: Dr Smith  ACP: ADILENE Villalobos

## 2024-11-02 NOTE — PROGRESS NOTE ADULT - PROBLEM SELECTOR PLAN 1
- PTT goal 60-70, C/w Heparin drip  - C/w Plavix and Asa  - D/w Dr. Farhat Ulrich and Dr. Collins- c/w heparin drip and repeat CTA head and Neck with contrast on TUESDAY 11/5. Will review imaging and determine if stent is needed. If Stent warrented will plan for this on Wednesday    case to be discussed and reviewed with Dr. Collins /Dr. Ulrich    Neurosurgery, ext [phone]

## 2024-11-02 NOTE — PROGRESS NOTE ADULT - ASSESSMENT
Patient is a 69 year old Yi speaking male with PMH of HTN, HLD, DM, presents with Acute Pulmonary Edema in the setting of HTN emergency with acute stroke.    PLAN:    NEURO:   #Code stroke in the ER  -patient with aphasia and gazing to left side  -CTA: No hemorrhage, no intracranial lesions  -Not a tenecteplase candidate due to out of window, Not a mechanical thrombectomy candidate due to no retrievable large vessel occlusion.  -CTA showing Rt Carotid: 50% stenosis and Lt Carotid: 90% stenosis  - A1C 9.4   - lipid profile - LDL 87  - Neuro following   - c/w ASA, plavix and statin   -c/w heparin gtt, goal  60-70  -repeat CTA on Tues, if persistent stenosis, plan for transfer to [facility] for stent  -BP goal is 140-160  - S&S recs appreciated, pt can be started on soft and bite sized diet with mildly thick liquids, will benefit from speech therapy   -Loop Recorder prior to discharge    - PT recommends subacute Rehab       PULMONARY:  # flash pulmonary edema  - CXR in ED showed discrete right perihilar opacities with bilateral effusions, s/p lasix 80 in ED   - CXR 10/30  Further resolution of pulmonary edema.   - Weened off O2, saturating well on RA   -d/c lasix 80 BID given pt saturating well on RA and uptrending Cr  l    #Concern for Aspiration   - patient with cough after medications were given  - pt passed bedside dysphagia screening   - started on soft and bite sized diet with mildly thick liquids per S&S eval   - per S&S, pt will benefit from speech therapy     CARDIAC:  -EKG with NSR LVH, ST depressions in multiple leads, bigeminy and PVCx on tele    #NSTEMI:  #elevated troponin   -NSR on tele  -loaded with ASA and plavix in the ER, as per neuro, would hold hep gtt for now given concern for hemorrhagic conversion of the possible stroke   - c/w lipitor 80, ASA 81, and plavix 75  - off nitro gtt since 10/28  -TTE 10/27 showed mildly decreased LVSF w/ EF of 45 to 50 %, with regional wall motion abnormalities. Basal and mid anterior wall, basal and mid anterolateral wall, basal and mid inferolateral wall, and basal inferior segment are abnormal.  -Troponin 104 -> 148 -> 190 -> 236 -> 312 -> 298, d/c trending   - would defer cardiac cath in setting of possible acute stroke for now      #HTN Emergency:  - was on nitro gtt to maintain SBPs 140-180s, now off nitro gtt since 10/28  - on hydralazine PRN   - d/c lasix 80 BID given uptrending Cr   - c/w Coreg to12.5 BID   - maintain new SBP goal of 140-160    #HLD:  - c/w lipitor 80  - lipid profile showed LDL 87     GI:  # Diet   - passed bedside dysphagia screening  - started on soft and bite sized diet with mildly thick liquids per S&S eval       RENAL:   #NOVA   -Creatinine Elevated on admission, resolved. likely due to hypertensive emergency   - Strict Is and Os   - avoid nephrotoxins   - hold lasix 80 BID   - Quick d/c, pt passed TOV     ID:  #Leukocytosis  -resolved  - BCx - NG after 5 days  - MRSA swab - neg, d/c'd vanco  - Full RVP neg, zosyn d/c'd     ENDO:  #DM  - HGBA1c 9.4   - c/w lantus 10 qhs, admelog 6 TID, and premeal sliding scale      VTE PPX  - Heparin gtt     Ethics:   -Full Code      Patient is a 69 year old Hebrew speaking male with PMH of HTN, HLD, DM, presents with Acute Pulmonary Edema in the setting of HTN emergency with acute stroke.    PLAN:    NEURO:   #Code stroke in the ER  -patient with aphasia and gazing to left side  -CTA: No hemorrhage, no intracranial lesions  -Not a tenecteplase candidate due to out of window, Not a mechanical thrombectomy candidate due to no retrievable large vessel occlusion.  -CTA showing Rt Carotid: 50% stenosis and Lt Carotid: 90% stenosis  - A1C 9.4   - lipid profile - LDL 87  - Neuro following   - c/w ASA, plavix and statin   -c/w heparin gtt, goal  60-70  -repeat CTA on Tues, if persistent stenosis, plan for transfer to [facility] for stent  -BP goal is 140-160  - S&S recs appreciated, pt can be started on soft and bite sized diet with mildly thick liquids, will benefit from speech therapy   -Loop Recorder prior to discharge    - PT recommends subacute Rehab       PULMONARY:  # flash pulmonary edema  -Resolved  - CXR in ED showed discrete right perihilar opacities with bilateral effusions, s/p lasix 80 in ED   - Weened off O2, saturating well on RA         #Concern for Aspiration   - patient with cough after medications were given  - pt passed bedside dysphagia screening   - S&S recs appreciated, started on soft and bite sized diet with mildly thick liquids, pt will benefit from speech therapy     CARDIAC:  -EKG with NSR LVH, ST depressions in multiple leads, bigeminy and PVCx on tele    #NSTEMI:  #elevated troponin   -NSR on tele  -loaded with ASA and plavix in the ER,   - c/w lipitor 80, ASA 81, and plavix 75  - off nitro gtt since 10/28  -TTE 10/27 showed mildly decreased LVSF w/ EF of 45 to 50 %, with regional wall motion abnormalities. Basal and mid anterior wall, basal and mid anterolateral wall, basal and mid inferolateral wall, and basal inferior segment are abnormal.  -Troponin 104 -> 148 -> 190 -> 236 -> 312 -> 298, d/c trending   - defer cardiac cath in setting of possible acute stroke       #HTN Emergency:  - nitro gtt off 10/28  - d/c lasix 80 BID given uptrending Cr   - maintain new SBP goal of 140-160  - c/w Coreg to12.5 BID   - hydralazine PRN       #HLD:  - c/w lipitor 80  - lipid profile showed LDL 87     GI:  # Diet   - passed bedside dysphagia screening  - started on soft and bite sized diet with mildly thick liquids per S&S eval       RENAL:   #NOVA   -Creatinine Elevated on admission, resolved. likely due to hypertensive emergency   - Strict Is and Os   - avoid nephrotoxins   - hold lasix 80 BID   - Quick d/c, pt passed TOV     ID:  #Leukocytosis  -resolved  - BCx - NG after 5 days  - MRSA swab - neg, d/c'd vanco  - Full RVP neg, zosyn d/c'd     ENDO:  #DM  - HGBA1c 9.4   - c/w lantus 10 qhs, admelog 6 TID, and premeal sliding scale      VTE PPX  - Heparin gtt     Ethics:   -Full Code      Patient is a 69 year old Frisian speaking male with PMH of HTN, HLD, DM, presents with Acute Pulmonary Edema in the setting of HTN emergency with acute stroke.    PLAN:    NEURO:   #Code stroke in the ER  -patient with aphasia and gazing to left side  -CTA: No hemorrhage, no intracranial lesions  -Not a tenecteplase candidate due to out of window, Not a mechanical thrombectomy candidate due to no retrievable large vessel occlusion.  -CTA showing Rt Carotid: 50% stenosis and Lt Carotid: 90% stenosis  - A1C 9.4   - lipid profile - LDL 87  - Neuro following   - c/w ASA, plavix and statin   -c/w heparin gtt, goal  60-70  -repeat CTA on Tues, if persistent stenosis, plan for transfer to [facility] stent  -BP goal is 140-160  -Loop Recorder prior to discharge    - PT recommends subacute Rehab       PULMONARY:  # flash pulmonary edema  -Resolved  - CXR in ED showed discrete right perihilar opacities with bilateral effusions, s/p lasix 80 in ED   - Weened off O2, saturating well on RA         #Concern for Aspiration   - patient with cough after medications were given  - pt passed bedside dysphagia screening   - S&S recs appreciated, started on soft and bite sized diet with mildly thick liquids, pt will benefit from speech therapy     CARDIAC:  -EKG with NSR LVH, ST depressions in multiple leads, bigeminy and PVCx on tele    #NSTEMI:  #elevated troponin   -NSR on tele  -loaded with ASA and plavix in the ER,   - c/w lipitor 80, ASA 81, and plavix 75  - off nitro gtt since 10/28  -TTE 10/27 showed mildly decreased LVSF w/ EF of 45 to 50 %, with regional wall motion abnormalities. Basal and mid anterior wall, basal and mid anterolateral wall, basal and mid inferolateral wall, and basal inferior segment are abnormal.  -Troponin 104 -> 148 -> 190 -> 236 -> 312 -> 298, d/c trending   - defer cardiac cath in setting of possible acute stroke       #HTN Emergency:  - nitro gtt off 10/28  - d/c lasix 80 BID given uptrending Cr   - maintain new SBP goal of 140-160  - c/w Coreg to12.5 BID   - hydralazine PRN       #HLD:  - c/w lipitor 80  - lipid profile showed LDL 87     GI:  # Diet   - passed bedside dysphagia screening  - started on soft and bite sized diet with mildly thick liquids per S&S eval       RENAL:   #NOVA   -Creatinine Elevated on admission, resolved. likely due to hypertensive emergency   - Strict Is and Os   - avoid nephrotoxins   - hold lasix 80 BID   - Quick d/c, pt passed TOV     ID:  #Leukocytosis  -resolved  - BCx - NG after 5 days  - MRSA swab - neg, d/c'd vanco  - Full RVP neg, zosyn d/c'd     ENDO:  #DM  - HGBA1c 9.4   - c/w lantus 10 qhs, admelog 6 TID, and premeal sliding scale      VTE PPX  - Heparin gtt     Ethics:   -Full Code      Patient is a 69 year old Sami speaking male with PMH of HTN, HLD, DM, presents with Acute Pulmonary Edema in the setting of HTN emergency with acute stroke.    PLAN:    NEURO:   #Code stroke in the ER  -patient with aphasia and gazing to left side  -CTA: No hemorrhage, no intracranial lesions  -Not a tenecteplase candidate due to out of window, Not a mechanical thrombectomy candidate due to no retrievable large vessel occlusion.  -CTA showing Rt Carotid: 50% stenosis and Lt Carotid: 90% stenosis  - A1C 9.4   - lipid profile - LDL 87  - Neuro following   - c/w ASA, plavix and statin   -c/w heparin gtt, goal  60-70  -repeat CTA on Tues, if persistent stenosis, plan for transfer to [facility] for stent  -BP goal is 140-160  -Loop Recorder prior to discharge    - PT recommends subacute Rehab       PULMONARY:  # flash pulmonary edema requiring Bipap  -Resolved  - CXR in ED showed discrete right perihilar opacities with bilateral effusions, s/p lasix 80 in ED   - Weaned off O2, saturating well on RA         #Concern for Aspiration   - patient with cough after medications were given  - pt passed bedside dysphagia screening   - S&S recs appreciated, started on soft and bite sized diet with mildly thick liquids, pt will benefit from speech therapy     CARDIAC:    #NSTEMI:  -EKG with NSR LVH, ST depressions in multiple leads, bigeminy and PVCx on tele  #elevated troponin   -NSR on tele  -loaded with ASA and plavix in the ER,   - c/w lipitor 80, ASA 81, and plavix 75  - off nitro gtt since 10/28  -TTE 10/27 showed mildly decreased LVSF w/ EF of 45 to 50 %, with regional wall motion abnormalities. Basal and mid anterior wall, basal and mid anterolateral wall, basal and mid inferolateral wall, and basal inferior segment are abnormal.  -Troponin 104 -> 148 -> 190 -> 236 -> 312 -> 298, d/c trending   - defer cardiac cath in setting of possible acute stroke       #HTN Emergency:  - nitro gtt off 10/28  - d/c lasix 80 BID given uptrending Cr   - maintain new SBP goal of 140-160  - c/w Coreg to12.5 BID   - hydralazine PRN       #HLD:  - c/w lipitor 80  - lipid profile showed LDL 87     GI:  # Diet   - passed bedside dysphagia screening  - started on soft and bite sized diet with mildly thick liquids per S&S eval   - Aspirin       RENAL:   #NOVA   -Creatinine Elevated on admission, resolved. likely due to hypertensive emergency   - Strict Is and Os   - avoid nephrotoxins   - hold lasix 80 BID   - Quick d/c, pt passed TOV     ID:  #Leukocytosis  -resolved  - BCx - NG after 5 days  - MRSA swab - neg, d/c'd vanco  - Full RVP neg, zosyn d/c'd     ENDO:  #DM  - HGBA1c 9.4   - c/w lantus 10 qhs, admelog 6 TID, and premeal sliding scale      VTE PPX  - Heparin gtt     Ethics:   -Full Code      Patient is a 69 year old Italian speaking male with PMH of HTN, HLD, DM, presents with Acute Pulmonary Edema in the setting of HTN emergency with acute stroke.    PLAN:    NEURO:   #Code stroke in the ER  -patient with aphasia and gazing to left side  -CTA: No hemorrhage, no intracranial lesions  -Not a tenecteplase candidate due to out of window, Not a mechanical thrombectomy candidate due to no retrievable large vessel occlusion.  -CTA showing Rt Carotid: 50% stenosis and Lt Carotid: 90% stenosis, left subclavian artery with segmental occulsion betweeen the throacic aorta and the left vertebral origin and Left vertebral occlusion with reversal   of flow from the basilar to its PICA. left cerebral hemispheric anterior corona radiata white matter stenosis.  - MR brain on 10/31 reveal moderate stenosis of the distal left precavernous ICA and moderate to severe stenosis of the distal left cavernous ICA.   - A1C 9.4   - lipid profile - LDL 87  - Neuro following   - c/w ASA, plavix and statin   -c/w heparin gtt, goal  60-70  -repeat CTA on Tues, if persistent stenosis, plan for transfer to [facility] for stent  -BP goal is 140-160  -Loop Recorder prior to discharge    - PT recommends subacute Rehab       PULMONARY:  # flash pulmonary edema requiring Bipap  -Resolved  - CXR in ED showed discrete right perihilar opacities with bilateral effusions, s/p lasix 80 in ED   - Weaned off O2, saturating well on RA           CARDIAC:    #NSTEMI  -EKG with NSR LVH, ST depressions in multiple leads, bigeminy and PVCx on tele  #elevated troponin   -NSR on tele  -loaded with ASA and plavix in the ER,   - c/w lipitor 80, ASA 81, and plavix 75  - off nitro gtt since 10/28  -TTE 10/27 showed mildly decreased LVSF w/ EF of 45 to 50 %, with regional wall motion abnormalities. Basal and mid anterior wall, basal and mid anterolateral wall, basal and mid inferolateral wall, and basal inferior segment are abnormal.  -Troponin 104 -> 148 -> 190 -> 236 -> 312 -> 298, d/c trending   - defer cardiac cath in setting of possible acute stroke       #HTN Emergency on admission  - nitro gtt off 10/28  - d/c lasix 80 BID given uptrending Cr   - maintain new SBP goal of 140-160  - c/w Coreg to12.5 BID   - hydralazine PRN       #HLD:  - c/w lipitor 80  - lipid profile showed LDL 87     GI:  # Diet   #Concern for Aspiration   - passed bedside dysphagia screening  - S&S recs appreciated, started on soft and bite sized diet with mildly thick liquids, pt will benefit from speech therapy   - Aspiration precaution         RENAL:   #NOVA   -Creatinine Elevated on admission, resolved. likely due to hypertensive emergency   - Now resloved   - avoid nephrotoxins   - voiding without difficulty  - Trend Screat    ID:  #Leukocytosis  -resolved  - BCx - NG after 5 days  - MRSA swab - neg, d/c'd vanco  - Full RVP neg, zosyn d/c'd     ENDO:  #DM  - HGBA1c 9.4   - c/w lantus 10 qhs, admelog 6 TID, and premeal sliding scale      VTE PPX  - Heparin gtt     Ethics:   -Full Code

## 2024-11-02 NOTE — PROGRESS NOTE ADULT - SUBJECTIVE AND OBJECTIVE BOX
SUBJECTIVE EVENTS:    Vital Signs Last 24 Hrs  T(C): 36.5 (02 Nov 2024 08:00), Max: 37.1 (01 Nov 2024 16:41)  T(F): 97.7 (02 Nov 2024 08:00), Max: 98.8 (01 Nov 2024 16:41)  HR: 65 (02 Nov 2024 09:00) (63 - 87)  BP: 131/69 (02 Nov 2024 09:00) (106/88 - 183/86)  BP(mean): 87 (02 Nov 2024 09:00) (86 - 129)  RR: 21 (02 Nov 2024 09:00) (18 - 34)  SpO2: 93% (02 Nov 2024 09:00) (93% - 100%)    Parameters below as of 02 Nov 2024 07:00  Patient On (Oxygen Delivery Method): room air          PHYSICAL EXAM:  Awake Alert Age Appopriate  PERRL, EOMI, No right lower facial  Right UE 0/5  RLE Prox 2/5 KE 4-, DF 2/5  Left side 5/5      DIET:      MEDICATIONS  (STANDING):  albuterol    90 MICROgram(s) HFA Inhaler 2 Puff(s) Inhalation two times a day  albuterol/ipratropium for Nebulization 3 milliLiter(s) Nebulizer every 6 hours  aspirin  chewable 81 milliGRAM(s) Oral daily  atorvastatin 80 milliGRAM(s) Oral at bedtime  carvedilol 12.5 milliGRAM(s) Oral every 12 hours  chlorhexidine 2% Cloths 1 Application(s) Topical daily  clopidogrel Tablet 75 milliGRAM(s) Oral daily  dextrose 5%. 1000 milliLiter(s) (50 mL/Hr) IV Continuous <Continuous>  dextrose 5%. 1000 milliLiter(s) (100 mL/Hr) IV Continuous <Continuous>  dextrose 50% Injectable 25 Gram(s) IV Push once  dextrose 50% Injectable 12.5 Gram(s) IV Push once  glucagon  Injectable 1 milliGRAM(s) IntraMuscular once  heparin  Infusion 600 Unit(s)/Hr (8 mL/Hr) IV Continuous <Continuous>  insulin glargine Injectable (LANTUS) 10 Unit(s) SubCutaneous at bedtime  insulin lispro (ADMELOG) corrective regimen sliding scale   SubCutaneous three times a day before meals  insulin lispro (ADMELOG) corrective regimen sliding scale   SubCutaneous at bedtime  insulin lispro Injectable (ADMELOG) 6 Unit(s) SubCutaneous three times a day before meals    MEDICATIONS  (PRN):  acetaminophen     Tablet .. 650 milliGRAM(s) Oral every 6 hours PRN Temp greater or equal to 38C (100.4F), Moderate Pain (4 - 6), Severe Pain (7 - 10)  dextrose Oral Gel 15 Gram(s) Oral once PRN Blood Glucose LESS THAN 70 milliGRAM(s)/deciliter  dextrose Oral Gel 15 Gram(s) Oral once PRN Blood Glucose LESS THAN 70 milliGRAM(s)/deciliter  hydrALAZINE Injectable 5 milliGRAM(s) IV Push every 6 hours PRN HTN                            12.7   10.86 )-----------( 517      ( 02 Nov 2024 05:15 )             38.8   11-02    138  |  101  |  31[H]  ----------------------------<  255[H]  4.3   |  24  |  1.11    Ca    9.3      02 Nov 2024 05:15  Phos  3.4     11-02  Mg     2.40     11-02    TPro  7.1  /  Alb  3.3  /  TBili  0.4  /  DBili  x   /  AST  42[H]  /  ALT  51[H]  /  AlkPhos  59  11-02  PT/INR - ( 31 Oct 2024 15:00 )   PT: 12.3 sec;   INR: 1.06 ratio         PTT - ( 02 Nov 2024 05:15 )  PTT:67.9 secUrinalysis Basic - ( 02 Nov 2024 05:15 )    Color: x / Appearance: x / SG: x / pH: x  Gluc: 255 mg/dL / Ketone: x  / Bili: x / Urobili: x   Blood: x / Protein: x / Nitrite: x   Leuk Esterase: x / RBC: x / WBC x   Sq Epi: x / Non Sq Epi: x / Bacteria: x          RADIOLGY:   < from: CT Head No Cont (11.01.24 @ 10:39) >    ACC: [id number] EXAM:  CT BRAIN   ORDERED BY: AURE ATKINSON     PROCEDURE DATE:  11/01/2024          INTERPRETATION:  CLINICAL INFORMATION: repeat head CT to determine   evolving bleed    COMPARISON: Head CT October 31, 2024.    Multiple axial sections were performed from the base of skull to vertex   without contrast enhancement. Coronal and sagittal instructions were   performed    Parenchymal volume loss and chronic microvessel ischemic changes again   seen.    Abnormal areas of low-attenuation involving the left frontal cortical and   subcortical region is again identified which is compatible with patient's   known acute infarct. No hemorrhagic transformation is seen. No   significant shift or herniation is seen.    Evaluation of the osseouswith the appropriate window appears unremarkable    The visualized paranasal sinuses mastoid and middle ear regions are clear.    Impression: Acute infarcts again seen as described above.    < end of copied text >

## 2024-11-03 DIAGNOSIS — I21.4 NON-ST ELEVATION (NSTEMI) MYOCARDIAL INFARCTION: ICD-10-CM

## 2024-11-03 DIAGNOSIS — R53.1 WEAKNESS: ICD-10-CM

## 2024-11-03 DIAGNOSIS — E78.5 HYPERLIPIDEMIA, UNSPECIFIED: ICD-10-CM

## 2024-11-03 DIAGNOSIS — E11.9 TYPE 2 DIABETES MELLITUS WITHOUT COMPLICATIONS: ICD-10-CM

## 2024-11-03 DIAGNOSIS — I63.9 CEREBRAL INFARCTION, UNSPECIFIED: ICD-10-CM

## 2024-11-03 DIAGNOSIS — I16.1 HYPERTENSIVE EMERGENCY: ICD-10-CM

## 2024-11-03 DIAGNOSIS — Z29.9 ENCOUNTER FOR PROPHYLACTIC MEASURES, UNSPECIFIED: ICD-10-CM

## 2024-11-03 LAB
ANION GAP SERPL CALC-SCNC: 15 MMOL/L — HIGH (ref 7–14)
APTT BLD: 50.8 SEC — HIGH (ref 24.5–35.6)
APTT BLD: 55.9 SEC — HIGH (ref 24.5–35.6)
APTT BLD: 65.3 SEC — HIGH (ref 24.5–35.6)
APTT BLD: 78 SEC — HIGH (ref 24.5–35.6)
BLOOD GAS VENOUS COMPREHENSIVE RESULT: SIGNIFICANT CHANGE UP
BUN SERPL-MCNC: 27 MG/DL — HIGH (ref 7–23)
CALCIUM SERPL-MCNC: 9.7 MG/DL — SIGNIFICANT CHANGE UP (ref 8.4–10.5)
CHLORIDE SERPL-SCNC: 101 MMOL/L — SIGNIFICANT CHANGE UP (ref 98–107)
CO2 SERPL-SCNC: 21 MMOL/L — LOW (ref 22–31)
CREAT SERPL-MCNC: 1.04 MG/DL — SIGNIFICANT CHANGE UP (ref 0.5–1.3)
EGFR: 78 ML/MIN/1.73M2 — SIGNIFICANT CHANGE UP
FLUAV AG NPH QL: SIGNIFICANT CHANGE UP
FLUBV AG NPH QL: SIGNIFICANT CHANGE UP
GLUCOSE BLDC GLUCOMTR-MCNC: 186 MG/DL — HIGH (ref 70–99)
GLUCOSE BLDC GLUCOMTR-MCNC: 194 MG/DL — HIGH (ref 70–99)
GLUCOSE BLDC GLUCOMTR-MCNC: 205 MG/DL — HIGH (ref 70–99)
GLUCOSE BLDC GLUCOMTR-MCNC: 219 MG/DL — HIGH (ref 70–99)
GLUCOSE BLDC GLUCOMTR-MCNC: 226 MG/DL — HIGH (ref 70–99)
GLUCOSE SERPL-MCNC: 175 MG/DL — HIGH (ref 70–99)
HCT VFR BLD CALC: 44.2 % — SIGNIFICANT CHANGE UP (ref 39–50)
HGB BLD-MCNC: 14.4 G/DL — SIGNIFICANT CHANGE UP (ref 13–17)
LACTATE SERPL-SCNC: 1.4 MMOL/L — SIGNIFICANT CHANGE UP (ref 0.5–2)
MCHC RBC-ENTMCNC: 28.7 PG — SIGNIFICANT CHANGE UP (ref 27–34)
MCHC RBC-ENTMCNC: 32.6 G/DL — SIGNIFICANT CHANGE UP (ref 32–36)
MCV RBC AUTO: 88.2 FL — SIGNIFICANT CHANGE UP (ref 80–100)
NRBC # BLD: 0 /100 WBCS — SIGNIFICANT CHANGE UP (ref 0–0)
NRBC # FLD: 0 K/UL — SIGNIFICANT CHANGE UP (ref 0–0)
PHOSPHATE SERPL-MCNC: 3.4 MG/DL — SIGNIFICANT CHANGE UP (ref 2.5–4.5)
PLATELET # BLD AUTO: 562 K/UL — HIGH (ref 150–400)
POTASSIUM SERPL-MCNC: 4.1 MMOL/L — SIGNIFICANT CHANGE UP (ref 3.5–5.3)
POTASSIUM SERPL-SCNC: 4.1 MMOL/L — SIGNIFICANT CHANGE UP (ref 3.5–5.3)
RBC # BLD: 5.01 M/UL — SIGNIFICANT CHANGE UP (ref 4.2–5.8)
RBC # FLD: 12.9 % — SIGNIFICANT CHANGE UP (ref 10.3–14.5)
RSV RNA NPH QL NAA+NON-PROBE: SIGNIFICANT CHANGE UP
SARS-COV-2 RNA SPEC QL NAA+PROBE: SIGNIFICANT CHANGE UP
SODIUM SERPL-SCNC: 137 MMOL/L — SIGNIFICANT CHANGE UP (ref 135–145)
WBC # BLD: 13.49 K/UL — HIGH (ref 3.8–10.5)
WBC # FLD AUTO: 13.49 K/UL — HIGH (ref 3.8–10.5)

## 2024-11-03 PROCEDURE — 99233 SBSQ HOSP IP/OBS HIGH 50: CPT

## 2024-11-03 PROCEDURE — 71045 X-RAY EXAM CHEST 1 VIEW: CPT | Mod: 26

## 2024-11-03 RX ORDER — SODIUM CHLORIDE 9 MG/ML
1000 INJECTION, SOLUTION INTRAMUSCULAR; INTRAVENOUS; SUBCUTANEOUS
Refills: 0 | Status: DISCONTINUED | OUTPATIENT
Start: 2024-11-03 | End: 2024-11-05

## 2024-11-03 RX ORDER — HEPARIN SODIUM 10000 [USP'U]/ML
800 INJECTION INTRAVENOUS; SUBCUTANEOUS
Qty: 25000 | Refills: 0 | Status: DISCONTINUED | OUTPATIENT
Start: 2024-11-03 | End: 2024-11-03

## 2024-11-03 RX ORDER — HEPARIN SODIUM 10000 [USP'U]/ML
850 INJECTION INTRAVENOUS; SUBCUTANEOUS
Qty: 25000 | Refills: 0 | Status: DISCONTINUED | OUTPATIENT
Start: 2024-11-03 | End: 2024-11-04

## 2024-11-03 RX ORDER — CARVEDILOL 25 MG/1
6.25 TABLET, FILM COATED ORAL EVERY 12 HOURS
Refills: 0 | Status: DISCONTINUED | OUTPATIENT
Start: 2024-11-03 | End: 2024-11-05

## 2024-11-03 RX ORDER — HEPARIN SODIUM 10000 [USP'U]/ML
900 INJECTION INTRAVENOUS; SUBCUTANEOUS
Qty: 25000 | Refills: 0 | Status: DISCONTINUED | OUTPATIENT
Start: 2024-11-03 | End: 2024-11-03

## 2024-11-03 RX ADMIN — Medication 4: at 17:55

## 2024-11-03 RX ADMIN — HEPARIN SODIUM 8.5 UNIT(S)/HR: 10000 INJECTION INTRAVENOUS; SUBCUTANEOUS at 22:54

## 2024-11-03 RX ADMIN — Medication 4: at 09:07

## 2024-11-03 RX ADMIN — CARVEDILOL 6.25 MILLIGRAM(S): 25 TABLET, FILM COATED ORAL at 18:42

## 2024-11-03 RX ADMIN — CHLORHEXIDINE GLUCONATE 1 APPLICATION(S): 40 SOLUTION TOPICAL at 11:39

## 2024-11-03 RX ADMIN — Medication 6 UNIT(S): at 09:07

## 2024-11-03 RX ADMIN — Medication 81 MILLIGRAM(S): at 11:33

## 2024-11-03 RX ADMIN — Medication 6 UNIT(S): at 17:54

## 2024-11-03 RX ADMIN — Medication 10 UNIT(S): at 23:00

## 2024-11-03 RX ADMIN — CARVEDILOL 12.5 MILLIGRAM(S): 25 TABLET, FILM COATED ORAL at 05:58

## 2024-11-03 RX ADMIN — HEPARIN SODIUM 8 UNIT(S)/HR: 10000 INJECTION INTRAVENOUS; SUBCUTANEOUS at 20:01

## 2024-11-03 RX ADMIN — HEPARIN SODIUM 8 UNIT(S)/HR: 10000 INJECTION INTRAVENOUS; SUBCUTANEOUS at 08:02

## 2024-11-03 RX ADMIN — CLOPIDOGREL 75 MILLIGRAM(S): 75 TABLET ORAL at 11:33

## 2024-11-03 RX ADMIN — IPRATROPIUM BROMIDE AND ALBUTEROL SULFATE 3 MILLILITER(S): .5; 2.5 SOLUTION RESPIRATORY (INHALATION) at 11:07

## 2024-11-03 RX ADMIN — HEPARIN SODIUM 8 UNIT(S)/HR: 10000 INJECTION INTRAVENOUS; SUBCUTANEOUS at 15:51

## 2024-11-03 RX ADMIN — Medication 2: at 12:48

## 2024-11-03 RX ADMIN — SODIUM CHLORIDE 50 MILLILITER(S): 9 INJECTION, SOLUTION INTRAMUSCULAR; INTRAVENOUS; SUBCUTANEOUS at 15:17

## 2024-11-03 RX ADMIN — Medication 80 MILLIGRAM(S): at 23:00

## 2024-11-03 RX ADMIN — Medication 6 UNIT(S): at 12:48

## 2024-11-03 RX ADMIN — HEPARIN SODIUM 9 UNIT(S)/HR: 10000 INJECTION INTRAVENOUS; SUBCUTANEOUS at 00:50

## 2024-11-03 NOTE — PROGRESS NOTE ADULT - ASSESSMENT
75 year old Serbian speaking male with PMH of HTN, HLD, DM, p/w  shortness of breath admitted for NSTEMI and flash pulmnonary edema i/s/o HTN emergency. Also found to have Right sided weakness 2/2 CVA admitted for CCU for bipap, lasix, nitroglycerin IV infusion, hydralazine. Now improving. Transferred to medicine for further mgt

## 2024-11-03 NOTE — PROGRESS NOTE ADULT - PROBLEM SELECTOR PLAN 2
--admitted w/ SOB 2/2 Flash pulm edema i/s/o HTN emergency  --Improving  --c/w coreg as above  --Dumaryanne Q6H  --Monitor pressures: SBP goal 140-160

## 2024-11-03 NOTE — PROGRESS NOTE ADULT - ASSESSMENT
Patient is a 69 year old Belarusian speaking male with PMH of HTN, HLD, DM, presents with Acute Pulmonary Edema in the setting of HTN emergency with acute stroke.   In ED, on BiPAP, given lasix, and started nitro drip due to /110. Code stroke called for right side weakness  CTA demonstrated diffuse intracranial and extracranial atherosclerosis, including notably 90% maximal stenosis in carotid bulb and focal occlusion in proximal left vertebral artery. CTA also noted for segmental occlusion of left subclavian artery between thoracic aorta and left vertebral origin. Patient loaded with Plavix 600mg and ASA 325mg, on Plavix 75mg and ASA 81mg. Right Carotid Doppler 50% and left Carotid Dopplers 90%. MRI brain showed high left MCA infarct. Pt, diuresed and  wean off nitro drip with good Blood Pressure  controlled  while in CCU.    Cardic    #NSTEMI  -EKG with NSR LVH, ST depressions in multiple leads, bigeminy and PVCx on tele  #elevated troponin  104 -> 148 -> 190 -> 236 -> 312 -> 298, d/c trending   -NSR on tele  -loaded with ASA and plavix in the ER,   - c/w lipitor 80, ASA 81, and plavix 75  - off nitro gtt since 10/28  -TTE 10/27 showed mildly decreased LVSF w/ EF of 45 to 50 %, with regional wall motion abnormalities. Basal and mid anterior wall, basal and mid anterolateral wall, basal and mid inferolateral wall, and basal inferior segment are abnormal.    - defer cardiac cath in setting of possible acute stroke  Patient is a 69 year old Bulgarian speaking male with PMH of HTN, HLD, DM, presents with Acute Pulmonary Edema in the setting of HTN emergency with acute stroke.   In ED, on BiPAP, given lasix, and started nitro drip due to /110. Code stroke called for right side weakness  CTA demonstrated diffuse intracranial and extracranial atherosclerosis, including notably 90% maximal stenosis in carotid bulb and focal occlusion in proximal left vertebral artery. CTA also noted for segmental occlusion of left subclavian artery between thoracic aorta and left vertebral origin. Patient loaded with Plavix 600mg and ASA 325mg, on Plavix 75mg and ASA 81mg. Right Carotid Doppler 50% and left Carotid Dopplers 90%. MRI brain showed high left MCA infarct. Pt, diuresed and  wean off nitro drip with good Blood Pressure  controlled  while in CCU.    Cardic    #NSTEMI  -EKG with NSR LVH, ST depressions in multiple leads, bigeminy and PVCx on tele  #elevated troponin  104 -> 148 -> 190 -> 236 -> 312 -> 298, d/c trending   -NSR  with PVcs on tele  -loaded with ASA and plavix in the ER,   -c/w lipitor 80, ASA 81, and plavix 75 and carvedilol 12.5  bid   -TTE 10/27 showed mildly decreased LVSF w/ EF of 45 to 50 %, with regional wall motion abnormalities. Basal and mid anterior wall, basal and mid anterolateral wall, basal and mid inferolateral wall, and basal inferior segment are abnormal.  - defer cardiac cath at this admission  in setting of possible acute stroke   - Patient remain Chest pain free    Cardiology will sign off .Please call [phone] with any questions and concerns

## 2024-11-03 NOTE — PROGRESS NOTE ADULT - ASSESSMENT
69 RH Irish speaking male PMHx COPD, HTN, DM presented for hypoxic respiratory distress due to COPD exacerbation vs cardiogenic etiology. In ED, on BiPAP, given lasix, and started nitro drip due to /110. Code stroke called for right side weakness first noticed by family this AM, LKN yesterday evening. Initial VS in ED: /110 prior to nitroglycerin drip, during neurology exam /106. Exam: On BiPAP, AAOx1, following simple commands with occasional confusion, left gaze preference, mild R facial droop, right upper and lower extremity drift, no sensory changes. CTA demonstrated diffuse intracranial and extracranial atherosclerosis, including notably 90% maximal stenosis in carotid bulb and focal occlusion in proximal left vertebral artery. CTA also noted for segmental occlusion of left subclavian artery between thoracic aorta and left vertebral origin. Patient loaded with Plavix 600mg and ASA 325mg, on Plavix 75mg and ASA 81mg. Right Carotid Doppler 50% and left Carotid Dopplers 90%. MRI brain showed high left MCA infarct.    10/31 Heparin drip started after discussion with Dr. Ulrich and Dr. Collins  11/1 [facility] stable- no acute hemorrhage, ok to resume Heparin drip with PTT goal 60-70       Problem/Plan - 1:  ·  Problem: H/O carotid stenosis.   ·  Plan: - PTT goal 60-70, C/w Heparin drip  - C/w Plavix and Asa  - D/w Dr. Farhat Ulrich and Dr. Collins- c/w heparin drip and repeat CTA head and Neck with contrast on TUESDAY 11/5. Will review imaging and determine if stent is needed. If Stent warrented will plan for this on Wednesday    case to be discussed and reviewed with Dr. Collins /Dr. Ulrich    Neurosurgery, ext [phone].

## 2024-11-03 NOTE — PROGRESS NOTE ADULT - SUBJECTIVE AND OBJECTIVE BOX
Patient seen and examined at bedside.    --Anticoagulation--  heparin  Infusion 900 Unit(s)/Hr IV Continuous <Continuous>    T(C): 36.7 (11-02-24 @ 16:41), Max: 36.7 (11-02-24 @ 16:41)  HR: 72 (11-02-24 @ 18:00) (63 - 87)  BP: 164/78 (11-02-24 @ 18:00) (129/84 - 165/89)  RR: 18 (11-02-24 @ 16:41) (18 - 34)  SpO2: 100% (11-02-24 @ 16:41) (93% - 100%)  Wt(kg): --    Exam: Oriented x 3  RUE 0/5 RLE HF 2/5, KE 3/5, DF 2/5, PF 4/5, left side 5/5  right facial asymmetry

## 2024-11-03 NOTE — PROGRESS NOTE ADULT - PROBLEM SELECTOR PLAN 3
-- CTA demonstrated diffuse intracranial and extracranial atherosclerosis, including notably 90% maximal stenosis in carotid bulb and focal occlusion in proximal left vertebral artery. CTA also noted for segmental occlusion of left subclavian artery between thoracic aorta and left vertebral origin.   --c/w heparin gtt  --c/w ASA and Plavix  --C/w statin  --Pending repeat CTA H/N on Tuesday 11/5  --F/u w/ neurosurg s/p CTA   --Monitor pressures: SBP goal 140-160  --ILR Prior to dc  --F/u neuro recs

## 2024-11-03 NOTE — PROGRESS NOTE ADULT - SUBJECTIVE AND OBJECTIVE BOX
Subjective/Objective: Patient alert , oriented . Patient denieschest pain ,sob, dizziness, palpitation, HA    Tele event: NSR with pVcs    MEDICATIONS    aspirin  chewable 81 milliGRAM(s) Oral daily  carvedilol 12.5 milliGRAM(s) Oral every 12 hours  clopidogrel Tablet 75 milliGRAM(s) Oral daily  atorvastatin 80 milliGRAM(s) Oral at bedtime  heparin  Infusion 800 Unit(s)/Hr IV Continuous <Continuous>  hydrALAZINE Injectable 5 milliGRAM(s) IV Push every 6 hours PRN  albuterol    90 MICROgram(s) HFA Inhaler 2 Puff(s) Inhalation two times a day  albuterol/ipratropium for Nebulization 3 milliLiter(s) Nebulizer every 6 hours  acetaminophen     Tablet .. 650 milliGRAM(s) Oral every 6 hours PRN  dextrose 50% Injectable 25 Gram(s) IV Push once  dextrose 50% Injectable 12.5 Gram(s) IV Push once  dextrose Oral Gel 15 Gram(s) Oral once PRN  dextrose Oral Gel 15 Gram(s) Oral once PRN  glucagon  Injectable 1 milliGRAM(s) IntraMuscular once  insulin glargine Injectable (LANTUS) 10 Unit(s) SubCutaneous at bedtime  insulin lispro (ADMELOG) corrective regimen sliding scale   SubCutaneous three times a day before meals  insulin lispro (ADMELOG) corrective regimen sliding scale   SubCutaneous at bedtime  insulin lispro Injectable (ADMELOG) 6 Unit(s) SubCutaneous three times a day before meals  chlorhexidine 2% Cloths 1 Application(s) Topical daily  dextrose 5%. 1000 milliLiter(s) IV Continuous <Continuous>  dextrose 5%. 1000 milliLiter(s) IV Continuous <Continuous>  sodium chloride 0.9%. 1000 milliLiter(s) IV Continuous <Continuous>            ICU Vital Signs Last 24 Hrs  T(C): 36.3 (03 Nov 2024 06:01), Max: 36.7 (02 Nov 2024 16:41)  T(F): 97.4 (03 Nov 2024 06:01), Max: 98.1 (02 Nov 2024 16:41)  HR: 68 (03 Nov 2024 06:01) (64 - 72)  BP: 124/73 (03 Nov 2024 06:01) (118/82 - 165/89)  BP(mean): 112 (02 Nov 2024 15:00) (93 - 112)  ABP: --  ABP(mean): --  RR: 17 (03 Nov 2024 06:01) (17 - 21)  SpO2: 98% (03 Nov 2024 06:01) (94% - 100%)    O2 Parameters below as of 03 Nov 2024 08:00  Patient On (Oxygen Delivery Method): room air            PHYSICAL EXAMINATION  Appearance: NAD, no distress  HEENT: Moist Mucous Membranes, Anicteric, PERRL, EOMI  Cardiovascular: Regular rate and rhythm, Normal S1 S2, No JVD, No murmurs  Respiratory: Lungs clear to auscultation. No rales, No rhonchi, No wheezing.  Gastrointestinal:  Soft, Non-tender, + BS  Neurologic: A&Ox3, Right lower extremity weakness 4/5 strength, decreased sensation, Right upper extremity 0/5,  Skin: Warm and dry, No rashes, No ecchymosis, No cyanosis  Musculoskeletal: No clubbing, No cyanosis, No edema  Vascular: Peripheral pulses palpable 2+ bilaterally  Psychiatry: Mood & affect appropriate      	    		      I&O's Summary    02 Nov 2024 08:01  -  03 Nov 2024 07:00  --------------------------------------------------------  IN: 124 mL / OUT: 650 mL / NET: -526 mL    	     LABS:	  LABORATORY VALUES	 	                          14.4   13.49 )-----------( 562      ( 03 Nov 2024 05:33 )             44.2       11-03    137  |  101  |  27[H]  ----------------------------<  175[H]  4.1   |  21[L]  |  1.04  11-02    138  |  101  |  31[H]  ----------------------------<  255[H]  4.3   |  24  |  1.11    Ca    9.7      03 Nov 2024 05:33  Ca    9.3      02 Nov 2024 05:15  Phos  3.4     11-03  Phos  3.4     11-02  Mg     2.40     11-02    TPro  7.1  /  Alb  3.3  /  TBili  0.4  /  DBili  x   /  AST  42[H]  /  ALT  51[H]  /  AlkPhos  59  11-02    LIVER FUNCTIONS - ( 02 Nov 2024 05:15 )  Alb: 3.3 g/dL / Pro: 7.1 g/dL / ALK PHOS: 59 U/L / ALT: 51 U/L / AST: 42 U/L / GGT: x           Activated Partial Thromboplastin Time: 78.0 sec (11-03 @ 05:33)      CARDIAC MARKERS:              10-30 @ 04:18  Cholesterol, Serum - 138  Direct LDL- --  HDL Cholesterol, Serum- 28  Triglycerides, Serum- 163      Thyroid Stimulating Hormone, Serum: 1.27 uIU/mL (10-30 @ 04:18)      Urinalysis Basic - ( 03 Nov 2024 05:33 )    Color: x / Appearance: x / SG: x / pH: x  Gluc: 175 mg/dL / Ketone: x  / Bili: x / Urobili: x   Blood: x / Protein: x / Nitrite: x   Leuk Esterase: x / RBC: x / WBC x   Sq Epi: x / Non Sq Epi: x / Bacteria: x      CAPILLARY BLOOD GLUCOSE      POCT Blood Glucose.: 186 mg/dL (03 Nov 2024 12:27)          Echo:  CONCLUSIONS:      1. Left ventricular systolic function is mildly decreased with an ejection fraction visually estimated at 45 to 50 %. Regional wall motion abnormalities present.   2. Basal and mid anterior wall, basal and mid anterolateral wall, basal and mid inferolateral wall, and basal inferior segment are abnormal.   3. Left atrium is normal in size.   4. The right atrium is normal in size.   5. Normal right ventricular cavity size, with normal wall thickness, and probably normal right ventricular systolic function.

## 2024-11-03 NOTE — PROGRESS NOTE ADULT - SUBJECTIVE AND OBJECTIVE BOX
Patient is a 69y old  Male who presents with a chief complaint of concern for stroke (03 Nov 2024 15:41)    HPI:  Patient is a 75 year old Persian speaking male with PMH of HTN, HLD, DM, presents with shortness of breath over 3-4 days which acutely worsened today. Patient also initially complained of chest pain and noted with elevated BP to 190/110. Acute management in the ED included BiPAP, lasix. Patient was placed on nitroglycerin drip due to concern for possible ACS. Chest xray noted with bilateral pulmonary edema which appears cardiac in origin. Patient's respiratory status stabilized on BiPAP. While in the ED, patient's sons reported that patient also began to have right arm weakness first noticed this morning. During this time, patient also appeared slightly confused and required assistance to walk. Patient was last seen at his baseline yesterday evening at 2100 when he was AAOx3 and did not have difficulties using his right side. Code stroke was called for right sided weakness. Upon stroke team assessment, patient is on BiPAP and does not appear in respiratory distress. BP at time of assessment is 130s systolic (nitroglycerin drip was previously discontinued). Patient denies history of stroke. Patient takes aspirin for cardioprotection. Ambulates independently at baseline, lives with family and requires some assistance with taking medications. LKN- 10/26- 2100. NIHSS-7 ( L gaze preference R facial, RUE/RLE drift, aphasia)    Interval history: Downgraded to medicine floor    Subejctive: Examined at bedside. Patient was sleeping, arousable to verbal stimulus.      Vital Signs Last 24 Hrs  T(C): 36.8 (03 Nov 2024 18:42), Max: 36.8 (03 Nov 2024 18:42)  T(F): 98.2 (03 Nov 2024 18:42), Max: 98.2 (03 Nov 2024 18:42)  HR: 81 (03 Nov 2024 18:42) (64 - 97)  BP: 129/80 (03 Nov 2024 18:42) (97/62 - 132/78)  BP(mean): --  RR: 18 (03 Nov 2024 18:42) (17 - 18)  SpO2: 100% (03 Nov 2024 18:42) (98% - 100%)    Parameters below as of 03 Nov 2024 18:42  Patient On (Oxygen Delivery Method): room air          Physical Exam:  Constitutional: Lying in bed  Neuro  * Mental Status:  Asleep awakes to verbal stimulus, oriented to conversation. Mild dysarthria.   * Cranial Nerves:  PERRL, R. facial droop, EOMI, left gaze preference  * Motor: RUE 0/5, LUE 5/5, RLE 2/5, LLE 5/5, drift or dysmetria couldn't be tested on R  * Sensory: Sensation intact to light touch and pain  * Reflexes: not assessed   Cardiovascular: Regular rate and rhythm.  Eyes: See neurologic examination with detailed examination of eyes.  Genitourinary: [ ] Quick, [ x ] No Quick.   Skin:  no wounds, no redness, no abrasions noted      LABS:                        14.4   13.49 )-----------( 562      ( 03 Nov 2024 05:33 )             44.2     11-03    137  |  101  |  27[H]  ----------------------------<  175[H]  4.1   |  21[L]  |  1.04    Ca    9.7      03 Nov 2024 05:33  Phos  3.4     11-03  Mg     2.40     11-02    TPro  7.1  /  Alb  3.3  /  TBili  0.4  /  DBili  x   /  AST  42[H]  /  ALT  51[H]  /  AlkPhos  59  11-02    PTT - ( 03 Nov 2024 15:00 )  PTT:65.3 sec  Urinalysis Basic - ( 03 Nov 2024 05:33 )    Color: x / Appearance: x / SG: x / pH: x  Gluc: 175 mg/dL / Ketone: x  / Bili: x / Urobili: x   Blood: x / Protein: x / Nitrite: x   Leuk Esterase: x / RBC: x / WBC x   Sq Epi: x / Non Sq Epi: x / Bacteria: x      CULTURES:  Culture Results:   No growth at 5 days (10-27 @ 17:37)  Culture Results:   No growth at 5 days (10-27 @ 14:00)        I&O:       Medications:    RADIOLOGY & ADDITIONAL STUDIES:

## 2024-11-03 NOTE — PROGRESS NOTE ADULT - PROBLEM SELECTOR PLAN 1
--EKG on admission with NSR LVH, ST depressions in multiple leads, bigeminy and PVCx on tele  --Trops 104---> 298  --Cardiac cath deferred given c/f acute CVA  --loaded with ASA and Plavix in the ER,   -c/w Lipitor 80, ASA 81, and Plavix 75 and carvedilol - decrease to 6.25  bid given hypotension  --Appreciate cardio recs

## 2024-11-03 NOTE — CHART NOTE - NSCHARTNOTEFT_GEN_A_CORE
Notified by RN that family was concerned that patient is weaker today. Pt seen and examined at bedside. Family at bedside provided translation. Pt AAOX4, does not appear aphasic or dysarthric however hard to assess given language barrier. Neurology exam remarkable for 0/5 strength RUE, 1/5 strength RLE, 5/5 strength LUE and LLE. PERRLA, EOMI. No facial droop noted. Generalized weakness likely from deconditioning and hypotension (SBP low today running 90s, 100s) given patient is perfusion dependent for the severe stenosis seen on imaging. IVF Started at a low rate 50cc/hr given pt was admitted with pulmonary edema and has reduced EF 45-50%. Pt has had leukocytosis for which infectious workup was done on admission that was negative. Family reports pt continues to cough therefore repeat infectious workup was sent which is thus far unremarkable (CXR Mild pulmonary venous congestion/perihilar interstitial edema/infiltrates, improved., VBG/Lactate WNL, RVP neg, Bcx sent). Pt without any other symptoms and family states patient has not experienced any difficulty with eating. If cough/Leukocytosis persists or if pt spikes fever, consider CT Chest to r/o aspiration PNA. Neurology made aware of generalized weakness, agreed w/ IVF to keep SBP Goal 140-180. Discussed with Dr. Cortés.

## 2024-11-03 NOTE — PROGRESS NOTE ADULT - SUBJECTIVE AND OBJECTIVE BOX
Yue Cortés        Patient is a 69y old  Male who presents with a chief complaint of concern for stroke (03 Nov 2024 13:24)      SUBJECTIVE / OVERNIGHT EVENTS: No acute overnight events. This morning pt doing well. c/o generalized weakness otherwise no complaints.       MEDICATIONS  (STANDING):  albuterol    90 MICROgram(s) HFA Inhaler 2 Puff(s) Inhalation two times a day  albuterol/ipratropium for Nebulization 3 milliLiter(s) Nebulizer every 6 hours  aspirin  chewable 81 milliGRAM(s) Oral daily  atorvastatin 80 milliGRAM(s) Oral at bedtime  carvedilol 6.25 milliGRAM(s) Oral every 12 hours  chlorhexidine 2% Cloths 1 Application(s) Topical daily  clopidogrel Tablet 75 milliGRAM(s) Oral daily  dextrose 5%. 1000 milliLiter(s) (100 mL/Hr) IV Continuous <Continuous>  dextrose 5%. 1000 milliLiter(s) (50 mL/Hr) IV Continuous <Continuous>  dextrose 50% Injectable 25 Gram(s) IV Push once  dextrose 50% Injectable 12.5 Gram(s) IV Push once  glucagon  Injectable 1 milliGRAM(s) IntraMuscular once  heparin  Infusion 800 Unit(s)/Hr (8 mL/Hr) IV Continuous <Continuous>  insulin glargine Injectable (LANTUS) 10 Unit(s) SubCutaneous at bedtime  insulin lispro (ADMELOG) corrective regimen sliding scale   SubCutaneous three times a day before meals  insulin lispro (ADMELOG) corrective regimen sliding scale   SubCutaneous at bedtime  insulin lispro Injectable (ADMELOG) 6 Unit(s) SubCutaneous three times a day before meals  sodium chloride 0.9%. 1000 milliLiter(s) (50 mL/Hr) IV Continuous <Continuous>    MEDICATIONS  (PRN):  acetaminophen     Tablet .. 650 milliGRAM(s) Oral every 6 hours PRN Temp greater or equal to 38C (100.4F), Moderate Pain (4 - 6), Severe Pain (7 - 10)  dextrose Oral Gel 15 Gram(s) Oral once PRN Blood Glucose LESS THAN 70 milliGRAM(s)/deciliter  dextrose Oral Gel 15 Gram(s) Oral once PRN Blood Glucose LESS THAN 70 milliGRAM(s)/deciliter    Allergies    No Known Allergies    Intolerances        Vital Signs Last 24 Hrs  T(C): 36.7 (03 Nov 2024 14:00), Max: 36.7 (02 Nov 2024 16:41)  T(F): 98.1 (03 Nov 2024 14:00), Max: 98.1 (02 Nov 2024 16:41)  HR: 97 (03 Nov 2024 14:00) (64 - 97)  BP: 102/66 (03 Nov 2024 14:00) (97/62 - 164/78)  BP(mean): --  RR: 18 (03 Nov 2024 14:00) (17 - 18)  SpO2: 100% (03 Nov 2024 14:00) (98% - 100%)    Parameters below as of 03 Nov 2024 14:00  Patient On (Oxygen Delivery Method): room air      Daily     Daily   CAPILLARY BLOOD GLUCOSE      POCT Blood Glucose.: 186 mg/dL (03 Nov 2024 12:27)  POCT Blood Glucose.: 205 mg/dL (03 Nov 2024 09:05)  POCT Blood Glucose.: 194 mg/dL (03 Nov 2024 08:01)  POCT Blood Glucose.: 159 mg/dL (02 Nov 2024 23:18)  POCT Blood Glucose.: 156 mg/dL (02 Nov 2024 17:54)    I&O's Summary    02 Nov 2024 08:01  -  03 Nov 2024 07:00  --------------------------------------------------------  IN: 124 mL / OUT: 650 mL / NET: -526 mL        PHYSICAL EXAM:  GENERAL: NAD  HEAD:  Atraumatic, Normocephalic  EYES: EOMI,  conjunctiva and sclera clear  NECK: Supple  CHEST/LUNG: Clear to auscultation bilaterally; No wheeze, crackles or rhonchi   HEART: Regular rate and rhythm; Normal S1 S2, No murmurs, rubs, or gallops  ABDOMEN: Soft, Nontender, Nondistended; Bowel sounds present  EXTREMITIES:  2+ Peripheral Pulses, No edema  PSYCH: AAOx2-3  NEUROLOGY: +RUE Weakness 0/5, RLL weakness 2/5. Left upper and lower ext strength intact. sensation intact  SKIN: Warm and dry    LABS:                        14.4   13.49 )-----------( 562      ( 03 Nov 2024 05:33 )             44.2     Hgb Trend: 14.4<--, 12.7<--, 13.3<--, 13.5<--, 12.5<--  11-03    137  |  101  |  27[H]  ----------------------------<  175[H]  4.1   |  21[L]  |  1.04    Ca    9.7      03 Nov 2024 05:33  Phos  3.4     11-03  Mg     2.40     11-02    TPro  7.1  /  Alb  3.3  /  TBili  0.4  /  DBili  x   /  AST  42[H]  /  ALT  51[H]  /  AlkPhos  59  11-02    Creatinine Trend: 1.04<--, 1.11<--, 1.39<--, 1.59<--, 1.87<--, 1.73<--  LIVER FUNCTIONS - ( 02 Nov 2024 05:15 )  Alb: 3.3 g/dL / Pro: 7.1 g/dL / ALK PHOS: 59 U/L / ALT: 51 U/L / AST: 42 U/L / GGT: x           PTT - ( 03 Nov 2024 15:00 )  PTT:65.3 sec      Urinalysis Basic - ( 03 Nov 2024 05:33 )    Color: x / Appearance: x / SG: x / pH: x  Gluc: 175 mg/dL / Ketone: x  / Bili: x / Urobili: x   Blood: x / Protein: x / Nitrite: x   Leuk Esterase: x / RBC: x / WBC x   Sq Epi: x / Non Sq Epi: x / Bacteria: x        RADIOLOGY & ADDITIONAL TESTS:    Imaging Personally Reviewed.    Consultant(s) Notes Reviewed.    Care Discussed with Consultants/Other Providers.

## 2024-11-03 NOTE — PROGRESS NOTE ADULT - PROBLEM SELECTOR PLAN 6
--Pt w/ generalized weakness today per family  --Suspect multifactorial i/s/o CVA and  hospitalization/ deconditioning  --Obtain UA, RVP w/ covid, blood cx x 2 given leukocytosis  --Monitor off abx for now  --Obtain PT eval

## 2024-11-03 NOTE — PROGRESS NOTE ADULT - PROBLEM SELECTOR PLAN 4
--C/w Lantus 10units at bedtime  --ISS  --Monitor fingersticks  --Consider endocrinology consult in the AM given elevated A1C

## 2024-11-03 NOTE — PROGRESS NOTE ADULT - ASSESSMENT
69 RH Maltese speaking male PMHx COPD, HTN, DM admitted for NSTEMI. Patient also with worsening right sided weakness found to have Left MCA acute stroke. CTA shows severe stenosis of L ICA.     Impression: Stable right hemiparesis, mixed aphasia with dysarthria and with improving right gaze palsy due to left MCA acute ischemic stroke. 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 Chappell           69 RH Romansh speaking male PMHx COPD, HTN, DM admitted for NSTEMI. Patient also with worsening right sided weakness found to have Left hemispheric internal bordezone infarction. CTA shows severe stenosis of L ICA.     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    Recommendation:  [] Continue AC as per neurosurgery for potential revascularization procedure (may have non-occlusive intraluminal thrombus vs just stenosis in L siphon)  [] Further workup for revascularization as per NSGY   [] ANTITHROMBOTIC THERAPY: Continue ASA 81 mg and plavix 75 mg daily as per SAMMPRIS ( no C/i as per neurosurgery notes)  [] Loop recorder prior to discharge  [] Telemonitoring  [] Q4 Neurochecks   [x] MRI  [x] TTE                   [x] LDL: 87, HbA1c:9.4  [x] Atorvastatin 80 mg HS Titrate LDL < 70  [x]  Physical therapy/OT/Speech Language: TESSIE    SBP goal: -180     Plan discussed with stroke fellow Dr. Chappell

## 2024-11-04 LAB
ALBUMIN SERPL ELPH-MCNC: 2.9 G/DL — LOW (ref 3.3–5)
ALP SERPL-CCNC: 53 U/L — SIGNIFICANT CHANGE UP (ref 40–120)
ALT FLD-CCNC: 57 U/L — HIGH (ref 4–41)
ANION GAP SERPL CALC-SCNC: 9 MMOL/L — SIGNIFICANT CHANGE UP (ref 7–14)
APTT BLD: 56.4 SEC — HIGH (ref 24.5–35.6)
APTT BLD: 61.7 SEC — HIGH (ref 24.5–35.6)
APTT BLD: 88.7 SEC — HIGH (ref 24.5–35.6)
AST SERPL-CCNC: 39 U/L — SIGNIFICANT CHANGE UP (ref 4–40)
BASOPHILS # BLD AUTO: 0.05 K/UL — SIGNIFICANT CHANGE UP (ref 0–0.2)
BASOPHILS NFR BLD AUTO: 0.4 % — SIGNIFICANT CHANGE UP (ref 0–2)
BILIRUB SERPL-MCNC: 0.4 MG/DL — SIGNIFICANT CHANGE UP (ref 0.2–1.2)
BUN SERPL-MCNC: 24 MG/DL — HIGH (ref 7–23)
CALCIUM SERPL-MCNC: 7.9 MG/DL — LOW (ref 8.4–10.5)
CHLORIDE SERPL-SCNC: 109 MMOL/L — HIGH (ref 98–107)
CO2 SERPL-SCNC: 22 MMOL/L — SIGNIFICANT CHANGE UP (ref 22–31)
CREAT SERPL-MCNC: 0.98 MG/DL — SIGNIFICANT CHANGE UP (ref 0.5–1.3)
EGFR: 83 ML/MIN/1.73M2 — SIGNIFICANT CHANGE UP
EOSINOPHIL # BLD AUTO: 0.44 K/UL — SIGNIFICANT CHANGE UP (ref 0–0.5)
EOSINOPHIL NFR BLD AUTO: 3.8 % — SIGNIFICANT CHANGE UP (ref 0–6)
GLUCOSE BLDC GLUCOMTR-MCNC: 184 MG/DL — HIGH (ref 70–99)
GLUCOSE SERPL-MCNC: 181 MG/DL — HIGH (ref 70–99)
HCT VFR BLD CALC: 39.1 % — SIGNIFICANT CHANGE UP (ref 39–50)
HGB BLD-MCNC: 12.8 G/DL — LOW (ref 13–17)
IANC: 6.68 K/UL — SIGNIFICANT CHANGE UP (ref 1.8–7.4)
IMM GRANULOCYTES NFR BLD AUTO: 0.5 % — SIGNIFICANT CHANGE UP (ref 0–0.9)
LYMPHOCYTES # BLD AUTO: 29.1 % — SIGNIFICANT CHANGE UP (ref 13–44)
LYMPHOCYTES # BLD AUTO: 3.38 K/UL — HIGH (ref 1–3.3)
MAGNESIUM SERPL-MCNC: 1.9 MG/DL — SIGNIFICANT CHANGE UP (ref 1.6–2.6)
MCHC RBC-ENTMCNC: 28.5 PG — SIGNIFICANT CHANGE UP (ref 27–34)
MCHC RBC-ENTMCNC: 32.7 G/DL — SIGNIFICANT CHANGE UP (ref 32–36)
MCV RBC AUTO: 87.1 FL — SIGNIFICANT CHANGE UP (ref 80–100)
MONOCYTES # BLD AUTO: 0.99 K/UL — HIGH (ref 0–0.9)
MONOCYTES NFR BLD AUTO: 8.5 % — SIGNIFICANT CHANGE UP (ref 2–14)
NEUTROPHILS # BLD AUTO: 6.68 K/UL — SIGNIFICANT CHANGE UP (ref 1.8–7.4)
NEUTROPHILS NFR BLD AUTO: 57.7 % — SIGNIFICANT CHANGE UP (ref 43–77)
NRBC # BLD: 0 /100 WBCS — SIGNIFICANT CHANGE UP (ref 0–0)
NRBC # FLD: 0 K/UL — SIGNIFICANT CHANGE UP (ref 0–0)
PHOSPHATE SERPL-MCNC: 2.6 MG/DL — SIGNIFICANT CHANGE UP (ref 2.5–4.5)
PLATELET # BLD AUTO: 523 K/UL — HIGH (ref 150–400)
POTASSIUM SERPL-MCNC: 3.7 MMOL/L — SIGNIFICANT CHANGE UP (ref 3.5–5.3)
POTASSIUM SERPL-SCNC: 3.7 MMOL/L — SIGNIFICANT CHANGE UP (ref 3.5–5.3)
PROT SERPL-MCNC: 6 G/DL — SIGNIFICANT CHANGE UP (ref 6–8.3)
RBC # BLD: 4.49 M/UL — SIGNIFICANT CHANGE UP (ref 4.2–5.8)
RBC # FLD: 13 % — SIGNIFICANT CHANGE UP (ref 10.3–14.5)
SODIUM SERPL-SCNC: 140 MMOL/L — SIGNIFICANT CHANGE UP (ref 135–145)
WBC # BLD: 11.6 K/UL — HIGH (ref 3.8–10.5)
WBC # FLD AUTO: 11.6 K/UL — HIGH (ref 3.8–10.5)

## 2024-11-04 PROCEDURE — 99222 1ST HOSP IP/OBS MODERATE 55: CPT

## 2024-11-04 PROCEDURE — 99233 SBSQ HOSP IP/OBS HIGH 50: CPT

## 2024-11-04 RX ORDER — HEPARIN SODIUM 10000 [USP'U]/ML
900 INJECTION INTRAVENOUS; SUBCUTANEOUS
Qty: 25000 | Refills: 0 | Status: DISCONTINUED | OUTPATIENT
Start: 2024-11-04 | End: 2024-11-04

## 2024-11-04 RX ORDER — INSULIN LISPRO 100/ML
2 VIAL (ML) SUBCUTANEOUS
Refills: 0 | Status: DISCONTINUED | OUTPATIENT
Start: 2024-11-04 | End: 2024-11-04

## 2024-11-04 RX ORDER — INSULIN GLARGINE,HUM.REC.ANLOG 100/ML
12 VIAL (ML) SUBCUTANEOUS AT BEDTIME
Refills: 0 | Status: DISCONTINUED | OUTPATIENT
Start: 2024-11-04 | End: 2024-11-05

## 2024-11-04 RX ORDER — INSULIN GLARGINE,HUM.REC.ANLOG 100/ML
15 VIAL (ML) SUBCUTANEOUS AT BEDTIME
Refills: 0 | Status: DISCONTINUED | OUTPATIENT
Start: 2024-11-04 | End: 2024-11-04

## 2024-11-04 RX ORDER — HEPARIN SODIUM 10000 [USP'U]/ML
700 INJECTION INTRAVENOUS; SUBCUTANEOUS
Qty: 25000 | Refills: 0 | Status: DISCONTINUED | OUTPATIENT
Start: 2024-11-04 | End: 2024-11-05

## 2024-11-04 RX ORDER — INSULIN LISPRO 100/ML
VIAL (ML) SUBCUTANEOUS
Refills: 0 | Status: DISCONTINUED | OUTPATIENT
Start: 2024-11-04 | End: 2024-11-05

## 2024-11-04 RX ORDER — MAGNESIUM SULFATE IN 0.9% NACL 2 G/50 ML
1 INTRAVENOUS SOLUTION, PIGGYBACK (ML) INTRAVENOUS ONCE
Refills: 0 | Status: COMPLETED | OUTPATIENT
Start: 2024-11-04 | End: 2024-11-04

## 2024-11-04 RX ORDER — HEPARIN SODIUM 10000 [USP'U]/ML
800 INJECTION INTRAVENOUS; SUBCUTANEOUS
Qty: 25000 | Refills: 0 | Status: DISCONTINUED | OUTPATIENT
Start: 2024-11-04 | End: 2024-11-04

## 2024-11-04 RX ORDER — POTASSIUM CHLORIDE 10 MEQ
40 TABLET, EXTENDED RELEASE ORAL ONCE
Refills: 0 | Status: COMPLETED | OUTPATIENT
Start: 2024-11-04 | End: 2024-11-04

## 2024-11-04 RX ORDER — INSULIN LISPRO 100/ML
8 VIAL (ML) SUBCUTANEOUS
Refills: 0 | Status: DISCONTINUED | OUTPATIENT
Start: 2024-11-04 | End: 2024-11-05

## 2024-11-04 RX ADMIN — Medication 81 MILLIGRAM(S): at 12:05

## 2024-11-04 RX ADMIN — HEPARIN SODIUM 8 UNIT(S)/HR: 10000 INJECTION INTRAVENOUS; SUBCUTANEOUS at 20:13

## 2024-11-04 RX ADMIN — Medication 80 MILLIGRAM(S): at 22:03

## 2024-11-04 RX ADMIN — Medication 8 UNIT(S): at 17:46

## 2024-11-04 RX ADMIN — HEPARIN SODIUM 8 UNIT(S)/HR: 10000 INJECTION INTRAVENOUS; SUBCUTANEOUS at 17:05

## 2024-11-04 RX ADMIN — Medication 2: at 12:11

## 2024-11-04 RX ADMIN — HEPARIN SODIUM 9 UNIT(S)/HR: 10000 INJECTION INTRAVENOUS; SUBCUTANEOUS at 08:15

## 2024-11-04 RX ADMIN — IPRATROPIUM BROMIDE AND ALBUTEROL SULFATE 3 MILLILITER(S): .5; 2.5 SOLUTION RESPIRATORY (INHALATION) at 10:32

## 2024-11-04 RX ADMIN — CHLORHEXIDINE GLUCONATE 1 APPLICATION(S): 40 SOLUTION TOPICAL at 12:04

## 2024-11-04 RX ADMIN — CLOPIDOGREL 75 MILLIGRAM(S): 75 TABLET ORAL at 12:05

## 2024-11-04 RX ADMIN — IPRATROPIUM BROMIDE AND ALBUTEROL SULFATE 3 MILLILITER(S): .5; 2.5 SOLUTION RESPIRATORY (INHALATION) at 21:36

## 2024-11-04 RX ADMIN — HEPARIN SODIUM 7 UNIT(S)/HR: 10000 INJECTION INTRAVENOUS; SUBCUTANEOUS at 23:26

## 2024-11-04 RX ADMIN — CARVEDILOL 6.25 MILLIGRAM(S): 25 TABLET, FILM COATED ORAL at 17:05

## 2024-11-04 RX ADMIN — Medication 2: at 17:47

## 2024-11-04 RX ADMIN — Medication 40 MILLIEQUIVALENT(S): at 09:44

## 2024-11-04 RX ADMIN — CARVEDILOL 6.25 MILLIGRAM(S): 25 TABLET, FILM COATED ORAL at 05:05

## 2024-11-04 RX ADMIN — IPRATROPIUM BROMIDE AND ALBUTEROL SULFATE 3 MILLILITER(S): .5; 2.5 SOLUTION RESPIRATORY (INHALATION) at 14:59

## 2024-11-04 RX ADMIN — Medication 100 GRAM(S): at 09:44

## 2024-11-04 RX ADMIN — Medication 12 UNIT(S): at 22:02

## 2024-11-04 RX ADMIN — Medication 8 UNIT(S): at 12:12

## 2024-11-04 NOTE — CONSULT NOTE ADULT - SUBJECTIVE AND OBJECTIVE BOX
Patient is a 69y old  Male who presents with a chief complaint of concern for stroke (04 Nov 2024 08:09)      HPI:  Patient is a 75 year old Portuguese speaking male with PMH of HTN, HLD, DM, presents with shortness of breath over 3-4 days which acutely worsened today. Patient also initially complained of chest pain and noted with elevated BP to 190/110. Acute management in the ED included BiPAP, lasix. Patient was placed on nitroglycerin drip due to concern for possible ACS. Chest xray noted with bilateral pulmonary edema which appears cardiac in origin. Patient's respiratory status stabilized on BiPAP. While in the ED, patient's sons reported that patient also began to have right arm weakness first noticed this morning. During this time, patient also appeared slightly confused and required assistance to walk. Patient was last seen at his baseline yesterday evening at 2100 when he was AAOx3 and did not have difficulties using his right side. Code stroke was called for right sided weakness.   Upon stroke team assessment, patient is on BiPAP and does not appear in respiratory distress. BP at time of assessment is 130s systolic (nitroglycerin drip was previously discontinued). Patient denies history of stroke. Patient takes aspirin for cardioprotection. Ambulates independently at baseline, lives with family and requires some assistance with taking medications.     CCU consulted because patient requiring BIPAP for difficulty breathing and requiring nitro gtt for BP elevated to 220/110.   (27 Oct 2024 16:04)      REVIEW OF SYSTEMS  Constitutional - No fever, No weight loss, No fatigue  HEENT - No eye pain, No visual disturbances, No difficulty hearing, No tinnitus, No vertigo, No neck pain  Respiratory - No cough, No wheezing, No shortness of breath  Cardiovascular - No chest pain, No palpitations  Gastrointestinal - No abdominal pain, No nausea, No vomiting, No diarrhea, No constipation  Genitourinary - No dysuria, No frequency, No hematuria, No incontinence  Neurological - No headaches, No memory loss, No loss of strength, No numbness, No tremors  Skin - No itching, No rashes, No lesions   Endocrine - No temperature intolerance  Musculoskeletal - No joint pain, No joint swelling, No muscle pain  Psychiatric - No depression, No anxiety    PAST MEDICAL & SURGICAL HISTORY  Diabetes mellitus    HTN (hypertension)    HLD (hyperlipidemia)    Hyperlipidemia        SOCIAL HISTORY  Smoking - Denied  EtOH - Denied   Drugs - Denied    FUNCTIONAL HISTORY  Lives with family  Independent    CURRENT FUNCTIONAL STATUS  11/2  Bed Mobility  Bed Mobility Training Rehab Potential: good, to achieve stated therapy goals  Bed Mobility Training Symptoms Noted During/After Treatment: none  Bed Mobility Training Supine-to-Sit: moderate assist (50% patient effort);  2 person assist;  verbal cues  Bed Mobility Training Limitations: decreased ability to use legs for bridging/pushing;  impaired ability to control trunk for mobility;  decreased ability to use arms for pushing/pulling;  decreased strength;  impaired motor control;  impaired coordination    Bed-Chair Transfer Training  Bed-to-Chair Transfer Training Rehab Potential: good, to achieve stated therapy goals  Bed-to-Chair Transfer Training Symptoms Noted During/After Treatment: none  Transfer Training Bed-to-Chair Transfer: minimum assist (75% patient effort);  2 person assist;  verbal cues;  full weight-bearing   via roseanne steady  Bed-to-Chair Transfer Training Transfer Safety Analysis: decreased balance;  decreased weight-shifting ability;  decreased strength;  impaired coordination;  impaired motor control;  via roseanne steady    Sit-Stand Transfer Training  Sit-to-Stand Transfer Training Rehab Potential: good, to achieve stated therapy goals  Sit-to-Stand Transfer Training Symptoms Noted During/After Treatment: none  Transfer Training Sit-to-Stand Transfer: minimum assist (75% patient effort);  2 person assist;  verbal cues;  full weight-bearing   via roseanne steady  Transfer Training Stand-to-Sit Transfer: minimum assist (75% patient effort);  2 person assist;  verbal cues;  full weight-bearing   via roseanne steady  Sit-to-Stand Transfer Training Transfer Safety Analysis: decreased balance;  decreased weight-shifting ability;  decreased strength;  impaired motor control;  impaired coordination;  via roseanne steady    Therapeutic Exercise  Therapeutic Exercise Rehab Effort: good  Therapeutic Exercise Symptoms Noted During/After Treatment: none  Therapeutic Exercise Detail: Seated active ROM knee extension x10 bilaterally     FAMILY HISTORY       RECENT LABS/IMAGING  < from: MR Head No Cont (10.29.24 @ 10:56) >    ACC: [id number] EXAM:  MR BRAIN   ORDERED BY: AURE ATKINSON     PROCEDURE DATE:  10/29/2024          INTERPRETATION:  EXAM: MRI OF THE BRAIN WITHOUT CONTRAST    HISTORY: Stroke    TECHNIQUE: Multi-planar multi-sequential MR imaging of the brain was   performed without intravenous contrast.    COMPARISON: CT/CTA of the head October 27, 2024.    FINDINGS:    Multiple foci of diffusion restriction in the high left MCA watershed   distribution, compatible with acute infarcts. Multiple foci of increased   T2/FLAIR signal throughout the deep and periventricular white matter,   compatible with chronic small vessel disease. Chronic infarcts in the   left corona radiata and mary/left middle cerebellar peduncle. Parenchymal   volume loss resulting in a mild ex vacuo dilatation appearance of the   bilateral ventricles. The visualized extra axial spaces and basal   cisterns are within normal limits. No midline shift or mass effect   present.    The craniocervical junction is within normal limits. The pituitary is   unremarkable. Irregular flow void in the cavernous segment of the left   ICA. The remainder of the major intracranial vessels demonstrate the   expected signal void related to vascular flow. The paranasal sinuses are   well aerated. Smallbilateral mastoid effusions, left larger than right.   The visualized orbits are within normal limits.      IMPRESSION:    1.  Acute infarcts in the high left MCA watershed distribution.  2.  Irregular flow-void in the cavernous segment of the left ICA. This   could be attributed to slow/turbulent flow. A severe stenosis is not   entirely excluded. Correlate with recent CTA of the head for further   evaluation.  3.  Chronic ischemic changes.    --- End of Report ---            DEANNE BETH MD; Attending Radiologist  This document has been electronically signed. Oct 29 2024 11:07AM    < end of copied text >    CBC Full  -  ( 04 Nov 2024 07:00 )  WBC Count : 11.60 K/uL  RBC Count : 4.49 M/uL  Hemoglobin : 12.8 g/dL  Hematocrit : 39.1 %  Platelet Count - Automated : 523 K/uL  Mean Cell Volume : 87.1 fL  Mean Cell Hemoglobin : 28.5 pg  Mean Cell Hemoglobin Concentration : 32.7 g/dL  Auto Neutrophil # : 6.68 K/uL  Auto Lymphocyte # : 3.38 K/uL  Auto Monocyte # : 0.99 K/uL  Auto Eosinophil # : 0.44 K/uL  Auto Basophil # : 0.05 K/uL  Auto Neutrophil % : 57.7 %  Auto Lymphocyte % : 29.1 %  Auto Monocyte % : 8.5 %  Auto Eosinophil % : 3.8 %  Auto Basophil % : 0.4 %    11-04    140  |  109[H]  |  24[H]  ----------------------------<  181[H]  3.7   |  22  |  0.98    Ca    7.9[L]      04 Nov 2024 07:00  Phos  2.6     11-04  Mg     1.90     11-04    TPro  6.0  /  Alb  2.9[L]  /  TBili  0.4  /  DBili  x   /  AST  39  /  ALT  57[H]  /  AlkPhos  53  11-04    Urinalysis Basic - ( 04 Nov 2024 07:00 )    Color: x / Appearance: x / SG: x / pH: x  Gluc: 181 mg/dL / Ketone: x  / Bili: x / Urobili: x   Blood: x / Protein: x / Nitrite: x   Leuk Esterase: x / RBC: x / WBC x   Sq Epi: x / Non Sq Epi: x / Bacteria: x        VITALS  T(C): 36.7 (11-04-24 @ 09:10), Max: 36.8 (11-03-24 @ 18:42)  HR: 72 (11-04-24 @ 09:10) (72 - 97)  BP: 113/78 (11-04-24 @ 09:10) (102/66 - 133/86)  RR: 18 (11-04-24 @ 09:10) (17 - 18)  SpO2: 98% (11-04-24 @ 09:10) (98% - 100%)  Wt(kg): --    ALLERGIES  No Known Allergies      MEDICATIONS   acetaminophen     Tablet .. 650 milliGRAM(s) Oral every 6 hours PRN  albuterol    90 MICROgram(s) HFA Inhaler 2 Puff(s) Inhalation two times a day  albuterol/ipratropium for Nebulization 3 milliLiter(s) Nebulizer every 6 hours  aspirin  chewable 81 milliGRAM(s) Oral daily  atorvastatin 80 milliGRAM(s) Oral at bedtime  carvedilol 6.25 milliGRAM(s) Oral every 12 hours  chlorhexidine 2% Cloths 1 Application(s) Topical daily  clopidogrel Tablet 75 milliGRAM(s) Oral daily  dextrose 5%. 1000 milliLiter(s) IV Continuous <Continuous>  dextrose 5%. 1000 milliLiter(s) IV Continuous <Continuous>  dextrose 50% Injectable 25 Gram(s) IV Push once  dextrose 50% Injectable 12.5 Gram(s) IV Push once  dextrose Oral Gel 15 Gram(s) Oral once PRN  dextrose Oral Gel 15 Gram(s) Oral once PRN  glucagon  Injectable 1 milliGRAM(s) IntraMuscular once  heparin  Infusion 900 Unit(s)/Hr IV Continuous <Continuous>  insulin glargine Injectable (LANTUS) 15 Unit(s) SubCutaneous at bedtime  insulin lispro (ADMELOG) corrective regimen sliding scale   SubCutaneous at bedtime  insulin lispro (ADMELOG) corrective regimen sliding scale   SubCutaneous three times a day before meals  insulin lispro Injectable (ADMELOG) 8 Unit(s) SubCutaneous three times a day before meals  sodium chloride 0.9%. 1000 milliLiter(s) IV Continuous <Continuous>      ----------------------------------------------------------------------------------------  PHYSICAL EXAM  Constitutional - NAD, Comfortable  HEENT - NCAT, EOMI  Neck - Supple, No limited ROM  Chest - CTA bilaterally, No wheeze, No rhonchi, No crackles  Cardiovascular - RRR, S1S2, No murmurs  Abdomen - BS+, Soft, NTND  Extremities - No C/C/E, No calf tenderness   Neurologic Exam -                    Cognitive - Awake, Alert, AAO to self, place, date, year, situation     Communication - Fluent, No dysarthria     Cranial Nerves - CN 2-12 intact     Motor - No focal deficits                    LEFT    UE - ShAB 5/5, EF 5/5, EE 5/5, WE 5/5,  5/5                    RIGHT UE - ShAB 5/5, EF 5/5, EE 5/5, WE 5/5,  5/5                    LEFT    LE - HF 5/5, KE 5/5, DF 5/5, PF 5/5                    RIGHT LE - HF 5/5, KE 5/5, DF 5/5, PF 5/5        Sensory - Intact to LT     Reflexes - DTR Intact, No primitive reflexive     Coordination - FTN intact     OculoVestibular - No saccades, No nystagmus, VOR         Balance - WNL Static  Psychiatric - Mood stable, Affect WNL  ----------------------------------------------------------------------------------------  ASSESSMENT/PLAN    Pain -  DVT PPX -   Rehab -    incomplete note, consult in progress Patient is a 69y old  Male who presents with a chief complaint of concern for stroke (04 Nov 2024 08:09)      HPI:  Patient is a 75 year old Lithuanian speaking male with PMH of HTN, HLD, DM, presents with shortness of breath over 3-4 days which acutely worsened today. Patient also initially complained of chest pain and noted with elevated BP to 190/110. Acute management in the ED included BiPAP, lasix. Patient was placed on nitroglycerin drip due to concern for possible ACS. Chest xray noted with bilateral pulmonary edema which appears cardiac in origin. Patient's respiratory status stabilized on BiPAP. While in the ED, patient's sons reported that patient also began to have right arm weakness first noticed this morning. During this time, patient also appeared slightly confused and required assistance to walk. Patient was last seen at his baseline yesterday evening at 2100 when he was AAOx3 and did not have difficulties using his right side. Code stroke was called for right sided weakness.   Upon stroke team assessment, patient is on BiPAP and does not appear in respiratory distress. BP at time of assessment is 130s systolic (nitroglycerin drip was previously discontinued). Patient denies history of stroke. Patient takes aspirin for cardioprotection. Ambulates independently at baseline, lives with family and requires some assistance with taking medications.     PM&R consulted for rehab disposition. Patient still having some word-finding difficulty when speaking to family, per patient's daughter at bedside. On initial presentation, patient had had some nausea and dizziness in addition to focal neurological deficits, denies nausea, dizziness this morning. Patient feels some RLE motor strength improvement but no return of motor strength to the RUE.     Patient currently lives at home with wife, who recently underwent a second hysterectomy and unable to assist in care of . Patient was independently carrying out ADLs prior to stroke and was driving himself / family at home. Also lives with 24 year old son, who may not be fully able to assist in care of patient. Patient's room requires walking up a set of stairs and is located at the back of house, patient and family concerned about ambulation at home. Desires acute rehabilitation in immediate discharge      REVIEW OF SYSTEMS  Constitutional - No fever, No fatigue  HEENT - No eye pain, No visual disturbances, No difficulty hearing  Respiratory - No cough, No wheezing, No shortness of breath  Cardiovascular - No chest pain  Neurological - No headaches, some word finding difficulty, right hemibody weakness, No new numbness  Skin - No itching, No rashes, No lesions   Musculoskeletal - No joint pain, No muscle pain    PAST MEDICAL & SURGICAL HISTORY  Diabetes mellitus    HTN (hypertension)    HLD (hyperlipidemia)    Hyperlipidemia        SOCIAL HISTORY  Smoking - Denied  EtOH - Denied   Drugs - Denied    FUNCTIONAL HISTORY  Lives with family  Independent    CURRENT FUNCTIONAL STATUS  11/2  Bed Mobility  Bed Mobility Training Rehab Potential: good, to achieve stated therapy goals  Bed Mobility Training Symptoms Noted During/After Treatment: none  Bed Mobility Training Supine-to-Sit: moderate assist (50% patient effort);  2 person assist;  verbal cues  Bed Mobility Training Limitations: decreased ability to use legs for bridging/pushing;  impaired ability to control trunk for mobility;  decreased ability to use arms for pushing/pulling;  decreased strength;  impaired motor control;  impaired coordination    Bed-Chair Transfer Training  Bed-to-Chair Transfer Training Rehab Potential: good, to achieve stated therapy goals  Bed-to-Chair Transfer Training Symptoms Noted During/After Treatment: none  Transfer Training Bed-to-Chair Transfer: minimum assist (75% patient effort);  2 person assist;  verbal cues;  full weight-bearing   via roseanne steady  Bed-to-Chair Transfer Training Transfer Safety Analysis: decreased balance;  decreased weight-shifting ability;  decreased strength;  impaired coordination;  impaired motor control;  via roseanne steady    Sit-Stand Transfer Training  Sit-to-Stand Transfer Training Rehab Potential: good, to achieve stated therapy goals  Sit-to-Stand Transfer Training Symptoms Noted During/After Treatment: none  Transfer Training Sit-to-Stand Transfer: minimum assist (75% patient effort);  2 person assist;  verbal cues;  full weight-bearing   via roseanne steady  Transfer Training Stand-to-Sit Transfer: minimum assist (75% patient effort);  2 person assist;  verbal cues;  full weight-bearing   via roseanne steady  Sit-to-Stand Transfer Training Transfer Safety Analysis: decreased balance;  decreased weight-shifting ability;  decreased strength;  impaired motor control;  impaired coordination;  via roseanne steady    Therapeutic Exercise  Therapeutic Exercise Rehab Effort: good  Therapeutic Exercise Symptoms Noted During/After Treatment: none  Therapeutic Exercise Detail: Seated active ROM knee extension x10 bilaterally     FAMILY HISTORY       RECENT LABS/IMAGING  < from: MR Head No Cont (10.29.24 @ 10:56) >    ACC: [id number] EXAM:  MR BRAIN   ORDERED BY: AURE ATKINSON     PROCEDURE DATE:  10/29/2024          INTERPRETATION:  EXAM: MRI OF THE BRAIN WITHOUT CONTRAST    HISTORY: Stroke    TECHNIQUE: Multi-planar multi-sequential MR imaging of the brain was   performed without intravenous contrast.    COMPARISON: CT/CTA of the head October 27, 2024.    FINDINGS:    Multiple foci of diffusion restriction in the high left MCA watershed   distribution, compatible with acute infarcts. Multiple foci of increased   T2/FLAIR signal throughout the deep and periventricular white matter,   compatible with chronic small vessel disease. Chronic infarcts in the   left corona radiata and mary/left middle cerebellar peduncle. Parenchymal   volume loss resulting in a mild ex vacuo dilatation appearance of the   bilateral ventricles. The visualized extra axial spaces and basal   cisterns are within normal limits. No midline shift or mass effect   present.    The craniocervical junction is within normal limits. The pituitary is   unremarkable. Irregular flow void in the cavernous segment of the left   ICA. The remainder of the major intracranial vessels demonstrate the   expected signal void related to vascular flow. The paranasal sinuses are   well aerated. Smallbilateral mastoid effusions, left larger than right.   The visualized orbits are within normal limits.      IMPRESSION:    1.  Acute infarcts in the high left MCA watershed distribution.  2.  Irregular flow-void in the cavernous segment of the left ICA. This   could be attributed to slow/turbulent flow. A severe stenosis is not   entirely excluded. Correlate with recent CTA of the head for further   evaluation.  3.  Chronic ischemic changes.    --- End of Report ---            DEANNE BETH MD; Attending Radiologist  This document has been electronically signed. Oct 29 2024 11:07AM    < end of copied text >    CBC Full  -  ( 04 Nov 2024 07:00 )  WBC Count : 11.60 K/uL  RBC Count : 4.49 M/uL  Hemoglobin : 12.8 g/dL  Hematocrit : 39.1 %  Platelet Count - Automated : 523 K/uL  Mean Cell Volume : 87.1 fL  Mean Cell Hemoglobin : 28.5 pg  Mean Cell Hemoglobin Concentration : 32.7 g/dL  Auto Neutrophil # : 6.68 K/uL  Auto Lymphocyte # : 3.38 K/uL  Auto Monocyte # : 0.99 K/uL  Auto Eosinophil # : 0.44 K/uL  Auto Basophil # : 0.05 K/uL  Auto Neutrophil % : 57.7 %  Auto Lymphocyte % : 29.1 %  Auto Monocyte % : 8.5 %  Auto Eosinophil % : 3.8 %  Auto Basophil % : 0.4 %    11-04    140  |  109[H]  |  24[H]  ----------------------------<  181[H]  3.7   |  22  |  0.98    Ca    7.9[L]      04 Nov 2024 07:00  Phos  2.6     11-04  Mg     1.90     11-04    TPro  6.0  /  Alb  2.9[L]  /  TBili  0.4  /  DBili  x   /  AST  39  /  ALT  57[H]  /  AlkPhos  53  11-04    Urinalysis Basic - ( 04 Nov 2024 07:00 )    Color: x / Appearance: x / SG: x / pH: x  Gluc: 181 mg/dL / Ketone: x  / Bili: x / Urobili: x   Blood: x / Protein: x / Nitrite: x   Leuk Esterase: x / RBC: x / WBC x   Sq Epi: x / Non Sq Epi: x / Bacteria: x        VITALS  T(C): 36.7 (11-04-24 @ 09:10), Max: 36.8 (11-03-24 @ 18:42)  HR: 72 (11-04-24 @ 09:10) (72 - 97)  BP: 113/78 (11-04-24 @ 09:10) (102/66 - 133/86)  RR: 18 (11-04-24 @ 09:10) (17 - 18)  SpO2: 98% (11-04-24 @ 09:10) (98% - 100%)  Wt(kg): --    ALLERGIES  No Known Allergies      MEDICATIONS   acetaminophen     Tablet .. 650 milliGRAM(s) Oral every 6 hours PRN  albuterol    90 MICROgram(s) HFA Inhaler 2 Puff(s) Inhalation two times a day  albuterol/ipratropium for Nebulization 3 milliLiter(s) Nebulizer every 6 hours  aspirin  chewable 81 milliGRAM(s) Oral daily  atorvastatin 80 milliGRAM(s) Oral at bedtime  carvedilol 6.25 milliGRAM(s) Oral every 12 hours  chlorhexidine 2% Cloths 1 Application(s) Topical daily  clopidogrel Tablet 75 milliGRAM(s) Oral daily  dextrose 5%. 1000 milliLiter(s) IV Continuous <Continuous>  dextrose 5%. 1000 milliLiter(s) IV Continuous <Continuous>  dextrose 50% Injectable 25 Gram(s) IV Push once  dextrose 50% Injectable 12.5 Gram(s) IV Push once  dextrose Oral Gel 15 Gram(s) Oral once PRN  dextrose Oral Gel 15 Gram(s) Oral once PRN  glucagon  Injectable 1 milliGRAM(s) IntraMuscular once  heparin  Infusion 900 Unit(s)/Hr IV Continuous <Continuous>  insulin glargine Injectable (LANTUS) 15 Unit(s) SubCutaneous at bedtime  insulin lispro (ADMELOG) corrective regimen sliding scale   SubCutaneous at bedtime  insulin lispro (ADMELOG) corrective regimen sliding scale   SubCutaneous three times a day before meals  insulin lispro Injectable (ADMELOG) 8 Unit(s) SubCutaneous three times a day before meals  sodium chloride 0.9%. 1000 milliLiter(s) IV Continuous <Continuous>      ----------------------------------------------------------------------------------------  PHYSICAL EXAM  Constitutional - NAD, Comfortable  HEENT - NCAT, EOMI  Neck - Supple  Cardiovascular - peripheral pulses detected, no pitting edema  Abdomen - Soft, NTND  Extremities - No C/C/E, No calf tenderness   Neurologic Exam -                    Cognitive - Awake, Alert, AAOx3 to self, place, year     Communication - Fluent, +mild dysarthria     Cranial Nerves - CN 2-12 intact     Motor - No focal deficits                    LEFT    UE - ShAB 5/5, EF 5/5, EE 5/5, WE 5/5,  5/5                    RIGHT UE - ShAB 0/5 throughout                    LEFT    LE - HF 5/5, KE 5/5, DF 5/5, PF 5/5                    RIGHT LE - HF 3/5, KE 4/5, DF 3/5, PF 3/5        Sensory - Intact to LT     Reflexes - DTR Intact on RLE, missing on LLE, No primitive reflexive     Coordination - FTN intact     OculoVestibular - No saccades, No nystagmus, VOR         Balance - unable to stand  Psychiatric - Mood stable, Affect WNL  ----------------------------------------------------------------------------------------   Patient is a 69y old  Male who presents with a chief complaint of concern for stroke (04 Nov 2024 08:09)      HPI:  Patient is a 75 year old Pashto speaking male with PMH of HTN, HLD, DM, presents with shortness of breath over 3-4 days which acutely worsened today. Patient also initially complained of chest pain and noted with elevated BP to 190/110. Acute management in the ED included BiPAP, lasix. Patient was placed on nitroglycerin drip due to concern for possible ACS. Chest xray noted with bilateral pulmonary edema which appears cardiac in origin. Patient's respiratory status stabilized on BiPAP. While in the ED, patient's sons reported that patient also began to have right arm weakness first noticed this morning. During this time, patient also appeared slightly confused and required assistance to walk. Patient was last seen at his baseline yesterday evening at 2100 when he was AAOx3 and did not have difficulties using his right side. Code stroke was called for right sided weakness.   Upon stroke team assessment, patient is on BiPAP and does not appear in respiratory distress. BP at time of assessment is 130s systolic (nitroglycerin drip was previously discontinued). Patient denies history of stroke. Patient takes aspirin for cardioprotection. Ambulates independently at baseline, lives with family and requires some assistance with taking medications.     PM&R consulted for rehab disposition. Patient still having some word-finding difficulty when speaking to family, per patient's daughter at bedside. On initial presentation, patient had had some nausea and dizziness in addition to focal neurological deficits, denies nausea, dizziness this morning. Patient feels some RLE motor strength improvement but no return of motor strength to the RUE.     Patient currently lives at home with wife, who recently underwent a second hysterectomy and unable to assist in care of . Patient was independently carrying out ADLs prior to stroke and was driving himself / family at home. Also lives with 24 year old son, who may not be fully able to assist in care of patient. Patient's room requires walking up a set of stairs and is located at the back of house, patient and family concerned about ambulation at home. Desires acute rehabilitation in immediate discharge      REVIEW OF SYSTEMS  Constitutional - No fever, No fatigue  HEENT - No eye pain, No visual disturbances, No difficulty hearing  Respiratory - No cough, No wheezing, No shortness of breath  Cardiovascular - No chest pain  Neurological - No headaches, some word finding difficulty, right hemibody weakness, No new numbness  Skin - No itching, No rashes, No lesions   Musculoskeletal - No joint pain, No muscle pain    PAST MEDICAL & SURGICAL HISTORY  Diabetes mellitus    HTN (hypertension)    HLD (hyperlipidemia)    Hyperlipidemia        SOCIAL HISTORY  Smoking - Denied  EtOH - Denied   Drugs - Denied    FUNCTIONAL HISTORY  Lives with family in home with 4+4 stairs to enter  Independent at baseline  drives  cares for his wife who recently had surgery    CURRENT FUNCTIONAL STATUS  11/2  Bed Mobility  Bed Mobility Training Rehab Potential: good, to achieve stated therapy goals  Bed Mobility Training Symptoms Noted During/After Treatment: none  Bed Mobility Training Supine-to-Sit: moderate assist (50% patient effort);  2 person assist;  verbal cues  Bed Mobility Training Limitations: decreased ability to use legs for bridging/pushing;  impaired ability to control trunk for mobility;  decreased ability to use arms for pushing/pulling;  decreased strength;  impaired motor control;  impaired coordination    Bed-Chair Transfer Training  Bed-to-Chair Transfer Training Rehab Potential: good, to achieve stated therapy goals  Bed-to-Chair Transfer Training Symptoms Noted During/After Treatment: none  Transfer Training Bed-to-Chair Transfer: minimum assist (75% patient effort);  2 person assist;  verbal cues;  full weight-bearing   via roseanne steady  Bed-to-Chair Transfer Training Transfer Safety Analysis: decreased balance;  decreased weight-shifting ability;  decreased strength;  impaired coordination;  impaired motor control;  via roseanne steady    Sit-Stand Transfer Training  Sit-to-Stand Transfer Training Rehab Potential: good, to achieve stated therapy goals  Sit-to-Stand Transfer Training Symptoms Noted During/After Treatment: none  Transfer Training Sit-to-Stand Transfer: minimum assist (75% patient effort);  2 person assist;  verbal cues;  full weight-bearing   via roseanne steady  Transfer Training Stand-to-Sit Transfer: minimum assist (75% patient effort);  2 person assist;  verbal cues;  full weight-bearing   via roseanne steady  Sit-to-Stand Transfer Training Transfer Safety Analysis: decreased balance;  decreased weight-shifting ability;  decreased strength;  impaired motor control;  impaired coordination;  via roseanne steady    Therapeutic Exercise  Therapeutic Exercise Rehab Effort: good  Therapeutic Exercise Symptoms Noted During/After Treatment: none  Therapeutic Exercise Detail: Seated active ROM knee extension x10 bilaterally      RECENT LABS/IMAGING  < from: MR Head No Cont (10.29.24 @ 10:56) >    ACC: [id number] EXAM:  MR BRAIN   ORDERED BY: BENJOHNNIE ATKINSON     PROCEDURE DATE:  10/29/2024          INTERPRETATION:  EXAM: MRI OF THE BRAIN WITHOUT CONTRAST    HISTORY: Stroke    TECHNIQUE: Multi-planar multi-sequential MR imaging of the brain was   performed without intravenous contrast.    COMPARISON: CT/CTA of the head October 27, 2024.    FINDINGS:    Multiple foci of diffusion restriction in the high left MCA watershed   distribution, compatible with acute infarcts. Multiple foci of increased   T2/FLAIR signal throughout the deep and periventricular white matter,   compatible with chronic small vessel disease. Chronic infarcts in the   left corona radiata and mary/left middle cerebellar peduncle. Parenchymal   volume loss resulting in a mild ex vacuo dilatation appearance of the   bilateral ventricles. The visualized extra axial spaces and basal   cisterns are within normal limits. No midline shift or mass effect   present.    The craniocervical junction is within normal limits. The pituitary is   unremarkable. Irregular flow void in the cavernous segment of the left   ICA. The remainder of the major intracranial vessels demonstrate the   expected signal void related to vascular flow. The paranasal sinuses are   well aerated. Smallbilateral mastoid effusions, left larger than right.   The visualized orbits are within normal limits.      IMPRESSION:    1.  Acute infarcts in the high left MCA watershed distribution.  2.  Irregular flow-void in the cavernous segment of the left ICA. This   could be attributed to slow/turbulent flow. A severe stenosis is not   entirely excluded. Correlate with recent CTA of the head for further   evaluation.  3.  Chronic ischemic changes.    --- End of Report ---            DEANNE BETH MD; Attending Radiologist  This document has been electronically signed. Oct 29 2024 11:07AM    < end of copied text >    CBC Full  -  ( 04 Nov 2024 07:00 )  WBC Count : 11.60 K/uL  RBC Count : 4.49 M/uL  Hemoglobin : 12.8 g/dL  Hematocrit : 39.1 %  Platelet Count - Automated : 523 K/uL  Mean Cell Volume : 87.1 fL  Mean Cell Hemoglobin : 28.5 pg  Mean Cell Hemoglobin Concentration : 32.7 g/dL  Auto Neutrophil # : 6.68 K/uL  Auto Lymphocyte # : 3.38 K/uL  Auto Monocyte # : 0.99 K/uL  Auto Eosinophil # : 0.44 K/uL  Auto Basophil # : 0.05 K/uL  Auto Neutrophil % : 57.7 %  Auto Lymphocyte % : 29.1 %  Auto Monocyte % : 8.5 %  Auto Eosinophil % : 3.8 %  Auto Basophil % : 0.4 %    11-04    140  |  109[H]  |  24[H]  ----------------------------<  181[H]  3.7   |  22  |  0.98    Ca    7.9[L]      04 Nov 2024 07:00  Phos  2.6     11-04  Mg     1.90     11-04    TPro  6.0  /  Alb  2.9[L]  /  TBili  0.4  /  DBili  x   /  AST  39  /  ALT  57[H]  /  AlkPhos  53  11-04    Urinalysis Basic - ( 04 Nov 2024 07:00 )    Color: x / Appearance: x / SG: x / pH: x  Gluc: 181 mg/dL / Ketone: x  / Bili: x / Urobili: x   Blood: x / Protein: x / Nitrite: x   Leuk Esterase: x / RBC: x / WBC x   Sq Epi: x / Non Sq Epi: x / Bacteria: x        VITALS  T(C): 36.7 (11-04-24 @ 09:10), Max: 36.8 (11-03-24 @ 18:42)  HR: 72 (11-04-24 @ 09:10) (72 - 97)  BP: 113/78 (11-04-24 @ 09:10) (102/66 - 133/86)  RR: 18 (11-04-24 @ 09:10) (17 - 18)  SpO2: 98% (11-04-24 @ 09:10) (98% - 100%)  Wt(kg): --    ALLERGIES  No Known Allergies      MEDICATIONS   acetaminophen     Tablet .. 650 milliGRAM(s) Oral every 6 hours PRN  albuterol    90 MICROgram(s) HFA Inhaler 2 Puff(s) Inhalation two times a day  albuterol/ipratropium for Nebulization 3 milliLiter(s) Nebulizer every 6 hours  aspirin  chewable 81 milliGRAM(s) Oral daily  atorvastatin 80 milliGRAM(s) Oral at bedtime  carvedilol 6.25 milliGRAM(s) Oral every 12 hours  chlorhexidine 2% Cloths 1 Application(s) Topical daily  clopidogrel Tablet 75 milliGRAM(s) Oral daily  dextrose 5%. 1000 milliLiter(s) IV Continuous <Continuous>  dextrose 5%. 1000 milliLiter(s) IV Continuous <Continuous>  dextrose 50% Injectable 25 Gram(s) IV Push once  dextrose 50% Injectable 12.5 Gram(s) IV Push once  dextrose Oral Gel 15 Gram(s) Oral once PRN  dextrose Oral Gel 15 Gram(s) Oral once PRN  glucagon  Injectable 1 milliGRAM(s) IntraMuscular once  heparin  Infusion 900 Unit(s)/Hr IV Continuous <Continuous>  insulin glargine Injectable (LANTUS) 15 Unit(s) SubCutaneous at bedtime  insulin lispro (ADMELOG) corrective regimen sliding scale   SubCutaneous at bedtime  insulin lispro (ADMELOG) corrective regimen sliding scale   SubCutaneous three times a day before meals  insulin lispro Injectable (ADMELOG) 8 Unit(s) SubCutaneous three times a day before meals  sodium chloride 0.9%. 1000 milliLiter(s) IV Continuous <Continuous>      ----------------------------------------------------------------------------------------  PHYSICAL EXAM  Constitutional - NAD, Comfortable  HEENT - NCAT, EOMI  Neck - Supple  Cardiovascular - peripheral pulses detected, no pitting edema  Abdomen - Soft, NTND  Extremities - No C/C/E, No calf tenderness   Neurologic Exam -                    Cognitive - Awake, Alert, AAOx3 to self, place, year     Communication -   +mild dysarthria, reports word finding difficulty     Cranial Nerves - CN 2-12 intact     Motor -                     LEFT    UE - ShAB 5/5, EF 5/5, EE 5/5, WE 5/5,  5/5                    RIGHT UE - ShAB 0/5 throughout                    LEFT    LE - HF 5/5, KE 5/5, DF 5/5, PF 5/5                    RIGHT LE - HF 3/5, KE 4/5, DF 3/5, PF 3/5        Sensory - Intact to LT     Reflexes - DTR Intact on RLE      Coordination - FTN intact     OculoVestibular - No saccades, No nystagmus, VOR      Psychiatric - Mood stable, Affect WNL  ----------------------------------------------------------------------------------------   Patient is a 69y old  Male who presents with a chief complaint of concern for stroke (04 Nov 2024 08:09)      HPI:  Patient is a 75 year old Hungarian speaking male with PMH of HTN, HLD, DM, presents with shortness of breath over 3-4 days which acutely worsened today. Patient also initially complained of chest pain and noted with elevated BP to 190/110. Acute management in the ED included BiPAP, lasix. Patient was placed on nitroglycerin drip due to concern for possible ACS. Chest xray noted with bilateral pulmonary edema which appears cardiac in origin. Patient's respiratory status stabilized on BiPAP. While in the ED, patient's sons reported that patient also began to have right arm weakness first noticed this morning. During this time, patient also appeared slightly confused and required assistance to walk. Patient was last seen at his baseline yesterday evening at 2100 when he was AAOx3 and did not have difficulties using his right side. Code stroke was called for right sided weakness.   Upon stroke team assessment, patient is on BiPAP and does not appear in respiratory distress. BP at time of assessment is 130s systolic (nitroglycerin drip was previously discontinued). Patient denies history of stroke. Patient takes aspirin for cardioprotection. Ambulates independently at baseline, lives with family and requires some assistance with taking medications.     PM&R consulted for rehab disposition. Patient still having some word-finding difficulty when speaking to family, per patient's daughter at bedside. On initial presentation, patient had had some nausea and dizziness in addition to focal neurological deficits, denies nausea, dizziness this morning. Patient feels some RLE motor strength improvement but no return of motor strength to the RUE.     Patient currently lives at home with wife, who recently underwent a second hysterectomy and unable to assist in care of . Patient was independently carrying out ADLs prior to stroke and was driving himself / family at home. Also lives with 24 year old son, who may not be fully able to assist in care of patient. Patient's room requires walking up a set of stairs and is located at the back of house, patient and family concerned about ambulation at home. Desires acute rehabilitation in immediate discharge      REVIEW OF SYSTEMS  Constitutional - No fever, No fatigue  HEENT - No eye pain, No visual disturbances, No difficulty hearing  Respiratory - No cough, No wheezing, No shortness of breath  Cardiovascular - No chest pain  Neurological - No headaches, some word finding difficulty, right hemibody weakness, No new numbness  Skin - No itching, No rashes, No lesions   Musculoskeletal - No joint pain, No muscle pain    PAST MEDICAL & SURGICAL HISTORY  Diabetes mellitus    HTN (hypertension)    HLD (hyperlipidemia)    Hyperlipidemia        SOCIAL HISTORY  Smoking - Denied  EtOH - Denied   Drugs - Denied    FUNCTIONAL HISTORY  Lives with family in home with 4+4 stairs to enter  Independent at baseline  drives  cares for his wife who recently had surgery    CURRENT FUNCTIONAL STATUS  11/2  Bed Mobility  Bed Mobility Training Rehab Potential: good, to achieve stated therapy goals  Bed Mobility Training Symptoms Noted During/After Treatment: none  Bed Mobility Training Supine-to-Sit: moderate assist (50% patient effort);  2 person assist;  verbal cues  Bed Mobility Training Limitations: decreased ability to use legs for bridging/pushing;  impaired ability to control trunk for mobility;  decreased ability to use arms for pushing/pulling;  decreased strength;  impaired motor control;  impaired coordination    Bed-Chair Transfer Training  Bed-to-Chair Transfer Training Rehab Potential: good, to achieve stated therapy goals  Bed-to-Chair Transfer Training Symptoms Noted During/After Treatment: none  Transfer Training Bed-to-Chair Transfer: minimum assist (75% patient effort);  2 person assist;  verbal cues;  full weight-bearing   via roseanne steady  Bed-to-Chair Transfer Training Transfer Safety Analysis: decreased balance;  decreased weight-shifting ability;  decreased strength;  impaired coordination;  impaired motor control;  via roseanne steady    Sit-Stand Transfer Training  Sit-to-Stand Transfer Training Rehab Potential: good, to achieve stated therapy goals  Sit-to-Stand Transfer Training Symptoms Noted During/After Treatment: none  Transfer Training Sit-to-Stand Transfer: minimum assist (75% patient effort);  2 person assist;  verbal cues;  full weight-bearing   via roseanne steady  Transfer Training Stand-to-Sit Transfer: minimum assist (75% patient effort);  2 person assist;  verbal cues;  full weight-bearing   via roseanne steady  Sit-to-Stand Transfer Training Transfer Safety Analysis: decreased balance;  decreased weight-shifting ability;  decreased strength;  impaired motor control;  impaired coordination;  via roseanne steady    Therapeutic Exercise  Therapeutic Exercise Rehab Effort: good  Therapeutic Exercise Symptoms Noted During/After Treatment: none  Therapeutic Exercise Detail: Seated active ROM knee extension x10 bilaterally      RECENT LABS/IMAGING  < from: MR Head No Cont (10.29.24 @ 10:56) >    ACC: [id number] EXAM:  MR BRAIN   ORDERED BY: BENJOHNNIE ATKINSON     PROCEDURE DATE:  10/29/2024          INTERPRETATION:  EXAM: MRI OF THE BRAIN WITHOUT CONTRAST    HISTORY: Stroke    TECHNIQUE: Multi-planar multi-sequential MR imaging of the brain was   performed without intravenous contrast.    COMPARISON: CT/CTA of the head October 27, 2024.    FINDINGS:    Multiple foci of diffusion restriction in the high left MCA watershed   distribution, compatible with acute infarcts. Multiple foci of increased   T2/FLAIR signal throughout the deep and periventricular white matter,   compatible with chronic small vessel disease. Chronic infarcts in the   left corona radiata and mary/left middle cerebellar peduncle. Parenchymal   volume loss resulting in a mild ex vacuo dilatation appearance of the   bilateral ventricles. The visualized extra axial spaces and basal   cisterns are within normal limits. No midline shift or mass effect   present.    The craniocervical junction is within normal limits. The pituitary is   unremarkable. Irregular flow void in the cavernous segment of the left   ICA. The remainder of the major intracranial vessels demonstrate the   expected signal void related to vascular flow. The paranasal sinuses are   well aerated. Smallbilateral mastoid effusions, left larger than right.   The visualized orbits are within normal limits.      IMPRESSION:    1.  Acute infarcts in the high left MCA watershed distribution.  2.  Irregular flow-void in the cavernous segment of the left ICA. This   could be attributed to slow/turbulent flow. A severe stenosis is not   entirely excluded. Correlate with recent CTA of the head for further   evaluation.  3.  Chronic ischemic changes.    --- End of Report ---            DEANNE BETH MD; Attending Radiologist  This document has been electronically signed. Oct 29 2024 11:07AM    < end of copied text >    CBC Full  -  ( 04 Nov 2024 07:00 )  WBC Count : 11.60 K/uL  RBC Count : 4.49 M/uL  Hemoglobin : 12.8 g/dL  Hematocrit : 39.1 %  Platelet Count - Automated : 523 K/uL  Mean Cell Volume : 87.1 fL  Mean Cell Hemoglobin : 28.5 pg  Mean Cell Hemoglobin Concentration : 32.7 g/dL  Auto Neutrophil # : 6.68 K/uL  Auto Lymphocyte # : 3.38 K/uL  Auto Monocyte # : 0.99 K/uL  Auto Eosinophil # : 0.44 K/uL  Auto Basophil # : 0.05 K/uL  Auto Neutrophil % : 57.7 %  Auto Lymphocyte % : 29.1 %  Auto Monocyte % : 8.5 %  Auto Eosinophil % : 3.8 %  Auto Basophil % : 0.4 %    11-04    140  |  109[H]  |  24[H]  ----------------------------<  181[H]  3.7   |  22  |  0.98    Ca    7.9[L]      04 Nov 2024 07:00  Phos  2.6     11-04  Mg     1.90     11-04    TPro  6.0  /  Alb  2.9[L]  /  TBili  0.4  /  DBili  x   /  AST  39  /  ALT  57[H]  /  AlkPhos  53  11-04    Urinalysis Basic - ( 04 Nov 2024 07:00 )    Color: x / Appearance: x / SG: x / pH: x  Gluc: 181 mg/dL / Ketone: x  / Bili: x / Urobili: x   Blood: x / Protein: x / Nitrite: x   Leuk Esterase: x / RBC: x / WBC x   Sq Epi: x / Non Sq Epi: x / Bacteria: x        VITALS  T(C): 36.7 (11-04-24 @ 09:10), Max: 36.8 (11-03-24 @ 18:42)  HR: 72 (11-04-24 @ 09:10) (72 - 97)  BP: 113/78 (11-04-24 @ 09:10) (102/66 - 133/86)  RR: 18 (11-04-24 @ 09:10) (17 - 18)  SpO2: 98% (11-04-24 @ 09:10) (98% - 100%)  Wt(kg): --    ALLERGIES  No Known Allergies      MEDICATIONS   acetaminophen     Tablet .. 650 milliGRAM(s) Oral every 6 hours PRN  albuterol    90 MICROgram(s) HFA Inhaler 2 Puff(s) Inhalation two times a day  albuterol/ipratropium for Nebulization 3 milliLiter(s) Nebulizer every 6 hours  aspirin  chewable 81 milliGRAM(s) Oral daily  atorvastatin 80 milliGRAM(s) Oral at bedtime  carvedilol 6.25 milliGRAM(s) Oral every 12 hours  chlorhexidine 2% Cloths 1 Application(s) Topical daily  clopidogrel Tablet 75 milliGRAM(s) Oral daily  dextrose 5%. 1000 milliLiter(s) IV Continuous <Continuous>  dextrose 5%. 1000 milliLiter(s) IV Continuous <Continuous>  dextrose 50% Injectable 25 Gram(s) IV Push once  dextrose 50% Injectable 12.5 Gram(s) IV Push once  dextrose Oral Gel 15 Gram(s) Oral once PRN  dextrose Oral Gel 15 Gram(s) Oral once PRN  glucagon  Injectable 1 milliGRAM(s) IntraMuscular once  heparin  Infusion 900 Unit(s)/Hr IV Continuous <Continuous>  insulin glargine Injectable (LANTUS) 15 Unit(s) SubCutaneous at bedtime  insulin lispro (ADMELOG) corrective regimen sliding scale   SubCutaneous at bedtime  insulin lispro (ADMELOG) corrective regimen sliding scale   SubCutaneous three times a day before meals  insulin lispro Injectable (ADMELOG) 8 Unit(s) SubCutaneous three times a day before meals  sodium chloride 0.9%. 1000 milliLiter(s) IV Continuous <Continuous>      ----------------------------------------------------------------------------------------  PHYSICAL EXAM  Constitutional - NAD, Comfortable  HEENT - NCAT, EOMI  Neck - Supple  Cardiovascular - peripheral pulses detected, no pitting edema  Abdomen - Soft, NTND  Extremities - No C/C/E, No calf tenderness   Neurologic Exam -                    Cognitive - Awake, Alert, AAOx3 to self, place, year     Communication -   +mild dysarthria, reports word finding difficulty     Cranial Nerves - CN 2-12 intact     Motor -                     LEFT    UE - ShAB 5/5, EF 5/5, EE 5/5, WE 5/5,  5/5                    RIGHT UE - ShAB 0/5 throughout                    LEFT    LE - HF 5/5, KE 5/5, DF 5/5, PF 5/5                    RIGHT LE - HF 3/5, KE 4/5, DF 3/5, PF 3/5        Sensory - Intact to LT      Coordination - FTN intact on the L     OculoVestibular - No saccades, No nystagmus, VOR      Psychiatric - Mood stable, Affect WNL  ----------------------------------------------------------------------------------------

## 2024-11-04 NOTE — DIETITIAN INITIAL EVALUATION ADULT - NUTRITIONGOAL OUTCOME1
Lower blood sugars and overall A1C% through diet, exercise and medication (as needed).   Pt able to teach back 3 principles of DM and heart healthy diet

## 2024-11-04 NOTE — PROGRESS NOTE ADULT - PROBLEM SELECTOR PLAN 6
- Pt w/ generalized weakness today per family  - Suspect multifactorial i/s/o CVA and  hospitalization/ deconditioning  - CXR without infiltrates  - Monitor off abx for now   - RVP- neg; blood cx in lab;   - CXR w/o acute infiltrate

## 2024-11-04 NOTE — PROGRESS NOTE ADULT - SUBJECTIVE AND OBJECTIVE BOX
[facility] Medicine  Evelyne Bwoen MD  Pager (K-F, 2O-3N): [phone]  Other Times:  [phone]    Patient is a 69y old  Male who presents with a chief complaint of concern for stroke (03 Nov 2024 20:08)      SUBJECTIVE / OVERNIGHT EVENTS: FS elevated above goal       MEDICATIONS  (STANDING):  albuterol    90 MICROgram(s) HFA Inhaler 2 Puff(s) Inhalation two times a day  albuterol/ipratropium for Nebulization 3 milliLiter(s) Nebulizer every 6 hours  aspirin  chewable 81 milliGRAM(s) Oral daily  atorvastatin 80 milliGRAM(s) Oral at bedtime  carvedilol 6.25 milliGRAM(s) Oral every 12 hours  chlorhexidine 2% Cloths 1 Application(s) Topical daily  clopidogrel Tablet 75 milliGRAM(s) Oral daily  dextrose 5%. 1000 milliLiter(s) (100 mL/Hr) IV Continuous <Continuous>  dextrose 5%. 1000 milliLiter(s) (50 mL/Hr) IV Continuous <Continuous>  dextrose 50% Injectable 25 Gram(s) IV Push once  dextrose 50% Injectable 12.5 Gram(s) IV Push once  glucagon  Injectable 1 milliGRAM(s) IntraMuscular once  heparin  Infusion 900 Unit(s)/Hr (9 mL/Hr) IV Continuous <Continuous>  insulin glargine Injectable (LANTUS) 10 Unit(s) SubCutaneous at bedtime  insulin lispro (ADMELOG) corrective regimen sliding scale   SubCutaneous three times a day before meals  insulin lispro (ADMELOG) corrective regimen sliding scale   SubCutaneous at bedtime  insulin lispro Injectable (ADMELOG) 6 Unit(s) SubCutaneous three times a day before meals  magnesium sulfate  IVPB 1 Gram(s) IV Intermittent once  potassium chloride   Powder 40 milliEquivalent(s) Oral once  sodium chloride 0.9%. 1000 milliLiter(s) (50 mL/Hr) IV Continuous <Continuous>    MEDICATIONS  (PRN):  acetaminophen     Tablet .. 650 milliGRAM(s) Oral every 6 hours PRN Temp greater or equal to 38C (100.4F), Moderate Pain (4 - 6), Severe Pain (7 - 10)  dextrose Oral Gel 15 Gram(s) Oral once PRN Blood Glucose LESS THAN 70 milliGRAM(s)/deciliter  dextrose Oral Gel 15 Gram(s) Oral once PRN Blood Glucose LESS THAN 70 milliGRAM(s)/deciliter      CAPILLARY BLOOD GLUCOSE      POCT Blood Glucose.: 219 mg/dL (03 Nov 2024 22:58)  POCT Blood Glucose.: 226 mg/dL (03 Nov 2024 17:41)  POCT Blood Glucose.: 186 mg/dL (03 Nov 2024 12:27)  POCT Blood Glucose.: 205 mg/dL (03 Nov 2024 09:05)    I&O's Summary      PHYSICAL EXAM:  Vital Signs Last 24 Hrs  T(C): 36.4 (04 Nov 2024 01:10), Max: 36.8 (03 Nov 2024 18:42)  T(F): 97.6 (04 Nov 2024 01:10), Max: 98.2 (03 Nov 2024 18:42)  HR: 75 (04 Nov 2024 01:10) (73 - 97)  BP: 133/86 (04 Nov 2024 01:10) (97/62 - 133/86)  BP(mean): --  RR: 17 (04 Nov 2024 01:10) (17 - 18)  SpO2: 100% (04 Nov 2024 01:10) (98% - 100%)    Parameters below as of 03 Nov 2024 20:00  Patient On (Oxygen Delivery Method): room air      CONSTITUTIONAL: NAD  EYES: PERRLA; conjunctiva and sclera clear  ENMT: Moist oral mucosa, no pharyngeal injection or exudates; normal dentition  NECK: Supple, no palpable masses; no thyromegaly  RESPIRATORY: Normal respiratory effort; lungs are clear to auscultation bilaterally  CARDIOVASCULAR: Regular rate and rhythm, normal S1 and S2, no murmur/rub/gallop; No lower extremity edema; Peripheral pulses are 2+ bilaterally  ABDOMEN: Nontender to palpation, normoactive bowel sounds, no rebound/guarding; No hepatosplenomegaly  MUSCULOSKELETAL:  Normal gait; no clubbing or cyanosis of digits; no joint swelling or tenderness to palpation  PSYCH: A+O to person, place, and time; affect appropriate  NEUROLOGY: CN 2-12 are intact and symmetric; no gross sensory deficits   SKIN: No rashes; no palpable lesions    LABS:                        12.8   11.60 )-----------( 523      ( 04 Nov 2024 07:00 )             39.1     11-04    140  |  109[H]  |  24[H]  ----------------------------<  181[H]  3.7   |  22  |  0.98    Ca    7.9[L]      04 Nov 2024 07:00  Phos  2.6     11-04  Mg     1.90     11-04    TPro  6.0  /  Alb  2.9[L]  /  TBili  0.4  /  DBili  x   /  AST  39  /  ALT  57[H]  /  AlkPhos  53  11-04    PTT - ( 04 Nov 2024 07:00 )  PTT:56.4 sec      Urinalysis Basic - ( 04 Nov 2024 07:00 )    Color: x / Appearance: x / SG: x / pH: x  Gluc: 181 mg/dL / Ketone: x  / Bili: x / Urobili: x   Blood: x / Protein: x / Nitrite: x   Leuk Esterase: x / RBC: x / WBC x   Sq Epi: x / Non Sq Epi: x / Bacteria: x          RADIOLOGY & ADDITIONAL TESTS:  Results Reviewed: < from: Xray Chest 1 View- PORTABLE-Urgent (Xray Chest 1 View- PORTABLE-Urgent .) (11.03.24 @ 12:36) >    IMPRESSION:  Mild pulmonary venous congestion/perihilar interstitial   edema/infiltrates, improved.    --- End of Report ---    < end of copied text >    Imaging Personally Reviewed:  Electrocardiogram Personally Reviewed:    COORDINATION OF CARE:  Care Discussed with Consultants/Other Providers [Y/N]:  Prior or Outpatient Records Reviewed [Y/N]:   [facility]  Evelyne Bowen MD  Pager (I-F, 9F-8L): [phone]  Other Times:  [phone]    Patient is a 69y old  Male who presents with a chief complaint of concern for stroke (03 Nov 2024 20:08)      SUBJECTIVE / OVERNIGHT EVENTS: FS elevated above goal; daughter at bedside       MEDICATIONS  (STANDING):  albuterol    90 MICROgram(s) HFA Inhaler 2 Puff(s) Inhalation two times a day  albuterol/ipratropium for Nebulization 3 milliLiter(s) Nebulizer every 6 hours  aspirin  chewable 81 milliGRAM(s) Oral daily  atorvastatin 80 milliGRAM(s) Oral at bedtime  carvedilol 6.25 milliGRAM(s) Oral every 12 hours  chlorhexidine 2% Cloths 1 Application(s) Topical daily  clopidogrel Tablet 75 milliGRAM(s) Oral daily  dextrose 5%. 1000 milliLiter(s) (100 mL/Hr) IV Continuous <Continuous>  dextrose 5%. 1000 milliLiter(s) (50 mL/Hr) IV Continuous <Continuous>  dextrose 50% Injectable 25 Gram(s) IV Push once  dextrose 50% Injectable 12.5 Gram(s) IV Push once  glucagon  Injectable 1 milliGRAM(s) IntraMuscular once  heparin  Infusion 900 Unit(s)/Hr (9 mL/Hr) IV Continuous <Continuous>  insulin glargine Injectable (LANTUS) 10 Unit(s) SubCutaneous at bedtime  insulin lispro (ADMELOG) corrective regimen sliding scale   SubCutaneous three times a day before meals  insulin lispro (ADMELOG) corrective regimen sliding scale   SubCutaneous at bedtime  insulin lispro Injectable (ADMELOG) 6 Unit(s) SubCutaneous three times a day before meals  magnesium sulfate  IVPB 1 Gram(s) IV Intermittent once  potassium chloride   Powder 40 milliEquivalent(s) Oral once  sodium chloride 0.9%. 1000 milliLiter(s) (50 mL/Hr) IV Continuous <Continuous>    MEDICATIONS  (PRN):  acetaminophen     Tablet .. 650 milliGRAM(s) Oral every 6 hours PRN Temp greater or equal to 38C (100.4F), Moderate Pain (4 - 6), Severe Pain (7 - 10)  dextrose Oral Gel 15 Gram(s) Oral once PRN Blood Glucose LESS THAN 70 milliGRAM(s)/deciliter  dextrose Oral Gel 15 Gram(s) Oral once PRN Blood Glucose LESS THAN 70 milliGRAM(s)/deciliter      CAPILLARY BLOOD GLUCOSE      POCT Blood Glucose.: 219 mg/dL (03 Nov 2024 22:58)  POCT Blood Glucose.: 226 mg/dL (03 Nov 2024 17:41)  POCT Blood Glucose.: 186 mg/dL (03 Nov 2024 12:27)  POCT Blood Glucose.: 205 mg/dL (03 Nov 2024 09:05)    I&O's Summary      PHYSICAL EXAM:  Vital Signs Last 24 Hrs  T(C): 36.4 (04 Nov 2024 01:10), Max: 36.8 (03 Nov 2024 18:42)  T(F): 97.6 (04 Nov 2024 01:10), Max: 98.2 (03 Nov 2024 18:42)  HR: 75 (04 Nov 2024 01:10) (73 - 97)  BP: 133/86 (04 Nov 2024 01:10) (97/62 - 133/86)  BP(mean): --  RR: 17 (04 Nov 2024 01:10) (17 - 18)  SpO2: 100% (04 Nov 2024 01:10) (98% - 100%)    Parameters below as of 03 Nov 2024 20:00  Patient On (Oxygen Delivery Method): room air    GENERAL: NAD  HEAD:  Atraumatic, Normocephalic  EYES: EOMI,  conjunctiva and sclera clear  NECK: Supple  CHEST/LUNG: Clear to auscultation bilaterally; No wheeze, crackles or rhonchi   HEART: Regular rate and rhythm; Normal S1 S2, No murmurs, rubs, or gallops  ABDOMEN: Soft, Nontender, Nondistended; Bowel sounds present  EXTREMITIES:  2+ Peripheral Pulses, No edema  PSYCH: AAOx2-3  NEUROLOGY: +RUE Weakness 0/5, RLL weakness 2/5. Left upper and lower ext strength intact. sensation intact  SKIN: Warm and dry      LABS:                        12.8   11.60 )-----------( 523      ( 04 Nov 2024 07:00 )             39.1     11-04    140  |  109[H]  |  24[H]  ----------------------------<  181[H]  3.7   |  22  |  0.98    Ca    7.9[L]      04 Nov 2024 07:00  Phos  2.6     11-04  Mg     1.90     11-04    TPro  6.0  /  Alb  2.9[L]  /  TBili  0.4  /  DBili  x   /  AST  39  /  ALT  57[H]  /  AlkPhos  53  11-04    PTT - ( 04 Nov 2024 07:00 )  PTT:56.4 sec      Urinalysis Basic - ( 04 Nov 2024 07:00 )    Color: x / Appearance: x / SG: x / pH: x  Gluc: 181 mg/dL / Ketone: x  / Bili: x / Urobili: x   Blood: x / Protein: x / Nitrite: x   Leuk Esterase: x / RBC: x / WBC x   Sq Epi: x / Non Sq Epi: x / Bacteria: x          RADIOLOGY & ADDITIONAL TESTS:  Results Reviewed: < from: Xray Chest 1 View- PORTABLE-Urgent (Xray Chest 1 View- PORTABLE-Urgent .) (11.03.24 @ 12:36) >    IMPRESSION:  Mild pulmonary venous congestion/perihilar interstitial   edema/infiltrates, improved.    --- End of Report ---    < end of copied text >    Imaging Personally Reviewed:  Electrocardiogram Personally Reviewed:    COORDINATION OF CARE:  Care Discussed with Consultants/Other Providers [Y/N]:  Prior or Outpatient Records Reviewed [Y/N]:

## 2024-11-04 NOTE — PROGRESS NOTE ADULT - PROBLEM SELECTOR PLAN 1
- EKG on admission with NSR LVH, ST depressions in multiple leads, bigeminy and PVCx on tele  - Trops 104---> 298  - 10/27 TTE w/ EF  45 to 50 %. Regional wall motion abnormalities present. Basal and mid anterior wall, basal and mid anterolateral wall, basal and mid inferolateral wall, and basal inferior segment are abnormal.  - Cardiac cath deferred given acute CVA  - loaded with ASA and Plavix in the ER,   - c/w Lipitor 80, ASA 81, and Plavix 75 and carvedilol - decreased to 6.25  bid given hypotension on 11/3 and started on IVF @ 50 ml; monitor off IVF  - Appreciate cardio recs

## 2024-11-04 NOTE — PROGRESS NOTE ADULT - PROBLEM SELECTOR PLAN 3
- CTA demonstrated diffuse intracranial and extracranial atherosclerosis, including notably 90% maximal stenosis in carotid bulb and focal occlusion in proximal left vertebral artery. CTA also noted for segmental occlusion of left subclavian artery between thoracic aorta and left vertebral origin.   - MRI 10/29- acute Lt MCA infracts   - c/w heparin gtt, ASA and Plavix and statin  - Monitor pressures: SBP goal 140-160  - Pending repeat CTA H/N on Tuesday 11/5  - F/u w/ neurosurg s/p CTA   - ILR Prior to dc  - F/u neuro recs - CTA demonstrated diffuse intracranial and extracranial atherosclerosis, including notably 90% maximal stenosis in carotid bulb and focal occlusion in proximal left vertebral artery. CTA also noted for segmental occlusion of left subclavian artery between thoracic aorta and left vertebral origin.   - MRI 10/29- acute Lt MCA infarcts   - c/w heparin gtt, ASA and Plavix and statin  - Monitor pressures: SBP goal 140-160  - Pending repeat CTA H/N on Tuesday 11/5  - F/u w/ neurosurg s/p CTA   - ILR Prior to dc  - F/u neuro recs

## 2024-11-04 NOTE — DIETITIAN INITIAL EVALUATION ADULT - PERTINENT LABORATORY DATA
11-04    140  |  109[H]  |  24[H]  ----------------------------<  181[H]  3.7   |  22  |  0.98    Ca    7.9[L]      04 Nov 2024 07:00  Phos  2.6     11-04  Mg     1.90     11-04    TPro  6.0  /  Alb  2.9[L]  /  TBili  0.4  /  DBili  x   /  AST  39  /  ALT  57[H]  /  AlkPhos  53  11-04  POCT Blood Glucose.: 184 mg/dL (11-04-24 @ 08:15)  A1C with Estimated Average Glucose Result: 9.4 % (10-28-24 @ 03:06)

## 2024-11-04 NOTE — PROGRESS NOTE ADULT - PROBLEM SELECTOR PLAN 4
- A1c -9.2   - ISS  - increase lantus to 15 U sq and ademalog 8 U sq AC   - Monitor fingersticks  - endocrine c/s

## 2024-11-04 NOTE — DIETITIAN INITIAL EVALUATION ADULT - ORAL INTAKE PTA/DIET HISTORY
Nutrition interview conducted with Pt's daughter (in English), present at bedside. Declined offered  services today. Pt lives at home with his wife. They cook together at home. No difficulty obtaining groceries. No specific diet followed PTA. No supplements/Vitamins taken PTA.

## 2024-11-04 NOTE — DIETITIAN INITIAL EVALUATION ADULT - PERTINENT MEDS FT
MEDICATIONS  (STANDING):  albuterol    90 MICROgram(s) HFA Inhaler 2 Puff(s) Inhalation two times a day  albuterol/ipratropium for Nebulization 3 milliLiter(s) Nebulizer every 6 hours  aspirin  chewable 81 milliGRAM(s) Oral daily  atorvastatin 80 milliGRAM(s) Oral at bedtime  carvedilol 6.25 milliGRAM(s) Oral every 12 hours  chlorhexidine 2% Cloths 1 Application(s) Topical daily  clopidogrel Tablet 75 milliGRAM(s) Oral daily  dextrose 5%. 1000 milliLiter(s) (100 mL/Hr) IV Continuous <Continuous>  dextrose 5%. 1000 milliLiter(s) (50 mL/Hr) IV Continuous <Continuous>  dextrose 50% Injectable 25 Gram(s) IV Push once  dextrose 50% Injectable 12.5 Gram(s) IV Push once  glucagon  Injectable 1 milliGRAM(s) IntraMuscular once  heparin  Infusion 900 Unit(s)/Hr (9 mL/Hr) IV Continuous <Continuous>  insulin glargine Injectable (LANTUS) 15 Unit(s) SubCutaneous at bedtime  insulin lispro (ADMELOG) corrective regimen sliding scale   SubCutaneous at bedtime  insulin lispro (ADMELOG) corrective regimen sliding scale   SubCutaneous three times a day before meals  insulin lispro Injectable (ADMELOG) 8 Unit(s) SubCutaneous three times a day before meals  sodium chloride 0.9%. 1000 milliLiter(s) (50 mL/Hr) IV Continuous <Continuous>    MEDICATIONS  (PRN):  acetaminophen     Tablet .. 650 milliGRAM(s) Oral every 6 hours PRN Temp greater or equal to 38C (100.4F), Moderate Pain (4 - 6), Severe Pain (7 - 10)  dextrose Oral Gel 15 Gram(s) Oral once PRN Blood Glucose LESS THAN 70 milliGRAM(s)/deciliter  dextrose Oral Gel 15 Gram(s) Oral once PRN Blood Glucose LESS THAN 70 milliGRAM(s)/deciliter

## 2024-11-04 NOTE — CONSULT NOTE ADULT - ASSESSMENT
69 RH Kazakh speaking male PMHx COPD, HTN, DM admitted for NSTEMI. Patient also with worsening right sided weakness found to have Left hemispheric internal bordezone infarction. CTA shows severe stenosis of L ICA. MRI w/ L borderzone infarcts of L MARY/MCA. Pending [facility] tomorrow 11/5.   endocrine called for DM   a1c 9.4  he cant not tell me details regarding Diet, or DM meds  states "i forget",  now with concern for severe R hemiparesis (RUE plegia),  unable to reach family for collaterol   a1c 9.4  per team pt is on   Metformin 500mg BID  Repaglinide 2ng TID  trulicity pen 1x daily   Basaglar pen          While inpatient  BG target 100-180 mg/dl, remains above goal  - mild increase to Lantus  12  units qhs  - OKAY to c/w Admelog to 8-8-8 units SC Premeal/TIDAC as per review pt needs more premeal than basal   - Admelog  Low dose Correction Scale Premeal & seperate low dose  Correction Scale Bedtime   - Hypoglycemia protocol in place   - Carb Consistent Diet   - Nutrition consult   - Provider to RN for diabetes/insulin pen teaching     dc planning  need to confirm doses of home meds   would optimize metformin to 1000mg BID if labs allow closer to dc   add on SGLT2 for CV risk reduction (please send farigxa or Jardiance to assess coverage)  assess dose of trulcity and see if there is room to increase  c/w Basaglar and prandin     this will need to be reviwed with pt, family and PCP closer to dc and will need close fu outpt. they will need DM education prior to dc re: goals, use of glucometer, and plan for DM  pt with concern for aphasia and right hemiparesis in setting of stroke, will need to ensure pt and or family can help with DM and DM meds administration if pt is unable to self manage DM and medications       To prepare for dc:  -Discussed with patient the importance of Carb consistent diet and exercise as tolerated.    -Recommend nutritional consultation  inpt prior to dc   -Discussed glycemic goal of a1c <7% to prevent microvascular complications of diabetes mellitus and reduce the risk of macrovascular complications.  - Make sure patient knows how to inject insulin and check fingersticks with glucometer (ask bedside RN for teaching)- this needs to be taught to pt and family prior to dc   -Counseled pt on kinetics and action of basal and prandial insulin. Discussed that pt should check FSBG before meals and ALSO take the prandial insulin BEFORE meals   - Discharge on premeal insulin and Lantus. do not dc on correction scale or bedtime scale.  - At home, Patient should check FSBG premeals and bedtime. Pt should call their doctor when FSBG <70 or above >400 and or consistently above 200s as changes in the regimen will have to be made.  -pt should call PMD for DM related questions or concerns until pt is seen by CDE or endocrine   -Recommend annual podiatry and ophthalmology follow up.     -------------------------------------------------------------------------------------------------------------------------------------  DISCHARGE RX:  - Glucometer (ACCU_CHECK leisa Conenct, Ascensia Contour Next EZ or One, Freestyle Freedome LITE or OneTouch Verio IQ)  - Glucometer test strips and lancets  (make sure compatible w glucometer), Dispense #100 (or #200) use as directed  Please also prescribe glucose tabs for hypoglycemia risk     --------------------------------------------------------------------------------------------------------------------------------------      2. HTN  goal BP in DM <130/80  HTN control per team in setting stroke   outpt mc/cr ratio      3. HLD  goal LDL <55 in DM and stroke   c/w statin for CV risk reduction if no contrainidcations         d/w team    Kacie Plascencia MD  Attending Physician   Department of Endocrinology, Diabetes and Metabolism     weekdays: 9am to 5pm: email [facility] or [facility] and or TEAMS     Nights and weekends: [phone]  Please note that this patient may be followed by a different provider tomorrow.   If no answer or after hours, please contact [phone].  For final dc reccomendations, please call [phone] or page the endocrine fellow on call.

## 2024-11-04 NOTE — PROGRESS NOTE ADULT - ASSESSMENT
74 yo M Italian speaking w/ PMH of HTN, HLD, DM, p/w  shortness of breath admitted for NSTEMI and flash pulmnonary edema i/s/o HTN emergency. Pt also noted to have Right sided weakness 2/2 CVA.

## 2024-11-04 NOTE — DIETITIAN INITIAL EVALUATION ADULT - FACTORS AFF FOOD INTAKE
Detail Level: Generalized Quality 337: Tuberculosis Prevention For Psoriasis And Psoriatic Arthritis Patients On A Biological Immune Response Modifier: Patient has a documented negative annual TB screening. Quality 410: Psoriasis Clinical Response To Oral Systemic Or Biologic Mediations: Psoriasis Assessment Tool Documented, Met Specified Benchmark none

## 2024-11-04 NOTE — CONSULT NOTE ADULT - SUBJECTIVE AND OBJECTIVE BOX
Reason For Consult: DM    HPI:  Patient is a 75 year old Macedonian speaking male with PMH of HTN, HLD, DM, presents with shortness of breath over 3-4 days which acutely worsened today. Patient also initially complained of chest pain and noted with elevated BP to 190/110. Acute management in the ED included BiPAP, lasix. Patient was placed on nitroglycerin drip due to concern for possible ACS. Chest xray noted with bilateral pulmonary edema which appears cardiac in origin. Patient's respiratory status stabilized on BiPAP. While in the ED, patient's sons reported that patient also began to have right arm weakness first noticed this morning. During this time, patient also appeared slightly confused and required assistance to walk. Patient was last seen at his baseline yesterday evening at 2100 when he was AAOx3 and did not have difficulties using his right side. Code stroke was called for right sided weakness.   Upon stroke team assessment, patient is on BiPAP and does not appear in respiratory distress. BP at time of assessment is 130s systolic (nitroglycerin drip was previously discontinued). Patient denies history of stroke. Patient takes aspirin for cardioprotection. Ambulates independently at baseline, lives with family and requires some assistance with taking medications.     CCU consulted because patient requiring BIPAP for difficulty breathing and requiring nitro gtt for BP elevated to 220/110.    endocrine called 11/4 for DM   spoke to pt with Macedonian    even with  pt is a limited informant  Language Line LORENA   [id number]  he cant not tell me details regarding Diet, or DM meds  states "i forget"  unable to reach family for collaterol   a1c 9.4  per team pt is on   Metformin 500mg BID  Repaglinide 2ng TID  trulicity pen 1x daily   Basaglar pen          PAST MEDICAL & SURGICAL HISTORY:  Diabetes mellitus      HTN (hypertension)      Hyperlipidemia          FAMILY HISTORY:  pt does not know      Social History:  lives with family     Outpatient Medications:  Metformin 500mg BID  Repaglinide 2ng TID  trulicity pen 1x daily   Basaglar pen      MEDICATIONS  (STANDING):  albuterol    90 MICROgram(s) HFA Inhaler 2 Puff(s) Inhalation two times a day  albuterol/ipratropium for Nebulization 3 milliLiter(s) Nebulizer every 6 hours  aspirin  chewable 81 milliGRAM(s) Oral daily  atorvastatin 80 milliGRAM(s) Oral at bedtime  carvedilol 6.25 milliGRAM(s) Oral every 12 hours  chlorhexidine 2% Cloths 1 Application(s) Topical daily  clopidogrel Tablet 75 milliGRAM(s) Oral daily  dextrose 5%. 1000 milliLiter(s) (100 mL/Hr) IV Continuous <Continuous>  dextrose 5%. 1000 milliLiter(s) (50 mL/Hr) IV Continuous <Continuous>  dextrose 50% Injectable 25 Gram(s) IV Push once  dextrose 50% Injectable 12.5 Gram(s) IV Push once  glucagon  Injectable 1 milliGRAM(s) IntraMuscular once  heparin  Infusion 900 Unit(s)/Hr (9 mL/Hr) IV Continuous <Continuous>  insulin glargine Injectable (LANTUS) 15 Unit(s) SubCutaneous at bedtime  insulin lispro (ADMELOG) corrective regimen sliding scale   SubCutaneous at bedtime  insulin lispro (ADMELOG) corrective regimen sliding scale   SubCutaneous three times a day before meals  insulin lispro Injectable (ADMELOG) 8 Unit(s) SubCutaneous three times a day before meals  sodium chloride 0.9%. 1000 milliLiter(s) (50 mL/Hr) IV Continuous <Continuous>    MEDICATIONS  (PRN):  acetaminophen     Tablet .. 650 milliGRAM(s) Oral every 6 hours PRN Temp greater or equal to 38C (100.4F), Moderate Pain (4 - 6), Severe Pain (7 - 10)  dextrose Oral Gel 15 Gram(s) Oral once PRN Blood Glucose LESS THAN 70 milliGRAM(s)/deciliter  dextrose Oral Gel 15 Gram(s) Oral once PRN Blood Glucose LESS THAN 70 milliGRAM(s)/deciliter      Allergies    No Known Allergies    Intolerances      Review of Systems:  Constitutional: No fever  Eyes: No blurry vision  Neuro: No tremors  HEENT: No pain  Cardiovascular: No chest pain, palpitations  Respiratory: No SOB, no cough  GI: No nausea, vomiting, abdominal pain  : No dysuria  Skin: no rash  Psych: no depression  Endocrine: no polyuria, polydipsia  Hem/lymph: no swelling  Osteoporosis: no fractures    ALL OTHER SYSTEMS REVIEWED AND NEGATIVE        PHYSICAL EXAM:  VITALS: T(C): 36.5 (11-04-24 @ 13:00)  T(F): 97.7 (11-04-24 @ 13:00), Max: 98.2 (11-03-24 @ 18:42)  HR: 80 (11-04-24 @ 14:59) (72 - 88)  BP: 96/62 (11-04-24 @ 13:00) (96/62 - 133/86)  RR:  (17 - 18)  SpO2:  (97% - 100%)  Wt(kg): --  GENERAL: NAD, well-groomed, well-developed  EYES: No proptosis, no lid lag, anicteric  HEENT:  Atraumatic, Normocephalic, moist mucous membranes  THYROID: Normal size, no palpable nodules  RESPIRATORY: Clear to auscultation bilaterally; No rales, rhonchi, wheezing, or rubs  CARDIOVASCULAR: Regular rate and rhythm; No murmurs; no peripheral edema  GI: Soft, nontender, non distended, normal bowel sounds  SKIN: Dry, intact, No rashes or lesions  MUSCULOSKELETAL: Full range of motion, normal strength  NEURO: sensation intact, extraocular movements intact, no tremor, normal reflexes  PSYCH: Alert and oriented x 3, normal affect, normal mood  CUSHING'S SIGNS: no striae    POCT Blood Glucose.: 223 mg/dL (11-04-24 @ 12:03)  POCT Blood Glucose.: 184 mg/dL (11-04-24 @ 08:15)  POCT Blood Glucose.: 219 mg/dL (11-03-24 @ 22:58)  POCT Blood Glucose.: 226 mg/dL (11-03-24 @ 17:41)  POCT Blood Glucose.: 186 mg/dL (11-03-24 @ 12:27)  POCT Blood Glucose.: 205 mg/dL (11-03-24 @ 09:05)  POCT Blood Glucose.: 194 mg/dL (11-03-24 @ 08:01)  POCT Blood Glucose.: 159 mg/dL (11-02-24 @ 23:18)  POCT Blood Glucose.: 156 mg/dL (11-02-24 @ 17:54)  POCT Blood Glucose.: 259 mg/dL (11-02-24 @ 11:50)  POCT Blood Glucose.: 239 mg/dL (11-02-24 @ 07:19)  POCT Blood Glucose.: 306 mg/dL (11-01-24 @ 21:45)  POCT Blood Glucose.: 242 mg/dL (11-01-24 @ 16:49)                            12.8   11.60 )-----------( 523      ( 04 Nov 2024 07:00 )             39.1       11-04    140  |  109[H]  |  24[H]  ----------------------------<  181[H]  3.7   |  22  |  0.98    eGFR: 83    Ca    7.9[L]      11-04  Mg     1.90     11-04  Phos  2.6     11-04    TPro  6.0  /  Alb  2.9[L]  /  TBili  0.4  /  DBili  x   /  AST  39  /  ALT  57[H]  /  AlkPhos  53  11-04      Thyroid Function Tests:  10-30 @ 04:18 TSH 1.27 FreeT4 -- T3 -- Anti TPO -- Anti Thyroglobulin Ab -- TSI --          10-30 Chol 138 Direct LDL -- LDL calculated 77 HDL 28[L] Trig 163[H], 10-28 Chol 130 Direct LDL -- LDL calculated 87 HDL 26[L] Trig 87    Radiology:

## 2024-11-04 NOTE — PROGRESS NOTE ADULT - ASSESSMENT
INCOMPLETE     Impression:     Recommendations                     Patient case discussed and seen with stroke attending, Dr. Libman.    Please call with questions: [phone]  69 RH Mongolian speaking male PMHx COPD, HTN, DM admitted for NSTEMI. Patient also with worsening right sided weakness found to have Left hemispheric internal bordezone infarction. CTA shows severe stenosis of L ICA. MRI w/ L borderzone infarcts of L MARY/MCA. Pending [facility] tomorrow 11/5.     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     Recommendations     [] Repeat CTA H/N w/ IV contrast tomorrow  [] Further workup/management to be determined following neurosurgery recommendations, appreciate care  [] If no intracranial procedure required, would likely still benefit from revascularization of the extracranial L ICA  [] ILR placement per STROKE-AF inpatient or outpatient  [] Per neurosurgery, continue w/ hep gtt (conservative measures aPTT preferred of 40-60) in addition to dual antiplatelet therapy (ASA/Plavix).   [] Atorvastatin 80 mg HS Titrate LDL < 70 (currently 87)  [] Telemonitoring  [] Long term BP goals < 130/80 mmHg. Avoid hypotension. Give IVF PRN.   [] Stroke Neurochecks q4hr   [] HbA1C goals < 7.0 (currently 9.4)  []  Physical therapy/OT/Speech Language: TESSIE  [] Patient should follow up with Stroke NP, Norma Andino or Nancy Reilly, in clinic at [address] [place] [address]. Please email [email or web address] w/ basic PHI.     Patient case discussed and seen with stroke attending, Dr. Libman.    Please call with questions: [phone]  69 RH Arabic speaking male PMHx COPD, HTN, DM admitted for NSTEMI. Patient also with worsening right sided weakness found to have Left hemispheric internal bordezone infarction. CTA shows severe stenosis of L ICA. MRI w/ L borderzone infarcts of L MARY/MCA. Pending [facility] tomorrow 11/5.     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     Recommendations     [] Repeat CTA H/N w/ IV contrast tomorrow  [] Further workup/management to be determined following neurosurgery recommendations, appreciate care  [] If no intracranial procedure required, would likely still benefit from revascularization of the extracranial L ICA  [] ILR placement per STROKE-AF inpatient or outpatient  [] Per neurosurgery, continue w/ hep gtt (conservative measures aPTT preferred of 40-60) in addition to dual antiplatelet therapy (ASA/Plavix).   [] Atorvastatin 80 mg HS Titrate LDL < 70 (currently 87)  [] Telemonitoring  [] Long term BP goals < 130/80 mmHg. Avoid hypotension. Give IVF PRN.   [] Stroke Neurochecks q4hr   [] HbA1C goals < 7.0 (currently 9.4)  []  Physical therapy/OT/Speech Language: TESSIE  [] Patient should follow up with Stroke NP, Norma Andino or Nancy Reilly, in clinic at [address] [place] [address]. Please email [email or web address] w/ basic PHI.     Patient case discussed and seen with stroke attending, Dr. Libman.    Please call with questions: [phone]  69 RH Chinese speaking male PMHx COPD, HTN, DM admitted for NSTEMI. Patient also with worsening right sided weakness found to have Left hemispheric internal bordezone infarction. CTA shows severe stenosis of L ICA. MRI w/ L borderzone infarcts of L MARY/MCA. Pending [facility] tomorrow 11/5.     Impression: Slightly improving since admission, overall neurologically stable with severe R hemiparesis (RUE plegia), mixed transcortical aphasia due to internal borderzone L MCA/MARY infarcts most likely from symptomatic, severe extracranial +/- intracranial carotid siphon stenosis vs non-occlusive thrombus.     Recommendations     [] Repeat CTA H/N w/ IV contrast tomorrow  [] Further workup/management to be determined following neurosurgery recommendations, appreciate care  [] If no intracranial procedure required, would likely still benefit from revascularization of the extracranial L ICA  [] ILR placement per STROKE-AF inpatient or outpatient  [] Per neurosurgery, continue w/ hep gtt (conservative measures aPTT preferred of 40-60) in addition to dual antiplatelet therapy (ASA/Plavix). Most likely after repeat CTA, unless there is convincing evidence of an intraluminal non-occlusive thrombus, will stop IV heparin and continue ASA/Plavix.  [] Atorvastatin 80 mg HS Titrate LDL < 70 (currently 87)  [] Telemonitoring  [] Long term BP goals < 130/80 mmHg. Avoid hypotension. Give IVF PRN.   [] Stroke Neurochecks q4hr   [] HbA1C goals < 7.0 (currently 9.4)  []  Physical therapy/OT/Speech Language: TESSIE  [] Patient should follow up with Stroke NP, Norma Andino or Nancy Reilly, in clinic at [address] [place] [address]. Please email [email or web address] w/ basic PHI.     Patient case discussed and seen with stroke attending, Dr. Libman.    Please call with questions: [phone]

## 2024-11-04 NOTE — CONSULT NOTE ADULT - ASSESSMENT
ASSESSMENT/PLAN: 68 yo Czech speaking male with PMH of HTN, HLD, DM, presents with shortness of breath and right hemibody weakness, found to have acute infarcts on MRI and pulmonary edema on CXR, consistent with acute stroke and pulmonary edema in the setting of hypertensive emergency.    PLAN:  - Continue seeing PT/OT/SLP  - Out of bed to chair as tolerated  - Turn in position q2 to prevent skin breakdown    Pain - tylenol prn   DVT PPX - heparin  Rehab - acute rehab    incomplete note, consult in progress   ASSESSMENT/PLAN: 68 yo Maori speaking male with PMH of HTN, HLD, DM, presents with shortness of breath and right hemibody weakness, found to have acute infarcts on MRI and pulmonary edema on CXR, consistent with acute stroke and pulmonary edema in the setting of hypertensive emergency.    PLAN:  - Continue bedside PT/OT/SLP  - Out of bed to chair as tolerated  - Turn in position q2 to prevent skin breakdown    Pain - tylenol prn   DVT PPX - heparin  Rehab - acute rehab  patient can tolerate 3 hours per day of therapy with medical supervision. will monitor progress with bedside therapy ASSESSMENT/PLAN: 70 yo Vietnamese speaking male with PMH of HTN, HLD, DM, presents with shortness of breath due to pulmonary edema and right hemibody weakness and aphasia, found to have acute infarcts.   also noted to have stenosis in carotid and L vertebral artery  now off lasix  PLAN:  - Continue bedside PT/OT/SLP  - Out of bed to chair as tolerated  - Turn in position q2 to prevent skin breakdown  cardiac cath deferred due to acute stroke, patient had elevated troponins, trended down  Pain - tylenol prn   DVT PPX - heparin  Rehab - acute rehab  patient can tolerate 3 hours per day of therapy with medical supervision. will monitor progress with bedside therapy

## 2024-11-04 NOTE — DIETITIAN INITIAL EVALUATION ADULT - OTHER INFO
Electrodesiccation Text: The wound bed was treated with electrodesiccation after the biopsy was performed. Per chart, 69 year old Azeri speaking male with PMH of HTN, HLD, DM, p/w  shortness of breath admitted for NSTEMI and flash pulmnonary edema i/s/o HTN emergency. Also found to have Right sided weakness 2/2 CVA admitted for CCU for bipap, lasix, nitroglycerin IV infusion, hydralazine. Now improving. Transferred to medicine for further mgt    No reports of any recent episodes of nausea, vomiting, diarrhea or constipation, Last BM noted today per Pt and RN at beside today. Denies any chewing/swallowing difficulties. Continues on soft and bite sized, mildly thick liquids per SLP recs (10/29 note). No food allergies. Stated UBW: ~68kg per Pt. Pt's weight in house is 70kg, does not endorse any recent wt loss PTA or while in hospital. Food preferences explored and noted. Intake is good-excellent (%) per RN flowsheets and as observed today at breakfast. Feeding skills: assistance required.     RD provided pt with handout and verbal education for Heart healthy nutrition and Carbohydrate Counting for People with Diabetes. Discussed carbohydrate sources, carbohydrate portion sizes, protein sources, mixed meals, and nutrition label reading. Stressed importance of balanced meals with adequate protein and fiber. Pt was informed of current A1c, goal A1c, and goal fingerstick range. RD also reviewed low sodium, low fat restriction as well as preferred methods of cooking. Handouts provided with foods recommended, foods to avoid, shopping tips and sample menu. Pt confirmed understanding and compliance to this information.

## 2024-11-04 NOTE — PROGRESS NOTE ADULT - SUBJECTIVE AND OBJECTIVE BOX
INTERVAL HISTORY: Patient seen at bedside by stroke team & attending. Welsh translation provided by daughter who decline  services. No acute events overnight. Per daughter, no significant improvement in speech since past few days, but RLE has been slowly improving.     PAST MEDICAL & SURGICAL HISTORY:  Diabetes mellitus      HTN (hypertension)      Hyperlipidemia        FAMILY HISTORY:    SOCIAL HISTORY:   T/E/D:   Occupation:   Lives with:     MEDICATIONS (HOME):  Home Medications:  Aspir 81 oral delayed release tablet: 1 tab(s) orally once a day (27 Oct 2024 16:20)  Basaglar KwikPen 100 units/mL subcutaneous solution: 15 unit(s) subcutaneous once a day (at bedtime) (27 Oct 2024 16:22)  gabapentin 300 mg oral capsule: 1 cap(s) orally 2 times a day (27 Oct 2024 16:21)  Lipitor 20 mg oral tablet: 1 tab(s) orally once a day (27 Oct 2024 16:20)  metFORMIN 850 mg oral tablet: 1 tab(s) orally once a day (27 Oct 2024 16:21)  repaglinide 2 mg oral tablet: 1 tab(s) orally 3 times a day (27 Oct 2024 16:20)    MEDICATIONS  (STANDING):  albuterol    90 MICROgram(s) HFA Inhaler 2 Puff(s) Inhalation two times a day  albuterol/ipratropium for Nebulization 3 milliLiter(s) Nebulizer every 6 hours  aspirin  chewable 81 milliGRAM(s) Oral daily  atorvastatin 80 milliGRAM(s) Oral at bedtime  carvedilol 6.25 milliGRAM(s) Oral every 12 hours  chlorhexidine 2% Cloths 1 Application(s) Topical daily  clopidogrel Tablet 75 milliGRAM(s) Oral daily  dextrose 5%. 1000 milliLiter(s) (100 mL/Hr) IV Continuous <Continuous>  dextrose 5%. 1000 milliLiter(s) (50 mL/Hr) IV Continuous <Continuous>  dextrose 50% Injectable 25 Gram(s) IV Push once  dextrose 50% Injectable 12.5 Gram(s) IV Push once  glucagon  Injectable 1 milliGRAM(s) IntraMuscular once  heparin  Infusion 900 Unit(s)/Hr (9 mL/Hr) IV Continuous <Continuous>  insulin glargine Injectable (LANTUS) 15 Unit(s) SubCutaneous at bedtime  insulin lispro (ADMELOG) corrective regimen sliding scale   SubCutaneous at bedtime  insulin lispro (ADMELOG) corrective regimen sliding scale   SubCutaneous three times a day before meals  insulin lispro Injectable (ADMELOG) 8 Unit(s) SubCutaneous three times a day before meals  sodium chloride 0.9%. 1000 milliLiter(s) (50 mL/Hr) IV Continuous <Continuous>    MEDICATIONS  (PRN):  acetaminophen     Tablet .. 650 milliGRAM(s) Oral every 6 hours PRN Temp greater or equal to 38C (100.4F), Moderate Pain (4 - 6), Severe Pain (7 - 10)  dextrose Oral Gel 15 Gram(s) Oral once PRN Blood Glucose LESS THAN 70 milliGRAM(s)/deciliter  dextrose Oral Gel 15 Gram(s) Oral once PRN Blood Glucose LESS THAN 70 milliGRAM(s)/deciliter    ALLERGIES/INTOLERANCES:  Allergies  No Known Allergies    Intolerances    VITALS & EXAMINATION:  Vital Signs Last 24 Hrs  T(C): 36.5 (04 Nov 2024 13:00), Max: 36.8 (03 Nov 2024 18:42)  T(F): 97.7 (04 Nov 2024 13:00), Max: 98.2 (03 Nov 2024 18:42)  HR: 76 (04 Nov 2024 13:00) (72 - 88)  BP: 96/62 (04 Nov 2024 13:00) (96/62 - 133/86)  BP(mean): --  RR: 17 (04 Nov 2024 13:00) (17 - 18)  SpO2: 97% (04 Nov 2024 13:00) (97% - 100%)    Parameters below as of 04 Nov 2024 13:00  Patient On (Oxygen Delivery Method): room air        General:  Constitutional: Male, appears stated age, in no apparent distress including pain  Head: Normocephalic & Atraumatic.  Respiratory: Breathing comfortably.    Neurological:  MS: Eyes open. Awake, alert, oriented to person, place, situation, time. Able to follow simple commands, but not crossed commands.     Language: Speech is appears fluent in english, but in Welsh with more hesitance and pauses. Relatively repetition.     CNs: Possible RHH. EOMI no nystagmus. V1-3 intact to LT b/l. Moderate R Facial palsy, UMN pattern.     Motor: Normal muscle bulk & tone. No noticeable tremor. No movement of RUE throughout, plegic (0/5 throughout). RLE with trace effort against gravity, distally at DF 2/5. No drifts of LUE/LLE. 	     Sensation: Intact to LT/Temp b/l throughout.     Cortical: Extinction on DSS (neglect): none    Coordination: Cannot assess on symptomatic side due to weakness.     Gait: Deferred.         LABORATORY:  CBC                       12.8   11.60 )-----------( 523      ( 04 Nov 2024 07:00 )             39.1     Chem 11-04    140  |  109[H]  |  24[H]  ----------------------------<  181[H]  3.7   |  22  |  0.98    Ca    7.9[L]      04 Nov 2024 07:00  Phos  2.6     11-04  Mg     1.90     11-04    TPro  6.0  /  Alb  2.9[L]  /  TBili  0.4  /  DBili  x   /  AST  39  /  ALT  57[H]  /  AlkPhos  53  11-04    LFTs LIVER FUNCTIONS - ( 04 Nov 2024 07:00 )  Alb: 2.9 g/dL / Pro: 6.0 g/dL / ALK PHOS: 53 U/L / ALT: 57 U/L / AST: 39 U/L / GGT: x           Coagulopathy PTT - ( 04 Nov 2024 07:00 )  PTT:56.4 sec  Lipid Panel 10-30 Chol 138 LDL -- HDL 28[L] Trig 163[H], 10-28 Chol 130 LDL -- HDL 26[L] Trig 87  A1c   Cardiac enzymes     U/A Urinalysis Basic - ( 04 Nov 2024 07:00 )    Color: x / Appearance: x / SG: x / pH: x  Gluc: 181 mg/dL / Ketone: x  / Bili: x / Urobili: x   Blood: x / Protein: x / Nitrite: x   Leuk Esterase: x / RBC: x / WBC x   Sq Epi: x / Non Sq Epi: x / Bacteria: x      CSF  Immunological  Other    STUDIES & IMAGING:  Studies (EKG, EEG, EMG, etc):     Radiology (XR, CT, MR, U/S, TTE/JB):        < from: CT Head No Cont (11.01.24 @ 10:39) >  Impression: Acute infarcts again seen as described above.    < from: MR Angio Neck w/wo IV Cont (10.31.24 @ 13:34) >  MRA BRAIN:  Apparent moderate stenosis of the distal left precavernous ICA and   moderate to severe stenosis of the distal left cavernous ICA. Diminished   flow related signal within the supraclinoid left ICA compared to the right    Somewhat diminished flow related signal within the left M1 segment with   normal flow-related enhancement of the more distal left MCA.    The left intradural vertebral demonstrates poor flow related enhancement   proximally, but better flow related enhancement distal to the origin of   the left PICA.    No vascular aneurysm.    MRA NECK:  Approximately 70% short segment stenosis of the origin of the right   internal carotid artery. Mildly diminished flow related signal and   enhancement of the more distal vessel. Please note on CTA neck   10/27/2024, the vessel appears nearly occluded atits origin.    Lack of flow related enhancement of the left subclavian artery beginning   just distal to its origin. The vessel reconstitutes and demonstrates   normal flow-related enhancement more distally.      MRI brain w/o contrast:     1.  Acute infarcts in the high left MCA watershed distribution.  2.  Irregular flow-void in the cavernous segment of the left ICA. This   could be attributed to slow/turbulent flow. A severe stenosis is not   entirely excluded. Correlate with recent CTA of the head for further   evaluation.  3.  Chronic ischemic changes.     INTERVAL HISTORY: Patient seen at bedside by stroke team & attending. German translation provided by daughter who decline  services. No acute events overnight. Per daughter, no significant improvement in speech since past few days, but RLE has been slowly improving.     PAST MEDICAL & SURGICAL HISTORY:  Diabetes mellitus      HTN (hypertension)      Hyperlipidemia        FAMILY HISTORY:    SOCIAL HISTORY:   T/E/D:   Occupation:   Lives with:     MEDICATIONS (HOME):  Home Medications:  Aspir 81 oral delayed release tablet: 1 tab(s) orally once a day (27 Oct 2024 16:20)  Basaglar KwikPen 100 units/mL subcutaneous solution: 15 unit(s) subcutaneous once a day (at bedtime) (27 Oct 2024 16:22)  gabapentin 300 mg oral capsule: 1 cap(s) orally 2 times a day (27 Oct 2024 16:21)  Lipitor 20 mg oral tablet: 1 tab(s) orally once a day (27 Oct 2024 16:20)  metFORMIN 850 mg oral tablet: 1 tab(s) orally once a day (27 Oct 2024 16:21)  repaglinide 2 mg oral tablet: 1 tab(s) orally 3 times a day (27 Oct 2024 16:20)    MEDICATIONS  (STANDING):  albuterol    90 MICROgram(s) HFA Inhaler 2 Puff(s) Inhalation two times a day  albuterol/ipratropium for Nebulization 3 milliLiter(s) Nebulizer every 6 hours  aspirin  chewable 81 milliGRAM(s) Oral daily  atorvastatin 80 milliGRAM(s) Oral at bedtime  carvedilol 6.25 milliGRAM(s) Oral every 12 hours  chlorhexidine 2% Cloths 1 Application(s) Topical daily  clopidogrel Tablet 75 milliGRAM(s) Oral daily  dextrose 5%. 1000 milliLiter(s) (100 mL/Hr) IV Continuous <Continuous>  dextrose 5%. 1000 milliLiter(s) (50 mL/Hr) IV Continuous <Continuous>  dextrose 50% Injectable 25 Gram(s) IV Push once  dextrose 50% Injectable 12.5 Gram(s) IV Push once  glucagon  Injectable 1 milliGRAM(s) IntraMuscular once  heparin  Infusion 900 Unit(s)/Hr (9 mL/Hr) IV Continuous <Continuous>  insulin glargine Injectable (LANTUS) 15 Unit(s) SubCutaneous at bedtime  insulin lispro (ADMELOG) corrective regimen sliding scale   SubCutaneous at bedtime  insulin lispro (ADMELOG) corrective regimen sliding scale   SubCutaneous three times a day before meals  insulin lispro Injectable (ADMELOG) 8 Unit(s) SubCutaneous three times a day before meals  sodium chloride 0.9%. 1000 milliLiter(s) (50 mL/Hr) IV Continuous <Continuous>    MEDICATIONS  (PRN):  acetaminophen     Tablet .. 650 milliGRAM(s) Oral every 6 hours PRN Temp greater or equal to 38C (100.4F), Moderate Pain (4 - 6), Severe Pain (7 - 10)  dextrose Oral Gel 15 Gram(s) Oral once PRN Blood Glucose LESS THAN 70 milliGRAM(s)/deciliter  dextrose Oral Gel 15 Gram(s) Oral once PRN Blood Glucose LESS THAN 70 milliGRAM(s)/deciliter    ALLERGIES/INTOLERANCES:  Allergies  No Known Allergies    Intolerances    VITALS & EXAMINATION:  Vital Signs Last 24 Hrs  T(C): 36.5 (04 Nov 2024 13:00), Max: 36.8 (03 Nov 2024 18:42)  T(F): 97.7 (04 Nov 2024 13:00), Max: 98.2 (03 Nov 2024 18:42)  HR: 76 (04 Nov 2024 13:00) (72 - 88)  BP: 96/62 (04 Nov 2024 13:00) (96/62 - 133/86)  BP(mean): --  RR: 17 (04 Nov 2024 13:00) (17 - 18)  SpO2: 97% (04 Nov 2024 13:00) (97% - 100%)    Parameters below as of 04 Nov 2024 13:00  Patient On (Oxygen Delivery Method): room air        General:  Constitutional: Male, appears stated age, in no apparent distress including pain  Head: Normocephalic & Atraumatic.  Respiratory: Breathing comfortably.    Neurological:  MS: Eyes open. Awake, alert, oriented to person, place, situation, time. Able to follow simple commands, but not crossed commands.     Language: Speech is appears fluent in english, but in German with more hesitancy and pauses. Relatively preserved repetition.     CNs: Possible RHH. EOMI no nystagmus. V1-3 intact to LT b/l. Moderate R Facial palsy, UMN pattern.     Motor: Normal muscle bulk & tone. No noticeable tremor. No movement of RUE throughout, plegic (0/5 throughout). RLE with trace effort against gravity, distally at DF 2/5. No drifts of LUE/LLE. 	     Sensation: Intact to LT/Temp b/l throughout.     Cortical: Extinction on DSS (neglect): none    Coordination: Cannot assess on symptomatic side due to weakness.     Gait: Deferred.         LABORATORY:  CBC                       12.8   11.60 )-----------( 523      ( 04 Nov 2024 07:00 )             39.1     Chem 11-04    140  |  109[H]  |  24[H]  ----------------------------<  181[H]  3.7   |  22  |  0.98    Ca    7.9[L]      04 Nov 2024 07:00  Phos  2.6     11-04  Mg     1.90     11-04    TPro  6.0  /  Alb  2.9[L]  /  TBili  0.4  /  DBili  x   /  AST  39  /  ALT  57[H]  /  AlkPhos  53  11-04    LFTs LIVER FUNCTIONS - ( 04 Nov 2024 07:00 )  Alb: 2.9 g/dL / Pro: 6.0 g/dL / ALK PHOS: 53 U/L / ALT: 57 U/L / AST: 39 U/L / GGT: x           Coagulopathy PTT - ( 04 Nov 2024 07:00 )  PTT:56.4 sec  Lipid Panel 10-30 Chol 138 LDL -- HDL 28[L] Trig 163[H], 10-28 Chol 130 LDL -- HDL 26[L] Trig 87  A1c   Cardiac enzymes     U/A Urinalysis Basic - ( 04 Nov 2024 07:00 )    Color: x / Appearance: x / SG: x / pH: x  Gluc: 181 mg/dL / Ketone: x  / Bili: x / Urobili: x   Blood: x / Protein: x / Nitrite: x   Leuk Esterase: x / RBC: x / WBC x   Sq Epi: x / Non Sq Epi: x / Bacteria: x      CSF  Immunological  Other    STUDIES & IMAGING:  Studies (EKG, EEG, EMG, etc):     Radiology (XR, CT, MR, U/S, TTE/JB):        < from: CT Head No Cont (11.01.24 @ 10:39) >  Impression: Acute infarcts again seen as described above.    < from: MR Angio Neck w/wo IV Cont (10.31.24 @ 13:34) >  MRA BRAIN:  Apparent moderate stenosis of the distal left precavernous ICA and   moderate to severe stenosis of the distal left cavernous ICA. Diminished   flow related signal within the supraclinoid left ICA compared to the right    Somewhat diminished flow related signal within the left M1 segment with   normal flow-related enhancement of the more distal left MCA.    The left intradural vertebral demonstrates poor flow related enhancement   proximally, but better flow related enhancement distal to the origin of   the left PICA.    No vascular aneurysm.    MRA NECK:  Approximately 70% short segment stenosis of the origin of the right   internal carotid artery. Mildly diminished flow related signal and   enhancement of the more distal vessel. Please note on CTA neck   10/27/2024, the vessel appears nearly occluded atits origin.    Lack of flow related enhancement of the left subclavian artery beginning   just distal to its origin. The vessel reconstitutes and demonstrates   normal flow-related enhancement more distally.      MRI brain w/o contrast:     1.  Acute infarcts in the high left MCA watershed distribution.  2.  Irregular flow-void in the cavernous segment of the left ICA. This   could be attributed to slow/turbulent flow. A severe stenosis is not   entirely excluded. Correlate with recent CTA of the head for further   evaluation.  3.  Chronic ischemic changes.

## 2024-11-04 NOTE — DIETITIAN INITIAL EVALUATION ADULT - NS FNS DIET ORDER
Diet, Soft and Bite Sized:   Consistent Carbohydrate {Evening Snack} (CSTCHOSN)  Mildly Thick Liquids (MILDTHICKLIQS)  No Concentrated Phosphorus  Halal (10-29-24 @ 17:43) [Active]

## 2024-11-04 NOTE — DIETITIAN INITIAL EVALUATION ADULT - ADD RECOMMEND
Scheduled Change Diet to Soft and Bite sized, Mildly thick liquids, Consistent Carbohydrate and Low sodium, Halal

## 2024-11-04 NOTE — PROGRESS NOTE ADULT - PROBLEM SELECTOR PLAN 2
- admitted w/ SOB 2/2 Flash pulm edema i/s/o HTN emergency  - c/w coreg as above  - Duonebs Q6H  - Monitor pressures: SBP goal 140-160

## 2024-11-05 ENCOUNTER — INPATIENT (INPATIENT)
Facility: HOSPITAL | Age: 69
LOS: 5 days | Discharge: HOME | DRG: 68 | End: 2024-11-11
Attending: RADIOLOGY | Admitting: RADIOLOGY
Payer: COMMERCIAL

## 2024-11-05 ENCOUNTER — TRANSCRIPTION ENCOUNTER (OUTPATIENT)
Age: 69
End: 2024-11-05

## 2024-11-05 VITALS
TEMPERATURE: 98 F | SYSTOLIC BLOOD PRESSURE: 160 MMHG | DIASTOLIC BLOOD PRESSURE: 80 MMHG | RESPIRATION RATE: 18 BRPM | OXYGEN SATURATION: 100 % | HEART RATE: 82 BPM

## 2024-11-05 VITALS
TEMPERATURE: 98 F | RESPIRATION RATE: 18 BRPM | DIASTOLIC BLOOD PRESSURE: 78 MMHG | SYSTOLIC BLOOD PRESSURE: 120 MMHG | OXYGEN SATURATION: 94 % | HEART RATE: 77 BPM

## 2024-11-05 DIAGNOSIS — R93.89 ABNORMAL FINDINGS ON DIAGNOSTIC IMAGING OF OTHER SPECIFIED BODY STRUCTURES: ICD-10-CM

## 2024-11-05 DIAGNOSIS — I65.29 OCCLUSION AND STENOSIS OF UNSPECIFIED CAROTID ARTERY: ICD-10-CM

## 2024-11-05 PROBLEM — E11.9 TYPE 2 DIABETES MELLITUS WITHOUT COMPLICATIONS: Chronic | Status: ACTIVE | Noted: 2024-10-27

## 2024-11-05 PROBLEM — I10 ESSENTIAL (PRIMARY) HYPERTENSION: Chronic | Status: ACTIVE | Noted: 2024-10-27

## 2024-11-05 PROBLEM — E78.5 HYPERLIPIDEMIA, UNSPECIFIED: Chronic | Status: ACTIVE | Noted: 2024-10-27

## 2024-11-05 LAB
ALBUMIN SERPL ELPH-MCNC: 3.5 G/DL — SIGNIFICANT CHANGE UP (ref 3.3–5)
ALBUMIN SERPL ELPH-MCNC: 3.5 G/DL — SIGNIFICANT CHANGE UP (ref 3.3–5)
ALP SERPL-CCNC: 65 U/L — SIGNIFICANT CHANGE UP (ref 40–120)
ALP SERPL-CCNC: 65 U/L — SIGNIFICANT CHANGE UP (ref 40–120)
ALT FLD-CCNC: 62 U/L — HIGH (ref 4–41)
ALT FLD-CCNC: 66 U/L — HIGH (ref 10–45)
ANION GAP SERPL CALC-SCNC: 11 MMOL/L — SIGNIFICANT CHANGE UP (ref 5–17)
ANION GAP SERPL CALC-SCNC: 12 MMOL/L — SIGNIFICANT CHANGE UP (ref 7–14)
APTT BLD: 27.6 SEC — SIGNIFICANT CHANGE UP (ref 24.5–35.6)
APTT BLD: 44.9 SEC — HIGH (ref 24.5–35.6)
APTT BLD: 52.9 SEC — HIGH (ref 24.5–35.6)
AST SERPL-CCNC: 38 U/L — SIGNIFICANT CHANGE UP (ref 4–40)
AST SERPL-CCNC: 47 U/L — HIGH (ref 10–40)
BASOPHILS # BLD AUTO: 0.07 K/UL — SIGNIFICANT CHANGE UP (ref 0–0.2)
BASOPHILS NFR BLD AUTO: 0.6 % — SIGNIFICANT CHANGE UP (ref 0–2)
BILIRUB SERPL-MCNC: 0.3 MG/DL — SIGNIFICANT CHANGE UP (ref 0.2–1.2)
BILIRUB SERPL-MCNC: 0.5 MG/DL — SIGNIFICANT CHANGE UP (ref 0.2–1.2)
BUN SERPL-MCNC: 24 MG/DL — HIGH (ref 7–23)
BUN SERPL-MCNC: 28 MG/DL — HIGH (ref 7–23)
CALCIUM SERPL-MCNC: 9.4 MG/DL — SIGNIFICANT CHANGE UP (ref 8.4–10.5)
CALCIUM SERPL-MCNC: 9.6 MG/DL — SIGNIFICANT CHANGE UP (ref 8.4–10.5)
CHLORIDE SERPL-SCNC: 105 MMOL/L — SIGNIFICANT CHANGE UP (ref 96–108)
CHLORIDE SERPL-SCNC: 99 MMOL/L — SIGNIFICANT CHANGE UP (ref 98–107)
CO2 SERPL-SCNC: 22 MMOL/L — SIGNIFICANT CHANGE UP (ref 22–31)
CO2 SERPL-SCNC: 22 MMOL/L — SIGNIFICANT CHANGE UP (ref 22–31)
CREAT SERPL-MCNC: 1.04 MG/DL — SIGNIFICANT CHANGE UP (ref 0.5–1.3)
CREAT SERPL-MCNC: 1.05 MG/DL — SIGNIFICANT CHANGE UP (ref 0.5–1.3)
EGFR: 77 ML/MIN/1.73M2 — SIGNIFICANT CHANGE UP
EGFR: 78 ML/MIN/1.73M2 — SIGNIFICANT CHANGE UP
EOSINOPHIL # BLD AUTO: 0.37 K/UL — SIGNIFICANT CHANGE UP (ref 0–0.5)
EOSINOPHIL NFR BLD AUTO: 3.3 % — SIGNIFICANT CHANGE UP (ref 0–6)
GLUCOSE SERPL-MCNC: 134 MG/DL — HIGH (ref 70–99)
GLUCOSE SERPL-MCNC: 206 MG/DL — HIGH (ref 70–99)
HCT VFR BLD CALC: 39.3 % — SIGNIFICANT CHANGE UP (ref 39–50)
HCT VFR BLD CALC: 40 % — SIGNIFICANT CHANGE UP (ref 39–50)
HGB BLD-MCNC: 12.6 G/DL — LOW (ref 13–17)
HGB BLD-MCNC: 13.3 G/DL — SIGNIFICANT CHANGE UP (ref 13–17)
IANC: 6.32 K/UL — SIGNIFICANT CHANGE UP (ref 1.8–7.4)
IMM GRANULOCYTES NFR BLD AUTO: 0.6 % — SIGNIFICANT CHANGE UP (ref 0–0.9)
INR BLD: 1.04 RATIO — SIGNIFICANT CHANGE UP (ref 0.85–1.16)
LYMPHOCYTES # BLD AUTO: 3.53 K/UL — HIGH (ref 1–3.3)
LYMPHOCYTES # BLD AUTO: 31.3 % — SIGNIFICANT CHANGE UP (ref 13–44)
MAGNESIUM SERPL-MCNC: 2.2 MG/DL — SIGNIFICANT CHANGE UP (ref 1.6–2.6)
MCHC RBC-ENTMCNC: 28 PG — SIGNIFICANT CHANGE UP (ref 27–34)
MCHC RBC-ENTMCNC: 28.7 PG — SIGNIFICANT CHANGE UP (ref 27–34)
MCHC RBC-ENTMCNC: 32.1 G/DL — SIGNIFICANT CHANGE UP (ref 32–36)
MCHC RBC-ENTMCNC: 33.3 G/DL — SIGNIFICANT CHANGE UP (ref 32–36)
MCV RBC AUTO: 86.2 FL — SIGNIFICANT CHANGE UP (ref 80–100)
MCV RBC AUTO: 87.3 FL — SIGNIFICANT CHANGE UP (ref 80–100)
MONOCYTES # BLD AUTO: 0.91 K/UL — HIGH (ref 0–0.9)
MONOCYTES NFR BLD AUTO: 8.1 % — SIGNIFICANT CHANGE UP (ref 2–14)
NEUTROPHILS # BLD AUTO: 6.32 K/UL — SIGNIFICANT CHANGE UP (ref 1.8–7.4)
NEUTROPHILS NFR BLD AUTO: 56.1 % — SIGNIFICANT CHANGE UP (ref 43–77)
NRBC # BLD: 0 /100 WBCS — SIGNIFICANT CHANGE UP (ref 0–0)
NRBC # BLD: 0 /100 WBCS — SIGNIFICANT CHANGE UP (ref 0–0)
NRBC # FLD: 0 K/UL — SIGNIFICANT CHANGE UP (ref 0–0)
PHOSPHATE SERPL-MCNC: 3.2 MG/DL — SIGNIFICANT CHANGE UP (ref 2.5–4.5)
PLATELET # BLD AUTO: 487 K/UL — HIGH (ref 150–400)
PLATELET # BLD AUTO: 509 K/UL — HIGH (ref 150–400)
POTASSIUM SERPL-MCNC: 3.9 MMOL/L — SIGNIFICANT CHANGE UP (ref 3.5–5.3)
POTASSIUM SERPL-MCNC: 4.3 MMOL/L — SIGNIFICANT CHANGE UP (ref 3.5–5.3)
POTASSIUM SERPL-SCNC: 3.9 MMOL/L — SIGNIFICANT CHANGE UP (ref 3.5–5.3)
POTASSIUM SERPL-SCNC: 4.3 MMOL/L — SIGNIFICANT CHANGE UP (ref 3.5–5.3)
PROT SERPL-MCNC: 7.3 G/DL — SIGNIFICANT CHANGE UP (ref 6–8.3)
PROT SERPL-MCNC: 7.3 G/DL — SIGNIFICANT CHANGE UP (ref 6–8.3)
PROTHROM AB SERPL-ACNC: 12 SEC — SIGNIFICANT CHANGE UP (ref 9.9–13.4)
RBC # BLD: 4.5 M/UL — SIGNIFICANT CHANGE UP (ref 4.2–5.8)
RBC # BLD: 4.64 M/UL — SIGNIFICANT CHANGE UP (ref 4.2–5.8)
RBC # FLD: 13 % — SIGNIFICANT CHANGE UP (ref 10.3–14.5)
RBC # FLD: 13.1 % — SIGNIFICANT CHANGE UP (ref 10.3–14.5)
SODIUM SERPL-SCNC: 133 MMOL/L — LOW (ref 135–145)
SODIUM SERPL-SCNC: 138 MMOL/L — SIGNIFICANT CHANGE UP (ref 135–145)
WBC # BLD: 11.27 K/UL — HIGH (ref 3.8–10.5)
WBC # BLD: 11.65 K/UL — HIGH (ref 3.8–10.5)
WBC # FLD AUTO: 11.27 K/UL — HIGH (ref 3.8–10.5)
WBC # FLD AUTO: 11.65 K/UL — HIGH (ref 3.8–10.5)

## 2024-11-05 PROCEDURE — 99232 SBSQ HOSP IP/OBS MODERATE 35: CPT

## 2024-11-05 PROCEDURE — 99233 SBSQ HOSP IP/OBS HIGH 50: CPT

## 2024-11-05 PROCEDURE — 71045 X-RAY EXAM CHEST 1 VIEW: CPT | Mod: 26

## 2024-11-05 PROCEDURE — 70496 CT ANGIOGRAPHY HEAD: CPT | Mod: 26

## 2024-11-05 PROCEDURE — 70498 CT ANGIOGRAPHY NECK: CPT | Mod: 26

## 2024-11-05 RX ORDER — INSULIN GLARGINE,HUM.REC.ANLOG 100/ML
14 VIAL (ML) SUBCUTANEOUS AT BEDTIME
Refills: 0 | Status: DISCONTINUED | OUTPATIENT
Start: 2024-11-05 | End: 2024-11-05

## 2024-11-05 RX ORDER — CARVEDILOL 25 MG/1
3.12 TABLET, FILM COATED ORAL EVERY 12 HOURS
Refills: 0 | Status: DISCONTINUED | OUTPATIENT
Start: 2024-11-05 | End: 2024-11-05

## 2024-11-05 RX ORDER — ALBUTEROL 90 MCG
2 AEROSOL (GRAM) INHALATION EVERY 12 HOURS
Refills: 0 | Status: DISCONTINUED | OUTPATIENT
Start: 2024-11-05 | End: 2024-11-11

## 2024-11-05 RX ORDER — POLYETHYLENE GLYCOL 3350 17 G/17G
17 POWDER, FOR SOLUTION ORAL DAILY
Refills: 0 | Status: DISCONTINUED | OUTPATIENT
Start: 2024-11-05 | End: 2024-11-11

## 2024-11-05 RX ORDER — ENOXAPARIN SODIUM 40MG/0.4ML
40 SYRINGE (ML) SUBCUTANEOUS EVERY 24 HOURS
Refills: 0 | Status: DISCONTINUED | OUTPATIENT
Start: 2024-11-05 | End: 2024-11-11

## 2024-11-05 RX ORDER — SODIUM CHLORIDE 9 MG/ML
1000 INJECTION, SOLUTION INTRAMUSCULAR; INTRAVENOUS; SUBCUTANEOUS
Refills: 0 | Status: DISCONTINUED | OUTPATIENT
Start: 2024-11-05 | End: 2024-11-07

## 2024-11-05 RX ORDER — CARVEDILOL 25 MG/1
3.12 TABLET, FILM COATED ORAL EVERY 12 HOURS
Refills: 0 | Status: DISCONTINUED | OUTPATIENT
Start: 2024-11-05 | End: 2024-11-07

## 2024-11-05 RX ORDER — CLOPIDOGREL 75 MG/1
75 TABLET ORAL DAILY
Refills: 0 | Status: DISCONTINUED | OUTPATIENT
Start: 2024-11-05 | End: 2024-11-06

## 2024-11-05 RX ORDER — INSULIN LISPRO 100/ML
VIAL (ML) SUBCUTANEOUS
Refills: 0 | Status: DISCONTINUED | OUTPATIENT
Start: 2024-11-05 | End: 2024-11-11

## 2024-11-05 RX ORDER — SODIUM CHLORIDE 9 MG/ML
1000 INJECTION, SOLUTION INTRAMUSCULAR; INTRAVENOUS; SUBCUTANEOUS
Refills: 0 | Status: DISCONTINUED | OUTPATIENT
Start: 2024-11-05 | End: 2024-11-05

## 2024-11-05 RX ORDER — CLOPIDOGREL 75 MG/1
1 TABLET ORAL
Qty: 0 | Refills: 0 | DISCHARGE
Start: 2024-11-05

## 2024-11-05 RX ORDER — ASPIRIN/MAG CARB/ALUMINUM AMIN 325 MG
1 TABLET ORAL
Refills: 0 | DISCHARGE

## 2024-11-05 RX ORDER — INSULIN GLARGINE,HUM.REC.ANLOG 100/ML
15 VIAL (ML) SUBCUTANEOUS
Refills: 0 | DISCHARGE

## 2024-11-05 RX ORDER — INSULIN GLARGINE,HUM.REC.ANLOG 100/ML
12 VIAL (ML) SUBCUTANEOUS AT BEDTIME
Refills: 0 | Status: DISCONTINUED | OUTPATIENT
Start: 2024-11-05 | End: 2024-11-11

## 2024-11-05 RX ORDER — INSULIN LISPRO 100/ML
10 VIAL (ML) SUBCUTANEOUS
Qty: 0 | Refills: 0 | DISCHARGE
Start: 2024-11-05

## 2024-11-05 RX ORDER — GLUCAGON INJECTION, SOLUTION 1 MG/.2ML
1 INJECTION, SOLUTION SUBCUTANEOUS ONCE
Refills: 0 | Status: DISCONTINUED | OUTPATIENT
Start: 2024-11-05 | End: 2024-11-07

## 2024-11-05 RX ORDER — ASPIRIN/MAG CARB/ALUMINUM AMIN 325 MG
1 TABLET ORAL
Qty: 0 | Refills: 0 | DISCHARGE
Start: 2024-11-05

## 2024-11-05 RX ORDER — ONDANSETRON HYDROCHLORIDE 2 MG/ML
4 INJECTION, SOLUTION INTRAMUSCULAR; INTRAVENOUS EVERY 6 HOURS
Refills: 0 | Status: DISCONTINUED | OUTPATIENT
Start: 2024-11-05 | End: 2024-11-08

## 2024-11-05 RX ORDER — METFORMIN HYDROCHLORIDE 500 MG/1
1 TABLET, EXTENDED RELEASE ORAL
Refills: 0 | DISCHARGE

## 2024-11-05 RX ORDER — CARVEDILOL 25 MG/1
1 TABLET, FILM COATED ORAL
Qty: 0 | Refills: 0 | DISCHARGE
Start: 2024-11-05

## 2024-11-05 RX ORDER — GABAPENTIN 300 MG/1
1 CAPSULE ORAL
Refills: 0 | DISCHARGE

## 2024-11-05 RX ORDER — INSULIN LISPRO 100/ML
VIAL (ML) SUBCUTANEOUS AT BEDTIME
Refills: 0 | Status: DISCONTINUED | OUTPATIENT
Start: 2024-11-05 | End: 2024-11-11

## 2024-11-05 RX ORDER — INSULIN GLARGINE,HUM.REC.ANLOG 100/ML
12 VIAL (ML) SUBCUTANEOUS AT BEDTIME
Refills: 0 | Status: DISCONTINUED | OUTPATIENT
Start: 2024-11-05 | End: 2024-11-05

## 2024-11-05 RX ORDER — SENNA 187 MG
2 TABLET ORAL AT BEDTIME
Refills: 0 | Status: DISCONTINUED | OUTPATIENT
Start: 2024-11-05 | End: 2024-11-11

## 2024-11-05 RX ORDER — ACETAMINOPHEN 500 MG
1000 TABLET ORAL EVERY 6 HOURS
Refills: 0 | Status: DISCONTINUED | OUTPATIENT
Start: 2024-11-05 | End: 2024-11-08

## 2024-11-05 RX ORDER — ALBUTEROL 90 MCG
2 AEROSOL (GRAM) INHALATION
Qty: 0 | Refills: 0 | DISCHARGE
Start: 2024-11-05

## 2024-11-05 RX ORDER — INSULIN GLARGINE,HUM.REC.ANLOG 100/ML
12 VIAL (ML) SUBCUTANEOUS
Qty: 0 | Refills: 0 | DISCHARGE
Start: 2024-11-05

## 2024-11-05 RX ORDER — REPAGLINIDE 2 MG/1
1 TABLET ORAL
Refills: 0 | DISCHARGE

## 2024-11-05 RX ORDER — ACETAMINOPHEN 500 MG
2 TABLET ORAL
Qty: 0 | Refills: 0 | DISCHARGE
Start: 2024-11-05

## 2024-11-05 RX ORDER — ASPIRIN/MAG CARB/ALUMINUM AMIN 325 MG
81 TABLET ORAL DAILY
Refills: 0 | Status: DISCONTINUED | OUTPATIENT
Start: 2024-11-05 | End: 2024-11-06

## 2024-11-05 RX ORDER — INSULIN LISPRO 100/ML
10 VIAL (ML) SUBCUTANEOUS
Refills: 0 | Status: DISCONTINUED | OUTPATIENT
Start: 2024-11-05 | End: 2024-11-05

## 2024-11-05 RX ADMIN — IPRATROPIUM BROMIDE AND ALBUTEROL SULFATE 3 MILLILITER(S): .5; 2.5 SOLUTION RESPIRATORY (INHALATION) at 15:30

## 2024-11-05 RX ADMIN — Medication 10 UNIT(S): at 18:03

## 2024-11-05 RX ADMIN — SODIUM CHLORIDE 50 MILLILITER(S): 9 INJECTION, SOLUTION INTRAMUSCULAR; INTRAVENOUS; SUBCUTANEOUS at 10:06

## 2024-11-05 RX ADMIN — CARVEDILOL 6.25 MILLIGRAM(S): 25 TABLET, FILM COATED ORAL at 06:07

## 2024-11-05 RX ADMIN — CLOPIDOGREL 75 MILLIGRAM(S): 75 TABLET ORAL at 12:08

## 2024-11-05 RX ADMIN — HEPARIN SODIUM 7 UNIT(S)/HR: 10000 INJECTION INTRAVENOUS; SUBCUTANEOUS at 13:20

## 2024-11-05 RX ADMIN — Medication 1: at 18:03

## 2024-11-05 RX ADMIN — HEPARIN SODIUM 7 UNIT(S)/HR: 10000 INJECTION INTRAVENOUS; SUBCUTANEOUS at 07:10

## 2024-11-05 RX ADMIN — HEPARIN SODIUM 7 UNIT(S)/HR: 10000 INJECTION INTRAVENOUS; SUBCUTANEOUS at 07:48

## 2024-11-05 RX ADMIN — Medication 81 MILLIGRAM(S): at 12:08

## 2024-11-05 RX ADMIN — Medication 10 UNIT(S): at 12:47

## 2024-11-05 RX ADMIN — Medication 2: at 08:43

## 2024-11-05 RX ADMIN — IPRATROPIUM BROMIDE AND ALBUTEROL SULFATE 3 MILLILITER(S): .5; 2.5 SOLUTION RESPIRATORY (INHALATION) at 09:35

## 2024-11-05 RX ADMIN — CHLORHEXIDINE GLUCONATE 1 APPLICATION(S): 40 SOLUTION TOPICAL at 12:08

## 2024-11-05 RX ADMIN — SODIUM CHLORIDE 50 MILLILITER(S): 9 INJECTION, SOLUTION INTRAMUSCULAR; INTRAVENOUS; SUBCUTANEOUS at 13:20

## 2024-11-05 RX ADMIN — Medication 8 UNIT(S): at 08:42

## 2024-11-05 RX ADMIN — Medication 2: at 12:48

## 2024-11-05 NOTE — PROGRESS NOTE ADULT - PROBLEM SELECTOR PLAN 4
- A1c -9.2   - ISS  - Monitor fingersticks  - basal/bolus as per endo  - endocrine appreciated - A1c -9.2   - ISS  - Monitor fingersticks  - basal/bolus as per endo lantus 14 and ademalog 10 U sq QAC  - endocrine appreciated - EKG on admission with NSR LVH, ST depressions in multiple leads, bigeminy and PVCx on tele  - Trops 104---> 298  - 10/27 TTE w/ EF  45 to 50 %. Regional wall motion abnormalities present. Basal and mid anterior wall, basal and mid anterolateral wall, basal and mid inferolateral wall, and basal inferior segment are abnormal.  - Cardiac cath deferred given acute CVA  - loaded with plavix and ASA in the ER,   - c/w Lipitor 80, ASA 81, and Plavix 75 and BB

## 2024-11-05 NOTE — CHART NOTE - NSCHARTNOTESELECT_GEN_ALL_CORE
Cardiac Clearance/Event Note
MAR ACCEPT NOTE
Neurosurgery/Event Note
CCU sign out/Event Note
Event Note

## 2024-11-05 NOTE — PROVIDER CONTACT NOTE (OTHER) - SITUATION
Patient's blood pressure is 101/66
/63
Patient's blood pressure is 100/71
Radiologist call for concern for cranial bleeding, told to stop heparin.
/75
done

## 2024-11-05 NOTE — H&P ADULT - HISTORY OF PRESENT ILLNESS
69M Kazakh speaking hx HTN, HLD, DM, initial (last wk) p/f code stroke Rt sided weakness i/s/o NSTEMI/HF, xfer from [facility] for angio/LICA stent w/ Dr. Ulrich. 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 Exam: AOx3, mild Rt facial, RUE 0/5, RLE 3/5, LLE 5/5, SILT

## 2024-11-05 NOTE — PROGRESS NOTE ADULT - REASON FOR ADMISSION
concern for stroke

## 2024-11-05 NOTE — CHART NOTE - NSCHARTNOTEFT_GEN_A_CORE
Cardiac Clearance  - TTE on ____ EF ____%, no significant valvular disease  - Cath on____:  ____% occlusion of ____, otherwise normal, non-obstructive CAD  - No cardiac contraindications at this time for OR with general anesthesia as there is no unstable arrythmia, active ACS, significant valvular disease, or decompensate HF.   - Patient is at elevated risk for a high risk procedure given cardiac history but is otherwise optimized from a cardiac perspective.   - pt with hx of CAD/PCI, recent LHC showing non-obs disease and with mildly elevated troponin. chest pain without acute ischemic ST changes.   low suspicion for ACS  - cont bblocker in the periprocedural period. continue aspirin if bleeding risk is not prohibitive given hx of CAD/PCI .            Pre-op cardiac eval  -Pt has no RCRI risk factors  -As per SCANLON, her risk of tanvir-op MACE is 0.3%, which is not considered to be elev  - METS do not exceed 4  -Further cardiac testing prior to planned procedure is unlikely to alter mgmt/outcomes  -pt is intermediate risk for intermediate to high risk procedure and is optimized from cv perspective and may proceed without further cardiac testing.      -c/w meds         or   - RCRI = of at least 2 (congestive HF and elevated Cr)  -No Active chest pain, no SOB. Volume status has been optimzed at this point.   -Overall this patient is at elevated risk for cardiac death, nonfatal myocardial infarction, and nonfatal cardiac arrest perioperatively for this low risk procedure given his significant comorbidities (CHE, morbid obesity) however from the cardiac standpoint, he is optimized for this potential life saving procedure.    or   The patient's Revised Cardiac Risk Index estimates a 10.1% 30-day risk of death, MI or cardiac arrest. The patient's Scanlon Perioperative Risk is 4.7-5.9% for 30-day risk of myocardial infarction or cardiac arrest. Although these risk calculators often over-estimate risk for non-surgical procedures that do not require general anesthesia, they also do not account for his severe aortic stenosis, which makes him a much more high risk patient.      or    Cardiovascular Risk Stratification - RCRI =  3  Functional Status:  2 METS, patient is able to walk one block without shortness of breath and chest pain. Can go up one flight of stairs  Patient currently does not show any clinical evidence of decompensated heart failure, cardiac arrythmias, or active ischemia.     Overall this patient is as 11% risk (for cardiac death, nonfatal myocardial infarction, and nonfatal cardiac arrest perioperatively for this AVF creation surgery.   -According to ACC/AHA 2014 Perioperative guidelines, no further cardiac testing is recommended.   -May proceed with planned procedure after shared-decision making between the patient or surrogate decision maker and procedure performing physician with regarding the risk, benefits, and alternatives to the procedure.     -Continue ASA 81mg, can be held if preferable from Vascular surgery perspective    I would involve anesthesiology early to start planning a sedation strategy for this patient given his severe AS. Pre-op cardiac eval  - TTE on 10/27/24: EF 45-50%, Regional wall motion abnormalities present. Basal and mid anterior wall, basal and mid anterolateral wall, basal and mid inferolateral wall, and basal inferior segment are abnormal.  - pt with hx of NSTEMI, cath was deferred in the setting of active stroke, EKG showed NSR LVH, ST depressions in multiple leads.   - Patient is at elevated risk for a high risk procedure given cardiac history but is otherwise optimized from a cardiac perspective.   - No cardiac contraindications at this time for OR with general anesthesia as there is no unstable arrythmia, significant valvular disease, or decompensate HF.       Cardiovascular Risk Stratification - RCRI =  4, Class IV Risk   Functional Status:  2 METS, patient is able to walk one block without shortness of breath and chest pain. Can go up one flight of stairs  Patient currently does not show any clinical evidence of decompensated heart failure, cardiac arrythmias, or active ischemia.     Overall this patient is at 15% 30-day risk of death, MI, or cardiac arrest   -According to ACC/AHA 2014 Perioperative guidelines, no further cardiac testing is recommended.   -May proceed with planned procedure after shared-decision making between the patient or surrogate decision maker and procedure performing physician with regarding the risk, benefits, and alternatives to the procedure.     -Continue ASA 81mg, can be held if preferable from Vascular surgery perspective Pre-op cardiac eval  - TTE on 10/27/24: EF 45-50%, Regional wall motion abnormalities present. Basal and mid anterior wall, basal and mid anterolateral wall, basal and mid inferolateral wall, and basal inferior segment are abnormal.  - pt with hx of NSTEMI, cath was deferred in the setting of active stroke, EKG showed NSR LVH, ST depressions in multiple leads.   - Patient is at elevated risk for a high risk procedure given cardiac history but is otherwise optimized from a cardiac perspective.   - No cardiac contraindications at this time for OR with general anesthesia as there is no unstable arrythmia, active ACS, significant valvular disease, or decompensate HF.     Cardiovascular Risk Stratification - RCRI =  4, Class IV Risk   Functional Status:   METS,   Patient currently does not show any clinical evidence of decompensated heart failure, cardiac arrythmias, or active ischemia.     Overall this patient is at 15% 30-day risk of death, MI, or cardiac arrest   -According to ACC/AHA 2014 Perioperative guidelines, no further cardiac testing is recommended.   -May proceed with planned procedure after shared-decision making between the patient or surrogate decision maker and procedure performing physician with regarding the risk, benefits, and alternatives to the procedure.       Incomplete until Attending Signed Pre-op cardiac eval  - TTE on 10/27/24: EF 45-50%, Regional wall motion abnormalities present. Basal and mid anterior wall, basal and mid anterolateral wall, basal and mid inferolateral wall, and basal inferior segment are abnormal.  - pt with hx of NSTEMI, cath was deferred in the setting of active stroke  - Patient is at elevated risk for a low risk procedure given cardiac history   - No cardiac contraindications at this time for OR with general anesthesia as there is no unstable arrythmia, active ACS, significant valvular disease, or decompensate HF.     Cardiovascular Risk Stratification - RCRI =  4, Class IV Risk   Functional Status: Unable to assess   Patient currently does not show any clinical evidence of decompensated heart failure, cardiac arrythmias, or active ischemia.     Overall this patient is at 15% 30-day risk of death, MI, or cardiac arrest   -May proceed with planned procedure after shared-decision making between the patient or surrogate decision maker and procedure performing physician with regarding the risk, benefits, and alternatives to the procedure.       Incomplete until Attending Signed Pre-op cardiac eval  - TTE on 10/27/24: EF 45-50%, Regional wall motion abnormalities present. Basal and mid anterior wall, basal and mid anterolateral wall, basal and mid inferolateral wall, and basal inferior segment are abnormal.  - pt with hx of NSTEMI, cath was deferred in the setting of active stroke  - Patient is at elevated risk for a low risk procedure given cardiac history       Cardiovascular Risk Stratification - RCRI =  4, Class IV Risk   Functional Status: Unable to assess   Patient currently does not show any clinical evidence of decompensated heart failure, cardiac arrythmias, or active ischemia.     Overall this patient is at 15% 30-day risk of death, MI, or cardiac arrest   -May proceed with planned procedure after shared-decision making between the patient or surrogate decision maker and procedure performing physician with regarding the risk, benefits, and alternatives to the procedure.       Incomplete until Attending Signed

## 2024-11-05 NOTE — PROGRESS NOTE ADULT - PROBLEM SELECTOR PLAN 1
- C/w Plavix and Asa  - D/w Dr. Farhat Ulrich and Dr. Collins with review of CTA-  STOP heparin drip, continue with ASA and Plavix   -Plan for Carotid stent  TOMORROW with Dr. Ulrich 11/6.   - Son made aware  - Transfer center called for tx to neurosurgery service  - Cardiac clearance obtained  - NPO after midnight with IVF      case to be discussed and reviewed with Dr. Collins /Dr. Ulrich    Neurosurgery, ext [phone]

## 2024-11-05 NOTE — PROGRESS NOTE ADULT - PROBLEM SELECTOR PLAN 5
--C/w statin - C/w statin - EKG on admission with NSR LVH, ST depressions in multiple leads, bigeminy and PVCx on tele  - Trops 104---> 298  - 10/27 TTE w/ EF  45 to 50 %. Regional wall motion abnormalities present. Basal and mid anterior wall, basal and mid anterolateral wall, basal and mid inferolateral wall, and basal inferior segment are abnormal.  - Cardiac cath deferred given acute CVA  - loaded with plavix and ASA in the ER,   - c/w Lipitor 80, ASA 81, and Plavix 75 and BB

## 2024-11-05 NOTE — SWALLOW BEDSIDE ASSESSMENT ADULT - DIET PRIOR TO ADMI
Regular food but typically softer per son at bedside and thin liquids
Regular Solids with Thin Liquids per patient's report
Soft regular food per son at bedside and thin liquids

## 2024-11-05 NOTE — PROVIDER CONTACT NOTE (OTHER) - RECOMMENDATIONS
Give patient fluids
hold coreg per parameter
continue to monitor
Monitor the patient's diastolic BP
Stop the heparin

## 2024-11-05 NOTE — PROVIDER CONTACT NOTE (OTHER) - BACKGROUND
69 male with PMH of HTN, HLD, Type 2 DM, presented with R.sided weakness and aphasia.
69 male with PMH of HTN, HLD, Type 2 DM, presented with R.sided weakness and aphasia.
Patient has a PMH of HTN, HLD, DM with R sided weakness, came in for acute pulmonary edema.
Patient is a 69M PMH HTN, HLD, DM, with R sided weakness. Admitted for acute pulmonary edema
Patient came in for pulmonary edema with hypertensive urgency. Became a code stroke in the emergency room.

## 2024-11-05 NOTE — SWALLOW BEDSIDE ASSESSMENT ADULT - SPECIFY REASON(S)
to clinically assess swallow mechanism
to clinically re-assess swallow mechanism
To clinically assess oropharyngeal swallow.
to clinically assess swallow mechanism

## 2024-11-05 NOTE — PROGRESS NOTE ADULT - SUBJECTIVE AND OBJECTIVE BOX
INTERVAL HISTORY: Patient seen at bedside by stroke team & attending. No acute events overnight. Patient received CTA, results pending. Patient unable to offer history at this time.     PAST MEDICAL & SURGICAL HISTORY:  Diabetes mellitus      HTN (hypertension)      Hyperlipidemia        FAMILY HISTORY:    SOCIAL HISTORY:   T/E/D:   Occupation:   Lives with:     MEDICATIONS (HOME):  Home Medications:  Aspir 81 oral delayed release tablet: 1 tab(s) orally once a day (27 Oct 2024 16:20)  Basaglar KwikPen 100 units/mL subcutaneous solution: 15 unit(s) subcutaneous once a day (at bedtime) (27 Oct 2024 16:22)  gabapentin 300 mg oral capsule: 1 cap(s) orally 2 times a day (27 Oct 2024 16:21)  Lipitor 20 mg oral tablet: 1 tab(s) orally once a day (27 Oct 2024 16:20)  metFORMIN 850 mg oral tablet: 1 tab(s) orally once a day (27 Oct 2024 16:21)  repaglinide 2 mg oral tablet: 1 tab(s) orally 3 times a day (27 Oct 2024 16:20)    MEDICATIONS  (STANDING):  albuterol    90 MICROgram(s) HFA Inhaler 2 Puff(s) Inhalation two times a day  albuterol/ipratropium for Nebulization 3 milliLiter(s) Nebulizer every 6 hours  aspirin  chewable 81 milliGRAM(s) Oral daily  atorvastatin 80 milliGRAM(s) Oral at bedtime  carvedilol 3.125 milliGRAM(s) Oral every 12 hours  chlorhexidine 2% Cloths 1 Application(s) Topical daily  clopidogrel Tablet 75 milliGRAM(s) Oral daily  dextrose 5%. 1000 milliLiter(s) (50 mL/Hr) IV Continuous <Continuous>  dextrose 5%. 1000 milliLiter(s) (100 mL/Hr) IV Continuous <Continuous>  dextrose 50% Injectable 25 Gram(s) IV Push once  dextrose 50% Injectable 12.5 Gram(s) IV Push once  glucagon  Injectable 1 milliGRAM(s) IntraMuscular once  heparin  Infusion 700 Unit(s)/Hr (7 mL/Hr) IV Continuous <Continuous>  insulin glargine Injectable (LANTUS) 14 Unit(s) SubCutaneous at bedtime  insulin lispro (ADMELOG) corrective regimen sliding scale   SubCutaneous three times a day before meals  insulin lispro (ADMELOG) corrective regimen sliding scale   SubCutaneous at bedtime  insulin lispro Injectable (ADMELOG) 10 Unit(s) SubCutaneous three times a day before meals  sodium chloride 0.9%. 1000 milliLiter(s) (50 mL/Hr) IV Continuous <Continuous>    MEDICATIONS  (PRN):  acetaminophen     Tablet .. 650 milliGRAM(s) Oral every 6 hours PRN Temp greater or equal to 38C (100.4F), Moderate Pain (4 - 6), Severe Pain (7 - 10)  dextrose Oral Gel 15 Gram(s) Oral once PRN Blood Glucose LESS THAN 70 milliGRAM(s)/deciliter  dextrose Oral Gel 15 Gram(s) Oral once PRN Blood Glucose LESS THAN 70 milliGRAM(s)/deciliter    ALLERGIES/INTOLERANCES:  Allergies  No Known Allergies    Intolerances    VITALS & EXAMINATION:  Vital Signs Last 24 Hrs  T(C): 36.4 (05 Nov 2024 08:15), Max: 36.9 (04 Nov 2024 21:00)  T(F): 97.6 (05 Nov 2024 08:15), Max: 98.4 (04 Nov 2024 21:00)  HR: 89 (05 Nov 2024 09:35) (76 - 90)  BP: 101/66 (05 Nov 2024 08:15) (101/66 - 157/83)  BP(mean): --  RR: 18 (05 Nov 2024 08:15) (14 - 18)  SpO2: 97% (05 Nov 2024 09:35) (97% - 99%)    Parameters below as of 05 Nov 2024 08:15  Patient On (Oxygen Delivery Method): room air      General:  Constitutional: Male, appears stated age, in no apparent distress including pain  Head: Normocephalic & Atraumatic.  Respiratory: Breathing comfortably.    Neurological:  MS: Eyes closed, open to voice. Awake, alert, oriented to person, place, situation, time. Able to follow simple commands, but not crossed commands.     Language: Speech is moderately dysfluent. Relatively preserved repetition.     CNs: Possible RHH with no blink to threat. EOMI no nystagmus. V1-3 intact to LT b/l. Moderate R Facial palsy, UMN pattern.     Motor: Normal muscle bulk & tone. No noticeable tremor. No movement of RUE throughout, plegic (0/5 throughout). RLE with trace effort against gravity. No drifts of LUE/LLE. 	     Sensation: Intact to LT/Temp b/l throughout.     Cortical: Extinction on DSS (neglect): none    Coordination: Cannot assess on symptomatic side due to weakness.     Gait: Deferred.         LABORATORY:  CBC                       13.3   11.27 )-----------( 509      ( 05 Nov 2024 04:38 )             40.0     Chem 11-05    133[L]  |  99  |  24[H]  ----------------------------<  206[H]  3.9   |  22  |  1.05    Ca    9.4      05 Nov 2024 04:38  Phos  3.2     11-05  Mg     2.20     11-05    TPro  7.3  /  Alb  3.5  /  TBili  0.5  /  DBili  x   /  AST  38  /  ALT  62[H]  /  AlkPhos  65  11-05    LFTs LIVER FUNCTIONS - ( 05 Nov 2024 04:38 )  Alb: 3.5 g/dL / Pro: 7.3 g/dL / ALK PHOS: 65 U/L / ALT: 62 U/L / AST: 38 U/L / GGT: x           Coagulopathy PTT - ( 05 Nov 2024 10:56 )  PTT:44.9 sec  Lipid Panel 10-30 Chol 138 LDL -- HDL 28[L] Trig 163[H], 10-28 Chol 130 LDL -- HDL 26[L] Trig 87  A1c   Cardiac enzymes     U/A Urinalysis Basic - ( 05 Nov 2024 04:38 )    Color: x / Appearance: x / SG: x / pH: x  Gluc: 206 mg/dL / Ketone: x  / Bili: x / Urobili: x   Blood: x / Protein: x / Nitrite: x   Leuk Esterase: x / RBC: x / WBC x   Sq Epi: x / Non Sq Epi: x / Bacteria: x      CSF  Immunological  Other    STUDIES & IMAGING:  Studies (EKG, EEG, EMG, etc):     Radiology (XR, CT, MR, U/S, TTE/JB):          < from: CT Head No Cont (11.01.24 @ 10:39) >  Impression: Acute infarcts again seen as described above.    < from: MR Angio Neck w/wo IV Cont (10.31.24 @ 13:34) >  MRA BRAIN:  Apparent moderate stenosis of the distal left precavernous ICA and   moderate to severe stenosis of the distal left cavernous ICA. Diminished   flow related signal within the supraclinoid left ICA compared to the right    Somewhat diminished flow related signal within the left M1 segment with   normal flow-related enhancement of the more distal left MCA.    The left intradural vertebral demonstrates poor flow related enhancement   proximally, but better flow related enhancement distal to the origin of   the left PICA.    No vascular aneurysm.    MRA NECK:  Approximately 70% short segment stenosis of the origin of the right   internal carotid artery. Mildly diminished flow related signal and   enhancement of the more distal vessel. Please note on CTA neck   10/27/2024, the vessel appears nearly occluded atits origin.    Lack of flow related enhancement of the left subclavian artery beginning   just distal to its origin. The vessel reconstitutes and demonstrates   normal flow-related enhancement more distally.      MRI brain w/o contrast:     1.  Acute infarcts in the high left MCA watershed distribution.  2.  Irregular flow-void in the cavernous segment of the left ICA. This   could be attributed to slow/turbulent flow. A severe stenosis is not   entirely excluded. Correlate with recent CTA of the head for further   evaluation.  3.  Chronic ischemic changes.

## 2024-11-05 NOTE — PROGRESS NOTE ADULT - TIME BILLING
reviewing laboratory data, consultants' recommendations, documentation in [place], performing medically appropriate examinations/evaluations, discussion with patient/family/RN/ACP/Residents and interdisciplinary staff (such as , social workers, etc), counseling patient/family/care giver, ordering medically appropriate medication, tests, or procedures
I was present with the Resident during the key portions of the history and exam. I discussed the case with the Resident and agree with the findings and plan as documented in the Resident's note, unless noted below.   ROS otherwise negative
I attest my time as attending is greater than 50% of the total combined time spent on qualifying patient care activities by the PA/NP and attending.  I have made amendments to the documentation where necessary.  Additional comments: Agree with above.  Patient seen and examined.  Discussed condition and plan with patient.
.
I attest my time as attending is greater than 50% of the total combined time spent on qualifying patient care activities by the PA/NP and attending.  I have made amendments to the documentation where necessary.  Additional comments: Agree with above.  Patient seen and examined.  Discussed condition and plan with patient
- Ordering, reviewing, and interpreting labs, testing, and imaging.  - Independently obtaining a review of systems and performing a physical exam  - Reviewing consultant documentation/recommendations in addition to discussing plan of care with consultants.  - Counselling and educating patient and family regarding interpretation of aforementioned items and plan of care.
- Ordering, reviewing, and interpreting labs, testing, and imaging.  - Independently obtaining a review of systems and performing a physical exam  - Reviewing consultant documentation/recommendations in addition to discussing plan of care with consultants.  - Counselling and educating patient and family regarding interpretation of aforementioned items and plan of care.

## 2024-11-05 NOTE — PROGRESS NOTE ADULT - ASSESSMENT
69 RH Yi speaking male PMHx COPD, HTN, DM admitted for NSTEMI. Patient also with worsening right sided weakness found to have Left hemispheric internal bordezone infarction. CTA shows severe stenosis of L ICA. MRI w/ L borderzone infarcts of L MARY/MCA. Pending [facility] tomorrow 11/5.   endocrine called for DM   a1c 9.4  he cant not tell me details regarding Diet, or DM meds  states "i forget",  now with concern for severe R hemiparesis (RUE plegia),  unable to reach family for collaterol   a1c 9.4  per team pt is on   Metformin 500mg BID  Repaglinide 2ng TID  trulicity pen 1x daily   Basaglar pen          While inpatient  BG target 100-180 mg/dl, remains above goal  - mild increase to Lantus  12  units qhs  - OKAY to c/w Admelog to 8-8-8 units SC Premeal/TIDAC as per review pt needs more premeal than basal   - Admelog  Low dose Correction Scale Premeal & seperate low dose  Correction Scale Bedtime   - Hypoglycemia protocol in place   - Carb Consistent Diet   - Nutrition consult   - Provider to RN for diabetes/insulin pen teaching     dc planning  need to confirm doses of home meds   would optimize metformin to 1000mg BID if labs allow closer to dc   add on SGLT2 for CV risk reduction (please send farigxa or Jardiance to assess coverage)  assess dose of trulcity and see if there is room to increase  c/w Basaglar and prandin     this will need to be reviwed with pt, family and PCP closer to dc and will need close fu outpt. they will need DM education prior to dc re: goals, use of glucometer, and plan for DM  pt with concern for aphasia and right hemiparesis in setting of stroke, will need to ensure pt and or family can help with DM and DM meds administration if pt is unable to self manage DM and medications       To prepare for dc:  -Discussed with patient the importance of Carb consistent diet and exercise as tolerated.    -Recommend nutritional consultation  inpt prior to dc   -Discussed glycemic goal of a1c <7% to prevent microvascular complications of diabetes mellitus and reduce the risk of macrovascular complications.  - Make sure patient knows how to inject insulin and check fingersticks with glucometer (ask bedside RN for teaching)- this needs to be taught to pt and family prior to dc   -Counseled pt on kinetics and action of basal and prandial insulin. Discussed that pt should check FSBG before meals and ALSO take the prandial insulin BEFORE meals   - Discharge on premeal insulin and Lantus. do not dc on correction scale or bedtime scale.  - At home, Patient should check FSBG premeals and bedtime. Pt should call their doctor when FSBG <70 or above >400 and or consistently above 200s as changes in the regimen will have to be made.  -pt should call PMD for DM related questions or concerns until pt is seen by CDE or endocrine   -Recommend annual podiatry and ophthalmology follow up.     -------------------------------------------------------------------------------------------------------------------------------------  DISCHARGE RX:  - Glucometer (ACCU_CHECK leisa Conenct, Ascensia Contour Next EZ or One, Freestyle Freedome LITE or OneTouch Verio IQ)  - Glucometer test strips and lancets  (make sure compatible w glucometer), Dispense #100 (or #200) use as directed  Please also prescribe glucose tabs for hypoglycemia risk     --------------------------------------------------------------------------------------------------------------------------------------      2. HTN  goal BP in DM <130/80  HTN control per team in setting stroke   outpt mc/cr ratio      3. HLD  goal LDL <55 in DM and stroke   c/w statin for CV risk reduction if no contrainidcations         d/w team    Kacie Plascencia MD  Attending Physician   Department of Endocrinology, Diabetes and Metabolism     weekdays: 9am to 5pm: email [facility] or [facility] and or TEAMS     Nights and weekends: [phone]  Please note that this patient may be followed by a different provider tomorrow.   If no answer or after hours, please contact [phone].  For final dc reccomendations, please call [phone] or page the endocrine fellow on call. 69 RH Armenian speaking male PMHx COPD, HTN, DM admitted for NSTEMI. Patient also with worsening right sided weakness found to have Left hemispheric internal bordezone infarction. CTA shows severe stenosis of L ICA. MRI w/ L borderzone infarcts of L MARY/MCA. Pending [facility] tomorrow 11/5.   endocrine called for DM   a1c 9.4  he cant not tell me details regarding Diet, or DM meds  states "i forget",  now with concern for severe R hemiparesis (RUE plegia),  unable to reach family for collaterol   a1c 9.4  per team pt is on   Metformin 850 mg p.o. BID with Meals   Repaglinide 2 mg TID with meals   Trulicity pen 1.5 mg sq weekly   Basaglar pen 50 units sq sq qhs   Confirmed above meds with son     While inpatient  - BG target 100-180 mg/dl, Above goal   - Continue Lantus 12 units sq qhs for NPO : will likely need higher dose of lantus when diet resumed   - Increase Admelog 10 units TID AC (please hold if npo/not eating)  - Continue Admelog Low dose Correction Scale Premeal & seperate low dose Correction Scale Bedtime   - Fs before meals and at bedtime   - Carb Consistent Diet   - Nutrition consult   - Hypoglycemia protocol in place   - Provider to RN for diabetes/insulin pen teaching; wife knows how to use insulin pen     dc planning  High carb diet at home  - As per primary team plan is to be transferred to [facility] or tomorrow   - would optimize metformin to 1000mg BID if labs allow closer to dc   - Add on SGLT2 for CV risk reduction (please send Farigxa 5mg p.o. daily or Jardiance 10mg p.o. daily for insurance coverage); no history of UTI, Foot wounds, and or DKA   - Increase Trulicity from 1.5 mg sq weekly to 3mg sq weekly   - Continue  Basaglar kwik pen (dose TBD)  and Prandin likely decrease dose to 1mg p.o. TID AC (please hold if skips meals) since Trulicity dose will be increased     - For severe hypoglycemia: Please prescribe Glucose tablets 4G (take 4 tablets) or 15G tablets for blood sugar less than 70 mG/dL repeat fingerstick in 15 minutes.     - As per Son Maxwellsabra Taylor (first call) [phone]; Pts wife will take full responsibility for glucose monitoring and insulin/trulicity pen administration(wife knows how to administer insulin and trulicity pen ;  if pt is unable to self manage DM and medications   - Please make sure to review insulin pen/trulicity pen with family prior to dc       - Please call your doctor if you fs is 70 or below and or 250 and above   - Reviewed importance of medication adherence,  glucose monitoring, and following a consistent carb diet   - Hypoglycemia and intervention   - Please follow up with your pcp, podiatry, endocrinology, and opthalmology as an outpt   - Please follow up with [facility] [place]; [address] [place] [address] [address]; [place] [address]; [phone]: requested appointment on 11/5     2. HTN  goal BP in DM <130/80  HTN control per team in setting stroke   outpt mc/cr ratio    3. HLD  goal LDL <55 in DM and stroke   c/w statin for CV risk reduction if no contrainidcations     D/w Griselda MORENO : emailed [facility] team       Mirtha Ji  Nurse Practitioner  Division of Endocrinology & Diabetes  In house pager #[phone]    If before 9AM or after 6PM, or on weekends/holidays, please call endocrine answering service for assistance ([phone]).For nonurgent matters email [email or web address] for assistance.

## 2024-11-05 NOTE — PROVIDER CONTACT NOTE (OTHER) - ACTION/TREATMENT ORDERED:
Provider notified, continue to monitor. Provider notified, continue to monitor. continue to run fluids.

## 2024-11-05 NOTE — PROGRESS NOTE ADULT - CRITICAL CARE ATTENDING COMMENT
75 year old man with history of HTN who presented with hypertensive emergency and right sided weakness complicated with acute diastolic heart failure and flash pulmonary edema. Placed on BIPAP and transferred to CCU for further care.  Weaned off Bipap; BP better controlled, NSTEMI with downtrending Troponin- Now on medical floor and neurosurgery planning for carotid artery stenting    TTE 10/27/24:  CONCLUSIONS:   1. Left ventricular systolic function is mildly decreased with an ejection fraction visually estimated at 45 to 50 %. Regional wall motion abnormalities present.   2. Basal and mid anterior wall, basal and mid anterolateral wall, basal and mid inferolateral wall, and basal inferior segment are abnormal.   3. Left atrium is normal in size.   4. The right atrium is normal in size.   5. Normal right ventricular cavity size, with normal wall thickness, and probably normal right ventricular systolic function.  **Left Ventricle:  Left ventricular systolic function is mildly decreased with an ejection fraction visually estimated at 45 to 50%. There are regional wall motion abnormalities present.    Meds:  ASA  Atorvastatin  Plavix  Coreg 3.125 mg BID    Patient with RCRI Score of 4  He is high risk for surgical intervention given recent NSTEMI and HF in setting of hypertensive emergency. However given his significant carotid disease it is reasonable to pursue carotid artery stenting with optimal BP control and fluid management. Would continue DAPT and statin.

## 2024-11-05 NOTE — SWALLOW BEDSIDE ASSESSMENT ADULT - COMMENTS
CT Head 11/1: Abnormal areas of low-attenuation involving the left frontal cortical and subcortical region is again identified which is compatible with patient's known acute infarct. No hemorrhagic transformation is seen. No significant shift or herniation is seen. Evaluation of the osseous with the appropriate window appears unremarkable The visualized paranasal sinuses mastoid and middle ear regions are clear. Impression: Acute infarcts again seen as described above.    CXR 11/3: IMPRESSION: Mild pulmonary venous congestion/perihilar interstitial edema/infiltrates, improved.    Of note: Patient known to this service, seen for initial bedside swallow evaluation and speech language evaluation on 10/29. Patient recommended for soft and bite-sized solids with mildly thick liquids and a Cinesophagram to objectively assess swallow mechanism given stroke work up and documented concern for aspiration pneumonia (see notes for details). Cinesophagram order not placed at that time.     Patient received lying in bed awake/ alert, RN assisted in repositioning patient in bed. Patient understands both English and Maori daughter, son and brother present throughout/ coming in and out and providing translation as needed per patient/ family request. Patient/ family reports patient has been tolerating current diet and seeking diet advancement. Patient also seen for a follow up for speech/ language skills (see note).

## 2024-11-05 NOTE — SWALLOW BEDSIDE ASSESSMENT ADULT - ASR SWALLOW RECOMMEND DIAG
Cinesophagram to objectively assess swallow mechanism given stroke work up and documented concern for aspiration pneumonia/VFSS/MBS
Consider cinesophagram at MD's discretion if concerned chest imaging is dysphagia related

## 2024-11-05 NOTE — PROGRESS NOTE ADULT - ASSESSMENT
76 yo M Portuguese speaking w/ PMH of HTN, HLD, DM, p/w  shortness of breath admitted for NSTEMI and flash pulmnonary edema i/s/o HTN emergency. Pt also noted to have Right sided weakness 2/2 CVA.

## 2024-11-05 NOTE — PROGRESS NOTE ADULT - SUBJECTIVE AND OBJECTIVE BOX
SUBJECTIVE EVENTS:    Vital Signs Last 24 Hrs  T(C): 36.4 (05 Nov 2024 08:15), Max: 36.9 (04 Nov 2024 21:00)  T(F): 97.6 (05 Nov 2024 08:15), Max: 98.4 (04 Nov 2024 21:00)  HR: 89 (05 Nov 2024 09:35) (76 - 90)  BP: 101/66 (05 Nov 2024 08:15) (101/66 - 157/83)  BP(mean): --  RR: 18 (05 Nov 2024 08:15) (14 - 18)  SpO2: 97% (05 Nov 2024 09:35) (97% - 99%)    Parameters below as of 05 Nov 2024 08:15  Patient On (Oxygen Delivery Method): room air          PHYSICAL EXAM:  Awake Alert Age Appopriate  PERRL, EOMI, No facial droop, Tongue midline  Normal Tone 5/5 strength equally  Anterior [place]: N/A    INCISION:   EVD/Post op Drain OUTPUT:    DIET:      MEDICATIONS  (STANDING):  albuterol    90 MICROgram(s) HFA Inhaler 2 Puff(s) Inhalation two times a day  albuterol/ipratropium for Nebulization 3 milliLiter(s) Nebulizer every 6 hours  aspirin  chewable 81 milliGRAM(s) Oral daily  atorvastatin 80 milliGRAM(s) Oral at bedtime  carvedilol 3.125 milliGRAM(s) Oral every 12 hours  chlorhexidine 2% Cloths 1 Application(s) Topical daily  clopidogrel Tablet 75 milliGRAM(s) Oral daily  dextrose 5%. 1000 milliLiter(s) (100 mL/Hr) IV Continuous <Continuous>  dextrose 5%. 1000 milliLiter(s) (50 mL/Hr) IV Continuous <Continuous>  dextrose 50% Injectable 25 Gram(s) IV Push once  dextrose 50% Injectable 12.5 Gram(s) IV Push once  glucagon  Injectable 1 milliGRAM(s) IntraMuscular once  insulin glargine Injectable (LANTUS) 14 Unit(s) SubCutaneous at bedtime  insulin lispro (ADMELOG) corrective regimen sliding scale   SubCutaneous at bedtime  insulin lispro (ADMELOG) corrective regimen sliding scale   SubCutaneous three times a day before meals  insulin lispro Injectable (ADMELOG) 10 Unit(s) SubCutaneous three times a day before meals  sodium chloride 0.9%. 1000 milliLiter(s) (50 mL/Hr) IV Continuous <Continuous>    MEDICATIONS  (PRN):  acetaminophen     Tablet .. 650 milliGRAM(s) Oral every 6 hours PRN Temp greater or equal to 38C (100.4F), Moderate Pain (4 - 6), Severe Pain (7 - 10)  dextrose Oral Gel 15 Gram(s) Oral once PRN Blood Glucose LESS THAN 70 milliGRAM(s)/deciliter  dextrose Oral Gel 15 Gram(s) Oral once PRN Blood Glucose LESS THAN 70 milliGRAM(s)/deciliter                            13.3   11.27 )-----------( 509      ( 05 Nov 2024 04:38 )             40.0   11-05    133[L]  |  99  |  24[H]  ----------------------------<  206[H]  3.9   |  22  |  1.05    Ca    9.4      05 Nov 2024 04:38  Phos  3.2     11-05  Mg     2.20     11-05    TPro  7.3  /  Alb  3.5  /  TBili  0.5  /  DBili  x   /  AST  38  /  ALT  62[H]  /  AlkPhos  65  11-05  PTT - ( 05 Nov 2024 10:56 )  PTT:44.9 secUrinalysis Basic - ( 05 Nov 2024 04:38 )    Color: x / Appearance: x / SG: x / pH: x  Gluc: 206 mg/dL / Ketone: x  / Bili: x / Urobili: x   Blood: x / Protein: x / Nitrite: x   Leuk Esterase: x / RBC: x / WBC x   Sq Epi: x / Non Sq Epi: x / Bacteria: x      Culture - Blood (collected 03 Nov 2024 15:09)  Source: .Blood BLOOD  Preliminary Report (04 Nov 2024 21:00):    No growth at 24 hours    Culture - Blood (collected 03 Nov 2024 14:58)  Source: .Blood BLOOD  Preliminary Report (04 Nov 2024 21:00):    No growth at 24 hours          RADIOLGY:  SUBJECTIVE EVENTS:     Vital Signs Last 24 Hrs  T(C): 36.4 (05 Nov 2024 08:15), Max: 36.9 (04 Nov 2024 21:00)  T(F): 97.6 (05 Nov 2024 08:15), Max: 98.4 (04 Nov 2024 21:00)  HR: 89 (05 Nov 2024 09:35) (76 - 90)  BP: 101/66 (05 Nov 2024 08:15) (101/66 - 157/83)  BP(mean): --  RR: 18 (05 Nov 2024 08:15) (14 - 18)  SpO2: 97% (05 Nov 2024 09:35) (97% - 99%)    Parameters below as of 05 Nov 2024 08:15  Patient On (Oxygen Delivery Method): room air          PHYSICAL EXAM:  Awake Alert Oriented x 2  Left side 5/5  Right UE 0/5  RLE Prox 2/5, KE 4-/4 DF2/5  Following commands    INCISION:   EVD/Post op Drain OUTPUT:    DIET:      MEDICATIONS  (STANDING):  albuterol    90 MICROgram(s) HFA Inhaler 2 Puff(s) Inhalation two times a day  albuterol/ipratropium for Nebulization 3 milliLiter(s) Nebulizer every 6 hours  aspirin  chewable 81 milliGRAM(s) Oral daily  atorvastatin 80 milliGRAM(s) Oral at bedtime  carvedilol 3.125 milliGRAM(s) Oral every 12 hours  chlorhexidine 2% Cloths 1 Application(s) Topical daily  clopidogrel Tablet 75 milliGRAM(s) Oral daily  dextrose 5%. 1000 milliLiter(s) (100 mL/Hr) IV Continuous <Continuous>  dextrose 5%. 1000 milliLiter(s) (50 mL/Hr) IV Continuous <Continuous>  dextrose 50% Injectable 25 Gram(s) IV Push once  dextrose 50% Injectable 12.5 Gram(s) IV Push once  glucagon  Injectable 1 milliGRAM(s) IntraMuscular once  insulin glargine Injectable (LANTUS) 14 Unit(s) SubCutaneous at bedtime  insulin lispro (ADMELOG) corrective regimen sliding scale   SubCutaneous at bedtime  insulin lispro (ADMELOG) corrective regimen sliding scale   SubCutaneous three times a day before meals  insulin lispro Injectable (ADMELOG) 10 Unit(s) SubCutaneous three times a day before meals  sodium chloride 0.9%. 1000 milliLiter(s) (50 mL/Hr) IV Continuous <Continuous>    MEDICATIONS  (PRN):  acetaminophen     Tablet .. 650 milliGRAM(s) Oral every 6 hours PRN Temp greater or equal to 38C (100.4F), Moderate Pain (4 - 6), Severe Pain (7 - 10)  dextrose Oral Gel 15 Gram(s) Oral once PRN Blood Glucose LESS THAN 70 milliGRAM(s)/deciliter  dextrose Oral Gel 15 Gram(s) Oral once PRN Blood Glucose LESS THAN 70 milliGRAM(s)/deciliter                            13.3   11.27 )-----------( 509      ( 05 Nov 2024 04:38 )             40.0   11-05    133[L]  |  99  |  24[H]  ----------------------------<  206[H]  3.9   |  22  |  1.05    Ca    9.4      05 Nov 2024 04:38  Phos  3.2     11-05  Mg     2.20     11-05    TPro  7.3  /  Alb  3.5  /  TBili  0.5  /  DBili  x   /  AST  38  /  ALT  62[H]  /  AlkPhos  65  11-05  PTT - ( 05 Nov 2024 10:56 )  PTT:44.9 secUrinalysis Basic - ( 05 Nov 2024 04:38 )    Color: x / Appearance: x / SG: x / pH: x  Gluc: 206 mg/dL / Ketone: x  / Bili: x / Urobili: x   Blood: x / Protein: x / Nitrite: x   Leuk Esterase: x / RBC: x / WBC x   Sq Epi: x / Non Sq Epi: x / Bacteria: x      Culture - Blood (collected 03 Nov 2024 15:09)  Source: .Blood BLOOD  Preliminary Report (04 Nov 2024 21:00):    No growth at 24 hours    Culture - Blood (collected 03 Nov 2024 14:58)  Source: .Blood BLOOD  Preliminary Report (04 Nov 2024 21:00):    No growth at 24 hours          RADIOLGY:   < from: CT Angio Head w/ IV Cont (11.05.24 @ 11:36) >    IMPRESSION: 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   Left V4 stenosis. High-grade stenosis left internal carotid artery at the   carotid bifurcation in the neck. No definite intracranial stenosis.    Recommend repeat chest x-ray to evaluate upper lobe consolidation versus   pulmonary edema.    < end of copied text >

## 2024-11-05 NOTE — DISCHARGE NOTE NURSING/CASE MANAGEMENT/SOCIAL WORK - FINANCIAL ASSISTANCE
[facility] provides services at a reduced cost to those who are determined to be eligible through [facility]’s financial assistance program. Information regarding [facility]’s financial assistance program can be found by going to [email or web address] or by calling [phone].

## 2024-11-05 NOTE — SWALLOW BEDSIDE ASSESSMENT ADULT - H & P REVIEW
Hospitalist 11/5, "76 yo M Swedish speaking w/ PMH of HTN, HLD, DM, p/w  shortness of breath admitted for NSTEMI and flash pulmnonary edema i/s/o HTN emergency. Pt also noted to have Right sided weakness 2/2 CVA."/yes

## 2024-11-05 NOTE — SWALLOW BEDSIDE ASSESSMENT ADULT - CONSISTENCIES ADMINISTERED
whole cracker/regular solid ~5 oz/thin liquid 3 tsp trials/moderately thick 4 tsp trials/pureed 4 oz/mildly thick

## 2024-11-05 NOTE — SWALLOW BEDSIDE ASSESSMENT ADULT - SWALLOW EVAL: DIAGNOSIS
1. Functional oral stage for puree, regular solids, moderately thick liquids, mildly thick liquids and thin liquids characterized by adequate acceptance and containment, adequate mastication of regular solids and adequate anterior to posterior transport. There was adequate oral clearance. 2. Pharyngeal stage for the aforementioned consistencies characterized by initiation of the pharyngeal swallow and hyolaryngeal excursion upon digital palpation. Patient with a cough response post swallow following mildly thick liquid trial x1 however patient's brother just arrived and patient attempting to say hi to brother which may have impacted swallow function. No overt s/s penetration/ aspiration observed for additional trials of mildly thick liquids or post swallow for puree, regular solids, moderately thick liquids and thin liquids.
1. Mild oral dysphagia for regular solids characterized by adequate acceptance and containment, slow mastication with slow anterior to posterior transport and adequate oral clearance. 2. Functional oral stage for puree, soft and bite-sized solids, moderately thick liquids, mildly thick liquids and thin liquids characterized by adequate acceptance and containment, adequate mastication of soft and bite-sized solids, adequate anterior to posterior transport and adequate oral clearance. 3. Functional pharyngeal stage suspected for puree, soft and bite-sized solids, moderately thick liquids and mildly thick liquids characterized by initiation of the pharyngeal swallow and hyolaryngeal excursion upon digital palpation with no overt s/s penetration/ aspiration noted....

## 2024-11-05 NOTE — PROGRESS NOTE ADULT - SUBJECTIVE AND OBJECTIVE BOX
Chief Complaint: Type 2 DM     History: Pt seen at bedside. Pt tolerating oral diet. Pt denies nausea and vomiting/any signs of hypoglycemia. Pt reports an adequate appetite. Czech translation to daughter at bedside.     MEDICATIONS  (STANDING):  albuterol    90 MICROgram(s) HFA Inhaler 2 Puff(s) Inhalation two times a day  albuterol/ipratropium for Nebulization 3 milliLiter(s) Nebulizer every 6 hours  aspirin  chewable 81 milliGRAM(s) Oral daily  atorvastatin 80 milliGRAM(s) Oral at bedtime  carvedilol 3.125 milliGRAM(s) Oral every 12 hours  chlorhexidine 2% Cloths 1 Application(s) Topical daily  clopidogrel Tablet 75 milliGRAM(s) Oral daily  dextrose 5%. 1000 milliLiter(s) (100 mL/Hr) IV Continuous <Continuous>  dextrose 5%. 1000 milliLiter(s) (50 mL/Hr) IV Continuous <Continuous>  dextrose 50% Injectable 25 Gram(s) IV Push once  dextrose 50% Injectable 12.5 Gram(s) IV Push once  glucagon  Injectable 1 milliGRAM(s) IntraMuscular once  insulin glargine Injectable (LANTUS) 14 Unit(s) SubCutaneous at bedtime  insulin lispro (ADMELOG) corrective regimen sliding scale   SubCutaneous at bedtime  insulin lispro (ADMELOG) corrective regimen sliding scale   SubCutaneous three times a day before meals  insulin lispro Injectable (ADMELOG) 10 Unit(s) SubCutaneous three times a day before meals  sodium chloride 0.9%. 1000 milliLiter(s) (50 mL/Hr) IV Continuous <Continuous>    MEDICATIONS  (PRN):  acetaminophen     Tablet .. 650 milliGRAM(s) Oral every 6 hours PRN Temp greater or equal to 38C (100.4F), Moderate Pain (4 - 6), Severe Pain (7 - 10)  dextrose Oral Gel 15 Gram(s) Oral once PRN Blood Glucose LESS THAN 70 milliGRAM(s)/deciliter  dextrose Oral Gel 15 Gram(s) Oral once PRN Blood Glucose LESS THAN 70 milliGRAM(s)/deciliter      Allergies: No Known Allergies    Review of Systems:  Respiratory: No SOB, no cough  GI: No nausea, vomiting, abdominal pain  Endocrine: no polyuria, polydipsia    PHYSICAL EXAM:  VITALS: T(C): 36.8 (11-05-24 @ 12:15)  T(F): 98.3 (11-05-24 @ 12:15), Max: 98.4 (11-04-24 @ 21:00)  HR: 67 (11-05-24 @ 12:15) (67 - 90)  BP: 100/71 (11-05-24 @ 12:15) (100/71 - 157/83)  RR:  (14 - 18)  SpO2:  (97% - 99%)  Wt(kg): --  GENERAL: NAD, well-groomed, well-developed  RESPIRATORY: No labored breathing   GI: Soft, nontender, non distended  PSYCH: Alert and oriented x 3, normal affect, normal mood      CAPILLARY BLOOD GLUCOSE  POCT Blood Glucose.: 210 mg/dL (05 Nov 2024 12:46)  POCT Blood Glucose.: 216 mg/dL (05 Nov 2024 08:40)  POCT Blood Glucose.: 211 mg/dL (04 Nov 2024 22:00)  POCT Blood Glucose.: 215 mg/dL (04 Nov 2024 17:44)    A1C with Estimated Average Glucose (10.28.24 @ 03:06)    A1C with Estimated Average Glucose Result: 9.4   Estimated Average Glucose: 223      11-05    133[L]  |  99  |  24[H]  ----------------------------<  206[H]  3.9   |  22  |  1.05    eGFR: 77    Ca    9.4      11-05  Mg     2.20     11-05  Phos  3.2     11-05    TPro  7.3  /  Alb  3.5  /  TBili  0.5  /  DBili  x   /  AST  38  /  ALT  62[H]  /  AlkPhos  65  11-05    Thyroid Function Tests:  10-30 @ 04:18 TSH 1.27 FreeT4 -- T3 -- Anti TPO -- Anti Thyroglobulin Ab -- TSI --    Diet, Regular:   Consistent Carbohydrate Evening Snack (CSTCHOSN)  Low Sodium  Halal (11-05-24 @ 15:49) [Active]  Diet, NPO after Midnight:      NPO Start Date: 05-Nov-2024,   NPO Start Time: 23:59  Except Medications  With Ice Chips/Sips of Water (11-05-24 @ 13:59) [Active]

## 2024-11-05 NOTE — PROGRESS NOTE ADULT - ASSESSMENT
Pre-op cardiac eval  - TTE on 10/27/24: EF 45-50%, Regional wall motion abnormalities present. Basal and mid anterior wall, basal and mid anterolateral wall, basal and mid inferolateral wall, and basal inferior segment are abnormal.  - pt with hx of NSTEMI, cath was deferred in the setting of active stroke  - Patient is at elevated risk for a low risk procedure given cardiac history       Cardiovascular Risk Stratification - RCRI =  4, Class IV Risk   Functional Status: Unable to assess   Patient currently does not show any clinical evidence of decompensated heart failure, cardiac arrythmias, or active ischemia.     Overall this patient is at 15% 30-day risk of death, MI, or cardiac arrest   -May proceed with planned procedure after shared-decision making between the patient or surrogate decision maker and procedure performing physician with regarding the risk, benefits, and alternatives to the procedure.    Patient is a 69 year old Telugu speaking male with PMH of HTN, HLD, DM presents with SOB over 3-4 days which acutely worsened and had stroke code called in ED for ride sided weakness. Pt was admitted to CCU initially for Acute Pulmonary Edema in the setting of HTN emergency with acute stroke. Pt was found to have acute L MCA infarcts with moderate to severe ICA stenosis. Downgraded to medicine on 11/2. Pending carotid stent placement by neurosurgery at [facility].     Problem 1: Pre-op cardiac eval  - TTE on 10/27/24: EF 45-50%, Regional wall motion abnormalities present. Basal and mid anterior wall, basal and mid anterolateral wall, basal and mid inferolateral wall, and basal inferior segment are abnormal.  - pt with hx of NSTEMI, however cath was deferred in the setting of active stroke    Cardiovascular Risk Stratification - RCRI =  4, Class IV Risk   Functional Status: Unable to assess   Overall this patient is at 15% 30-day risk of death, MI, or cardiac arrest   Pt is high risk for surgical intervention given recent NSTEMI and HF in setting of hypertensive emergency. However given his significant carotid disease it is reasonable to pursue carotid artery stenting with optimal BP control and fluid management. Would continue DAPT and statin.

## 2024-11-05 NOTE — DISCHARGE NOTE NURSING/CASE MANAGEMENT/SOCIAL WORK - NSDCPEFALRISK_GEN_ALL_CORE
For information on Fall & Injury Prevention, visit: [email or web address] OR  [email or web address] OR  https://www.cdc.gov/steadi/patient.html

## 2024-11-05 NOTE — PROGRESS NOTE ADULT - PROBLEM SELECTOR PLAN 1
- EKG on admission with NSR LVH, ST depressions in multiple leads, bigeminy and PVCx on tele  - Trops 104---> 298  - 10/27 TTE w/ EF  45 to 50 %. Regional wall motion abnormalities present. Basal and mid anterior wall, basal and mid anterolateral wall, basal and mid inferolateral wall, and basal inferior segment are abnormal.  - Cardiac cath deferred given acute CVA  - loaded with plavix and ASA in the ER,   - c/w Lipitor 80, ASA 81, and Plavix 75 and carvedilol - decreased to 6.25  bid given hypotension on 11/3 and started on IVF @ 50 ml; monitor off IVF  - Appreciate cardio recs - EKG on admission with NSR LVH, ST depressions in multiple leads, bigeminy and PVCx on tele  - Trops 104---> 298  - 10/27 TTE w/ EF  45 to 50 %. Regional wall motion abnormalities present. Basal and mid anterior wall, basal and mid anterolateral wall, basal and mid inferolateral wall, and basal inferior segment are abnormal.  - Cardiac cath deferred given acute CVA  - loaded with plavix and ASA in the ER,   - c/w Lipitor 80, ASA 81, and Plavix 75 and carvedilol - decreased to 6.25  bid given hypotension on 11/3; repeat BP if still low would give IVF to maintain 's - CTA demonstrated diffuse intracranial and extracranial atherosclerosis, including notably 90% maximal stenosis in carotid bulb and focal occlusion in proximal left vertebral artery. CTA also noted for segmental occlusion of left subclavian artery between thoracic aorta and left vertebral origin.   - MRI 10/29- acute Lt MCA infarcts   - c/w, ASA and Plavix and statin  - hold heparin drip as per NS   - Monitor pressures: SBP goal 140-160  - ILR Prior to discharge  - CTA with LT high grade stenosis- recommend stop heparin   - NS plan for transfer to [facility] for stent - CTA demonstrated diffuse intracranial and extracranial atherosclerosis, including notably 90% maximal stenosis in carotid bulb and focal occlusion in proximal left vertebral artery. CTA also noted for segmental occlusion of left subclavian artery between thoracic aorta and left vertebral origin.   - MRI 10/29- acute Lt MCA infarcts   - c/w, ASA and Plavix and statin  - hold heparin drip as per NS   - Monitor pressures: SBP goal 140-160  - ILR Prior to discharge  - CTA with LT high grade stenosis- recommend stop heparin;   - cardiology appreciated for optimization  - NS plan for transfer to [facility] for stent

## 2024-11-05 NOTE — SWALLOW BEDSIDE ASSESSMENT ADULT - ADDITIONAL RECOMMENDATIONS
1. This service to follow for diet tolerance as schedule permits. 2. Medical team advised to reconsult this service if patient is with a change in medical status or change in tolerance of recommended PO. 3. Monitor for changes in neurological status that may impact PO intake.
1. This service to follow for diet tolerance/ advancement as schedule permits. 2. Medical team advised to reconsult this service if patient is with a change in medical status or change in tolerance of recommended PO.

## 2024-11-05 NOTE — SWALLOW BEDSIDE ASSESSMENT ADULT - COMMENTS
Hospitalist 11/5, "76 yo M Chinese speaking w/ PMH of HTN, HLD, DM, p/w  shortness of breath admitted for NSTEMI and flash pulmnonary edema i/s/o HTN emergency. Pt also noted to have Right sided weakness 2/2 CVA."    RN arrived to unit; per RN patient off unit at CT. SLP service to follow as schedule permits.

## 2024-11-05 NOTE — H&P ADULT - ASSESSMENT
69M Setswana speaking hx HTN, HLD, DM, initial (last wk) p/f code stroke Rt sided weakness i/s/o NSTEMI/HF, xfer from [facility] for angio/LICA stent w/ Dr. Ulrich. 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 Exam: AOx3, mild Rt facial, RUE 0/5, RLE 3/5, LLE 5/5, SILT   -Direct adm to 4C under Dr. Ulrich   -Preop for angio/LICA stent today   -Med/cards @[facility] cleared high risk for angio/stent  -Continue DAPT. ARU/PRU sent 69M Wolof speaking hx HTN, HLD, DM, initial (last wk) p/f code stroke Rt sided weakness i/s/o NSTEMI/HF, xfer from [facility] for angio/LICA stent w/ Dr. Ulrich. 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 Exam: AOx3, mild Rt facial, RUE 0/5, RLE 3/5, LLE 5/5, SILT   -Direct adm to 4C under Dr. Ulrich   -Preop for angio/LICA stent today   -Med/cards @[facility] cleared high risk for angio/stent  -Continue DAPT.

## 2024-11-05 NOTE — PROVIDER CONTACT NOTE (OTHER) - ASSESSMENT
/75, BP has been consistently in 100s.
Patient resting comfortably in the bed. no signs of bleeding
Patient's blood pressure is 101/66
/63, unable to administer coreg PO
Patient's blood pressure is 100/71

## 2024-11-05 NOTE — PROGRESS NOTE ADULT - SUBJECTIVE AND OBJECTIVE BOX
Patient is a 69y old  Male who presents with a chief complaint of concern for stroke (05 Nov 2024 14:14)    HPI:  Patient is a 75 year old Armenian speaking male with PMH of HTN, HLD, DM, presents with shortness of breath over 3-4 days which acutely worsened today. Patient also initially complained of chest pain and noted with elevated BP to 190/110. Acute management in the ED included BiPAP, lasix. Patient was placed on nitroglycerin drip due to concern for possible ACS. Chest xray noted with bilateral pulmonary edema which appears cardiac in origin. Patient's respiratory status stabilized on BiPAP. While in the ED, patient's sons reported that patient also began to have right arm weakness first noticed this morning. During this time, patient also appeared slightly confused and required assistance to walk. Patient was last seen at his baseline yesterday evening at 2100 when he was AAOx3 and did not have difficulties using his right side. Code stroke was called for right sided weakness.   Upon stroke team assessment, patient is on BiPAP and does not appear in respiratory distress. BP at time of assessment is 130s systolic (nitroglycerin drip was previously discontinued). Patient denies history of stroke. Patient takes aspirin for cardioprotection. Ambulates independently at baseline, lives with family and requires some assistance with taking medications.     CCU consulted because patient requiring BIPAP for difficulty breathing and requiring nitro gtt for BP elevated to 220/110.   (27 Oct 2024 16:04)       INTERVAL HPI/OVERNIGHT EVENTS: Per neurosurgery, pt will be transferred to [facility] for stent placement. Cardiology contacted for cardiac optimization.     Subjective: Pt seen and examined at bedside, currently receiving nebulizer treatment.     ICU Vital Signs Last 24 Hrs  T(C): 36.8 (05 Nov 2024 12:15), Max: 36.9 (04 Nov 2024 21:00)  T(F): 98.3 (05 Nov 2024 12:15), Max: 98.4 (04 Nov 2024 21:00)  HR: 67 (05 Nov 2024 12:15) (67 - 90)  BP: 100/71 (05 Nov 2024 12:15) (100/71 - 157/83)  BP(mean): --  ABP: --  ABP(mean): --  RR: 18 (05 Nov 2024 12:15) (14 - 18)  SpO2: 98% (05 Nov 2024 12:15) (97% - 99%)    O2 Parameters below as of 05 Nov 2024 12:15  Patient On (Oxygen Delivery Method): room air          I&O's Summary    04 Nov 2024 07:01  -  05 Nov 2024 07:00  --------------------------------------------------------  IN: 800 mL / OUT: 1900 mL / NET: -1100 mL          Daily     Daily     Adult Advanced Hemodynamics Last 24 Hrs  CVP(mm Hg): --  CVP(cm H2O): --  CO: --  CI: --  PA: --  PA(mean): --  PCWP: --  SVR: --  SVRI: --  PVR: --  PVRI: --    EKG/Telemetry Events:    MEDICATIONS  (STANDING):  albuterol    90 MICROgram(s) HFA Inhaler 2 Puff(s) Inhalation two times a day  albuterol/ipratropium for Nebulization 3 milliLiter(s) Nebulizer every 6 hours  aspirin  chewable 81 milliGRAM(s) Oral daily  atorvastatin 80 milliGRAM(s) Oral at bedtime  carvedilol 3.125 milliGRAM(s) Oral every 12 hours  chlorhexidine 2% Cloths 1 Application(s) Topical daily  clopidogrel Tablet 75 milliGRAM(s) Oral daily  dextrose 5%. 1000 milliLiter(s) (100 mL/Hr) IV Continuous <Continuous>  dextrose 5%. 1000 milliLiter(s) (50 mL/Hr) IV Continuous <Continuous>  dextrose 50% Injectable 25 Gram(s) IV Push once  dextrose 50% Injectable 12.5 Gram(s) IV Push once  glucagon  Injectable 1 milliGRAM(s) IntraMuscular once  insulin glargine Injectable (LANTUS) 14 Unit(s) SubCutaneous at bedtime  insulin lispro (ADMELOG) corrective regimen sliding scale   SubCutaneous at bedtime  insulin lispro (ADMELOG) corrective regimen sliding scale   SubCutaneous three times a day before meals  insulin lispro Injectable (ADMELOG) 10 Unit(s) SubCutaneous three times a day before meals  sodium chloride 0.9%. 1000 milliLiter(s) (50 mL/Hr) IV Continuous <Continuous>    MEDICATIONS  (PRN):  acetaminophen     Tablet .. 650 milliGRAM(s) Oral every 6 hours PRN Temp greater or equal to 38C (100.4F), Moderate Pain (4 - 6), Severe Pain (7 - 10)  dextrose Oral Gel 15 Gram(s) Oral once PRN Blood Glucose LESS THAN 70 milliGRAM(s)/deciliter  dextrose Oral Gel 15 Gram(s) Oral once PRN Blood Glucose LESS THAN 70 milliGRAM(s)/deciliter      PHYSICAL EXAM:  GENERAL: lying in the bed in NAD  HEAD:  Atraumatic, Normocephalic  EYES: conjunctiva and sclera clear  NERVOUS SYSTEM:  Alert & Awake.   CHEST/LUNG: coarse breath sounds b/l, no crackles or wheezing   HEART: RRR, No murmurs  ABDOMEN: Soft, Nontender, Nondistended; Bowel sounds present  EXTREMITIES:  2+ Peripheral Pulses, No clubbing, cyanosis, or edema  LYMPH: No lymphadenopathy noted  SKIN: No rashes or lesions    LABS:                        13.3   11.27 )-----------( 509      ( 05 Nov 2024 04:38 )             40.0     11-05    133[L]  |  99  |  24[H]  ----------------------------<  206[H]  3.9   |  22  |  1.05    Ca    9.4      05 Nov 2024 04:38  Phos  3.2     11-05  Mg     2.20     11-05    TPro  7.3  /  Alb  3.5  /  TBili  0.5  /  DBili  x   /  AST  38  /  ALT  62[H]  /  AlkPhos  65  11-05    LIVER FUNCTIONS - ( 05 Nov 2024 04:38 )  Alb: 3.5 g/dL / Pro: 7.3 g/dL / ALK PHOS: 65 U/L / ALT: 62 U/L / AST: 38 U/L / GGT: x           PTT - ( 05 Nov 2024 10:56 )  PTT:44.9 sec  CAPILLARY BLOOD GLUCOSE      POCT Blood Glucose.: 210 mg/dL (05 Nov 2024 12:46)  POCT Blood Glucose.: 216 mg/dL (05 Nov 2024 08:40)  POCT Blood Glucose.: 211 mg/dL (04 Nov 2024 22:00)  POCT Blood Glucose.: 215 mg/dL (04 Nov 2024 17:44)            Urinalysis Basic - ( 05 Nov 2024 04:38 )    Color: x / Appearance: x / SG: x / pH: x  Gluc: 206 mg/dL / Ketone: x  / Bili: x / Urobili: x   Blood: x / Protein: x / Nitrite: x   Leuk Esterase: x / RBC: x / WBC x   Sq Epi: x / Non Sq Epi: x / Bacteria: x          RADIOLOGY & ADDITIONAL TESTS:  CXR:        Care Discussed with Consultants/Other Providers [ x] YES  [ ] NO           Patient is a 69y old  Male who presents with a chief complaint of concern for stroke (05 Nov 2024 14:14)    HPI:  Patient is a 75 year old German speaking male with PMH of HTN, HLD, DM, presents with shortness of breath over 3-4 days which acutely worsened today. Patient also initially complained of chest pain and noted with elevated BP to 190/110. Acute management in the ED included BiPAP, lasix. Patient was placed on nitroglycerin drip due to concern for possible ACS. Chest xray noted with bilateral pulmonary edema which appears cardiac in origin. Patient's respiratory status stabilized on BiPAP. While in the ED, patient's sons reported that patient also began to have right arm weakness first noticed this morning. During this time, patient also appeared slightly confused and required assistance to walk. Patient was last seen at his baseline yesterday evening at 2100 when he was AAOx3 and did not have difficulties using his right side. Code stroke was called for right sided weakness.   Upon stroke team assessment, patient is on BiPAP and does not appear in respiratory distress. BP at time of assessment is 130s systolic (nitroglycerin drip was previously discontinued). Patient denies history of stroke. Patient takes aspirin for cardioprotection. Ambulates independently at baseline, lives with family and requires some assistance with taking medications.     CCU consulted because patient requiring BIPAP for difficulty breathing and requiring nitro gtt for BP elevated to 220/110.   (27 Oct 2024 16:04)       INTERVAL HPI/OVERNIGHT EVENTS: Per neurosurgery, pt will be transferred to [facility] for stent placement. Cardiology contacted for cardiac optimization.     Subjective: Pt seen and examined at bedside, currently receiving nebulizer treatment. In NAD    ICU Vital Signs Last 24 Hrs  T(C): 36.8 (05 Nov 2024 12:15), Max: 36.9 (04 Nov 2024 21:00)  T(F): 98.3 (05 Nov 2024 12:15), Max: 98.4 (04 Nov 2024 21:00)  HR: 67 (05 Nov 2024 12:15) (67 - 90)  BP: 100/71 (05 Nov 2024 12:15) (100/71 - 157/83)  BP(mean): --  ABP: --  ABP(mean): --  RR: 18 (05 Nov 2024 12:15) (14 - 18)  SpO2: 98% (05 Nov 2024 12:15) (97% - 99%)    O2 Parameters below as of 05 Nov 2024 12:15  Patient On (Oxygen Delivery Method): room air          I&O's Summary    04 Nov 2024 07:01  -  05 Nov 2024 07:00  --------------------------------------------------------  IN: 800 mL / OUT: 1900 mL / NET: -1100 mL          Daily     Daily     Adult Advanced Hemodynamics Last 24 Hrs  CVP(mm Hg): --  CVP(cm H2O): --  CO: --  CI: --  PA: --  PA(mean): --  PCWP: --  SVR: --  SVRI: --  PVR: --  PVRI: --    EKG/Telemetry Events:    MEDICATIONS  (STANDING):  albuterol    90 MICROgram(s) HFA Inhaler 2 Puff(s) Inhalation two times a day  albuterol/ipratropium for Nebulization 3 milliLiter(s) Nebulizer every 6 hours  aspirin  chewable 81 milliGRAM(s) Oral daily  atorvastatin 80 milliGRAM(s) Oral at bedtime  carvedilol 3.125 milliGRAM(s) Oral every 12 hours  chlorhexidine 2% Cloths 1 Application(s) Topical daily  clopidogrel Tablet 75 milliGRAM(s) Oral daily  dextrose 5%. 1000 milliLiter(s) (100 mL/Hr) IV Continuous <Continuous>  dextrose 5%. 1000 milliLiter(s) (50 mL/Hr) IV Continuous <Continuous>  dextrose 50% Injectable 25 Gram(s) IV Push once  dextrose 50% Injectable 12.5 Gram(s) IV Push once  glucagon  Injectable 1 milliGRAM(s) IntraMuscular once  insulin glargine Injectable (LANTUS) 14 Unit(s) SubCutaneous at bedtime  insulin lispro (ADMELOG) corrective regimen sliding scale   SubCutaneous at bedtime  insulin lispro (ADMELOG) corrective regimen sliding scale   SubCutaneous three times a day before meals  insulin lispro Injectable (ADMELOG) 10 Unit(s) SubCutaneous three times a day before meals  sodium chloride 0.9%. 1000 milliLiter(s) (50 mL/Hr) IV Continuous <Continuous>    MEDICATIONS  (PRN):  acetaminophen     Tablet .. 650 milliGRAM(s) Oral every 6 hours PRN Temp greater or equal to 38C (100.4F), Moderate Pain (4 - 6), Severe Pain (7 - 10)  dextrose Oral Gel 15 Gram(s) Oral once PRN Blood Glucose LESS THAN 70 milliGRAM(s)/deciliter  dextrose Oral Gel 15 Gram(s) Oral once PRN Blood Glucose LESS THAN 70 milliGRAM(s)/deciliter      PHYSICAL EXAM:  GENERAL: lying in the bed in NAD  HEAD:  Atraumatic, Normocephalic  EYES: conjunctiva and sclera clear  NERVOUS SYSTEM:  Alert & Awake.   CHEST/LUNG: coarse breath sounds b/l, no crackles or wheezing   HEART: RRR, No murmurs  ABDOMEN: Soft, Nontender, Nondistended  EXTREMITIES: no b/l LE edema       LABS:                        13.3   11.27 )-----------( 509      ( 05 Nov 2024 04:38 )             40.0     11-05    133[L]  |  99  |  24[H]  ----------------------------<  206[H]  3.9   |  22  |  1.05    Ca    9.4      05 Nov 2024 04:38  Phos  3.2     11-05  Mg     2.20     11-05    TPro  7.3  /  Alb  3.5  /  TBili  0.5  /  DBili  x   /  AST  38  /  ALT  62[H]  /  AlkPhos  65  11-05    LIVER FUNCTIONS - ( 05 Nov 2024 04:38 )  Alb: 3.5 g/dL / Pro: 7.3 g/dL / ALK PHOS: 65 U/L / ALT: 62 U/L / AST: 38 U/L / GGT: x           PTT - ( 05 Nov 2024 10:56 )  PTT:44.9 sec  CAPILLARY BLOOD GLUCOSE      POCT Blood Glucose.: 210 mg/dL (05 Nov 2024 12:46)  POCT Blood Glucose.: 216 mg/dL (05 Nov 2024 08:40)  POCT Blood Glucose.: 211 mg/dL (04 Nov 2024 22:00)  POCT Blood Glucose.: 215 mg/dL (04 Nov 2024 17:44)            Urinalysis Basic - ( 05 Nov 2024 04:38 )    Color: x / Appearance: x / SG: x / pH: x  Gluc: 206 mg/dL / Ketone: x  / Bili: x / Urobili: x   Blood: x / Protein: x / Nitrite: x   Leuk Esterase: x / RBC: x / WBC x   Sq Epi: x / Non Sq Epi: x / Bacteria: x          Care Discussed with Consultants/Other Providers [ x] YES  [ ] NO

## 2024-11-05 NOTE — PROGRESS NOTE ADULT - SUBJECTIVE AND OBJECTIVE BOX
[facility] of Hospital Medicine  Evelyne Bowen MD  Pager (B-F, 3C-7O): [phone]  Other Times:  [phone]    Patient is a 69y old  Male who presents with a chief complaint of concern for stroke (04 Nov 2024 16:19)      SUBJECTIVE / OVERNIGHT EVENTS:  ADDITIONAL REVIEW OF SYSTEMS:    MEDICATIONS  (STANDING):  albuterol    90 MICROgram(s) HFA Inhaler 2 Puff(s) Inhalation two times a day  albuterol/ipratropium for Nebulization 3 milliLiter(s) Nebulizer every 6 hours  aspirin  chewable 81 milliGRAM(s) Oral daily  atorvastatin 80 milliGRAM(s) Oral at bedtime  carvedilol 6.25 milliGRAM(s) Oral every 12 hours  chlorhexidine 2% Cloths 1 Application(s) Topical daily  clopidogrel Tablet 75 milliGRAM(s) Oral daily  dextrose 5%. 1000 milliLiter(s) (100 mL/Hr) IV Continuous <Continuous>  dextrose 5%. 1000 milliLiter(s) (50 mL/Hr) IV Continuous <Continuous>  dextrose 50% Injectable 25 Gram(s) IV Push once  dextrose 50% Injectable 12.5 Gram(s) IV Push once  glucagon  Injectable 1 milliGRAM(s) IntraMuscular once  heparin  Infusion 700 Unit(s)/Hr (7 mL/Hr) IV Continuous <Continuous>  insulin glargine Injectable (LANTUS) 12 Unit(s) SubCutaneous at bedtime  insulin lispro (ADMELOG) corrective regimen sliding scale   SubCutaneous at bedtime  insulin lispro (ADMELOG) corrective regimen sliding scale   SubCutaneous three times a day before meals  insulin lispro Injectable (ADMELOG) 8 Unit(s) SubCutaneous three times a day before meals  sodium chloride 0.9%. 1000 milliLiter(s) (50 mL/Hr) IV Continuous <Continuous>    MEDICATIONS  (PRN):  acetaminophen     Tablet .. 650 milliGRAM(s) Oral every 6 hours PRN Temp greater or equal to 38C (100.4F), Moderate Pain (4 - 6), Severe Pain (7 - 10)  dextrose Oral Gel 15 Gram(s) Oral once PRN Blood Glucose LESS THAN 70 milliGRAM(s)/deciliter  dextrose Oral Gel 15 Gram(s) Oral once PRN Blood Glucose LESS THAN 70 milliGRAM(s)/deciliter      CAPILLARY BLOOD GLUCOSE      POCT Blood Glucose.: 216 mg/dL (05 Nov 2024 08:40)  POCT Blood Glucose.: 211 mg/dL (04 Nov 2024 22:00)  POCT Blood Glucose.: 215 mg/dL (04 Nov 2024 17:44)  POCT Blood Glucose.: 223 mg/dL (04 Nov 2024 12:03)    I&O's Summary    04 Nov 2024 07:01  -  05 Nov 2024 07:00  --------------------------------------------------------  IN: 800 mL / OUT: 1900 mL / NET: -1100 mL        PHYSICAL EXAM:  Vital Signs Last 24 Hrs  T(C): 36.7 (05 Nov 2024 05:30), Max: 36.9 (04 Nov 2024 21:00)  T(F): 98 (05 Nov 2024 05:30), Max: 98.4 (04 Nov 2024 21:00)  HR: 76 (05 Nov 2024 05:30) (72 - 87)  BP: 108/80 (05 Nov 2024 05:30) (96/62 - 157/83)  BP(mean): --  RR: 18 (05 Nov 2024 05:30) (14 - 18)  SpO2: 99% (05 Nov 2024 05:30) (97% - 99%)    Parameters below as of 05 Nov 2024 05:30  Patient On (Oxygen Delivery Method): room air    GENERAL: NAD  HEAD:  Atraumatic, Normocephalic  EYES: EOMI,  conjunctiva and sclera clear  NECK: Supple  CHEST/LUNG: Clear to auscultation bilaterally; No wheeze, crackles or rhonchi   HEART: Regular rate and rhythm; Normal S1 S2, No murmurs, rubs, or gallops  ABDOMEN: Soft, Nontender, Nondistended; Bowel sounds present  EXTREMITIES:  2+ Peripheral Pulses, No edema  PSYCH: AAOx2-3  NEUROLOGY: +RUE Weakness 0/5, RLL weakness 2/5. Left upper and lower ext strength intact. sensation intact  SKIN: Warm and dry      LABS:                        13.3   11.27 )-----------( 509      ( 05 Nov 2024 04:38 )             40.0     11-05    133[L]  |  99  |  24[H]  ----------------------------<  206[H]  3.9   |  22  |  1.05    Ca    9.4      05 Nov 2024 04:38  Phos  3.2     11-05  Mg     2.20     11-05    TPro  7.3  /  Alb  3.5  /  TBili  0.5  /  DBili  x   /  AST  38  /  ALT  62[H]  /  AlkPhos  65  11-05    PTT - ( 05 Nov 2024 04:38 )  PTT:52.9 sec      Urinalysis Basic - ( 05 Nov 2024 04:38 )    Color: x / Appearance: x / SG: x / pH: x  Gluc: 206 mg/dL / Ketone: x  / Bili: x / Urobili: x   Blood: x / Protein: x / Nitrite: x   Leuk Esterase: x / RBC: x / WBC x   Sq Epi: x / Non Sq Epi: x / Bacteria: x        Culture - Blood (collected 03 Nov 2024 15:09)  Source: .Blood BLOOD  Preliminary Report (04 Nov 2024 21:00):    No growth at 24 hours    Culture - Blood (collected 03 Nov 2024 14:58)  Source: .Blood BLOOD  Preliminary Report (04 Nov 2024 21:00):    No growth at 24 hours        RADIOLOGY & ADDITIONAL TESTS:  Results Reviewed:   Imaging Personally Reviewed:  Electrocardiogram Personally Reviewed:    COORDINATION OF CARE:  Care Discussed with Consultants/Other Providers [Y/N]:  Prior or Outpatient Records Reviewed [Y/N]:   [facility]  Evelyne Bowen MD  Pager (B-F, 3I-3O): [phone]  Other Times:  [phone]    Patient is a 69y old  Male who presents with a chief complaint of concern for stroke (04 Nov 2024 16:19)      SUBJECTIVE / OVERNIGHT EVENTS: Pt in NAD no cute events ON. Pt spoken to with Kyrgy[place] . denies any new complaints. FS elevated above goal. BP soft this AM but asymptomatic was on IVF yesterday post IVF BP was 150's       MEDICATIONS  (STANDING):  albuterol    90 MICROgram(s) HFA Inhaler 2 Puff(s) Inhalation two times a day  albuterol/ipratropium for Nebulization 3 milliLiter(s) Nebulizer every 6 hours  aspirin  chewable 81 milliGRAM(s) Oral daily  atorvastatin 80 milliGRAM(s) Oral at bedtime  carvedilol 6.25 milliGRAM(s) Oral every 12 hours  chlorhexidine 2% Cloths 1 Application(s) Topical daily  clopidogrel Tablet 75 milliGRAM(s) Oral daily  dextrose 5%. 1000 milliLiter(s) (100 mL/Hr) IV Continuous <Continuous>  dextrose 5%. 1000 milliLiter(s) (50 mL/Hr) IV Continuous <Continuous>  dextrose 50% Injectable 25 Gram(s) IV Push once  dextrose 50% Injectable 12.5 Gram(s) IV Push once  glucagon  Injectable 1 milliGRAM(s) IntraMuscular once  heparin  Infusion 700 Unit(s)/Hr (7 mL/Hr) IV Continuous <Continuous>  insulin glargine Injectable (LANTUS) 12 Unit(s) SubCutaneous at bedtime  insulin lispro (ADMELOG) corrective regimen sliding scale   SubCutaneous at bedtime  insulin lispro (ADMELOG) corrective regimen sliding scale   SubCutaneous three times a day before meals  insulin lispro Injectable (ADMELOG) 8 Unit(s) SubCutaneous three times a day before meals  sodium chloride 0.9%. 1000 milliLiter(s) (50 mL/Hr) IV Continuous <Continuous>    MEDICATIONS  (PRN):  acetaminophen     Tablet .. 650 milliGRAM(s) Oral every 6 hours PRN Temp greater or equal to 38C (100.4F), Moderate Pain (4 - 6), Severe Pain (7 - 10)  dextrose Oral Gel 15 Gram(s) Oral once PRN Blood Glucose LESS THAN 70 milliGRAM(s)/deciliter  dextrose Oral Gel 15 Gram(s) Oral once PRN Blood Glucose LESS THAN 70 milliGRAM(s)/deciliter      CAPILLARY BLOOD GLUCOSE      POCT Blood Glucose.: 216 mg/dL (05 Nov 2024 08:40)  POCT Blood Glucose.: 211 mg/dL (04 Nov 2024 22:00)  POCT Blood Glucose.: 215 mg/dL (04 Nov 2024 17:44)  POCT Blood Glucose.: 223 mg/dL (04 Nov 2024 12:03)    I&O's Summary    04 Nov 2024 07:01  -  05 Nov 2024 07:00  --------------------------------------------------------  IN: 800 mL / OUT: 1900 mL / NET: -1100 mL        PHYSICAL EXAM:  Vital Signs Last 24 Hrs  T(C): 36.7 (05 Nov 2024 05:30), Max: 36.9 (04 Nov 2024 21:00)  T(F): 98 (05 Nov 2024 05:30), Max: 98.4 (04 Nov 2024 21:00)  HR: 76 (05 Nov 2024 05:30) (72 - 87)  BP: 108/80 (05 Nov 2024 05:30) (96/62 - 157/83)  BP(mean): --  RR: 18 (05 Nov 2024 05:30) (14 - 18)  SpO2: 99% (05 Nov 2024 05:30) (97% - 99%)    Parameters below as of 05 Nov 2024 05:30  Patient On (Oxygen Delivery Method): room air    GENERAL: NAD  HEAD:  Atraumatic, Normocephalic  EYES: EOMI,  conjunctiva and sclera clear  NECK: Supple  CHEST/LUNG: Clear to auscultation bilaterally; No wheeze, crackles or rhonchi   HEART: Regular rate and rhythm; Normal S1 S2, No murmurs, rubs, or gallops  ABDOMEN: Soft, Nontender, Nondistended; Bowel sounds present  EXTREMITIES:  2+ Peripheral Pulses, No edema  PSYCH: AAOx2-3  NEUROLOGY: +RUE Weakness 0/5, RLL weakness 2/5. Left upper and lower ext strength intact. sensation intact  SKIN: Warm and dry      LABS:                        13.3   11.27 )-----------( 509      ( 05 Nov 2024 04:38 )             40.0     11-05    133[L]  |  99  |  24[H]  ----------------------------<  206[H]  3.9   |  22  |  1.05    Ca    9.4      05 Nov 2024 04:38  Phos  3.2     11-05  Mg     2.20     11-05    TPro  7.3  /  Alb  3.5  /  TBili  0.5  /  DBili  x   /  AST  38  /  ALT  62[H]  /  AlkPhos  65  11-05    PTT - ( 05 Nov 2024 04:38 )  PTT:52.9 sec      Urinalysis Basic - ( 05 Nov 2024 04:38 )    Color: x / Appearance: x / SG: x / pH: x  Gluc: 206 mg/dL / Ketone: x  / Bili: x / Urobili: x   Blood: x / Protein: x / Nitrite: x   Leuk Esterase: x / RBC: x / WBC x   Sq Epi: x / Non Sq Epi: x / Bacteria: x        Culture - Blood (collected 03 Nov 2024 15:09)  Source: .Blood BLOOD  Preliminary Report (04 Nov 2024 21:00):    No growth at 24 hours    Culture - Blood (collected 03 Nov 2024 14:58)  Source: .Blood BLOOD  Preliminary Report (04 Nov 2024 21:00):    No growth at 24 hours        RADIOLOGY & ADDITIONAL TESTS:  Results Reviewed:   Imaging Personally Reviewed:  Electrocardiogram Personally Reviewed:    COORDINATION OF CARE:  Care Discussed with Consultants/Other Providers [Y/N]:  Prior or Outpatient Records Reviewed [Y/N]:   [facility] Medicine  Evelyne Bowen MD  Pager (H-F, 0T-2A): [phone]  Other Times:  [phone]    Patient is a 69y old  Male who presents with a chief complaint of concern for stroke (04 Nov 2024 16:19)      SUBJECTIVE / OVERNIGHT EVENTS: Pt in NAD no cute events ON. Pt spoken to with Romansh . denies any new complaints. FS elevated above goal. BP soft this AM but asymptomatic was on IVF yesterday post IVF BP was 150's       MEDICATIONS  (STANDING):  albuterol    90 MICROgram(s) HFA Inhaler 2 Puff(s) Inhalation two times a day  albuterol/ipratropium for Nebulization 3 milliLiter(s) Nebulizer every 6 hours  aspirin  chewable 81 milliGRAM(s) Oral daily  atorvastatin 80 milliGRAM(s) Oral at bedtime  carvedilol 6.25 milliGRAM(s) Oral every 12 hours  chlorhexidine 2% Cloths 1 Application(s) Topical daily  clopidogrel Tablet 75 milliGRAM(s) Oral daily  dextrose 5%. 1000 milliLiter(s) (100 mL/Hr) IV Continuous <Continuous>  dextrose 5%. 1000 milliLiter(s) (50 mL/Hr) IV Continuous <Continuous>  dextrose 50% Injectable 25 Gram(s) IV Push once  dextrose 50% Injectable 12.5 Gram(s) IV Push once  glucagon  Injectable 1 milliGRAM(s) IntraMuscular once  heparin  Infusion 700 Unit(s)/Hr (7 mL/Hr) IV Continuous <Continuous>  insulin glargine Injectable (LANTUS) 12 Unit(s) SubCutaneous at bedtime  insulin lispro (ADMELOG) corrective regimen sliding scale   SubCutaneous at bedtime  insulin lispro (ADMELOG) corrective regimen sliding scale   SubCutaneous three times a day before meals  insulin lispro Injectable (ADMELOG) 8 Unit(s) SubCutaneous three times a day before meals  sodium chloride 0.9%. 1000 milliLiter(s) (50 mL/Hr) IV Continuous <Continuous>    MEDICATIONS  (PRN):  acetaminophen     Tablet .. 650 milliGRAM(s) Oral every 6 hours PRN Temp greater or equal to 38C (100.4F), Moderate Pain (4 - 6), Severe Pain (7 - 10)  dextrose Oral Gel 15 Gram(s) Oral once PRN Blood Glucose LESS THAN 70 milliGRAM(s)/deciliter  dextrose Oral Gel 15 Gram(s) Oral once PRN Blood Glucose LESS THAN 70 milliGRAM(s)/deciliter      CAPILLARY BLOOD GLUCOSE      POCT Blood Glucose.: 216 mg/dL (05 Nov 2024 08:40)  POCT Blood Glucose.: 211 mg/dL (04 Nov 2024 22:00)  POCT Blood Glucose.: 215 mg/dL (04 Nov 2024 17:44)  POCT Blood Glucose.: 223 mg/dL (04 Nov 2024 12:03)    I&O's Summary    04 Nov 2024 07:01  -  05 Nov 2024 07:00  --------------------------------------------------------  IN: 800 mL / OUT: 1900 mL / NET: -1100 mL        PHYSICAL EXAM:  Vital Signs Last 24 Hrs  T(C): 36.7 (05 Nov 2024 05:30), Max: 36.9 (04 Nov 2024 21:00)  T(F): 98 (05 Nov 2024 05:30), Max: 98.4 (04 Nov 2024 21:00)  HR: 76 (05 Nov 2024 05:30) (72 - 87)  BP: 108/80 (05 Nov 2024 05:30) (96/62 - 157/83)  BP(mean): --  RR: 18 (05 Nov 2024 05:30) (14 - 18)  SpO2: 99% (05 Nov 2024 05:30) (97% - 99%)    Parameters below as of 05 Nov 2024 05:30  Patient On (Oxygen Delivery Method): room air    GENERAL: NAD  HEAD:  Atraumatic, Normocephalic  EYES: EOMI,  conjunctiva and sclera clear  NECK: Supple  CHEST/LUNG: Clear to auscultation bilaterally; No wheeze, crackles or rhonchi   HEART: Regular rate and rhythm; Normal S1 S2, No murmurs, rubs, or gallops  ABDOMEN: Soft, Nontender, Nondistended; Bowel sounds present  EXTREMITIES:  2+ Peripheral Pulses, No edema  PSYCH: AAOx2-3  NEUROLOGY: +RUE Weakness 0/5, RLL weakness 2/5. Left upper and lower ext strength intact. sensation intact  SKIN: Warm and dry      LABS:                        13.3   11.27 )-----------( 509      ( 05 Nov 2024 04:38 )             40.0     11-05    133[L]  |  99  |  24[H]  ----------------------------<  206[H]  3.9   |  22  |  1.05    Ca    9.4      05 Nov 2024 04:38  Phos  3.2     11-05  Mg     2.20     11-05    TPro  7.3  /  Alb  3.5  /  TBili  0.5  /  DBili  x   /  AST  38  /  ALT  62[H]  /  AlkPhos  65  11-05    PTT - ( 05 Nov 2024 04:38 )  PTT:52.9 sec      Urinalysis Basic - ( 05 Nov 2024 04:38 )    Color: x / Appearance: x / SG: x / pH: x  Gluc: 206 mg/dL / Ketone: x  / Bili: x / Urobili: x   Blood: x / Protein: x / Nitrite: x   Leuk Esterase: x / RBC: x / WBC x   Sq Epi: x / Non Sq Epi: x / Bacteria: x        Culture - Blood (collected 03 Nov 2024 15:09)  Source: .Blood BLOOD  Preliminary Report (04 Nov 2024 21:00):    No growth at 24 hours    Culture - Blood (collected 03 Nov 2024 14:58)  Source: .Blood BLOOD  Preliminary Report (04 Nov 2024 21:00):    No growth at 24 hours        RADIOLOGY & ADDITIONAL TESTS:  Results Reviewed: < from: CT Angio Head w/ IV Cont (11.05.24 @ 11:36) >    IMPRESSION: 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   Left V4 stenosis. High-grade stenosis left internal carotid artery at the   carotid bifurcation in the neck. No definite intracranial stenosis.    Recommend repeat chest x-ray to evaluate upper lobe consolidation versus   pulmonary edema.    --- End of Report ---    < end of copied text >    Imaging Personally Reviewed:  Electrocardiogram Personally Reviewed:    COORDINATION OF CARE:  Care Discussed with Consultants/Other Providers [Y/N]:  Prior or Outpatient Records Reviewed [Y/N]:

## 2024-11-05 NOTE — SWALLOW BEDSIDE ASSESSMENT ADULT - SWALLOW EVAL: RECOMMENDED FEEDING/EATING TECHNIQUES
maintain upright posture during/after eating for 30 mins/oral hygiene/position upright (90 degrees)/small sips/bites
slow rate of intake/maintain upright posture during/after eating for 30 mins/oral hygiene/position upright (90 degrees)/small sips/bites

## 2024-11-05 NOTE — DISCHARGE NOTE NURSING/CASE MANAGEMENT/SOCIAL WORK - PATIENT PORTAL LINK FT
You can access the FollowMyHealth Patient Portal offered by [facility] by registering at the following website: [email or web address]. By joining [facility]’s FollowMyHealth portal, you will also be able to view your health information using other applications (apps) compatible with our system.

## 2024-11-05 NOTE — PROGRESS NOTE ADULT - ASSESSMENT
69 RH Japanese speaking male PMHx COPD, HTN, DM presented for hypoxic respiratory distress due to COPD exacerbation vs cardiogenic etiology. In ED, on BiPAP, given lasix, and started nitro drip due to /110. Code stroke called for right side weakness first noticed by family this AM, LKN yesterday evening. Initial VS in ED: /110 prior to nitroglycerin drip, during neurology exam /106. Exam: On BiPAP, AAOx1, following simple commands with occasional confusion, left gaze preference, mild R facial droop, right upper and lower extremity drift, no sensory changes. CTA demonstrated diffuse intracranial and extracranial atherosclerosis, including notably 90% maximal stenosis in carotid bulb and focal occlusion in proximal left vertebral artery. CTA also noted for segmental occlusion of left subclavian artery between thoracic aorta and left vertebral origin. Patient loaded with Plavix 600mg and ASA 325mg, on Plavix 75mg and ASA 81mg. Right Carotid Doppler 50% and left Carotid Dopplers 90%. MRI brain showed high left MCA infarct.    10/31 Heparin drip started after discussion with Dr. Ulrich and Dr. Collins  11/1 [facility] stable- no acute hemorrhage, ok to resume Heparin drip with PTT goal 60-70  11/5 Repeat CTA reviewed- continued stenosis.

## 2024-11-05 NOTE — SWALLOW BEDSIDE ASSESSMENT ADULT - SWALLOW EVAL: CURRENT DIET
NPO (except medications) (per MD order).
Soft and Bite-Sized Solids with Mildly Thick Liquids per MD order
NPO except medications per MD
NPO except medications per MD

## 2024-11-05 NOTE — PROGRESS NOTE ADULT - PROBLEM SELECTOR PLAN 3
- CTA demonstrated diffuse intracranial and extracranial atherosclerosis, including notably 90% maximal stenosis in carotid bulb and focal occlusion in proximal left vertebral artery. CTA also noted for segmental occlusion of left subclavian artery between thoracic aorta and left vertebral origin.   - MRI 10/29- acute Lt MCA infarcts   - c/w heparin gtt, ASA and Plavix and statin  - Monitor pressures: SBP goal 140-160  - Pending repeat CTA H/N on Tuesday 11/5  - F/u w/ neurosurg s/p CTA   - [facility] Prior to dc  - F/u neuro recs - A1c -9.2   - ISS  - Monitor fingersticks  - basal/bolus as per endo lantus 14 and ademalog 10 U sq QAC  - endocrine appreciated - admitted w/ SOB 2/2 Flash pulm edema i/s/o HTN emergency  - c/w coreg as above  - Duonebs Q6H  - carvedilol - borderline BP 11/5 and repeat decreased to 3.125 BID with hold parameters;  start IVF in setting of ICS

## 2024-11-05 NOTE — PROGRESS NOTE ADULT - ASSESSMENT
69 RH Ukrainian speaking male PMHx COPD, HTN, DM admitted for NSTEMI. Patient also with worsening right sided weakness found to have Left hemispheric internal bordezone infarction. CTA shows severe stenosis of L ICA. MRI w/ L borderzone infarcts of L MARY/MCA. CTA H/N repeated, pending final read. Pending neurosurgery recommendations.     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     Recommendations     [] Repeat CTA H/N w/ IV contrast results pending, follow up   [] Further workup/management to be determined following neurosurgery recommendations, appreciate care. Possible plan for revascularization at [facility].   [] If no intracranial procedure required, would likely still benefit from revascularization of the extracranial L ICA  [] ILR placement per STROKE-AF inpatient or outpatient  [] If there is no evidence of an intracranial ICA thrombus, suggest discontinuing heparin gtt and continuing ASA/Plavix.   [] Atorvastatin 80 mg HS Titrate LDL < 70 (currently 87)  [] Telemonitoring  [] Long term BP goals < 130/80 mmHg. Avoid hypotension. Give IVF PRN.   [] Stroke Neurochecks q4hr   [] HbA1C goals < 7.0 (currently 9.4)  [] Physical therapy/OT/Speech Language: TESSIE  [] Patient should follow up with Stroke NP, Norma Andino or Nancy Reilly, in clinic at [address] [place] [address]. Please email [email or web address] w/ basic PHI.     Patient case discussed and seen with stroke attending, Dr. Libman.    Please call with questions: [phone]      69 RH Yoruba speaking male PMHx COPD, HTN, DM admitted for NSTEMI. Patient also with worsening right sided weakness found to have Left hemispheric internal bordezone infarction. CTA shows severe stenosis of L ICA. MRI w/ L borderzone infarcts of L MARY/MCA. CTA H/N repeated, pending final read. Pending neurosurgery recommendations.     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 No evidence of significant intracranial ICA stenosis or thrombus.     Recommendations     [] Repeat CTA H/N w/ IV contrast results pending, follow up - discussed prelim read with Dr. Trinity Cortez: essentially unchanged from prior vessel imaging with severe extracranial (~90%) stenosis; intracranial circulation looks open and any degree of narrow or flow limitation was likely artifactual in hindsight (over-called) due to calcifications.   [] Further workup/management to be determined following neurosurgery recommendations, appreciate care. Suggest revascularization at [facility].   [] Patient would benefit from expeditious revascularization of the extracranial L ICA  [] ILR placement per STROKE-AF inpatient or outpatient  [] Can follow up CTA report, but from neurovascular standpoint (based on discussion with neuroradiologist Dr. Cortez) suggest discontinuing heparin gtt and continuing ASA/Plavix (or just ASA, depending on the discretion of neurosurgeon with revascularization).   [] Atorvastatin 80 mg HS Titrate LDL < 70 (currently 87)  [] Telemonitoring  [] Long term BP goals < 130/80 mmHg. Avoid hypotension. Give IVF PRN.   [] Stroke Neurochecks q4hr   [] HbA1C goals < 7.0 (currently 9.4)  [] Physical therapy/OT/Speech Language: TESSIE  [] Patient should follow up with Stroke NP, Norma Andino or Nancy Reilly, in clinic at [address] [place] [address]. Please email [email or web address] w/ basic PHI.     Patient case discussed and seen with stroke attending, Dr. Libman.    Please call with questions: [phone]      69 RH Mongolian speaking male PMHx COPD, HTN, DM admitted for NSTEMI. Patient also with worsening right sided weakness found to have Left hemispheric internal bordezone infarction. CTA shows severe stenosis of L ICA. MRI w/ L borderzone infarcts of L MARY/MCA. CTA H/N repeated, pending final read. Pending neurosurgery recommendations.     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 No evidence of significant intracranial ICA stenosis or thrombus. Patient will likely benefit from expeditious L ICA extracranial revascularization.     Recommendations     [] Repeat CTA H/N w/ IV contrast results pending, follow up - discussed prelim read with Dr. Trinity Cortez: essentially unchanged from prior vessel imaging with severe extracranial (~90%) stenosis; intracranial circulation looks open and any degree of narrow or flow limitation was likely artifactual in hindsight (over-called) due to calcifications.   [] Further workup/management to be determined following neurosurgery recommendations, appreciate care. Suggest revascularization at [facility].   [] Patient would benefit from expeditious revascularization of the extracranial L ICA  [] ILR placement per STROKE-AF inpatient or outpatient  [] Can follow up CTA report, but from neurovascular standpoint (based on discussion with neuroradiologist Dr. Cortez) suggest discontinuing heparin gtt and continuing ASA/Plavix (or just ASA, depending on the discretion of neurosurgeon with revascularization).   [] Atorvastatin 80 mg HS Titrate LDL < 70 (currently 87)  [] Telemonitoring  [] Long term BP goals < 130/80 mmHg. Avoid hypotension. Give IVF PRN.   [] Stroke Neurochecks q4hr   [] HbA1C goals < 7.0 (currently 9.4)  [] Physical therapy/OT/Speech Language: TESSIE  [] Patient should follow up with Stroke NP, Norma Andino or Nancy Reilly, in clinic at [address] [place] [address]. Please email [email or web address] w/ basic PHI.     Patient case discussed and seen with stroke attending, Dr. Libman.    Please call with questions: [phone]      69 RH Frisian speaking male PMHx COPD, HTN, DM admitted for NSTEMI. Patient also with worsening right sided weakness found to have Left hemispheric internal bordezone infarction. CTA shows severe stenosis of L ICA. MRI w/ L borderzone infarcts of L MARY/MCA. CTA H/N repeated, pending final read. Pending neurosurgery recommendations.     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 Degree of  intracranial LICA stenosis is uncertain and may be less severe than initially thought;  intraluminal thrombus is unlikely. Patient will likely benefit from expeditious L ICA extracranial revascularization.     Recommendations     [] Repeat CTA H/N w/ IV contrast results pending, follow up - discussed prelim read with Dr. Trinity Cortez: essentially unchanged from prior vessel imaging with severe extracranial (~90%) stenosis; intracranial circulation looks open and any degree of narrow or flow limitation was likely artifactual in hindsight (over-called) due to calcifications.   [] Further workup/management to be determined following neurosurgery recommendations, appreciate care. Suggest revascularization at [facility].   [] Patient would benefit from expeditious revascularization of the extracranial L ICA  [] ILR placement per STROKE-AF inpatient or outpatient  [] Can follow up CTA report, but from neurovascular standpoint (based on discussion with neuroradiologist Dr. Cortez and Dr. Ulrich) suggest discontinuing heparin gtt and continuing ASA/Plavix.   [] Atorvastatin 80 mg HS Titrate LDL < 70 (currently 87)  [] Telemonitoring  [] Long term BP goals < 130/80 mmHg. Avoid hypotension. Give IVF PRN.   [] Stroke Neurochecks q4hr   [] HbA1C goals < 7.0 (currently 9.4)  [] Physical therapy/OT/Speech Language: TESSIE  [] Patient should follow up with Stroke NP, Norma Andino or Nancy Reilly, in clinic at [address] [place] [address]. Please email [email or web address] w/ basic PHI.     Patient case discussed and seen with stroke attending, Dr. Libman.    Please call with questions: [phone]

## 2024-11-05 NOTE — PROGRESS NOTE ADULT - PROBLEM SELECTOR PLAN 7
DVT: Heparin gtt  DIET: Soft  DISPO: PT consulted DVT: Heparin gtt  DIET: Soft  DISPO: PMGIA- AR DVT: heparin off for procedure   DIET: Soft  DISPO: PMR- AR

## 2024-11-05 NOTE — SWALLOW BEDSIDE ASSESSMENT ADULT - SLP PERTINENT HISTORY OF CURRENT PROBLEM
Neuro 10/27, "Impression: Right hemiparesis likely due to left brain dysfunction. Mechanism large artery atherosclerosis vs ESUS. Encephalopathy likely metabolic in setting of acute hypoxic respiratory failure."
MR Head 10/29: IMPRESSION: 1.  Acute infarcts in the high left MCA watershed distribution. 2.  Irregular flow-void in the cavernous segment of the left ICA. This could be attributed to slow/turbulent flow. A severe stenosis is not entirely excluded. Correlate with recent CTA of the head for further evaluation. 3.  Chronic ischemic changes.
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

## 2024-11-05 NOTE — PROGRESS NOTE ADULT - PROBLEM SELECTOR PLAN 2
- admitted w/ SOB 2/2 Flash pulm edema i/s/o HTN emergency  - c/w coreg as above  - Duonebs Q6H  - Monitor pressures: SBP goal 140-160 - admitted w/ SOB 2/2 Flash pulm edema i/s/o HTN emergency  - c/w coreg as above  - Duonebs Q6H - admitted w/ SOB 2/2 Flash pulm edema i/s/o HTN emergency  - c/w coreg as above  - Duonebs Q6H  - carvedilol - borderline BP 11/5 and repeat decreased to 3.125 BID with hold parameters;  start IVF in setting of ICS - CTA with incidental poss Poss RUL infiltrate vs Pulmonary edema   - check X ray

## 2024-11-05 NOTE — PROGRESS NOTE ADULT - PROBLEM SELECTOR PLAN 6
- Pt w/ generalized weakness today per family  - Suspect multifactorial i/s/o CVA and  hospitalization/ deconditioning  - CXR without infiltrates  - Monitor off abx for now   - RVP- neg; blood cx in lab;   - CXR w/o acute infiltrate - C/w statin

## 2024-11-06 ENCOUNTER — APPOINTMENT (OUTPATIENT)
Dept: NEUROSURGERY | Facility: HOSPITAL | Age: 69
End: 2024-11-06

## 2024-11-06 LAB
ANION GAP SERPL CALC-SCNC: 10 MMOL/L — SIGNIFICANT CHANGE UP (ref 5–17)
BLD GP AB SCN SERPL QL: NEGATIVE — SIGNIFICANT CHANGE UP
BLD GP AB SCN SERPL QL: NEGATIVE — SIGNIFICANT CHANGE UP
BUN SERPL-MCNC: 20 MG/DL — SIGNIFICANT CHANGE UP (ref 7–23)
CALCIUM SERPL-MCNC: 5.9 MG/DL — CRITICAL LOW (ref 8.4–10.5)
CHLORIDE SERPL-SCNC: 116 MMOL/L — HIGH (ref 96–108)
CO2 SERPL-SCNC: 15 MMOL/L — LOW (ref 22–31)
CREAT SERPL-MCNC: 0.73 MG/DL — SIGNIFICANT CHANGE UP (ref 0.5–1.3)
EGFR: 98 ML/MIN/1.73M2 — SIGNIFICANT CHANGE UP
GLUCOSE BLDC GLUCOMTR-MCNC: 133 MG/DL — HIGH (ref 70–99)
GLUCOSE BLDC GLUCOMTR-MCNC: 136 MG/DL — HIGH (ref 70–99)
GLUCOSE BLDC GLUCOMTR-MCNC: 182 MG/DL — HIGH (ref 70–99)
GLUCOSE SERPL-MCNC: 131 MG/DL — HIGH (ref 70–99)
HCT VFR BLD CALC: 33 % — LOW (ref 39–50)
HCT VFR BLD CALC: 33.6 % — LOW (ref 39–50)
HGB BLD-MCNC: 10.6 G/DL — LOW (ref 13–17)
HGB BLD-MCNC: 10.7 G/DL — LOW (ref 13–17)
MAGNESIUM SERPL-MCNC: 1.5 MG/DL — LOW (ref 1.6–2.6)
MCHC RBC-ENTMCNC: 28.2 PG — SIGNIFICANT CHANGE UP (ref 27–34)
MCHC RBC-ENTMCNC: 28.9 PG — SIGNIFICANT CHANGE UP (ref 27–34)
MCHC RBC-ENTMCNC: 31.8 G/DL — LOW (ref 32–36)
MCHC RBC-ENTMCNC: 32.1 G/DL — SIGNIFICANT CHANGE UP (ref 32–36)
MCV RBC AUTO: 88.4 FL — SIGNIFICANT CHANGE UP (ref 80–100)
MCV RBC AUTO: 89.9 FL — SIGNIFICANT CHANGE UP (ref 80–100)
MRSA PCR RESULT.: SIGNIFICANT CHANGE UP
NRBC # BLD: 0 /100 WBCS — SIGNIFICANT CHANGE UP (ref 0–0)
NRBC # BLD: 0 /100 WBCS — SIGNIFICANT CHANGE UP (ref 0–0)
PHOSPHATE SERPL-MCNC: 4.1 MG/DL — SIGNIFICANT CHANGE UP (ref 2.5–4.5)
PLATELET # BLD AUTO: 463 K/UL — HIGH (ref 150–400)
PLATELET # BLD AUTO: 470 K/UL — HIGH (ref 150–400)
POTASSIUM SERPL-MCNC: 3.3 MMOL/L — LOW (ref 3.5–5.3)
POTASSIUM SERPL-SCNC: 3.3 MMOL/L — LOW (ref 3.5–5.3)
RBC # BLD: 3.67 M/UL — LOW (ref 4.2–5.8)
RBC # BLD: 3.8 M/UL — LOW (ref 4.2–5.8)
RBC # FLD: 13.2 % — SIGNIFICANT CHANGE UP (ref 10.3–14.5)
RBC # FLD: 13.2 % — SIGNIFICANT CHANGE UP (ref 10.3–14.5)
RH IG SCN BLD-IMP: POSITIVE — SIGNIFICANT CHANGE UP
RH IG SCN BLD-IMP: POSITIVE — SIGNIFICANT CHANGE UP
S AUREUS DNA NOSE QL NAA+PROBE: SIGNIFICANT CHANGE UP
SODIUM SERPL-SCNC: 141 MMOL/L — SIGNIFICANT CHANGE UP (ref 135–145)
WBC # BLD: 11.64 K/UL — HIGH (ref 3.8–10.5)
WBC # BLD: 14.27 K/UL — HIGH (ref 3.8–10.5)
WBC # FLD AUTO: 11.64 K/UL — HIGH (ref 3.8–10.5)
WBC # FLD AUTO: 14.27 K/UL — HIGH (ref 3.8–10.5)

## 2024-11-06 PROCEDURE — 37215 TRANSCATH STENT CCA W/EPS: CPT | Mod: LT

## 2024-11-06 PROCEDURE — 36223 PLACE CATH CAROTID/INOM ART: CPT | Mod: 59,RT

## 2024-11-06 PROCEDURE — 93010 ELECTROCARDIOGRAM REPORT: CPT | Mod: 76

## 2024-11-06 PROCEDURE — 99291 CRITICAL CARE FIRST HOUR: CPT

## 2024-11-06 PROCEDURE — 36226 PLACE CATH VERTEBRAL ART: CPT | Mod: RT

## 2024-11-06 PROCEDURE — 93970 EXTREMITY STUDY: CPT | Mod: 26

## 2024-11-06 PROCEDURE — 76937 US GUIDE VASCULAR ACCESS: CPT | Mod: 26

## 2024-11-06 RX ORDER — MAGNESIUM SULFATE IN 0.9% NACL 2 G/50 ML
2 INTRAVENOUS SOLUTION, PIGGYBACK (ML) INTRAVENOUS
Refills: 0 | Status: COMPLETED | OUTPATIENT
Start: 2024-11-06 | End: 2024-11-07

## 2024-11-06 RX ORDER — PHENYLEPHRINE HCL IN 0.9% NACL 20MG/250ML
0.5 PLASTIC BAG, INJECTION (ML) INTRAVENOUS
Qty: 40 | Refills: 0 | Status: DISCONTINUED | OUTPATIENT
Start: 2024-11-06 | End: 2024-11-07

## 2024-11-06 RX ORDER — ASPIRIN/MAG CARB/ALUMINUM AMIN 325 MG
325 TABLET ORAL
Refills: 0 | Status: DISCONTINUED | OUTPATIENT
Start: 2024-11-07 | End: 2024-11-11

## 2024-11-06 RX ORDER — CLOPIDOGREL 75 MG/1
75 TABLET ORAL
Refills: 0 | Status: DISCONTINUED | OUTPATIENT
Start: 2024-11-07 | End: 2024-11-11

## 2024-11-06 RX ORDER — CHLORHEXIDINE GLUCONATE 40 MG/ML
1 SOLUTION TOPICAL DAILY
Refills: 0 | Status: DISCONTINUED | OUTPATIENT
Start: 2024-11-06 | End: 2024-11-08

## 2024-11-06 RX ORDER — POTASSIUM CHLORIDE 10 MEQ
40 TABLET, EXTENDED RELEASE ORAL EVERY 4 HOURS
Refills: 0 | Status: COMPLETED | OUTPATIENT
Start: 2024-11-06 | End: 2024-11-07

## 2024-11-06 RX ADMIN — Medication 40 MILLIGRAM(S): at 00:11

## 2024-11-06 RX ADMIN — CLOPIDOGREL 75 MILLIGRAM(S): 75 TABLET ORAL at 11:14

## 2024-11-06 RX ADMIN — Medication 12 UNIT(S): at 00:10

## 2024-11-06 RX ADMIN — CHLORHEXIDINE GLUCONATE 1 APPLICATION(S): 40 SOLUTION TOPICAL at 22:02

## 2024-11-06 RX ADMIN — SODIUM CHLORIDE 50 MILLILITER(S): 9 INJECTION, SOLUTION INTRAMUSCULAR; INTRAVENOUS; SUBCUTANEOUS at 19:00

## 2024-11-06 RX ADMIN — Medication 80 MILLIGRAM(S): at 23:47

## 2024-11-06 RX ADMIN — Medication 2: at 06:42

## 2024-11-06 RX ADMIN — Medication 2 PUFF(S): at 05:30

## 2024-11-06 RX ADMIN — Medication 81 MILLIGRAM(S): at 11:14

## 2024-11-06 RX ADMIN — CARVEDILOL 3.12 MILLIGRAM(S): 25 TABLET, FILM COATED ORAL at 05:30

## 2024-11-06 RX ADMIN — Medication 12.8 MICROGRAM(S)/KG/MIN: at 19:30

## 2024-11-06 RX ADMIN — Medication 2 TABLET(S): at 23:46

## 2024-11-06 NOTE — PHYSICAL THERAPY INITIAL EVALUATION ADULT - PLANNED THERAPY INTERVENTIONS, PT EVAL
GOAL: Pt will negotiate up/down 6 steps with one handrail ascending using least restrictive assistive device, independently in 4 weeks/balance training/bed mobility training/gait training/strengthening/transfer training

## 2024-11-06 NOTE — PHYSICAL THERAPY INITIAL EVALUATION ADULT - ACTIVE RANGE OF MOTION EXAMINATION, REHAB EVAL
impaired RUE/RLE AAROM/Left UE Active ROM was WFL (within functional limits)/Left LE Active ROM was WFL (within functional limits)

## 2024-11-06 NOTE — OCCUPATIONAL THERAPY INITIAL EVALUATION ADULT - PERSONAL SAFETY AND JUDGMENT, REHAB EVAL
word finding difficulty- cognitive assessment not appropriate at this time/at risk behaviors demonstrated

## 2024-11-06 NOTE — CHART NOTE - NSCHARTNOTEFT_GEN_A_CORE
Interventional Neuro- Radiology   Procedure Note PA-C    Procedure: Catheter Cerebral Angiogram, angioplasty and left carotid wall stent placement   Pre- Procedure Diagnosis: Left carotid stenosis   Post- Procedure Diagnosis:    : Dr Farhat Ulrich  Fellow:    Dr Nadir Cobian   Fellow:    Dr Sanna Posey   Physician Assistant: RYNE Bradshaw PA-C    Nurse:                   Shahnaz Kingsley   Radiologic Tech:   Maria Alejandra Wahl LRT, Pradeep Blanchard LRT   Anesthesiologist:  Dr Cochran   Sheath:  Sheath:  Barbeau:      I/Os: EBL less than 10cc  IV fluids:     cc  Urine output     cc    Contrast Visi             Vitals: BP         HR      Spo2     %          Preliminary Report:  Using a 5 Irish short sheath to the right groin under MAC sedation via left vertebral artery, left internal carotid artery, left external carotid artery, right vertebral artery, right internal carotid artery, right external carotid artery a selective cerebral angiography was performed and demonstrated                       Official note to follow.  Patient tolerated procedure well, hemodynamically stable, no change in neurological status compared to baseline. Results discussed with neuro ICU team, patient and patient's family. Right groin sheath was removed, manual compression held to hemostasis for 20 minutes, no active bleeding, no hematoma, quick clot and safeguard balloon dressing applied at Interventional Neuro- Radiology   Procedure Note PA-C    Procedure: Catheter Cerebral Angiogram, angioplasty and left carotid wall stent placement   Pre- Procedure Diagnosis: Left carotid stenosis   Post- Procedure Diagnosis: severe left carotid stenosis, s/post left carotid wall stent     : Dr Farhat Ulrich  Fellow:    Dr Nadir Cobian   Fellow:    Dr Sanna Posey   Physician Assistant: RYNE Bradshaw PA-C    Nurse:                   Shahnaz Kingsley   Radiologic Tech:   Maria Alejandra Wahl LRT, Pradeep Blanchard LRT   Anesthesiologist:  Dr Cochran   Sheath:                 7 Nigerien short sheath   Barbeau:               A      I/Os: EBL less than 10cc  IV fluids: 700cc  Urine due to void  Contrast  63cc     carotid wall stent 8mm by 29mm     Emerge stent angioplasty   Aneudy stent angioplasty       Heparin 1,ooo unit  Heparin 2,5oo unit      Vitals: /80       HR 88     Spo2 100%          Preliminary Report:  Using a 7 Nigerien short sheath to the right radial artery using ultrasound guidance and confirmed with fluoroscopy MAC sedation a catheter cerebral angiogram and left carotid wall stent was performed. Official note to follow.  Patient tolerated procedure well, hemodynamically stable, no change in neurological status compared to baseline. Results discussed with neuro ICU team, patient and patient's family. Right radial sheath was removed, manual compression held to hemostasis for 20 minutes, no active bleeding, no hematoma, quick clot and safeguard balloon dressing applied at 1745 hours. Maintain systolic blood pressure under 140. Interventional Neuro- Radiology   Procedure Note PA-C    Procedure: Catheter Cerebral Angiogram, angioplasty and left carotid wall stent placement   Pre- Procedure Diagnosis: Left carotid stenosis   Post- Procedure Diagnosis: severe left carotid stenosis, s/post left carotid wall stent     : Dr Farhat Ulrich  Fellow:    Dr Nadir Cobian   Fellow:    Dr Sanna Posey   Physician Assistant: RYNE Bradshaw PA-C    Nurse:                   Shahnaz Kingsley   Radiologic Tech:   Maria Alejandra Wahl LRT, Pradeep Blanchard LRT   Anesthesiologist:  Dr Dimas Soto   Sheath:                 7 Indonesian short sheath   Barbeau:               A      I/Os: EBL less than 10cc  IV fluids: 700cc  Urine due to void  Contrast  63cc     carotid wall stent 8mm by 29mm     Emerge stent angioplasty   Aneudy stent angioplasty       Heparin 1,ooo unit  Heparin 2,5oo unit      Vitals: /80       HR 88     Spo2 100%          Preliminary Report:  Using a 7 Indonesian short sheath to the right radial artery using ultrasound guidance and confirmed with fluoroscopy MAC sedation a catheter cerebral angiogram and left carotid wall stent was performed. Official note to follow.  Patient tolerated procedure well, hemodynamically stable, no change in neurological status compared to baseline. Results discussed with neuro ICU team, patient and patient's family. Right radial sheath was removed, manual compression held to hemostasis for 20 minutes, no active bleeding, no hematoma, quick clot and safeguard balloon dressing applied at 1745 hours. Maintain systolic blood pressure under 140. Interventional Neuro- Radiology   Procedure Note PA-C    Procedure: Catheter Cerebral Angiogram, angioplasty and left carotid wall stent placement   Pre- Procedure Diagnosis: Left carotid stenosis   Post- Procedure Diagnosis: severe left carotid stenosis, s/post left carotid wall stent     : Dr Farhat Ulrich  Fellow:    Dr Nadir Cobian   Fellow:    Dr Sanna Posey   Physician Assistant: RYNE Bradshaw PA-C    Nurse:                   Shahnaz Kingsley   Radiologic Tech:   Maria Alejandra Wahl LRT, Pradeep Blanchard LRT   Anesthesiologist:  Dr Dimas Jaffe   Sheath:                 7 Singaporean short sheath   Barbeau:               A      I/Os: EBL less than 10cc  IV fluids: 700cc  Urine due to void  Contrast  63cc     carotid wall stent 8mm by 29mm     Emerge stent angioplasty   Aneudy stent angioplasty       Heparin 1,ooo unit  Heparin 2,5oo unit      Vitals: /80       HR 88     Spo2 100%          Preliminary Report:  Using a 7 Singaporean short sheath to the right radial artery using ultrasound guidance and confirmed with fluoroscopy, by Dr Nadir Cobian under MAC sedation a catheter cerebral angiogram angioplasty and left carotid wall stent was placed. Official note to follow.  Patient tolerated procedure well, hemodynamically stable, no change in neurological status compared to baseline. Results discussed with neuro ICU team, patient and patient's family. Right radial sheath was removed, manual compression held to hemostasis for 20 minutes, no active bleeding, no hematoma, quick clot and safeguard balloon dressing applied at 1745 hours. Maintain systolic blood pressure under 140.    Post procedure exam: Patient was Awake, alert, following commands, right upper extremity 0/5 right lower extremity 3/5, moves left upper and lower extremity with good strength, right wrist soft, right wrist band in place.

## 2024-11-06 NOTE — PHYSICAL THERAPY INITIAL EVALUATION ADULT - ADDITIONAL COMMENTS
IND without AD at baseline, lives with wife (recent surgery and unable to assist) in house with 6 ANA MARIA, 1st floor set up

## 2024-11-06 NOTE — PROGRESS NOTE ADULT - ASSESSMENT
ASSESSMENT AND PLAN:     NEURO:     PULM:     CV:    ENDO:     HEME/ONC:                      DVT ppx:     RENAL:     ID:     GI:      DISCHARGE PLANNING:       ASSESSMENT AND PLAN:   69 yr old male presented as code stroke found to have L carotid stenosis    NEURO:   POD 0 s/p Left carotid angioplasty  Neuro Q1H  Carotid dopplers in AM  Stroke core measures  c/w ASA 325mg and Plavix 75mg  Pain control w/ tylenol  Activity: Bedrest while safeguard on  PT/OT/ Speech and language    PULM:   on 2L NC  Sp02> 92%  IS  Hx Asthma  c/w albuterol inhaler    CV:  SBP goal 100-140  Hx HTN  c/w Coreg 3.125mg BID  c/w Atorvastatin 80mg  Wean off charlie  Hx NSTEMI/HF, last TTE at [facility] showed EF 45-50%. In house cards at [facility] recommended cath but was deferred due to acute stroke.  Elevated troponin, will continue to trend  In house cards consulted, will see in AM  rpt TTE-P  LDL 77,   ECG pending    ENDO:   Goal euglycemia (FS goal 120-180)  Hx DM, A1C 9.6  c/w Lantus 12u at bedtime  Medium ISS before meals and BRODY at bedime  Last   TSH 1.27, wnl    HEME/ONC:               SQL 40mg  SCDs  11/6 LED: negative    RENAL:   NS @50cc/hr until good PO intake  Goal is normonatremia  Replete lytes PRN    ID:   Afebrile  Monitor leukocytosis  Monitor platelets    GI:    Diet: CCD  LBM: PTA  Bowel regimen w/ senna and miralax  no PPI indicated at this time    DISCHARGE PLANNING: ICU  60 minutes critical care time.

## 2024-11-06 NOTE — PROGRESS NOTE ADULT - SUBJECTIVE AND OBJECTIVE BOX
HOSPITAL COURSE:  69M Amharic speaking hx HTN, HLD, DM, initial (last wk) p/f code stroke Rt sided weakness i/s/o NSTEMI/HF, xfer from [facility] for angio/LICA stent w/ Dr. Ulrich. 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 Hypotensive during the case s/p multiple neosticks and boluses.     Allergies    No Known Allergies    Intolerances        REVIEW OF SYSTEMS: [ ] Unable to Assess due to neurologic exam   [X ] All ROS addressed below are non-contributory, except:  Neuro: [ ] Headache [ ] Back pain [ ] Numbness [ ] Weakness [ ] Ataxia [ ] Dizziness [ ] Aphasia [ ] Dysarthria [ ] Visual disturbance  Resp: [ ] Shortness of breath/dyspnea, [ ] Orthopnea [ ] Cough  CV: [ ] Chest pain [ ] Palpitation [ ] Lightheadedness [ ] Syncope  Renal: [ ] Thirst [ ] Edema  GI: [ ] Nausea [ ] Emesis [ ] Abdominal pain [ ] Constipation [ ] Diarrhea  Hem: [ ] Hematemesis [ ] bright red blood per rectum  ID: [ ] Fever [ ] Chills [ ] Dysuria  ENT: [ ] Rhinorrhea      DEVICES:   [ ] Restraints [ ] ET tube [ ] central line [X ] arterial line [ ] kaye [ ] NGT/OGT [ ] EVD [ ] LD [ ] OLINDA/HMV [ ] Trach [ ] PEG [ ] Chest Tube     VITALS:   Vital Signs Last 24 Hrs  T(C): 36.7 (06 Nov 2024 18:15), Max: 36.9 (06 Nov 2024 08:42)  T(F): 98.1 (06 Nov 2024 18:15), Max: 98.4 (06 Nov 2024 08:42)  HR: 65 (06 Nov 2024 18:45) (65 - 82)  BP: 127/92 (06 Nov 2024 14:14) (107/74 - 160/80)  BP(mean): --  RR: 21 (06 Nov 2024 18:45) (16 - 23)  SpO2: 99% (06 Nov 2024 18:45) (94% - 100%)    Parameters below as of 06 Nov 2024 18:15  Patient On (Oxygen Delivery Method): nasal cannula  O2 Flow (L/min): 4    CAPILLARY BLOOD GLUCOSE      POCT Blood Glucose.: 182 mg/dL (06 Nov 2024 06:26)  POCT Blood Glucose.: 133 mg/dL (06 Nov 2024 00:08)    I&O's Summary    05 Nov 2024 07:01  -  06 Nov 2024 07:00  --------------------------------------------------------  IN: 0 mL / OUT: 450 mL / NET: -450 mL    06 Nov 2024 07:01  -  06 Nov 2024 20:56  --------------------------------------------------------  IN: 105.1 mL / OUT: 0 mL / NET: 105.1 mL        LABS:                        10.7   14.27 )-----------( 463      ( 06 Nov 2024 19:41 )             33.6     11-06    141  |  116[H]  |  20  ----------------------------<  131[H]  3.3[L]   |  15[L]  |  0.73             MEDICATION LEVELS:     IVF FLUIDS/MEDICATIONS:   MEDICATIONS  (STANDING):  albuterol    90 MICROgram(s) HFA Inhaler 2 Puff(s) Inhalation every 12 hours  atorvastatin 80 milliGRAM(s) Oral at bedtime  carvedilol 3.125 milliGRAM(s) Oral every 12 hours  chlorhexidine 4% Liquid 1 Application(s) Topical daily  dextrose 5%. 1000 milliLiter(s) (50 mL/Hr) IV Continuous <Continuous>  dextrose 5%. 1000 milliLiter(s) (100 mL/Hr) IV Continuous <Continuous>  dextrose 50% Injectable 12.5 Gram(s) IV Push once  dextrose 50% Injectable 25 Gram(s) IV Push once  dextrose 50% Injectable 25 Gram(s) IV Push once  enoxaparin Injectable 40 milliGRAM(s) SubCutaneous every 24 hours  glucagon  Injectable 1 milliGRAM(s) IntraMuscular once  insulin glargine Injectable (LANTUS) 12 Unit(s) SubCutaneous at bedtime  insulin lispro (ADMELOG) corrective regimen sliding scale   SubCutaneous at bedtime  insulin lispro (ADMELOG) corrective regimen sliding scale   SubCutaneous three times a day before meals  phenylephrine    Infusion 0.5 MICROgram(s)/kG/Min (12.8 mL/Hr) IV Continuous <Continuous>  polyethylene glycol 3350 17 Gram(s) Oral daily  senna 2 Tablet(s) Oral at bedtime  sodium chloride 0.9%. 1000 milliLiter(s) (50 mL/Hr) IV Continuous <Continuous>    MEDICATIONS  (PRN):  acetaminophen   IVPB .. 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   Left V4 stenosis. High-grade stenosis left internal carotid artery at the   carotid bifurcation in the neck. No definite intracranial stenosis.      EXAMINATION:  PHYSICAL EXAM:    Constitutional: No Acute Distress     Neurological:     Pulmonary: Clear to Auscultation, No rales, No rhonchi, No wheezes     Cardiovascular: S1, S2, Regular rate and rhythm     Gastrointestinal: Soft, Non-tender, Non-distended     Extremities: No calf tenderness     Incision: cdi   HOSPITAL COURSE:  69M Romansh speaking hx HTN, HLD, DM, initial (last wk) p/f code stroke Rt sided weakness i/s/o NSTEMI/HF, xfer from [facility] for angio/LICA stent w/ Dr. Ulrich. 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 Hypotensive during the case s/p multiple neosticks and boluses.     Allergies    No Known Allergies    Intolerances    REVIEW OF SYSTEMS: [ ] Unable to Assess due to neurologic exam   [X ] All ROS addressed below are non-contributory, except:  Neuro: [ ] Headache [ ] Back pain [ ] Numbness [ ] Weakness [ ] Ataxia [ ] Dizziness [ ] Aphasia [ ] Dysarthria [ ] Visual disturbance  Resp: [ ] Shortness of breath/dyspnea, [ ] Orthopnea [ ] Cough  CV: [ ] Chest pain [ ] Palpitation [ ] Lightheadedness [ ] Syncope  Renal: [ ] Thirst [ ] Edema  GI: [ ] Nausea [ ] Emesis [ ] Abdominal pain [ ] Constipation [ ] Diarrhea  Hem: [ ] Hematemesis [ ] bright red blood per rectum  ID: [ ] Fever [ ] Chills [ ] Dysuria  ENT: [ ] Rhinorrhea      DEVICES:   [ ] Restraints [ ] ET tube [ ] central line [X ] arterial line [ ] kaye [ ] NGT/OGT [ ] EVD [ ] LD [ ] OLINDA/HMV [ ] Trach [ ] PEG [ ] Chest Tube     VITALS:   Vital Signs Last 24 Hrs  T(C): 36.7 (06 Nov 2024 18:15), Max: 36.9 (06 Nov 2024 08:42)  T(F): 98.1 (06 Nov 2024 18:15), Max: 98.4 (06 Nov 2024 08:42)  HR: 65 (06 Nov 2024 18:45) (65 - 82)  BP: 127/92 (06 Nov 2024 14:14) (107/74 - 160/80)  BP(mean): --  RR: 21 (06 Nov 2024 18:45) (16 - 23)  SpO2: 99% (06 Nov 2024 18:45) (94% - 100%)    Parameters below as of 06 Nov 2024 18:15  Patient On (Oxygen Delivery Method): nasal cannula  O2 Flow (L/min): 4    CAPILLARY BLOOD GLUCOSE      POCT Blood Glucose.: 182 mg/dL (06 Nov 2024 06:26)  POCT Blood Glucose.: 133 mg/dL (06 Nov 2024 00:08)    I&O's Summary    05 Nov 2024 07:01  -  06 Nov 2024 07:00  --------------------------------------------------------  IN: 0 mL / OUT: 450 mL / NET: -450 mL    06 Nov 2024 07:01  -  06 Nov 2024 20:56  --------------------------------------------------------  IN: 105.1 mL / OUT: 0 mL / NET: 105.1 mL        LABS:                        10.7   14.27 )-----------( 463      ( 06 Nov 2024 19:41 )             33.6     11-06    141  |  116[H]  |  20  ----------------------------<  131[H]  3.3[L]   |  15[L]  |  0.73             MEDICATION LEVELS:     IVF FLUIDS/MEDICATIONS:   MEDICATIONS  (STANDING):  albuterol    90 MICROgram(s) HFA Inhaler 2 Puff(s) Inhalation every 12 hours  atorvastatin 80 milliGRAM(s) Oral at bedtime  carvedilol 3.125 milliGRAM(s) Oral every 12 hours  chlorhexidine 4% Liquid 1 Application(s) Topical daily  dextrose 5%. 1000 milliLiter(s) (50 mL/Hr) IV Continuous <Continuous>  dextrose 5%. 1000 milliLiter(s) (100 mL/Hr) IV Continuous <Continuous>  dextrose 50% Injectable 12.5 Gram(s) IV Push once  dextrose 50% Injectable 25 Gram(s) IV Push once  dextrose 50% Injectable 25 Gram(s) IV Push once  enoxaparin Injectable 40 milliGRAM(s) SubCutaneous every 24 hours  glucagon  Injectable 1 milliGRAM(s) IntraMuscular once  insulin glargine Injectable (LANTUS) 12 Unit(s) SubCutaneous at bedtime  insulin lispro (ADMELOG) corrective regimen sliding scale   SubCutaneous at bedtime  insulin lispro (ADMELOG) corrective regimen sliding scale   SubCutaneous three times a day before meals  phenylephrine    Infusion 0.5 MICROgram(s)/kG/Min (12.8 mL/Hr) IV Continuous <Continuous>  polyethylene glycol 3350 17 Gram(s) Oral daily  senna 2 Tablet(s) Oral at bedtime  sodium chloride 0.9%. 1000 milliLiter(s) (50 mL/Hr) IV Continuous <Continuous>    MEDICATIONS  (PRN):  acetaminophen   IVPB .. 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   Left V4 stenosis. High-grade stenosis left internal carotid artery at the   carotid bifurcation in the neck. No definite intracranial stenosis.      EXAMINATION:  PHYSICAL EXAM:    Constitutional: No Acute Distress     Neurological: AOx3, mild Rt facial, RUE 0/5, RLE HF 3/5 ow 2/5, LLE 5/5, SILT     Pulmonary: Clear to Auscultation, No rales, No rhonchi, No wheezes     Cardiovascular: S1, S2, Regular rate and rhythm     Gastrointestinal: Soft, Non-tender, Non-distended     Extremities: No calf tenderness     Incision: cdi, no hematoma at puncture site

## 2024-11-06 NOTE — OCCUPATIONAL THERAPY INITIAL EVALUATION ADULT - RANGE OF MOTION, PT EVAL
GOAL: Pt will increase AAROM by 20 degrees to LUE and LLE within 4 weeks to increase overall strength and mobility.

## 2024-11-06 NOTE — CHART NOTE - NSCHARTNOTEFT_GEN_A_CORE
CAPRINI SCORE [CLOT] Score on Admission for     AGE RELATED RISK FACTORS                                                       MOBILITY RELATED FACTORS  [ ] Age 41-60 years                                            (1 Point)                  [ ] Bed rest                                                        (1 Point)  [X ] Age: 61-74 years                                           (2 Points)                 [ ] Plaster cast                                                   (2 Points)  [ ] Age= 75 years                                              (3 Points)                 [ ] Bed bound for more than 72 hours                 (2 Points)    DISEASE RELATED RISK FACTORS                                               GENDER SPECIFIC FACTORS  [ ] Edema in the lower extremities                       (1 Point)                  [ ] Pregnancy                                                     (1 Point)  [ ] Varicose veins                                               (1 Point)                  [ ] Post-partum < 6 weeks                                   (1 Point)             [X ] BMI > 25 Kg/m2                                            (1 Point)                  [ ] Hormonal therapy  or oral contraception          (1 Point)                 [ ] Sepsis (in the previous month)                        (1 Point)                  [ ] History of pregnancy complications                 (1 point)  [ ] Pneumonia or serious lung disease                                               [ ] Unexplained or recurrent                     (1 Point)           (in the previous month)                               (1 Point)  [ ] Abnormal pulmonary function test                     (1 Point)                 SURGERY RELATED RISK FACTORS (include planned surgeries)  [ ] Acute myocardial infarction                              (1 Point)                 [ ]  Section                                             (1 Point)  [ ] Congestive heart failure (in the previous month)  (1 Point)               [X ] Minor surgery                                                  (1 Point)   [ ] Inflammatory bowel disease                             (1 Point)                 [ ] Arthroscopic surgery                                        (2 Points)  [ ] Central venous access                                      (2 Points)                [ ] General surgery lasting more than 45 minutes   (2 Points)       [ ] Stroke (in the previous month)                          (5 Points)               [ ] Elective arthroplasty                                         (5 Points)            [ ] current or past malignancy                              (2 Points)                                                                                                       HEMATOLOGY RELATED FACTORS                                                 TRAUMA RELATED RISK FACTORS  [ ] Prior episodes of VTE                                     (3 Points)                [ ] Fracture of the hip, pelvis, or leg                       (5 Points)  [ ] Positive family history for VTE                         (3 Points)                 [ ] Acute spinal cord injury (in the previous month)  (5 Points)  [ ] Prothrombin 39568 A                                     (3 Points)                 [ ] Paralysis  (less than 1 month)                             (5 Points)  [ ] Factor V Leiden                                             (3 Points)                  [ ] Multiple Trauma within 1 month                        (5 Points)  [ ] Lupus anticoagulants                                     (3 Points)                                                           [ ] Anticardiolipin antibodies                               (3 Points)                                                       [ ] High homocysteine in the blood                      (3 Points)                                             [ ] Other congenital or acquired thrombophilia      (3 Points)                                                [ ] Heparin induced thrombocytopenia                  (3 Points)                                          Total Score [   4    ]    Risk:  Very low 0   Low 1 to 2   Moderate 3 to 4   High =5       VTE Prophylasix Recommednations:  [X ] mechanical pneumatic compression devices                                      [ ] contraindicated: _____________________  [ ] chemo prophylasix                                                                                   [ X] contraindicated POD 0 s/p angioplasty of L carotid    **** HIGH LIKELIHOOD DVT PRESENT ON ADMISSION  [X ] (please order LE dopplers within 24 hours of admission)

## 2024-11-06 NOTE — PHYSICAL THERAPY INITIAL EVALUATION ADULT - PERTINENT HX OF CURRENT PROBLEM, REHAB EVAL
69M Uzbek speaking hx HTN, HLD, DM, initial (last wk) p/f code stroke Rt sided weakness i/s/o NSTEMI/HF, xfer from [facility] for angio/LICA stent w/ Dr. Ulrich. 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 Exam: AOx3, mild Rt facial, RUE 0/5, RLE 3/5, LLE 5/5, SILT   -Direct adm to 4C under Dr. Ulrich   -Preop for angio/LICA stent today 69M Faroese speaking hx HTN, HLD, DM, initial (last wk) p/f code stroke Rt sided weakness i/s/o NSTEMI/HF, xfer from [facility] for angio/LICA stent w/ Dr. Ulrich. 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  Pt pending possible angio/LICA stent today

## 2024-11-06 NOTE — OCCUPATIONAL THERAPY INITIAL EVALUATION ADULT - ADL RETRAINING, OT EVAL
GOAL: Pt will be independent in all UB Dressing techniques following R Hemiplegic techniques within 4 weeks to increase independence in ADLs.

## 2024-11-06 NOTE — OCCUPATIONAL THERAPY INITIAL EVALUATION ADULT - PERTINENT HX OF CURRENT PROBLEM, REHAB EVAL
69M Bulgarian speaking hx HTN, HLD, DM, initial (last wk) p/f code stroke Rt sided weakness i/s/o NSTEMI/HF, xfer from [facility] for angio/LICA stent w/ Dr. Ulrich. 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 Exam: AOx3, mild Rt facial, RUE 0/5, RLE 3/5, LLE 5/5, SILT. -Preop for angio/LICA stent today

## 2024-11-06 NOTE — PROGRESS NOTE ADULT - SUBJECTIVE AND OBJECTIVE BOX
Symptomatic extracranial L ICA stenosis  Also has intracranial stenosis  Consensus is to proceed with Carotid stenting (Vascular Neurology, INR, Neurosurgery)  Risks, benefits and alternatives were discussed  Risks include stroke, hemorrhage, death  On DAPT  Informed consent obtained; all questions were answered

## 2024-11-06 NOTE — PRE PROCEDURE NOTE - PRE PROCEDURE EVALUATION
Interventional Neuro Radiology  Pre-Procedure Note PA-C      This is a 69 year old right hand dominant Pashto speaking male with a past medical history significant for HTN, HLD, DM, initial (last wk) p/f code stroke, with right sided weakness i/s/o NSTEMI/HF, transfer from [facility] for angio/LICA stent w/ Dr. Ulrich. MR brain with watershed/MCA strokes i/s/o high grade proximal left ICA stenosis >90% at bifurcation.     Exam: AOx3, mild Right facial, RUE 0/5, RLE 3/5, Left upper and lower Extremity 5/5,   (06 Nov 2024 22:37)      Allergies: No Known Allergies  PMHX: Diabetes mellitus, HTN (hypertension, Hyperlipidemia  Social History:   FAMILY HISTORY:  Current Medications: acetaminophen   IVPB 1000 milliGRAM(s) IV Intermittent every 6 hours PRN  albuterol    90 MICROgram(s) HFA Inhaler 2 Puff(s) Inhalation every 12 hours  aspirin  chewable 81 milliGRAM(s) Oral daily  atorvastatin 80 milliGRAM(s) Oral at bedtime  carvedilol 3.125 milliGRAM(s) Oral every 12 hours  clopidogrel Tablet 75 milliGRAM(s) Oral daily  dextrose 5%. 1000 milliLiter(s) IV Continuous <Continuous>  dextrose 5%. 1000 milliLiter(s) IV Continuous <Continuous>  dextrose 50% Injectable 25 Gram(s) IV Push once  dextrose 50% Injectable 25 Gram(s) IV Push once  dextrose 50% Injectable 12.5 Gram(s) IV Push once  dextrose Oral Gel 15 Gram(s) Oral once PRN  enoxaparin Injectable 40 milliGRAM(s) SubCutaneous every 24 hours  glucagon  Injectable 1 milliGRAM(s) IntraMuscular once  insulin glargine Injectable (LANTUS) 12 Unit(s) SubCutaneous at bedtime  insulin lispro (ADMELOG) corrective regimen sliding scale   SubCutaneous at bedtime  insulin lispro (ADMELOG) corrective regimen sliding scale   SubCutaneous three times a day before meals  ondansetron Injectable 4 milliGRAM(s) IV Push every 6 hours PRN  polyethylene glycol 3350 17 Gram(s) Oral daily  senna 2 Tablet(s) Oral at bedtime  sodium chloride 0.9%. 1000 milliLiter(s) IV Continuous <Continuous>      Labs:                         12.6   11.65 )-----------( 649 ( 44 Nov 2024 23:15 )             39.3       11-05    138  |  105  |  28[H]  ----------------------------<  134[H]  4.3   |  22  |  1.04    Ca    9.6      05 Nov 2024 23:15  Phos  3.2     11-05  Mg     2.20     11-05    TPro  7.3  /  Alb  3.5  /  TBili  0.3  /  DBili  x   /  AST  47[H]  /  ALT  66[H]  /  AlkPhos  65  11-05      Blood Bank: AB positive       Assessment/Plan:   This is a 69 year old right hand dominant male with right sided weakness, who is transported to Neuro IR for a catheter cerebral angiogram and left carotid wall stent. Procedure, goals, risks, benefits and alternatives were discussed with patient and patient's son, All questions were answered. Risks include but are not limited to stroke, vessel injury, hemorrhage, and right wrist hematoma. Patient's son demonstrates understanding of all risks involved with this procedure and wishes to continue. Appropriate consent was obtained from patient's son and consent is in the patient's chart.

## 2024-11-06 NOTE — OCCUPATIONAL THERAPY INITIAL EVALUATION ADULT - ADDITIONAL COMMENTS
pt lives in a private house with wife and 6 steps to enter, none inside. prior to admission, pt was independent in all ADLs and functional tasks without AE. +drives. daughter at bedside reported wife is unable to care for pt 2* recent sx.

## 2024-11-06 NOTE — OCCUPATIONAL THERAPY INITIAL EVALUATION ADULT - BALANCE TRAINING, PT EVAL
GOAL: Pt will increase static/dynamic sitting balance by 1 muscle grade within 4 weeks to increase engagement in functional tasks.

## 2024-11-06 NOTE — PROCEDURE NOTE - NSPROCDETAILS_GEN_ALL_CORE
location identified, draped/prepped, sterile technique used, needle inserted/introduced/positive blood return obtained via catheter/sutured in place/all materials/supplies accounted for at end of procedure

## 2024-11-06 NOTE — PHYSICAL THERAPY INITIAL EVALUATION ADULT - IMPAIRMENTS FOUND, PT EVAL
aerobic capacity/endurance/fine motor/gait, locomotion, and balance/muscle strength/poor safety awareness

## 2024-11-06 NOTE — OCCUPATIONAL THERAPY INITIAL EVALUATION ADULT - DIAGNOSIS, OT EVAL
pt presents with decreased strength, balance, and RUE/RLE ROM/Coordination limiting ability to engage in functional tasks.

## 2024-11-07 ENCOUNTER — TRANSCRIPTION ENCOUNTER (OUTPATIENT)
Age: 69
End: 2024-11-07

## 2024-11-07 ENCOUNTER — RESULT REVIEW (OUTPATIENT)
Age: 69
End: 2024-11-07

## 2024-11-07 LAB
ANION GAP SERPL CALC-SCNC: 11 MMOL/L — SIGNIFICANT CHANGE UP (ref 5–17)
BUN SERPL-MCNC: 26 MG/DL — HIGH (ref 7–23)
CALCIUM SERPL-MCNC: 8.5 MG/DL — SIGNIFICANT CHANGE UP (ref 8.4–10.5)
CHLORIDE SERPL-SCNC: 107 MMOL/L — SIGNIFICANT CHANGE UP (ref 96–108)
CO2 SERPL-SCNC: 20 MMOL/L — LOW (ref 22–31)
CREAT SERPL-MCNC: 1 MG/DL — SIGNIFICANT CHANGE UP (ref 0.5–1.3)
EGFR: 81 ML/MIN/1.73M2 — SIGNIFICANT CHANGE UP
GLUCOSE BLDC GLUCOMTR-MCNC: 132 MG/DL — HIGH (ref 70–99)
GLUCOSE BLDC GLUCOMTR-MCNC: 205 MG/DL — HIGH (ref 70–99)
GLUCOSE BLDC GLUCOMTR-MCNC: 233 MG/DL — HIGH (ref 70–99)
GLUCOSE BLDC GLUCOMTR-MCNC: 237 MG/DL — HIGH (ref 70–99)
GLUCOSE SERPL-MCNC: 138 MG/DL — HIGH (ref 70–99)
MAGNESIUM SERPL-MCNC: 2 MG/DL — SIGNIFICANT CHANGE UP (ref 1.6–2.6)
PHOSPHATE SERPL-MCNC: 3.8 MG/DL — SIGNIFICANT CHANGE UP (ref 2.5–4.5)
POTASSIUM SERPL-MCNC: 4.1 MMOL/L — SIGNIFICANT CHANGE UP (ref 3.5–5.3)
POTASSIUM SERPL-SCNC: 4.1 MMOL/L — SIGNIFICANT CHANGE UP (ref 3.5–5.3)
SODIUM SERPL-SCNC: 138 MMOL/L — SIGNIFICANT CHANGE UP (ref 135–145)

## 2024-11-07 PROCEDURE — 93880 EXTRACRANIAL BILAT STUDY: CPT | Mod: 26

## 2024-11-07 PROCEDURE — 93308 TTE F-UP OR LMTD: CPT | Mod: 26

## 2024-11-07 PROCEDURE — 99233 SBSQ HOSP IP/OBS HIGH 50: CPT

## 2024-11-07 PROCEDURE — 70450 CT HEAD/BRAIN W/O DYE: CPT | Mod: 26

## 2024-11-07 PROCEDURE — 99222 1ST HOSP IP/OBS MODERATE 55: CPT

## 2024-11-07 RX ORDER — INSULIN LISPRO 100/ML
4 VIAL (ML) SUBCUTANEOUS
Refills: 0 | Status: DISCONTINUED | OUTPATIENT
Start: 2024-11-07 | End: 2024-11-11

## 2024-11-07 RX ORDER — CARVEDILOL 25 MG/1
3.12 TABLET, FILM COATED ORAL EVERY 12 HOURS
Refills: 0 | Status: DISCONTINUED | OUTPATIENT
Start: 2024-11-07 | End: 2024-11-07

## 2024-11-07 RX ADMIN — CARVEDILOL 3.12 MILLIGRAM(S): 25 TABLET, FILM COATED ORAL at 05:24

## 2024-11-07 RX ADMIN — CLOPIDOGREL 75 MILLIGRAM(S): 75 TABLET ORAL at 05:24

## 2024-11-07 RX ADMIN — Medication 40 MILLIGRAM(S): at 23:58

## 2024-11-07 RX ADMIN — Medication 2 PUFF(S): at 18:15

## 2024-11-07 RX ADMIN — Medication 12 UNIT(S): at 22:07

## 2024-11-07 RX ADMIN — CHLORHEXIDINE GLUCONATE 1 APPLICATION(S): 40 SOLUTION TOPICAL at 13:08

## 2024-11-07 RX ADMIN — Medication 40 MILLIGRAM(S): at 00:20

## 2024-11-07 RX ADMIN — Medication 80 MILLIGRAM(S): at 21:56

## 2024-11-07 RX ADMIN — Medication 4 UNIT(S): at 13:18

## 2024-11-07 RX ADMIN — Medication 4 UNIT(S): at 16:53

## 2024-11-07 RX ADMIN — POLYETHYLENE GLYCOL 3350 17 GRAM(S): 17 POWDER, FOR SOLUTION ORAL at 13:08

## 2024-11-07 RX ADMIN — Medication 12 UNIT(S): at 00:21

## 2024-11-07 RX ADMIN — Medication 12.8 MICROGRAM(S)/KG/MIN: at 07:12

## 2024-11-07 RX ADMIN — Medication 400 MILLIGRAM(S): at 06:18

## 2024-11-07 RX ADMIN — Medication 4: at 09:01

## 2024-11-07 RX ADMIN — Medication 325 MILLIGRAM(S): at 05:24

## 2024-11-07 RX ADMIN — Medication 4: at 16:52

## 2024-11-07 RX ADMIN — SODIUM CHLORIDE 50 MILLILITER(S): 9 INJECTION, SOLUTION INTRAMUSCULAR; INTRAVENOUS; SUBCUTANEOUS at 07:12

## 2024-11-07 RX ADMIN — Medication 1000 MILLIGRAM(S): at 06:38

## 2024-11-07 RX ADMIN — Medication 2 TABLET(S): at 21:53

## 2024-11-07 NOTE — SPEECH LANGUAGE PATHOLOGY EVALUATION - SLP GENERAL OBSERVATIONS
Pt received resting in bed, was arousable to verbal stimuli, however lethargic throughout this visit intermittently closing eyes and requiring cueing from SLP to remain engaged. O2 99% on 3L nasal cannula. Language Line  Keyla [id number] utilized for English-Kazakh translation throughout this visit.

## 2024-11-07 NOTE — SPEECH LANGUAGE PATHOLOGY EVALUATION - SLP DIAGNOSIS
69YOM transferred from [facility] for MR brain with watershed/MCA strokes seen for speech-language evaluation. Pt p/w receptive and expressive language and speech deficits evidenced by impaired ability to ID body parts and follow commands, and by vocalization/jargon speech output sans true verbalizations. ?Speech-language functioning c/b lethargy vs impaired mentation given across structured tasks, pt engaged in eye contact with SLP and looked at  on iPad, however intermittently required verbal cueing from SLP to remain engaged (closing eyes/lethargic during evaluation). Pt followed single step command x1, however, was unable to participate in all other structured tasks attempted. During sustained phonation /ah/, pt p/w reduced breath support for prolonged speech and harsh vocal quality. EVE Mendez and JOSH Moreira made aware.

## 2024-11-07 NOTE — PATIENT PROFILE ADULT - FALL HARM RISK - HARM RISK INTERVENTIONS
Assistance with ambulation/Assistance OOB with selected safe patient handling equipment/Communicate Risk of Fall with Harm to all staff/Reinforce activity limits and safety measures with patient and family/Review medications for side effects contributing to fall risk/Sit up slowly, dangle for a short time, stand at bedside before walking/Tailored Fall Risk Interventions/Toileting schedule using arm’s reach rule for commode and bathroom/Visual Cue: Yellow wristband and red socks/Bed in lowest position, wheels locked, appropriate side rails in place/Call bell, personal items and telephone in reach/Instruct patient to call for assistance before getting out of bed or chair/Non-slip footwear when patient is out of bed/[place] to call system/Physically safe environment - no spills, clutter or unnecessary equipment/Purposeful Proactive Rounding/Room/bathroom lighting operational, light cord in reach

## 2024-11-07 NOTE — PROGRESS NOTE ADULT - ATTENDING COMMENTS
L ICA stenting on ASA/plavix  transferred from [facility], adm there for NSTEMI, diffuse LV wall motion abnormalities  POD 1  vitals reviewed afebrile  diffuse atheroscloratic disease  off phenylephrine since 10am  2L NC, I/O 225+  labs reviewed, Na 138  meds reviewed  BLE dopplers negative 11/6     [id number]  carotid dopplers pending  ASA/plavix   cardiology  -160  LUE c/f subclavian steal, so BP from RUE  wean NC, IS  CCD  LBM PTA, cont senna/miralax  IVL  maintain euglycemia, lantus 12U and add premeals 4tid, BRODY   lovenox ppx  afebrile  not critically ill but medically complex 55min

## 2024-11-07 NOTE — SPEECH LANGUAGE PATHOLOGY EVALUATION - COMMENTS
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 Left V4 stenosis. High-grade stenosis left internal carotid artery at the carotid bifurcation in the neck. No definite intracranial stenosis."    Per neurosx: "carotid dopplers in AM. if ok transfer to floor" Unable to assess. Further testing required. Per Language Line  Keyla [id number], pt expressing only vocalizations and jargon speech. No true words verbalized throughout this visit. Further evaluation is required. Further evaluation is required. SLP will f/u for ongoing speech-language evaluation during hospitalization.

## 2024-11-07 NOTE — SPEECH LANGUAGE PATHOLOGY EVALUATION - BODY PART ID
During body part ID task, SLP asked pt to show his thumb. Pt briefly lifted his hand, however, otherwise was unable to indicate his thumb or any other body part requested./impaired

## 2024-11-07 NOTE — DISCHARGE NOTE PROVIDER - NSDCMRMEDTOKEN_GEN_ALL_CORE_FT
acetaminophen 325 mg oral tablet: 2 tab(s) orally every 6 hours As needed Temp greater or equal to 38C (100.4F), Moderate Pain (4 - 6), Severe Pain (7 - 10)  albuterol 90 mcg/inh inhalation aerosol: 2 puff(s) inhaled 2 times a day  aspirin 81 mg oral tablet, chewable: 1 tab(s) orally once a day  atorvastatin 80 mg oral tablet: 1 tab(s) orally once a day (at bedtime)  carvedilol 3.125 mg oral tablet: 1 tab(s) orally every 12 hours  clopidogrel 75 mg oral tablet: 1 tab(s) orally once a day  insulin glargine 100 units/mL subcutaneous solution: 12 unit(s) subcutaneous once a day (at bedtime)  insulin lispro 100 units/mL injectable solution: 10 injectable 3 times a day   acetaminophen 325 mg oral tablet: 2 tab(s) orally every 6 hours As needed Temp greater or equal to 38C (100.4F), Mild Pain (1 - 3)  albuterol 90 mcg/inh inhalation aerosol: 2 puff(s) inhaled every 12 hours  aspirin 325 mg oral tablet: 1 tab(s) orally once a day  atorvastatin 20 mg oral tablet: 1 tab(s) orally once a day (at bedtime)  Basaglar KwikPen 100 units/mL subcutaneous solution: 12 unit(s) subcutaneous once a day (at bedtime) MAY BE INCREASED UP TO 50 UNITS ( PRE ADMISSION DOSE) AS BLOOD SUGAR LEVEL PERMITS  clopidogrel 75 mg oral tablet: 1 tab(s) orally once a day  dulaglutide 1.5 mg/0.5 mL subcutaneous solution: 1.5 milligram(s) subcutaneously once a week  enoxaparin: 40 milligram(s) subcutaneous once a day (at bedtime)  famotidine 40 mg oral tablet: 1 tab(s) orally once a day  metFORMIN 500 mg oral tablet: 1 tab(s) orally 2 times a day  metoprolol: 12.5 milligram(s) orally 2 times a day HOLD FOR SB/P&lt; 110 OR HR&lt;60  polyethylene glycol 3350 oral powder for reconstitution: 17 gram(s) orally once a day  repaglinide 2 mg oral tablet: 1 tab(s) orally 3 times a day  senna leaf extract oral tablet: 2 tab(s) orally once a day (at bedtime)

## 2024-11-07 NOTE — SPEECH LANGUAGE PATHOLOGY EVALUATION - H & P REVIEW
This is a 69 year old right hand dominant Urdu speaking male with a past medical history significant for HTN, HLD, DM, initial (last wk) p/f code stroke, with right sided weakness i/s/o NSTEMI/HF, transfer from [facility] for angio/LICA stent w/ Dr. Ulrich. MR brain with watershed/MCA strokes i/s/o high grade proximal left ICA stenosis >90% at bifurcation. "Hx NSTEMI/HF, last TTE at [facility] showed EF 45-50%. In house cards at [facility] recommended cath but was deferred due to acute stroke."/yes

## 2024-11-07 NOTE — PROGRESS NOTE ADULT - ASSESSMENT
ASSESSMENT AND PLAN:   69 yr old male presented as code stroke found to have L carotid stenosis    NEURO:   L carotid angioplasty 11/07/2024 - POD 1  Neuro Q4h  Carotid dopplers in AM pending  Stroke core measures  c/w ASA 325mg and Plavix 75mg  Pain control w/ tylenol  Activity: Bedrest while safeguard on  PT/OT/ Speech and language    PULM:   on 2L NC  Sp02> 92%  IS  Hx Asthma  c/w albuterol inhaler    CV:  SBP goal 100-140  Hx HTN  c/w Coreg 3.125mg BID  c/w Atorvastatin 80mg  c/w DAPT  Wean off charlie  Hx NSTEMI/HF, last TTE at [facility] showed EF 45-50%. In house cards at [facility] recommended cath but was deferred due to acute stroke  In house cards consulted, will see in AM  rpt TTE-P  LDL 77,   ECG pending    ENDO:   Goal euglycemia (FS goal 120-180)  Hx DM, A1C 9.6  c/w Lantus 12u at bedtime  Medium ISS before meals and BRODY at bedime  Last   TSH 1.27, wnl    HEME/ONC:               SQL 40mg  SCDs  11/6 LED: negative    RENAL:   NS @50cc/hr until good PO intake  Goal is normonatremia  Replete lytes PRN    ID:   Afebrile  Monitor leukocytosis  Monitor platelets    GI:    Diet: CCD  LBM: PTA  Bowel regimen w/ senna and miralax  no PPI indicated at this time    DISCHARGE PLANNING: floor

## 2024-11-07 NOTE — DISCHARGE NOTE NURSING/CASE MANAGEMENT/SOCIAL WORK - FINANCIAL ASSISTANCE
[facility] provides services at a reduced cost to those who are determined to be eligible through [facility]’s financial assistance program. Information regarding [facility]’s financial assistance program can be found by going to [email or web address] or by calling [phone].

## 2024-11-07 NOTE — PHARMACOTHERAPY INTERVENTION NOTE - COMMENTS
Medication Reconciliation completed for patient.  These were confirmed with family member and pharmacy ([facility]).  Updated medications are reflected in Outpatient Medication Review.     As per family:   aspirin 81 mg oral tablet, chewable: 1 tab(s) orally once a day (07 Nov 2024 11:45)  atorvastatin 20 mg oral tablet: 1 tab(s) orally once a day (at bedtime) (07 Nov 2024 11:48)  Basaglar KwikPen 100 units/mL subcutaneous solution: 50 unit(s) subcutaneous once a day (at bedtime) (07 Nov 2024 11:52)  metformin 850mg oral tab 1 tab twice a day (the bottle was an old fill from [facility])   gabapentin 300mg oral capsule 1 capsule twice a day (the bottle was an old fill from [facility])  repaglinide 2 mg oral tablet: 1 tab(s) orally 3 times a day     As per pharmacy:   Home Medications:  aspirin 81 mg oral tablet, chewable: 1 tab(s) orally once a day (07 Nov 2024 11:45)  atorvastatin 20 mg oral tablet: 1 tab(s) orally once a day (at bedtime) (07 Nov 2024 11:48)  Basaglar KwikPen 100 units/mL subcutaneous solution: 50 unit(s) subcutaneous once a day (at bedtime) (07 Nov 2024 11:52)  dulaglutide 1.5 mg/0.5 mL subcutaneous solution: 1.5 milligram(s) subcutaneously once a week (07 Nov 2024 11:48)  famotidine 40 mg oral tablet: 1 tab(s) orally once a day (07 Nov 2024 11:51)  metFORMIN 500 mg oral tablet: 1 tab(s) orally 2 times a day (07 Nov 2024 11:51)  metoprolol succinate 25 mg oral tablet, extended release: 1 tab(s) orally once a day (07 Nov 2024 11:51)  repaglinide 2 mg oral tablet: 1 tab(s) orally 3 times a day (07 Nov 2024 11:51)  valsartan 160 mg oral tablet: 1 tab(s) orally once a day (07 Nov 2024 11:48)    Discrepancies noted between family list and pharmacy fills regarding gabapentin, metformin dose and antihypertensive medications. Family member at bedside did not recall any cardiac history. However, all pharmacy fills were as recent as August 2024 for 90 day supply of all meds.     Notified team regarding discrepancies and will restart meds as appropriate.     Jb Jones, PharmD, BCCCP  Clinical Pharmacy Specialist  Available via TEAMS  Available on Teams

## 2024-11-07 NOTE — SPEECH LANGUAGE PATHOLOGY EVALUATION - SLP PERTINENT HISTORY OF CURRENT PROBLEM
Seen by SLP at [facility] for swallow and speech-language evaluations. SLE 10/29 reported "mild expressive language skills." Multiple BSEs  in 10/2024 and 11/2024, last seen 11/5/24 with recommendations for a regular/thin diet. Aware pt passed dysphagia screen at [facility] and is currently receiving a regular/thin diet.

## 2024-11-07 NOTE — DISCHARGE NOTE NURSING/CASE MANAGEMENT/SOCIAL WORK - NSDCPEFALRISK_GEN_ALL_CORE
For information on Fall & Injury Prevention, visit: [email or web address] OR  [email or web address] OR  https://www.cdc.gov/steadi/patient.html

## 2024-11-07 NOTE — CONSULT NOTE ADULT - ASSESSMENT
EKG - NSR T wave inversion lateral leads, ST depressions inferior leads   Echo - EF 45% segmental wall motion abnormalities , no intracardiac shunt     a/p     1) Acute CVA - sec to left ICA stenosis s/p carotid stent , cont asa and plavix and lipitor     2) NSTEMI  - echo shows EF 45% segmental wall motion abnormalities, cont asa plavix cath out pt after a month     3) DVT prophylasix - sc lovenox

## 2024-11-07 NOTE — DISCHARGE NOTE PROVIDER - NSCORESITESY/N_GEN_A_CORE_RD
Discharge phone call completed with patient on 4/3/2023 @ 6017.  Patient denies questions or concerns.     The following questions or concerns were verbalized: none    Patient is aware of signs and symptoms necessitating a call to his provider.       Patient states he was able to get his prescriptions filled and does not have any questions about the prescriptions.      Patient confirmed that he is aware of his scheduled follow up appointments.      Jessie Hunter RN     No

## 2024-11-07 NOTE — SPEECH LANGUAGE PATHOLOGY EVALUATION - SLP ABLE TO FOLLOW COMMANDS
Pt followed one-step command in 1 of 4 opportunities despite repetitions and verbal cueing from SLP./impaired

## 2024-11-07 NOTE — DISCHARGE NOTE PROVIDER - NSDCFUADDINST_GEN_ALL_CORE_FT
Return to ER immediately for any of the following: fever, bleeding, new onset numbness/tingling/weakness, nausea and/or vomiting, chest pain, shortness of breath, confusion, seizure, altered mental status, urinary and/or fecal incontinence or retention.

## 2024-11-07 NOTE — PROGRESS NOTE ADULT - SUBJECTIVE AND OBJECTIVE BOX
Patient seen and examined at bedside.    --Anticoagulation--  clopidogrel Tablet 75 milliGRAM(s) Oral <User Schedule>  enoxaparin Injectable 40 milliGRAM(s) SubCutaneous every 24 hours    T(C): 36.5 (11-06-24 @ 23:00), Max: 36.9 (11-06-24 @ 08:42)  HR: 68 (11-07-24 @ 01:30) (55 - 79)  BP: 95/54 (11-06-24 @ 20:30) (85/59 - 157/76)  RR: 19 (11-07-24 @ 01:30) (14 - 23)  SpO2: 99% (11-07-24 @ 01:30) (94% - 100%)  Wt(kg): --    Exam:  AOx3, mild Rt facial, RUE 0/5, RLE HF 3/5 KE 4/5, ow 2-3/5, LLE 5/5, SILT

## 2024-11-07 NOTE — DISCHARGE NOTE PROVIDER - CARE PROVIDER_API CALL
Farhat Ulrich  Radiology  [address], [address]  [place] [address]  Phone: [phone]  Fax: [phone]  Follow Up Time:

## 2024-11-07 NOTE — DISCHARGE NOTE PROVIDER - NSDCCPCAREPLAN_GEN_ALL_CORE_FT
PRINCIPAL DISCHARGE DIAGNOSIS  Diagnosis: Left carotid stenosis  Assessment and Plan of Treatment: You underwent a cerebral angiographhy for Left internal carotid stent placement with Dr. Farhat Andino on 11/6/24. Please follow up with your physician within 1 week of discharge from the hospital.   You are being discharged on new medications.  Please make sure you follow the instructions both verbally told and on the prescription.  If you have any questions or concerns, please call the office.   You have been started on Aspirin and Plavix. It is necesary that you take these medications daily to ensure the stent remains open.   Aspirin is a blood thinner. Please consult your physician if you have any evidence of bleeding, significant bruising, bloody or dark tarry stools.  You are on plavix.  Plavix helps thin the blood.  It is important to continue this. Side effects: bleeding, nosebleeds, easy bruising   Please do not take additional NSAIDs- ie. advil, motrin, ibuprofen, aleve, naproxen, etc. Tylenol/ acetaminophen ok to take.   You have a skin puncture on your groin and right wrist where the procedure was done, please monitor for excessive bruising, bleeding, palpable mass under the site and seek medical advice if present.   please do not engage in strenuous activity, heavy lifting, drive, or return to work or school until cleared by surgeon.   Please notify physician if fevers, bleeding, swelling, pain not relieved by medication, increased sluggishness or irritability, increased nausea or vomiting, inability to tolerate foods or liquids, new or increasing weakness/numbness/tingling.      SECONDARY DISCHARGE DIAGNOSES  Diagnosis: NSTEMI (non-ST elevation myocardial infarction)  Assessment and Plan of Treatment: Please follow up with  ___ outpatient within 1-2 weeks of discharge.   please continue with lipitor and carvedilol.  please follow up with your primary care physician within 1-2 weeks of discharge.     PRINCIPAL DISCHARGE DIAGNOSIS  Diagnosis: Left carotid stenosis  Assessment and Plan of Treatment: You underwent a cerebral angiographhy for Left internal carotid stent placement with Dr. Farhat Andino on 11/6/24. Please follow up with your physician within 1 week of discharge from the hospital.   You are being discharged on new medications.  Please make sure you follow the instructions both verbally told and on the prescription.  If you have any questions or concerns, please call the office.   You have been started on Aspirin and Plavix. It is necesary that you take these medications daily to ensure the stent remains open.   Aspirin is a blood thinner. Please consult your physician if you have any evidence of bleeding, significant bruising, bloody or dark tarry stools.  You are on plavix.  Plavix helps thin the blood.  It is important to continue this. Side effects: bleeding, nosebleeds, easy bruising   Please do not take additional NSAIDs- ie. advil, motrin, ibuprofen, aleve, naproxen, etc. Tylenol/ acetaminophen ok to take.   You have a skin puncture on your groin and right wrist where the procedure was done, please monitor for excessive bruising, bleeding, palpable mass under the site and seek medical advice if present.   please do not engage in strenuous activity, heavy lifting, drive, or return to work or school until cleared by surgeon.   Please notify physician if fevers, bleeding, swelling, pain not relieved by medication, increased sluggishness or irritability, increased nausea or vomiting, inability to tolerate foods or liquids, new or increasing weakness/numbness/tingling.      SECONDARY DISCHARGE DIAGNOSES  Diagnosis: NSTEMI (non-ST elevation myocardial infarction)  Assessment and Plan of Treatment: Please follow up with  ___ outpatient within 1-2 weeks of discharge.   please continue with lipitor and carvedilol.  please follow up with your primary care physician within 1-2 weeks of discharge.    Diagnosis: HTN (hypertension)  Assessment and Plan of Treatment: Continue Metoprolol 12.5 mg bid, continue to hold Valsartan and follow up with your primary care physician    Diagnosis: HLD (hyperlipidemia)  Assessment and Plan of Treatment: Continue Atorvastatin and follow up with your primary care physician    Diagnosis: DM (diabetes mellitus)  Assessment and Plan of Treatment: Continue with your home regimen, continue to monitor your blood sugar level  and follow up with your primary care physician

## 2024-11-07 NOTE — CHART NOTE - NSCHARTNOTEFT_GEN_A_CORE
Notified by nurse- Patient exam on 11/7 3pm at SBP 170s  mild right facial, RUE 0/5, RLE HF 3/5, KE 4/5, PF 3/5, DF 2/5, LLE 5/5    Due to waxing/wane exam of the RLE weakness, will obtain CTA and CTH of brain    Dr. Evelyn Barrios notifed Notified by nurse- Patient exam on 11/7 3pm at SBP 170s  mild right facial, RUE 0/5, RLE HF 3/5, KE 4/5, PF 3/5, DF 2/5, LLE 5/5    Due to waxing/wane exam of the RLE weakness, will obtain  CTH of brain    Dr. Evelyn Barrios and Dr. Ulrich notifed

## 2024-11-07 NOTE — PROGRESS NOTE ADULT - ASSESSMENT
ASSESSMENT AND PLAN:   69 yr old male presented as code stroke found to have L carotid stenosis    NEURO:   L carotid angioplasty 11/07/2024 - POD 1  Neuro Q4h  Carotid dopplers: patent L carotid stent  Stroke core measures  c/w ASA 325mg and Plavix 75mg  Pain control w/ tylenol  Activity: Increase as tolerated  PT/OT/ Speech and language    PULM:   on 2L NC  Sp02> 92%  IS  Hx Asthma  c/w albuterol inhaler    CV:  SBP goal 100-140  Hx HTN  c/w Coreg 3.125mg BID  c/w Atorvastatin 80mg  c/w DAPT  Wean off charlie  Hx NSTEMI/HF, last TTE at [facility] showed EF 45-50%. In house cards at [facility] recommended cath but was deferred due to acute stroke  In house cards consulted, pt will need cath, follow-up outpatient   rpt TTE 11/7: negative for intracardiac shunt  LDL 77,       ENDO:   Goal euglycemia (FS goal 120-180)  Hx DM, A1C 9.6  c/w Lantus 12u at bedtime and Lispro 4u  Medium ISS before meals and BRODY at bedime  TSH 1.27, wnl    HEME/ONC:               SQL 40mg  SCDs  11/6 LED: negative    RENAL:   NS @50cc/hr until good PO intake  Goal is normonatremia  Replete lytes PRN    ID:   Afebrile  Monitor leukocytosis  Monitor platelets    GI:    Diet: CCD  LBM: PTA  Bowel regimen w/ senna and miralax  no PPI indicated at this time    DISCHARGE PLANNING:

## 2024-11-07 NOTE — PROGRESS NOTE ADULT - SUBJECTIVE AND OBJECTIVE BOX
HOSPITAL COURSE:  69M Hebrew speaking hx HTN, HLD, DM, initial (last wk) p/f code stroke Rt sided weakness i/s/o NSTEMI/HF, xfer from [facility] for angio/LICA stent w/ Dr. Ulrich. 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 Hypotensive during the case s/p multiple neosticks and boluses.     Admission Scores  GCS:   HH:   MF:   NIHSS:   RASS:    CAM-ICU:   ICH:    24 hour Events:       Allergies    No Known Allergies    Intolerances        REVIEW OF SYSTEMS: [ ] Unable to Assess due to neurologic exam   [ ] All ROS addressed below are non-contributory, except:  Neuro: [ ] Headache [ ] Back pain [ ] Numbness [ ] Weakness [ ] Ataxia [ ] Dizziness [ ] Aphasia [ ] Dysarthria [ ] Visual disturbance  Resp: [ ] Shortness of breath/dyspnea, [ ] Orthopnea [ ] Cough  CV: [ ] Chest pain [ ] Palpitation [ ] Lightheadedness [ ] Syncope  Renal: [ ] Thirst [ ] Edema  GI: [ ] Nausea [ ] Emesis [ ] Abdominal pain [ ] Constipation [ ] Diarrhea  Hem: [ ] Hematemesis [ ] bright red blood per rectum  ID: [ ] Fever [ ] Chills [ ] Dysuria  ENT: [ ] Rhinorrhea      DEVICES:   [ ] Restraints [ ] ET tube [ ] central line [ ] arterial line [ ] kaye [ ] NGT/OGT [ ] EVD [ ] LD [ ] OLINDA/HMV [ ] Trach [ ] PEG [ ] Chest Tube     VITALS:   Vital Signs Last 24 Hrs  T(C): 37.3 (07 Nov 2024 23:00), Max: 37.3 (07 Nov 2024 23:00)  T(F): 99.1 (07 Nov 2024 23:00), Max: 99.1 (07 Nov 2024 23:00)  HR: 74 (07 Nov 2024 23:00) (57 - 78)  BP: --  BP(mean): --  RR: 20 (07 Nov 2024 23:00) (15 - 21)  SpO2: 98% (07 Nov 2024 23:00) (95% - 99%)    Parameters below as of 07 Nov 2024 19:00  Patient On (Oxygen Delivery Method): room air      CAPILLARY BLOOD GLUCOSE      POCT Blood Glucose.: 132 mg/dL (07 Nov 2024 21:52)  POCT Blood Glucose.: 205 mg/dL (07 Nov 2024 16:49)  POCT Blood Glucose.: 233 mg/dL (07 Nov 2024 13:06)  POCT Blood Glucose.: 237 mg/dL (07 Nov 2024 08:57)    I&O's Summary    06 Nov 2024 07:01  -  07 Nov 2024 07:00  --------------------------------------------------------  IN: 1225.3 mL / OUT: 1000 mL / NET: 225.3 mL    07 Nov 2024 07:01  -  08 Nov 2024 02:33  --------------------------------------------------------  IN: 907.8 mL / OUT: 1150 mL / NET: -242.2 mL        Respiratory:        LABS:                        10.6   11.64 )-----------( 470      ( 06 Nov 2024 23:39 )             33.0     11-06    138  |  107  |  26[H]  ----------------------------<  138[H]  4.1   |  20[L]  |  1.00             MEDICATION LEVELS:     IVF FLUIDS/MEDICATIONS:   MEDICATIONS  (STANDING):  albuterol    90 MICROgram(s) HFA Inhaler 2 Puff(s) Inhalation every 12 hours  aspirin 325 milliGRAM(s) Oral <User Schedule>  atorvastatin 80 milliGRAM(s) Oral at bedtime  chlorhexidine 4% Liquid 1 Application(s) Topical daily  clopidogrel Tablet 75 milliGRAM(s) Oral <User Schedule>  enoxaparin Injectable 40 milliGRAM(s) SubCutaneous every 24 hours  insulin glargine Injectable (LANTUS) 12 Unit(s) SubCutaneous at bedtime  insulin lispro (ADMELOG) corrective regimen sliding scale   SubCutaneous at bedtime  insulin lispro (ADMELOG) corrective regimen sliding scale   SubCutaneous three times a day before meals  insulin lispro Injectable (ADMELOG) 4 Unit(s) SubCutaneous three times a day before meals  polyethylene glycol 3350 17 Gram(s) Oral daily  senna 2 Tablet(s) Oral at bedtime    MEDICATIONS  (PRN):  acetaminophen   IVPB .. 1000 milliGRAM(s) IV Intermittent every 6 hours PRN Mild Pain (1 - 3)  ondansetron Injectable 4 milliGRAM(s) IV Push every 6 hours PRN Nausea and/or Vomiting        IMAGING:      EXAMINATION:  PHYSICAL EXAM:    Constitutional: No Acute Distress     Neurological: Awake, alert oriented to person, place and time, Following Commands, PERRL, EOMI, No Gaze Preference, Face Symmetrical, Speech Fluent, No dysmetria, No ataxia, No nystagmus     Motor exam:          Upper extremity                         Delt     Bicep     Tricep    HG                                                 R         5/5        5/5        5/5       5/5                                               L          5/5        5/5        5/5       5/5          Lower extremity                        HF         KF        KE       DF         PF                                                  R        5/5        5/5        5/5       5/5         5/5                                               L         5/5        5/5       5/5       5/5          5/5                                                 Sensation: [ ] intact to light touch  [ ] decreased:     Reflexes: Deep Tendon Reflexes Intact     Pulmonary: Clear to Auscultation, No rales, No rhonchi, No wheezes     Cardiovascular: S1, S2, Regular rate and rhythm     Gastrointestinal: Soft, Non-tender, Non-distended     Extremities: No calf tenderness     Incision:

## 2024-11-07 NOTE — PROGRESS NOTE ADULT - SUBJECTIVE AND OBJECTIVE BOX
HOSPITAL COURSE:  69M Kyrgyz speaking hx HTN, HLD, DM, initial (last wk) p/f code stroke Rt sided weakness i/s/o NSTEMI/HF, xfer from [facility] for angio/LICA stent w/ Dr. Ulrich. 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 Hypotensive during the case s/p multiple neosticks and boluses.     Allergies    No Known Allergies    Intolerances    REVIEW OF SYSTEMS: [ ] Unable to Assess due to neurologic exam   [X ] All ROS addressed below are non-contributory, except:  Neuro: [ ] Headache [ ] Back pain [ ] Numbness [ ] Weakness [ ] Ataxia [ ] Dizziness [ ] Aphasia [ ] Dysarthria [ ] Visual disturbance  Resp: [ ] Shortness of breath/dyspnea, [ ] Orthopnea [ ] Cough  CV: [ ] Chest pain [ ] Palpitation [ ] Lightheadedness [ ] Syncope  Renal: [ ] Thirst [ ] Edema  GI: [ ] Nausea [ ] Emesis [ ] Abdominal pain [ ] Constipation [ ] Diarrhea  Hem: [ ] Hematemesis [ ] bright red blood per rectum  ID: [ ] Fever [ ] Chills [ ] Dysuria  ENT: [ ] Rhinorrhea      DEVICES:   [ ] Restraints [ ] ET tube [ ] central line [X ] arterial line [ ] kaye [ ] NGT/OGT [ ] EVD [ ] LD [ ] OLINDA/HMV [ ] Trach [ ] PEG [ ] Chest Tube     VITALS:   Vital Signs Last 24 Hrs  T(C): 36.7 (06 Nov 2024 18:15), Max: 36.9 (06 Nov 2024 08:42)  T(F): 98.1 (06 Nov 2024 18:15), Max: 98.4 (06 Nov 2024 08:42)  HR: 65 (06 Nov 2024 18:45) (65 - 82)  BP: 127/92 (06 Nov 2024 14:14) (107/74 - 160/80)  BP(mean): --  RR: 21 (06 Nov 2024 18:45) (16 - 23)  SpO2: 99% (06 Nov 2024 18:45) (94% - 100%)    Parameters below as of 06 Nov 2024 18:15  Patient On (Oxygen Delivery Method): nasal cannula  O2 Flow (L/min): 4    CAPILLARY BLOOD GLUCOSE      POCT Blood Glucose.: 182 mg/dL (06 Nov 2024 06:26)  POCT Blood Glucose.: 133 mg/dL (06 Nov 2024 00:08)    I&O's Summary    05 Nov 2024 07:01  -  06 Nov 2024 07:00  --------------------------------------------------------  IN: 0 mL / OUT: 450 mL / NET: -450 mL    06 Nov 2024 07:01  -  06 Nov 2024 20:56  --------------------------------------------------------  IN: 105.1 mL / OUT: 0 mL / NET: 105.1 mL        LABS:                        10.7   14.27 )-----------( 463      ( 06 Nov 2024 19:41 )             33.6     11-06    141  |  116[H]  |  20  ----------------------------<  131[H]  3.3[L]   |  15[L]  |  0.73             MEDICATION LEVELS:     IVF FLUIDS/MEDICATIONS:   MEDICATIONS  (STANDING):  albuterol    90 MICROgram(s) HFA Inhaler 2 Puff(s) Inhalation every 12 hours  atorvastatin 80 milliGRAM(s) Oral at bedtime  carvedilol 3.125 milliGRAM(s) Oral every 12 hours  chlorhexidine 4% Liquid 1 Application(s) Topical daily  dextrose 5%. 1000 milliLiter(s) (50 mL/Hr) IV Continuous <Continuous>  dextrose 5%. 1000 milliLiter(s) (100 mL/Hr) IV Continuous <Continuous>  dextrose 50% Injectable 12.5 Gram(s) IV Push once  dextrose 50% Injectable 25 Gram(s) IV Push once  dextrose 50% Injectable 25 Gram(s) IV Push once  enoxaparin Injectable 40 milliGRAM(s) SubCutaneous every 24 hours  glucagon  Injectable 1 milliGRAM(s) IntraMuscular once  insulin glargine Injectable (LANTUS) 12 Unit(s) SubCutaneous at bedtime  insulin lispro (ADMELOG) corrective regimen sliding scale   SubCutaneous at bedtime  insulin lispro (ADMELOG) corrective regimen sliding scale   SubCutaneous three times a day before meals  phenylephrine    Infusion 0.5 MICROgram(s)/kG/Min (12.8 mL/Hr) IV Continuous <Continuous>  polyethylene glycol 3350 17 Gram(s) Oral daily  senna 2 Tablet(s) Oral at bedtime  sodium chloride 0.9%. 1000 milliLiter(s) (50 mL/Hr) IV Continuous <Continuous>    MEDICATIONS  (PRN):  acetaminophen   IVPB .. 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   Left V4 stenosis. High-grade stenosis left internal carotid artery at the   carotid bifurcation in the neck. No definite intracranial stenosis.      EXAMINATION:  PHYSICAL EXAM:    Constitutional: No Acute Distress     Neurological: AOx3, mild Rt facial, RUE 0/5, RLE HF 3/5 ow 2/5, LLE 5/5, SILT     Pulmonary: Clear to Auscultation, No rales, No rhonchi, No wheezes     Cardiovascular: S1, S2, Regular rate and rhythm     Gastrointestinal: Soft, Non-tender, Non-distended     Extremities: No calf tenderness     Incision: cdi, no hematoma at puncture site
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
## 2024-11-07 NOTE — DISCHARGE NOTE NURSING/CASE MANAGEMENT/SOCIAL WORK - PATIENT PORTAL LINK FT
You can access the FollowMyHealth Patient Portal offered by [facility] by registering at the following website: [email or web address]. By joining [facility]’s FollowMyHealth portal, you will also be able to view your health information using other applications (apps) compatible with our system.

## 2024-11-08 LAB
CULTURE RESULTS: SIGNIFICANT CHANGE UP
CULTURE RESULTS: SIGNIFICANT CHANGE UP
GLUCOSE BLDC GLUCOMTR-MCNC: 130 MG/DL — HIGH (ref 70–99)
GLUCOSE BLDC GLUCOMTR-MCNC: 190 MG/DL — HIGH (ref 70–99)
GLUCOSE BLDC GLUCOMTR-MCNC: 205 MG/DL — HIGH (ref 70–99)
GLUCOSE BLDC GLUCOMTR-MCNC: 237 MG/DL — HIGH (ref 70–99)
SPECIMEN SOURCE: SIGNIFICANT CHANGE UP
SPECIMEN SOURCE: SIGNIFICANT CHANGE UP

## 2024-11-08 PROCEDURE — 99222 1ST HOSP IP/OBS MODERATE 55: CPT

## 2024-11-08 PROCEDURE — 99232 SBSQ HOSP IP/OBS MODERATE 35: CPT

## 2024-11-08 PROCEDURE — 99233 SBSQ HOSP IP/OBS HIGH 50: CPT

## 2024-11-08 RX ORDER — METOPROLOL TARTRATE 50 MG
12.5 TABLET ORAL EVERY 12 HOURS
Refills: 0 | Status: DISCONTINUED | OUTPATIENT
Start: 2024-11-08 | End: 2024-11-11

## 2024-11-08 RX ADMIN — Medication 12 UNIT(S): at 21:54

## 2024-11-08 RX ADMIN — Medication 2: at 18:17

## 2024-11-08 RX ADMIN — Medication 4 UNIT(S): at 18:18

## 2024-11-08 RX ADMIN — Medication 40 MILLIGRAM(S): at 23:47

## 2024-11-08 RX ADMIN — Medication 2 PUFF(S): at 05:22

## 2024-11-08 RX ADMIN — Medication 4 UNIT(S): at 08:38

## 2024-11-08 RX ADMIN — Medication 80 MILLIGRAM(S): at 21:54

## 2024-11-08 RX ADMIN — CLOPIDOGREL 75 MILLIGRAM(S): 75 TABLET ORAL at 05:31

## 2024-11-08 RX ADMIN — Medication 2 PUFF(S): at 18:18

## 2024-11-08 RX ADMIN — Medication 4: at 12:41

## 2024-11-08 RX ADMIN — Medication 4 UNIT(S): at 12:41

## 2024-11-08 RX ADMIN — POLYETHYLENE GLYCOL 3350 17 GRAM(S): 17 POWDER, FOR SOLUTION ORAL at 11:31

## 2024-11-08 RX ADMIN — Medication 12.5 MILLIGRAM(S): at 11:45

## 2024-11-08 RX ADMIN — Medication 325 MILLIGRAM(S): at 05:31

## 2024-11-08 NOTE — PROGRESS NOTE ADULT - SUBJECTIVE AND OBJECTIVE BOX
Neuro IR-PA-C  Daily note       This is a 69 year old right hand dominant Italian speaking male with a past medical history significant for HTN, HLD, DM, who presented as a code stroke, with right sided weakness i/s/o NSTEMI/HF, xfer from [facility] for angio/LICA stent w/ Dr. Ulrich. MR brain with left watershed/ MCA strokes i/s/o high grade proximal LICA stenosis >90% at bifurcation. Exam: AOx3, mild Rt facial, RUE 0/5, RLE 3/5, LLE 5/5, SILT  (05 Nov 2024 22:37)      INTERVAL HPI OVERNIGHT EVENTS:  69y Male s/p __ seen lying comfortably in bed. Tolerating diet. Passing gas/BM. Voiding. Quick in with __ clear urine. Denies headache, weakness, numbness, n/v/d, fevers, chills, chest pain, SOB.     Vital Signs Last 24 Hrs  T(C): 36.9 (08 Nov 2024 03:00), Max: 37.3 (07 Nov 2024 23:00)  T(F): 98.4 (08 Nov 2024 03:00), Max: 99.1 (07 Nov 2024 23:00)  HR: 73 (08 Nov 2024 05:25) (57 - 76)  RR: 20 (08 Nov 2024 03:00) (15 - 20)  SpO2: 99% (08 Nov 2024 05:25) (95% - 99%)    Parameters below as of 08 Nov 2024 05:25  Patient On (Oxygen Delivery Method): nasal cannula        PHYSICAL EXAM:  GENERAL: NAD  HEAD:  Atraumatic, normocephalic  WOUND: Right wrist new arterial line in place, no hematoma   MENTAL STATUS: A A O x 2, (name and place) Following simple commands   CRANIAL NERVES: PERRL. EOMI without nystagmus. Facial sensation intact V1-3 distribution b/l. Face symmetric w/ normal eye closure and smile, tongue midline. Hearing grossly intact. Speech clear. Unable to shrug right shoulder.   MOTOR: strength 5/5 left upper and lower extremities  right upper extremity 0/5  right lower  extremity 3/5  SENSATION: grossly intact to light touch all extremities  ABDOMEN: Soft, nontender, nondistended  EXTREMITIES:  2+ peripheral pulses, no calf tenderness   SKIN: Warm, dry; no rashes    LABS:                        10.6   11.64 )-----------( 470      ( 06 Nov 2024 23:39 )             33.0     11-06    138  |  107  |  26[H]  ----------------------------<  138[H]  4.1   |  20[L]  |  1.00    Ca    8.5      06 Nov 2024 23:39  Phos  3.8     11-06  Mg     2.0     11-06        Urinalysis Basic - ( 06 Nov 2024 23:39 )    Color: x / Appearance: x / SG: x / pH: x  Gluc: 138 mg/dL / Ketone: x  / Bili: x / Urobili: x   Blood: x / Protein: x / Nitrite: x   Leuk Esterase: x / RBC: x / WBC x   Sq Epi: x / Non Sq Epi: x / Bacteria: x        11-07 @ 07:01  -  11-08 @ 07:00  --------------------------------------------------------  IN: 1007.8 mL / OUT: 1350 mL / NET: -342.2 mL        RADIOLOGY & ADDITIONAL TESTS: Neuro IR-PA-C  Daily note       This is a 69 year old right hand dominant Wolof speaking male with a past medical history significant for HTN, HLD, DM, who presented as a code stroke, with right sided weakness i/s/o NSTEMI/HF, xfer from [facility] for angio/ LICA stent w/ Dr. Ulrich. MR brain with left watershed/ MCA strokes i/s/o high grade proximal LICA stenosis >90% at bifurcation. Patient was seen and examined during morning rounds, with neurosurgery team. Patient is POD#2 status post catheter cerebral angiogram and left carotid wall stent placement.       INTERVAL HPI OVERNIGHT EVENTS:  This is a 69 year old right hand dominant male POD#2 s/post left carotid wall stent placement, via right radial artery, seen lying comfortably in bed. Patient is more awake, and able to lift right lower extremity, off the bed and maintain it.     Vital Signs Last 24 Hrs  T(C): 36.9 (08 Nov 2024 03:00), Max: 37.3 (07 Nov 2024 23:00)  T(F): 98.4 (08 Nov 2024 03:00), Max: 99.1 (07 Nov 2024 23:00)  HR: 73 (08 Nov 2024 05:25) (57 - 76)  RR: 20 (08 Nov 2024 03:00) (15 - 20)  SpO2: 99% (08 Nov 2024 05:25) (95% - 99%)    Parameters below as of 08 Nov 2024 05:25  Patient On (Oxygen Delivery Method): nasal cannula        PHYSICAL EXAM:  GENERAL: NAD  HEAD:  Atraumatic, normocephalic  WOUND: Right wrist new arterial line in place, no hematoma   MENTAL STATUS: A A O x 2, (name and place) Following simple commands   CRANIAL NERVES: PERRL. EOMI, Tongue is midline,Speech clear. Unable to shrug right shoulder.   MOTOR: strength 5/5 left upper and lower extremities  right upper extremity 0/5  right lower  extremity 3/5  SENSATION: grossly intact to light touch all extremities  ABDOMEN: Soft, nontender, nondistended  EXTREMITIES:  2+ peripheral pulses, no calf tenderness   SKIN: Warm, dry; no rashes    LABS:                        10.6   11.64 )-----------( 470      ( 06 Nov 2024 23:39 )             33.0     11-06    138  |  107  |  26[H]  ----------------------------<  138[H]  4.1   |  20[L]  |  1.00    Ca    8.5      06 Nov 2024 23:39  Phos  3.8     11-06  Mg     2.0     11-06        Urinalysis Basic - ( 06 Nov 2024 23:39 )    Color: x / Appearance: x / SG: x / pH: x  Gluc: 138 mg/dL / Ketone: x  / Bili: x / Urobili: x   Blood: x / Protein: x / Nitrite: x   Leuk Esterase: x / RBC: x / WBC x   Sq Epi: x / Non Sq Epi: x / Bacteria: x        11-07 @ 07:01  -  11-08 @ 07:00  --------------------------------------------------------  IN: 1007.8 mL / OUT: 1350 mL / NET: -342.2 mL        RADIOLOGY & ADDITIONAL TESTS: Neuro IR-PA-C  Daily note       This is a 69 year old right hand dominant Belarusian speaking male with a past medical history significant for HTN, HLD, DM, who presented as a code stroke, with right sided weakness i/s/o NSTEMI/HF, xfer from [facility] for angio/ LICA stent with Dr. Ulrich. MR brain revealed a left watershed/ MCA strokes i/s/o high grade proximal left ICA stenosis >90% at bifurcation. Patient was seen and examined during morning rounds, with neurosurgery team. Patient is POD#2 status post catheter cerebral angiogram, angioplasty and left carotid wall stent placement.       INTERVAL HPI OVERNIGHT EVENTS:  This is a 69 year old right hand dominant male POD#2 s/post left carotid wall stent placement, via right radial artery, seen lying comfortably in bed. Patient is more awake, and able to lift right lower extremity, off the bed and maintain it.     Vital Signs Last 24 Hrs  T(C): 36.9 (08 Nov 2024 03:00), Max: 37.3 (07 Nov 2024 23:00)  T(F): 98.4 (08 Nov 2024 03:00), Max: 99.1 (07 Nov 2024 23:00)  HR: 73 (08 Nov 2024 05:25) (57 - 76)  RR: 20 (08 Nov 2024 03:00) (15 - 20)  SpO2: 99% (08 Nov 2024 05:25) (95% - 99%)    Parameters below as of 08 Nov 2024 05:25  Patient On (Oxygen Delivery Method): nasal cannula      PHYSICAL EXAM:  GENERAL: NAD  HEAD:  Atraumatic, normocephalic  WOUND: Right wrist new arterial line in place, no hematoma   MENTAL STATUS: A A O x 2, (name and place) Following simple commands   CRANIAL NERVES: PERRL, + right facial Tongue is midline, Speech clear. Unable to shrug right shoulder.   MOTOR: strength 5/5 left upper and lower extremities  right upper extremity 0/5  right lower  extremity 3/5  SENSATION: grossly intact to light touch all extremities  ABDOMEN: Soft, nontender, nondistended  EXTREMITIES:  2+ peripheral pulses, no calf tenderness   SKIN: Warm, dry; no rashes    LABS:                        10.6   11.64 )-----------( 470      ( 06 Nov 2024 23:39 )             33.0     11-06    138  |  107  |  26[H]  ----------------------------<  138[H]  4.1   |  20[L]  |  1.00    Ca    8.5      06 Nov 2024 23:39  Phos  3.8     11-06  Mg     2.0     11-06        Urinalysis Basic - ( 06 Nov 2024 23:39 )    Color: x / Appearance: x / SG: x / pH: x  Gluc: 138 mg/dL / Ketone: x  / Bili: x / Urobili: x   Blood: x / Protein: x / Nitrite: x   Leuk Esterase: x / RBC: x / WBC x   Sq Epi: x / Non Sq Epi: x / Bacteria: x      11-07 @ 07:01  -  11-08 @ 07:00  --------------------------------------------------------  IN: 1007.8 mL / OUT: 1350 mL / NET: -342.2 mL      RADIOLOGY & ADDITIONAL TESTS:  ACC: [id number] EXAM:  CAROTID DUPLEX BILATERAL    PROCEDURE DATE:  11/07/2024    INTERPRETATION:  CLINICAL INFORMATION: Left carotid stenosis status post   stenting    COMPARISON: CT angiography 11/5/2024    TECHNIQUE: Grayscale, color and spectral Doppler examination of both   carotid arteries was performed.    FINDINGS:  No abnormal waveforms are encountered. Mild right carotid plaque. Patent   left internal carotid artery stent crossing the bulb.    Peak systolic velocities are as follows:    RIGHT:  PROX CCA = 134 cm/s  DIST CCA = 72 cm/s  PROX ICA = 102 cm/s  DIST ICA = 120 cm/s  ECA = 167 cm/s    LEFT:  PROX CCA = 88 cm/s  DIST CCA = 86 cm/s  PROX ICA = 111 cm/s  DIST ICA = 85 cm/s  ECA = 384 cm/s    Antegrade flow is noted within the right vertebral artery and retrograde   in the left.    IMPRESSION: No significant hemodynamic stenosis of the right carotid   artery.  Patent left internal carotid artery stent crossing the bulb.  Left external carotid artery stenosis.  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    Measurement of carotid stenosis is based on updated recommendations for   carotid stenosis interpretation criteria from the Intersocietal   Accreditation Commission (IAC) Vascular Testing Board, modified from the   Society of Radiologists in Ultrasound (SRU) Consensus Conference Criteria   for Internal Carotid Artery Stenosis.

## 2024-11-08 NOTE — PROGRESS NOTE ADULT - SUBJECTIVE AND OBJECTIVE BOX
HOSPITAL COURSE:  69M Spanish speaking hx HTN, HLD, DM, initial (last wk) p/f code stroke Rt sided weakness i/s/o NSTEMI/HF, xfer from [facility] for angio/LICA stent w/ Dr. Ulrich. 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 Hypotensive during the case s/p multiple neosticks and boluses.   11/7 c/f fluctuating RLE exam AG vs. 2/5, ?effort dependent, CTH done no changed, US reviewed per neuroIR    unchanged exam overnight this am RLE AG      Allergies    No Known Allergies    Intolerances      REVIEW OF SYSTEMS: [ ] Unable to Assess due to neurologic exam   [ x] All ROS addressed below are non-contributory, except:  Neuro: [ ] Headache [ ] Back pain [ ] Numbness [ x] Weakness [ ] Ataxia [ ] Dizziness [ ] Aphasia [ ] Dysarthria [ ] Visual disturbance  Resp: [ ] Shortness of breath/dyspnea, [ ] Orthopnea [ ] Cough  CV: [ ] Chest pain [ ] Palpitation [ ] Lightheadedness [ ] Syncope  Renal: [ ] Thirst [ ] Edema  GI: [ ] Nausea [ ] Emesis [ ] Abdominal pain [ ] Constipation [ ] Diarrhea  Hem: [ ] Hematemesis [ ] bright red blood per rectum  ID: [ ] Fever [ ] Chills [ ] Dysuria  ENT: [ ] Rhinorrhea    ICU Vital Signs Last 24 Hrs  T(C): 36.4 (08 Nov 2024 07:00), Max: 37.3 (07 Nov 2024 23:00)  T(F): 97.5 (08 Nov 2024 07:00), Max: 99.1 (07 Nov 2024 23:00)  HR: 63 (08 Nov 2024 07:00) (57 - 76)  BP: --  BP(mean): --  ABP: 159/54 (08 Nov 2024 07:00) (127/44 - 203/62)  ABP(mean): 85 (08 Nov 2024 07:00) (69 - 105)  RR: 19 (08 Nov 2024 07:00) (15 - 20)  SpO2: 99% (08 Nov 2024 07:00) (95% - 99%)    O2 Parameters below as of 08 Nov 2024 07:00  Patient On (Oxygen Delivery Method): nasal cannula  O2 Flow (L/min): 2    CBC:            10.6   11.64 )-----------( 470      ( 11-06-24 @ 23:39 )             33.0         Chem:         ( 11-06-24 @ 23:39 )    138  |  107  |  26[H]  ----------------------------<  138[H]  4.1   |  20[L]  |  1.00        Liver Functions:     Type & Screen: ( 11-06-24 @ 16:39 )    ABO/Rh/Shoshana:  AB Positive       MEDICATIONS  (STANDING):  albuterol    90 MICROgram(s) HFA Inhaler 2 Puff(s) Inhalation every 12 hours  aspirin 325 milliGRAM(s) Oral <User Schedule>  atorvastatin 80 milliGRAM(s) Oral at bedtime  clopidogrel Tablet 75 milliGRAM(s) Oral <User Schedule>  enoxaparin Injectable 40 milliGRAM(s) SubCutaneous every 24 hours  insulin glargine Injectable (LANTUS) 12 Unit(s) SubCutaneous at bedtime  insulin lispro (ADMELOG) corrective regimen sliding scale   SubCutaneous three times a day before meals  insulin lispro (ADMELOG) corrective regimen sliding scale   SubCutaneous at bedtime  insulin lispro Injectable (ADMELOG) 4 Unit(s) SubCutaneous three times a day before meals  metoprolol tartrate 12.5 milliGRAM(s) Oral every 12 hours  polyethylene glycol 3350 17 Gram(s) Oral daily  senna 2 Tablet(s) Oral at bedtime    MEDICATIONS  (PRN):      PHYSICAL EXAM:    Constitutional: No Acute Distress     Neurological: AOx3, mild Rt facial, RUE 0/5, RLE 3/5, LLE 5/5, SILT     Pulmonary: Clear to Auscultation, No rales, No rhonchi, No wheezes     Cardiovascular: S1, S2, Regular rate and rhythm     Gastrointestinal: Soft, Non-tender, Non-distended     Extremities: No calf tenderness     Incision: cdi, no hematoma at puncture site

## 2024-11-08 NOTE — PROGRESS NOTE ADULT - ASSESSMENT
ASSESSMENT AND PLAN:   69 yr old male presented as code stroke found to have L carotid stenosis    NEURO:   L carotid angioplasty 11/07/2024 - POD 2  Neuro Q4h  Carotid dopplers: patent L carotid stent  Stroke core measures  c/w ASA 325mg and Plavix 75mg  Pain control w/ tylenol  Activity: Increase as tolerated  PT/OT/ Speech and language    PULM:   on 2L NC  Sp02> 92%  IS  Hx Asthma  c/w albuterol inhaler    CV:  SBP goal 120-180, avoid hypotension  Hx HTN  d/c coreg, start metoprolol 12.5mg Q12  c/w Atorvastatin 80mg  c/w DAPT  Hx NSTEMI/HF, last TTE at [facility] showed EF 45-50%. In house cards at [facility] recommended cath but was deferred due to acute stroke  In house cards consulted, pt will need cath, follow-up outpatient   rpt TTE 11/7: negative for intracardiac shunt  LDL 77,       ENDO:   Goal euglycemia (FS goal 120-180)  Hx DM, A1C 9.6  c/w Lantus 12u at bedtime and Lispro 4u  Medium ISS before meals and BRODY at bedime  TSH 1.27, wnl    HEME/ONC:               SQL 40mg  SCDs  11/6 LED: negative    RENAL:   IVL  Goal is normonatremia  Replete lytes PRN    ID:   Afebrile  Monitor leukocytosis  Monitor platelets    GI:    Diet: CCD  LBM: PTA  Bowel regimen w/ senna and miralax  no PPI indicated at this time    DISCHARGE PLANNING: AR planning, stable for transfer to floor, not critically ill 40min spent

## 2024-11-08 NOTE — PROGRESS NOTE ADULT - SUBJECTIVE AND OBJECTIVE BOX
Teto Vásquez MD  Interventional Cardiology / Advance Heart Failure and Cardiac Transplant Specialist  [place] Office : [address] [place] [address] [address] [address]  Tel:   [place] Office : [address] [facility] [place] [address]  Tel: [phone]       Pt is lying in bed comfortable not in distress  	  MEDICATIONS:  clopidogrel Tablet 75 milliGRAM(s) Oral <User Schedule>  enoxaparin Injectable 40 milliGRAM(s) SubCutaneous every 24 hours  metoprolol tartrate 12.5 milliGRAM(s) Oral every 12 hours      albuterol    90 MICROgram(s) HFA Inhaler 2 Puff(s) Inhalation every 12 hours    aspirin 325 milliGRAM(s) Oral <User Schedule>    polyethylene glycol 3350 17 Gram(s) Oral daily  senna 2 Tablet(s) Oral at bedtime    atorvastatin 80 milliGRAM(s) Oral at bedtime  insulin glargine Injectable (LANTUS) 12 Unit(s) SubCutaneous at bedtime  insulin lispro (ADMELOG) corrective regimen sliding scale   SubCutaneous at bedtime  insulin lispro (ADMELOG) corrective regimen sliding scale   SubCutaneous three times a day before meals  insulin lispro Injectable (ADMELOG) 4 Unit(s) SubCutaneous three times a day before meals        PAST MEDICAL/SURGICAL HISTORY  PAST MEDICAL & SURGICAL HISTORY:  Diabetes mellitus      HTN (hypertension)      Hyperlipidemia          SOCIAL HISTORY: Substance Use (street drugs): ( x ) never used  (  ) other:    FAMILY HISTORY:      PHYSICAL EXAM:  T(C): 36.8 (11-08-24 @ 15:00), Max: 37.3 (11-07-24 @ 23:00)  HR: 71 (11-08-24 @ 15:00) (63 - 77)  BP: --  RR: 20 (11-08-24 @ 15:00) (19 - 30)  SpO2: 95% (11-08-24 @ 15:00) (95% - 99%)  Wt(kg): --  I&O's Summary    07 Nov 2024 07:01  -  08 Nov 2024 07:00  --------------------------------------------------------  IN: 1007.8 mL / OUT: 1350 mL / NET: -342.2 mL    08 Nov 2024 07:01  -  08 Nov 2024 16:22  --------------------------------------------------------  IN: 200 mL / OUT: 0 mL / NET: 200 mL        GENERAL: NAD   EYES: EOMI, PERRLA, conjunctiva and sclera clear  ENMT: No tonsillar erythema, exudates, or enlargement; Moist mucous membranes, Good dentition, No lesions  Cardiovascular: Normal S1 S2, No JVD, No murmurs, No edema  Respiratory: Lungs clear to auscultation	  Gastrointestinal:  Soft, Non-tender, + BS	  Extremities: no edema                              10.6   11.64 )-----------( 470      ( 06 Nov 2024 23:39 )             33.0     11-06    138  |  107  |  26[H]  ----------------------------<  138[H]  4.1   |  20[L]  |  1.00    Ca    8.5      06 Nov 2024 23:39  Phos  3.8     11-06  Mg     2.0     11-06      proBNP:   Lipid Profile:   HgA1c:   TSH:     Consultant(s) Notes Reviewed:  [x ] YES  [ ] NO    Care Discussed with Consultants/Other Providers [ x] YES  [ ] NO    Imaging Personally Reviewed independently:  [x] YES  [ ] NO    All labs, radiologic studies, vitals, orders and medications list reviewed. Patient is seen and examined at bedside. Case discussed with medical team.

## 2024-11-08 NOTE — PROGRESS NOTE ADULT - ASSESSMENT
This is a 69 year old right hand dominant Kyrgyz speaking male with a past medical history significant for HTN, HLD, DM, who presented as a code stroke, with right sided weakness. MR brain with left watershed/ MCA strokes. Patient is POD#2 status post catheter cerebral angiogram and left carotid wall stent placement.     1. Continue Neuro checks   2. Continue ASA and Plavix   3. Continue Lovenox   4. Maintain SBP under 180  5. Maintain euglycemia  6. Plan was discussed with Dr Farhat Ulrich  This is a 69 year old right hand dominant German speaking male with a past medical history significant for HTN, HLD, DM, who presented as a code stroke, with right sided weakness. MR brain with left watershed/ MCA strokes. Patient is POD#2 status post catheter cerebral angiogram, angioplasty and left carotid wall stent placement.     1. Continue Neuro checks   2. Continue ASA and Plavix   3. Continue Lovenox   4. Maintain SBP under 180  5. Maintain euglycemia  6. Plan was discussed with Dr Farhat Ulrich

## 2024-11-08 NOTE — CHART NOTE - NSCHARTNOTEFT_GEN_A_CORE
69M Yi speaking hx HTN, HLD, DM, initial (last wk) p/f code stroke Rt sided weakness i/s/o NSTEMI/HF, xfer from [facility] for angio/LICA stent w/ Dr. Ulrich. 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    Pt seen for speech-language re-evaluation. Pt last seen by this service on 11/7. D/w EVE Mendez prior who reported pt following commands this AM and more engaged. Pt received resting upright in bed and was easily arousable to verbal stimuli. Pt AAOx3. Lethargy noted throughout this visit. O2 99% on 2L NC. Language Line  [employer] [id number] utilized for Yi-English translation throughout this visit. Pt participated in structured language tasks. Receptively, pt ID body parts with 75% accuracy, answered simple y/n questions with 100% accuracy, however demonstrated difficulties answering complex y/n questions, and followed 1 and 2-step commands with 100% accuracy. Expressively, pt completed automatized sequences with 75% accuracy, pt completed an object naming task with 75% accuracy, and completed a responsive naming task with 25% accuracy. Across receptive and expressive language tasks, pt benefitted from repetitions and verbal cues to improve task performance. Cognitive communication marked by requirement for increased processing time and completion of an abstract reasoning task with 100% accuracy.    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 Expressive deficits noted to be more severe than receptive, evidenced by difficulties with automatic sequences, object naming, and responsive naming. Receptively, pt p/w difficulties completing tasks of increased complexity. Per Language Line , pt with no noted slurring of speech and was intelligible across all speech output. Cognitive communication impacted by requirement for increased processing time.     Given above, recommend:  1. This service continue to f/u for speech-language remediation during hospitalization  2. Pt will benefit from skilled ST to treat expressive and receptive language deficits upon d/c from hospital    All of the above d/w pt and EVE Mendez. This service will continue to follow.    Brigitte White M.A. CCC-SLP Teams   Speech-Language Pathology

## 2024-11-08 NOTE — CONSULT NOTE ADULT - SUBJECTIVE AND OBJECTIVE BOX
Patient is a 69y old  Male who presents with a chief complaint of L ICA stenosis (08 Nov 2024 08:46)    HPI:  69M Slovak speaking hx HTN, HLD, DM, initial (last wk) p/f code stroke Rt sided weakness i/s/o NSTEMI/HF, xfer from [facility] for angio/LICA stent w/ Dr. Ulrich. 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 Exam: AOx3, mild Rt facial, RUE 0/5, RLE 3/5, LLE 5/5, SILT  (05 Nov 2024 22:37)      69yM was admitted on 11-05, seen today in NSCU.       REVIEW OF SYSTEMS  Denies chest pain, SOB, N/V, F/C, abdominal pain     VITALS  T(C): 36.4 (11-08-24 @ 07:00), Max: 37.3 (11-07-24 @ 23:00)  HR: 63 (11-08-24 @ 07:00) (60 - 76)  BP: --  RR: 19 (11-08-24 @ 07:00) (17 - 20)  SpO2: 99% (11-08-24 @ 07:00) (95% - 99%)  Wt(kg): --    PAST MEDICAL & SURGICAL HISTORY  Diabetes mellitus    HTN (hypertension)    HLD (hyperlipidemia)    Hyperlipidemia        FUNCTIONAL HISTORY  Lives with wife, 6 steps to enter   Independent AMB and ADLs PTA     CURRENT FUNCTIONAL STATUS  SLP 11/8  fluent aphasia     PT  11/6  follows 75% commands  bed mobility mod assist   transfers mod assist with blocking right knee   gait mod assist x 2, 3 lateral steps     OT 11/6  bed mobility mod assist   transfers mod assist x 2  standing balance poor     RECENT LABS/IMAGING  CBC Full  -  ( 06 Nov 2024 23:39 )  WBC Count : 11.64 K/uL  RBC Count : 3.67 M/uL  Hemoglobin : 10.6 g/dL  Hematocrit : 33.0 %  Platelet Count - Automated : 470 K/uL  Mean Cell Volume : 89.9 fl  Mean Cell Hemoglobin : 28.9 pg  Mean Cell Hemoglobin Concentration : 32.1 g/dL  Auto Neutrophil # : x  Auto Lymphocyte # : x  Auto Monocyte # : x  Auto Eosinophil # : x  Auto Basophil # : x  Auto Neutrophil % : x  Auto Lymphocyte % : x  Auto Monocyte % : x  Auto Eosinophil % : x  Auto Basophil % : x    11-06    138  |  107  |  26[H]  ----------------------------<  138[H]  4.1   |  20[L]  |  1.00    Ca    8.5      06 Nov 2024 23:39  Phos  3.8     11-06  Mg     2.0     11-06      Urinalysis Basic - ( 06 Nov 2024 23:39 )    Color: x / Appearance: x / SG: x / pH: x  Gluc: 138 mg/dL / Ketone: x  / Bili: x / Urobili: x   Blood: x / Protein: x / Nitrite: x   Leuk Esterase: x / RBC: x / WBC x   Sq Epi: x / Non Sq Epi: x / Bacteria: x    < from: CT Head No Cont (11.07.24 @ 15:37) >    IMPRESSION:    No new acute infarct or hemorrhage. Subacute infarcts in the left frontal   lobe and left centrum semiovale, as seen on prior exam.      < end of copied text >    < from: MR Head No Cont (10.29.24 @ 10:56) >    IMPRESSION:    1.  Acute infarcts in the high left MCA watershed distribution.  2.  Irregular flow-void in the cavernous segment of the left ICA. This   could be attributed to slow/turbulent flow. A severe stenosis is not   entirely excluded. Correlate with recent CTA of the head for further   evaluation.  3.  Chronic ischemic changes.    < end of copied text >      ALLERGIES  No Known Allergies      MEDICATIONS   albuterol    90 MICROgram(s) HFA Inhaler 2 Puff(s) Inhalation every 12 hours  aspirin 325 milliGRAM(s) Oral <User Schedule>  atorvastatin 80 milliGRAM(s) Oral at bedtime  clopidogrel Tablet 75 milliGRAM(s) Oral <User Schedule>  enoxaparin Injectable 40 milliGRAM(s) SubCutaneous every 24 hours  insulin glargine Injectable (LANTUS) 12 Unit(s) SubCutaneous at bedtime  insulin lispro (ADMELOG) corrective regimen sliding scale   SubCutaneous at bedtime  insulin lispro (ADMELOG) corrective regimen sliding scale   SubCutaneous three times a day before meals  insulin lispro Injectable (ADMELOG) 4 Unit(s) SubCutaneous three times a day before meals  metoprolol tartrate 12.5 milliGRAM(s) Oral every 12 hours  polyethylene glycol 3350 17 Gram(s) Oral daily  senna 2 Tablet(s) Oral at bedtime      ----------------------------------------------------------------------------------------  PHYSICAL EXAM  Constitutional - NAD, Comfortable, in bed   Chest - Breathing comfortably on room air   Cardiovascular - S1S2   Extremities - No C/C/E, No calf tenderness   Neurologic Exam -   follows commands                 Cognitive - Awake, Alert, AAO to self, place: hospital with choices, month, year, situation     Communication - aphasia         Motor - right sided weakness        Psychiatric - Mood stable, Affect WNL  ----------------------------------------------------------------------------------------  ASSESSMENT/PLAN  69yMale h/o HTN, DM with functional deficits after CVA with aphasia, right sided weakness   s/p left carotid angioplasty  CT/MRI with acute left MCA infarcts   NSTEMI, EF 45%, outpatient follow up for cath  DVT PPX - SCDs lovenox   Rehab -    patient requires mod assist with mobility    continue bedside therapy while admitted to prevent secondary complications of immobility, bed mobility, transfer training, progressive ambulation, equipment evaluation, ADLs, bracing/splinting   OOB throughout the day with staff, OOB to chair with meals/3 hours daily        Recommend ACUTE inpatient rehabilitation for the functional deficits consisting of 3 hours of multidisciplinary intense therapy/day x 5 days/week x 2-4 weeks depending on progress at rehabilitation facility, 24 hour RN/daily PMR physician for comorbid medical management.      Patient will be able to participate in and benefit from intense rehabilitation therapies for 3 hours a day x 5 days/week to maximize independence.     Rehab recommendations are dependent on functional progress and participation with bedside therapy       Will continue to follow for ongoing rehab needs and recommendations.       55 minutes spent, reviewing hospital course, therapy notes, relevant imaging, previous hospitalizations, exam, education about inpatient rehabilitation, documentation, and discussion with  and rehabilitation team  
Teto Vásquez MD  Interventional Cardiology / Advance Heart Failure and Cardiac Transplant Specialist  [place] Office : [address] [place] [address] [place] [address]  Tel:   [place] Office : [address] [facility] [place] [address]  Tel: [phone]         HISTORY OF PRESENTING ILLNESS:  HPI:  69M Arabic speaking hx HTN, HLD, DM, initial (last wk) p/f code stroke Rt sided weakness i/s/o NSTEMI/HF, xfer from [facility] for angio/LICA stent w/ Dr. Ulrich. 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 Exam: AOx3, mild Rt facial, RUE 0/5, RLE 3/5, LLE 5/5, SILT   Cardiology called as pt had NSTEMI s/p left ICA stent doing well     PAST MEDICAL & SURGICAL HISTORY:  Diabetes mellitus      HTN (hypertension)      Hyperlipidemia          SOCIAL HISTORY: Substance Use (street drugs): ( x ) never used  (  ) other:    FAMILY HISTORY:        MEDICATIONS:  clopidogrel Tablet 75 milliGRAM(s) Oral <User Schedule>  enoxaparin Injectable 40 milliGRAM(s) SubCutaneous every 24 hours  albuterol    90 MICROgram(s) HFA Inhaler 2 Puff(s) Inhalation every 12 hours  acetaminophen   IVPB .. 1000 milliGRAM(s) IV Intermittent every 6 hours PRN  aspirin 325 milliGRAM(s) Oral <User Schedule>  ondansetron Injectable 4 milliGRAM(s) IV Push every 6 hours PRN  polyethylene glycol 3350 17 Gram(s) Oral daily  senna 2 Tablet(s) Oral at bedtime  atorvastatin 80 milliGRAM(s) Oral at bedtime  insulin glargine Injectable (LANTUS) 12 Unit(s) SubCutaneous at bedtime  insulin lispro (ADMELOG) corrective regimen sliding scale   SubCutaneous three times a day before meals  insulin lispro (ADMELOG) corrective regimen sliding scale   SubCutaneous at bedtime  insulin lispro Injectable (ADMELOG) 4 Unit(s) SubCutaneous three times a day before meals  chlorhexidine 4% Liquid 1 Application(s) Topical daily      FAMILY HISTORY:        Allergies    No Known Allergies    Intolerances    	      PHYSICAL EXAM:  T(C): 37.1 (11-07-24 @ 19:00), Max: 37.1 (11-07-24 @ 19:00)  HR: 70 (11-07-24 @ 19:00) (55 - 78)  BP: --  RR: 20 (11-07-24 @ 19:00) (14 - 21)  SpO2: 96% (11-07-24 @ 19:00) (95% - 100%)  Wt(kg): --  I&O's Summary    06 Nov 2024 07:01  -  07 Nov 2024 07:00  --------------------------------------------------------  IN: 1225.3 mL / OUT: 1000 mL / NET: 225.3 mL    07 Nov 2024 07:01  -  07 Nov 2024 21:04  --------------------------------------------------------  IN: 707.8 mL / OUT: 750 mL / NET: -42.2 mL        GENERAL: NAD   EYES: EOMI, PERRLA, conjunctiva and sclera clear  ENMT: No tonsillar erythema, exudates, or enlargement; Moist mucous membranes, Good dentition, No lesions  Cardiovascular: Normal S1 S2, No JVD, No murmurs, No edema  Respiratory: Lungs clear to auscultation	  Gastrointestinal:  Soft, Non-tender, + BS	  Extremities: no edema      LABS:	 	    CARDIAC MARKERS:                                  10.6   11.64 )-----------( 470      ( 06 Nov 2024 23:39 )             33.0     11-06    138  |  107  |  26[H]  ----------------------------<  138[H]  4.1   |  20[L]  |  1.00    Ca    8.5      06 Nov 2024 23:39  Phos  3.8     11-06  Mg     2.0     11-06    TPro  7.3  /  Alb  3.5  /  TBili  0.3  /  DBili  x   /  AST  47[H]  /  ALT  66[H]  /  AlkPhos  65  11-05    proBNP:   Lipid Profile:   HgA1c:   TSH:     Consultant(s) Notes Reviewed:  [x ] YES  [ ] NO    Care Discussed with Consultants/Other Providers [ x] YES  [ ] NO    Imaging Personally Reviewed independently:  [x] YES  [ ] NO    All labs, radiologic studies, vitals, orders and medications list reviewed. Patient is seen and examined at bedside. Case discussed with medical team.    ASSESSMENT/PLAN:

## 2024-11-09 LAB
ANION GAP SERPL CALC-SCNC: 11 MMOL/L — SIGNIFICANT CHANGE UP (ref 5–17)
BASOPHILS # BLD AUTO: 0.04 K/UL — SIGNIFICANT CHANGE UP (ref 0–0.2)
BASOPHILS NFR BLD AUTO: 0.4 % — SIGNIFICANT CHANGE UP (ref 0–2)
BUN SERPL-MCNC: 22 MG/DL — SIGNIFICANT CHANGE UP (ref 7–23)
CALCIUM SERPL-MCNC: 8.9 MG/DL — SIGNIFICANT CHANGE UP (ref 8.4–10.5)
CHLORIDE SERPL-SCNC: 104 MMOL/L — SIGNIFICANT CHANGE UP (ref 96–108)
CO2 SERPL-SCNC: 21 MMOL/L — LOW (ref 22–31)
CREAT SERPL-MCNC: 1.06 MG/DL — SIGNIFICANT CHANGE UP (ref 0.5–1.3)
EGFR: 76 ML/MIN/1.73M2 — SIGNIFICANT CHANGE UP
EOSINOPHIL # BLD AUTO: 0.33 K/UL — SIGNIFICANT CHANGE UP (ref 0–0.5)
EOSINOPHIL NFR BLD AUTO: 3 % — SIGNIFICANT CHANGE UP (ref 0–6)
GLUCOSE BLDC GLUCOMTR-MCNC: 143 MG/DL — HIGH (ref 70–99)
GLUCOSE BLDC GLUCOMTR-MCNC: 165 MG/DL — HIGH (ref 70–99)
GLUCOSE BLDC GLUCOMTR-MCNC: 175 MG/DL — HIGH (ref 70–99)
GLUCOSE BLDC GLUCOMTR-MCNC: 189 MG/DL — HIGH (ref 70–99)
GLUCOSE SERPL-MCNC: 189 MG/DL — HIGH (ref 70–99)
HCT VFR BLD CALC: 32.6 % — LOW (ref 39–50)
HGB BLD-MCNC: 10.5 G/DL — LOW (ref 13–17)
IMM GRANULOCYTES NFR BLD AUTO: 0.5 % — SIGNIFICANT CHANGE UP (ref 0–0.9)
LYMPHOCYTES # BLD AUTO: 2.43 K/UL — SIGNIFICANT CHANGE UP (ref 1–3.3)
LYMPHOCYTES # BLD AUTO: 22.1 % — SIGNIFICANT CHANGE UP (ref 13–44)
MAGNESIUM SERPL-MCNC: 1.9 MG/DL — SIGNIFICANT CHANGE UP (ref 1.6–2.6)
MCHC RBC-ENTMCNC: 28.5 PG — SIGNIFICANT CHANGE UP (ref 27–34)
MCHC RBC-ENTMCNC: 32.2 G/DL — SIGNIFICANT CHANGE UP (ref 32–36)
MCV RBC AUTO: 88.3 FL — SIGNIFICANT CHANGE UP (ref 80–100)
MONOCYTES # BLD AUTO: 0.87 K/UL — SIGNIFICANT CHANGE UP (ref 0–0.9)
MONOCYTES NFR BLD AUTO: 7.9 % — SIGNIFICANT CHANGE UP (ref 2–14)
NEUTROPHILS # BLD AUTO: 7.3 K/UL — SIGNIFICANT CHANGE UP (ref 1.8–7.4)
NEUTROPHILS NFR BLD AUTO: 66.1 % — SIGNIFICANT CHANGE UP (ref 43–77)
NRBC # BLD: 0 /100 WBCS — SIGNIFICANT CHANGE UP (ref 0–0)
PHOSPHATE SERPL-MCNC: 2.9 MG/DL — SIGNIFICANT CHANGE UP (ref 2.5–4.5)
PLATELET # BLD AUTO: 391 K/UL — SIGNIFICANT CHANGE UP (ref 150–400)
POTASSIUM SERPL-MCNC: 4.1 MMOL/L — SIGNIFICANT CHANGE UP (ref 3.5–5.3)
POTASSIUM SERPL-SCNC: 4.1 MMOL/L — SIGNIFICANT CHANGE UP (ref 3.5–5.3)
RBC # BLD: 3.69 M/UL — LOW (ref 4.2–5.8)
RBC # FLD: 13.5 % — SIGNIFICANT CHANGE UP (ref 10.3–14.5)
SODIUM SERPL-SCNC: 136 MMOL/L — SIGNIFICANT CHANGE UP (ref 135–145)
WBC # BLD: 11.02 K/UL — HIGH (ref 3.8–10.5)
WBC # FLD AUTO: 11.02 K/UL — HIGH (ref 3.8–10.5)

## 2024-11-09 PROCEDURE — 99232 SBSQ HOSP IP/OBS MODERATE 35: CPT

## 2024-11-09 RX ORDER — ACETAMINOPHEN 500 MG
650 TABLET ORAL EVERY 6 HOURS
Refills: 0 | Status: DISCONTINUED | OUTPATIENT
Start: 2024-11-09 | End: 2024-11-11

## 2024-11-09 RX ORDER — SODIUM PHOSPHATE, MONOBASIC, MONOHYDRATE AND SODIUM PHOSPHATE, DIBASIC ANHYDROUS 276; 142 MG/ML; MG/ML
15 INJECTION, SOLUTION INTRAVENOUS ONCE
Refills: 0 | Status: COMPLETED | OUTPATIENT
Start: 2024-11-09 | End: 2024-11-09

## 2024-11-09 RX ORDER — MAGNESIUM SULFATE IN 0.9% NACL 2 G/50 ML
1 INTRAVENOUS SOLUTION, PIGGYBACK (ML) INTRAVENOUS ONCE
Refills: 0 | Status: COMPLETED | OUTPATIENT
Start: 2024-11-09 | End: 2024-11-09

## 2024-11-09 RX ADMIN — Medication 2 PUFF(S): at 05:13

## 2024-11-09 RX ADMIN — SODIUM PHOSPHATE, MONOBASIC, MONOHYDRATE AND SODIUM PHOSPHATE, DIBASIC ANHYDROUS 63.75 MILLIMOLE(S): 276; 142 INJECTION, SOLUTION INTRAVENOUS at 09:59

## 2024-11-09 RX ADMIN — CLOPIDOGREL 75 MILLIGRAM(S): 75 TABLET ORAL at 05:13

## 2024-11-09 RX ADMIN — Medication 4 UNIT(S): at 07:33

## 2024-11-09 RX ADMIN — Medication 2 TABLET(S): at 21:34

## 2024-11-09 RX ADMIN — Medication 4 UNIT(S): at 16:58

## 2024-11-09 RX ADMIN — Medication 325 MILLIGRAM(S): at 05:13

## 2024-11-09 RX ADMIN — Medication 2: at 16:57

## 2024-11-09 RX ADMIN — Medication 2: at 07:31

## 2024-11-09 RX ADMIN — Medication 102 GRAM(S): at 11:42

## 2024-11-09 RX ADMIN — Medication 2: at 11:40

## 2024-11-09 RX ADMIN — POLYETHYLENE GLYCOL 3350 17 GRAM(S): 17 POWDER, FOR SOLUTION ORAL at 11:41

## 2024-11-09 RX ADMIN — Medication 12.5 MILLIGRAM(S): at 16:58

## 2024-11-09 RX ADMIN — Medication 2 PUFF(S): at 16:57

## 2024-11-09 RX ADMIN — Medication 4 UNIT(S): at 11:41

## 2024-11-09 RX ADMIN — Medication 40 MILLIGRAM(S): at 21:35

## 2024-11-09 RX ADMIN — Medication 80 MILLIGRAM(S): at 21:35

## 2024-11-09 RX ADMIN — Medication 12 UNIT(S): at 21:31

## 2024-11-09 NOTE — PROGRESS NOTE ADULT - SUBJECTIVE AND OBJECTIVE BOX
Teto Vásquez MD  Interventional Cardiology / Advance Heart Failure and Cardiac Transplant Specialist  [place] Office : [address] [place] [address] [place] [address]  Tel: [phone]  [place] Office : [address] [facility] [place] [address]  Tel: [phone]       Pt is lying in bed comfortable not in distress, dysarthria  	  MEDICATIONS:  clopidogrel Tablet 75 milliGRAM(s) Oral <User Schedule>  enoxaparin Injectable 40 milliGRAM(s) SubCutaneous every 24 hours  metoprolol tartrate 12.5 milliGRAM(s) Oral every 12 hours      albuterol    90 MICROgram(s) HFA Inhaler 2 Puff(s) Inhalation every 12 hours    acetaminophen     Tablet .. 650 milliGRAM(s) Oral every 6 hours PRN  aspirin 325 milliGRAM(s) Oral <User Schedule>    polyethylene glycol 3350 17 Gram(s) Oral daily  senna 2 Tablet(s) Oral at bedtime    atorvastatin 80 milliGRAM(s) Oral at bedtime  insulin glargine Injectable (LANTUS) 12 Unit(s) SubCutaneous at bedtime  insulin lispro (ADMELOG) corrective regimen sliding scale   SubCutaneous at bedtime  insulin lispro (ADMELOG) corrective regimen sliding scale   SubCutaneous three times a day before meals  insulin lispro Injectable (ADMELOG) 4 Unit(s) SubCutaneous three times a day before meals    magnesium sulfate  IVPB 1 Gram(s) IV Intermittent once      PAST MEDICAL/SURGICAL HISTORY  PAST MEDICAL & SURGICAL HISTORY:  Diabetes mellitus      HTN (hypertension)      Hyperlipidemia          SOCIAL HISTORY: Substance Use (street drugs): ( x ) never used  (  ) other:    FAMILY HISTORY:         PHYSICAL EXAM:  T(C): 36.3 (11-09-24 @ 09:00), Max: 37.1 (11-08-24 @ 16:47)  HR: 76 (11-09-24 @ 09:00) (70 - 87)  BP: 115/79 (11-09-24 @ 09:00) (95/67 - 170/85)  RR: 18 (11-09-24 @ 09:00) (18 - 30)  SpO2: 95% (11-09-24 @ 09:00) (95% - 96%)  Wt(kg): --  I&O's Summary    08 Nov 2024 07:01  -  09 Nov 2024 07:00  --------------------------------------------------------  IN: 200 mL / OUT: 0 mL / NET: 200 mL             EYES:   PERRLA   ENMT:   dysarthria  Cardiovascular: Normal S1 S2, No JVD, No murmurs, No edema  Respiratory: Lungs clear to auscultation	  Gastrointestinal:  Soft, Non-tender, + BS	  Extremities: right sided weakness                                    10.5   11.02 )-----------( 391      ( 09 Nov 2024 07:08 )             32.6     11-09    136  |  104  |  22  ----------------------------<  189[H]  4.1   |  21[L]  |  1.06    Ca    8.9      09 Nov 2024 06:58  Phos  2.9     11-09  Mg     1.9     11-09      proBNP:   Lipid Profile:   HgA1c:   TSH:     Consultant(s) Notes Reviewed:  [x ] YES  [ ] NO    Care Discussed with Consultants/Other Providers [ x] YES  [ ] NO    Imaging Personally Reviewed independently:  [x] YES  [ ] NO    All labs, radiologic studies, vitals, orders and medications list reviewed. Patient is seen and examined at bedside. Case discussed with medical team.

## 2024-11-09 NOTE — PROGRESS NOTE ADULT - ASSESSMENT
ASSESSMENT AND PLAN:   69 yr old male presented as code stroke found to have L carotid stenosis    NEURO:   L carotid angioplasty 11/07/2024 - POD 2  Neuro Q4h  Carotid dopplers: patent L carotid stent  Stroke core measures  c/w ASA 325mg and Plavix 75mg  Pain control w/ tylenol  Activity: Increase as tolerated  PT/OT/ Speech and language  Pending AR    PULM:   Sp02> 92%  Incentive spirometry, on RA  Hx Asthma  c/w albuterol inhaler    CV:  SBP liberalized to 100-180, continue to monitor exams  Hx HTN  d/c coreg, start metoprolol 12.5mg Q12  c/w Atorvastatin 80mg  c/w DAPT  Hx NSTEMI/HF, last TTE at [facility] showed EF 45-50%. In house cards at [facility] recommended cath but was deferred due to acute stroke  In house cards consulted, pt will need cath, follow-up outpatient   rpt TTE 11/7: negative for intracardiac shunt  LDL 77,       ENDO:   Goal euglycemia (FS goal 120-180)  Hx DM, A1C 9.6  c/w Lantus 12u at bedtime and Lispro 4u  Medium ISS before meals and BRODY at bedime  TSH 1.27, wnl    HEME/ONC:               SQL 40mg  SCDs  11/6 LED: negative    RENAL:   IVL  Goal is normonatremia  Replete lytes PRN    ID:   Afebrile  Monitor leukocytosis  Monitor platelets    GI:    Diet: CCD  LBM: 11/8  Bowel regimen w/ senna and miralax  no PPI indicated at this time

## 2024-11-09 NOTE — PROGRESS NOTE ADULT - SUBJECTIVE AND OBJECTIVE BOX
HOSPITAL COURSE:  69M German speaking hx HTN, HLD, DM, initial (last wk) p/f code stroke Rt sided weakness i/s/o NSTEMI/HF, xfer from [facility] for angio/LICA stent w/ Dr. Ulrich. 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 Hypotensive during the case s/p multiple neosticks and boluses.   11/7 c/f fluctuating RLE exam AG vs. 2/5, ?effort dependent, CTH done no changed, US reviewed per neuroIR    unchanged exam overnight this am RLE AG      Allergies    No Known Allergies    Intolerances      REVIEW OF SYSTEMS: [ ] Unable to Assess due to neurologic exam   [ x] All ROS addressed below are non-contributory, except:  Neuro: [ ] Headache [ ] Back pain [ ] Numbness [ x] Weakness [ ] Ataxia [ ] Dizziness [ ] Aphasia [ ] Dysarthria [ ] Visual disturbance  Resp: [ ] Shortness of breath/dyspnea, [ ] Orthopnea [ ] Cough  CV: [ ] Chest pain [ ] Palpitation [ ] Lightheadedness [ ] Syncope  Renal: [ ] Thirst [ ] Edema  GI: [ ] Nausea [ ] Emesis [ ] Abdominal pain [ ] Constipation [ ] Diarrhea  Hem: [ ] Hematemesis [ ] bright red blood per rectum  ID: [ ] Fever [ ] Chills [ ] Dysuria  ENT: [ ] Rhinorrhea    ICU Vital Signs Last 24 Hrs  T(C): 36.4 (08 Nov 2024 07:00), Max: 37.3 (07 Nov 2024 23:00)  T(F): 97.5 (08 Nov 2024 07:00), Max: 99.1 (07 Nov 2024 23:00)  HR: 63 (08 Nov 2024 07:00) (57 - 76)  BP: --  BP(mean): --  ABP: 159/54 (08 Nov 2024 07:00) (127/44 - 203/62)  ABP(mean): 85 (08 Nov 2024 07:00) (69 - 105)  RR: 19 (08 Nov 2024 07:00) (15 - 20)  SpO2: 99% (08 Nov 2024 07:00) (95% - 99%)    O2 Parameters below as of 08 Nov 2024 07:00  Patient On (Oxygen Delivery Method): nasal cannula  O2 Flow (L/min): 2    CBC:            10.6   11.64 )-----------( 470      ( 11-06-24 @ 23:39 )             33.0         Chem:         ( 11-06-24 @ 23:39 )    138  |  107  |  26[H]  ----------------------------<  138[H]  4.1   |  20[L]  |  1.00        Liver Functions:     Type & Screen: ( 11-06-24 @ 16:39 )    ABO/Rh/Shoshana:  AB Positive       MEDICATIONS  (STANDING):  albuterol    90 MICROgram(s) HFA Inhaler 2 Puff(s) Inhalation every 12 hours  aspirin 325 milliGRAM(s) Oral <User Schedule>  atorvastatin 80 milliGRAM(s) Oral at bedtime  clopidogrel Tablet 75 milliGRAM(s) Oral <User Schedule>  enoxaparin Injectable 40 milliGRAM(s) SubCutaneous every 24 hours  insulin glargine Injectable (LANTUS) 12 Unit(s) SubCutaneous at bedtime  insulin lispro (ADMELOG) corrective regimen sliding scale   SubCutaneous three times a day before meals  insulin lispro (ADMELOG) corrective regimen sliding scale   SubCutaneous at bedtime  insulin lispro Injectable (ADMELOG) 4 Unit(s) SubCutaneous three times a day before meals  metoprolol tartrate 12.5 milliGRAM(s) Oral every 12 hours  polyethylene glycol 3350 17 Gram(s) Oral daily  senna 2 Tablet(s) Oral at bedtime    MEDICATIONS  (PRN):      PHYSICAL EXAM:    Constitutional: No Acute Distress     Neurological: AOx3, mild Rt facial, RUE 0/5, RLE 3/5, LLE 5/5, SILT     Pulmonary: Clear to Auscultation, No rales, No rhonchi, No wheezes     Cardiovascular: S1, S2, Regular rate and rhythm     Gastrointestinal: Soft, Non-tender, Non-distended     Extremities: No calf tenderness     Incision: cdi, no hematoma at puncture site

## 2024-11-10 LAB
GLUCOSE BLDC GLUCOMTR-MCNC: 121 MG/DL — HIGH (ref 70–99)
GLUCOSE BLDC GLUCOMTR-MCNC: 172 MG/DL — HIGH (ref 70–99)
GLUCOSE BLDC GLUCOMTR-MCNC: 182 MG/DL — HIGH (ref 70–99)
GLUCOSE BLDC GLUCOMTR-MCNC: 195 MG/DL — HIGH (ref 70–99)

## 2024-11-10 PROCEDURE — 99232 SBSQ HOSP IP/OBS MODERATE 35: CPT

## 2024-11-10 RX ADMIN — Medication 2 PUFF(S): at 16:43

## 2024-11-10 RX ADMIN — Medication 12.5 MILLIGRAM(S): at 16:43

## 2024-11-10 RX ADMIN — CLOPIDOGREL 75 MILLIGRAM(S): 75 TABLET ORAL at 05:33

## 2024-11-10 RX ADMIN — Medication 4 UNIT(S): at 16:41

## 2024-11-10 RX ADMIN — Medication 12 UNIT(S): at 21:52

## 2024-11-10 RX ADMIN — POLYETHYLENE GLYCOL 3350 17 GRAM(S): 17 POWDER, FOR SOLUTION ORAL at 11:27

## 2024-11-10 RX ADMIN — Medication 325 MILLIGRAM(S): at 05:33

## 2024-11-10 RX ADMIN — Medication 80 MILLIGRAM(S): at 21:53

## 2024-11-10 RX ADMIN — Medication 12.5 MILLIGRAM(S): at 05:34

## 2024-11-10 RX ADMIN — Medication 4 UNIT(S): at 11:27

## 2024-11-10 RX ADMIN — Medication 40 MILLIGRAM(S): at 23:09

## 2024-11-10 RX ADMIN — Medication 4 UNIT(S): at 07:38

## 2024-11-10 RX ADMIN — Medication 2: at 16:40

## 2024-11-10 RX ADMIN — Medication 2 TABLET(S): at 21:52

## 2024-11-10 RX ADMIN — Medication 2 PUFF(S): at 05:53

## 2024-11-10 NOTE — PROGRESS NOTE ADULT - SUBJECTIVE AND OBJECTIVE BOX
69M Syriac speaking hx HTN, HLD, DM, initial (last wk) p/f code stroke Rt sided weakness i/s/o NSTEMI/HF, xfer from [facility] for angio/LICA stent w/ Dr. Ulrich. 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 Exam: AOx3, mild Rt facial, RUE 0/5, RLE 3/5, LLE 5/5, SILT  (05 Nov 2024 22:37)    Post-op day # 4 s/p Left Carotid stenting    Overnight event: no acute event     Vital Signs Last 24 Hrs  T(C): 36.6 (10 Nov 2024 04:40), Max: 37.2 (09 Nov 2024 20:56)  T(F): 97.9 (10 Nov 2024 04:40), Max: 98.9 (09 Nov 2024 20:56)  HR: 84 (10 Nov 2024 04:40) (72 - 84)  BP: 126/76 (10 Nov 2024 04:40) (107/74 - 126/76)  BP(mean): --  RR: 18 (10 Nov 2024 04:40) (18 - 19)  SpO2: 97% (10 Nov 2024 04:40) (94% - 97%)    Parameters below as of 10 Nov 2024 04:40  Patient On (Oxygen Delivery Method): room air                              10.5   11.02 )-----------( 391      ( 09 Nov 2024 07:08 )             32.6    11-09    136  |  104  |  22  ----------------------------<  189[H]  4.1   |  21[L]  |  1.06    Ca    8.9      09 Nov 2024 06:58  Phos  2.9     11-09  Mg     1.9     11-09       Stroke Core Measures      DRAIN OUTPUT:     NEUROIMAGING:     PHYSICAL EXAM:    General: No Acute Distress     Neurological: Awake, alert oriented to person, place and time,     Pulmonary: Clear to Auscultation, No Rales, No Rhonchi, No Wheezes     Cardiovascular: S1, S2, Regular Rate and Rhythm     Gastrointestinal: Soft, Nontender, Nondistended     Incision:     MEDICATIONS:   Antibiotics:    Neuro:  acetaminophen     Tablet .. 650 milliGRAM(s) Oral every 6 hours PRN Temp greater or equal to 38C (100.4F), Mild Pain (1 - 3)  aspirin 325 milliGRAM(s) Oral <User Schedule>    Anticoagulation:  clopidogrel Tablet 75 milliGRAM(s) Oral <User Schedule>  enoxaparin Injectable 40 milliGRAM(s) SubCutaneous every 24 hours    Cardiology:  metoprolol tartrate 12.5 milliGRAM(s) Oral every 12 hours    Endo:   atorvastatin 80 milliGRAM(s) Oral at bedtime  insulin glargine Injectable (LANTUS) 12 Unit(s) SubCutaneous at bedtime  insulin lispro (ADMELOG) corrective regimen sliding scale   SubCutaneous three times a day before meals  insulin lispro (ADMELOG) corrective regimen sliding scale   SubCutaneous at bedtime  insulin lispro Injectable (ADMELOG) 4 Unit(s) SubCutaneous three times a day before meals    Pulm:  albuterol    90 MICROgram(s) HFA Inhaler 2 Puff(s) Inhalation every 12 hours    GI/:  polyethylene glycol 3350 17 Gram(s) Oral daily  senna 2 Tablet(s) Oral at bedtime                              69M Hebrew speaking hx HTN, HLD, DM, initial (last wk) p/f code stroke Rt sided weakness i/s/o NSTEMI/HF, xfer from [facility] for angio/LICA stent w/ Dr. Ulrich. 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 Exam: AOx3, mild Rt facial, RUE 0/5, RLE 3/5, LLE 5/5, SILT  (05 Nov 2024 22:37)    Post-op day # 4 s/p Left Carotid stenting    Overnight event: no acute event     Vital Signs Last 24 Hrs  T(C): 36.6 (10 Nov 2024 04:40), Max: 37.2 (09 Nov 2024 20:56)  T(F): 97.9 (10 Nov 2024 04:40), Max: 98.9 (09 Nov 2024 20:56)  HR: 84 (10 Nov 2024 04:40) (72 - 84)  BP: 126/76 (10 Nov 2024 04:40) (107/74 - 126/76)  BP(mean): --  RR: 18 (10 Nov 2024 04:40) (18 - 19)  SpO2: 97% (10 Nov 2024 04:40) (94% - 97%)    Parameters below as of 10 Nov 2024 04:40  Patient On (Oxygen Delivery Method): room air                              10.5   11.02 )-----------( 391      ( 09 Nov 2024 07:08 )             32.6    11-09    136  |  104  |  22  ----------------------------<  189[H]  4.1   |  21[L]  |  1.06    Ca    8.9      09 Nov 2024 06:58  Phos  2.9     11-09  Mg     1.9     11-09       Stroke Core Measures      DRAIN OUTPUT:     NEUROIMAGING:     PHYSICAL EXAM:    General: No Acute Distress     Neurological: Awake, alert oriented to person, place, mild Rt facial, RUE 0/5, RLE Hip flexor 4/5, LLE 5/5    Pulmonary: Clear to Auscultation, No Rales, No Rhonchi, No Wheezes     Cardiovascular: S1, S2, Regular Rate and Rhythm     Gastrointestinal: Soft, Nontender, Nondistended     Incision:     MEDICATIONS:   Antibiotics:    Neuro:  acetaminophen     Tablet .. 650 milliGRAM(s) Oral every 6 hours PRN Temp greater or equal to 38C (100.4F), Mild Pain (1 - 3)  aspirin 325 milliGRAM(s) Oral <User Schedule>    Anticoagulation:  clopidogrel Tablet 75 milliGRAM(s) Oral <User Schedule>  enoxaparin Injectable 40 milliGRAM(s) SubCutaneous every 24 hours    Cardiology:  metoprolol tartrate 12.5 milliGRAM(s) Oral every 12 hours    Endo:   atorvastatin 80 milliGRAM(s) Oral at bedtime  insulin glargine Injectable (LANTUS) 12 Unit(s) SubCutaneous at bedtime  insulin lispro (ADMELOG) corrective regimen sliding scale   SubCutaneous three times a day before meals  insulin lispro (ADMELOG) corrective regimen sliding scale   SubCutaneous at bedtime  insulin lispro Injectable (ADMELOG) 4 Unit(s) SubCutaneous three times a day before meals    Pulm:  albuterol    90 MICROgram(s) HFA Inhaler 2 Puff(s) Inhalation every 12 hours    GI/:  polyethylene glycol 3350 17 Gram(s) Oral daily  senna 2 Tablet(s) Oral at bedtime

## 2024-11-10 NOTE — PROGRESS NOTE ADULT - ASSESSMENT
69 yr old male presented as code stroke found to have L carotid stenosis    Procedure: Lt Carotid stent 11/6    PLAN    NEURO:   L carotid angioplasty 11/07/2024 - POD 2  Neuro Q4h  Carotid dopplers: patent L carotid stent  Stroke core measures  c/w ASA 325mg and Plavix 75mg  Pain control w/ tylenol  Activity: Increase as tolerated  PT/OT/ Speech and language  Pending AR    PULM:   Sp02> 92%  Incentive spirometry, on RA  Hx Asthma  c/w albuterol inhaler    CV:  SBP liberalized to 100-180, continue to monitor exams  Hx HTN  d/c coreg, start metoprolol 12.5mg Q12  c/w Atorvastatin 80mg  c/w DAPT  Hx NSTEMI/HF, last TTE at [facility] showed EF 45-50%. In house cards at [facility] recommended cath but was deferred due to acute stroke  In house cards consulted, pt will need cath, follow-up outpatient   rpt TTE 11/7: negative for intracardiac shunt  LDL 77,       ENDO:   Goal euglycemia (FS goal 120-180)  Hx DM, A1C 9.6  c/w Lantus 12u at bedtime and Lispro 4u  Medium ISS before meals and BRODY at bedime  TSH 1.27, wnl    HEME/ONC:               SQL 40mg  SCDs  11/6 LED: negative    RENAL:   IVL  Goal is normonatremia  Replete lytes PRN    ID:   Afebrile  Monitor leukocytosis  Monitor platelets    GI:    Diet: CCD  LBM: 11/8  Bowel regimen w/ senna and miralax  no PPI indicated at this time      69 yr old male presented as code stroke found to have L carotid stenosis    Procedure: L carotid angioplasty 11/07/2024 - POD 3    PLAN    NEURO:   Neuro checks Q4h  Carotid dopplers: patent L carotid stent  Stroke core measures  c/w ASA 325mg and Plavix 75mg  Pain control w/ tylenol  Activity: Increase as tolerated  PT/OT/ Speech and language  Pending AR    PULM:   Sp02> 94%  Incentive spirometry, on RA  Hx Asthma  c/w albuterol inhaler    CV:  SBP liberalized to 100-180, continue to monitor exams  Hx HTN  C/w metoprolol 12.5mg Q12  c/w Atorvastatin 80mg  c/w DAPT  Hx NSTEMI/HF, last TTE at [facility] showed EF 45-50%. In house cards at [facility] recommended cath but was deferred due to acute stroke  In house cards consulted, pt will need cath, follow-up outpatient   rpt TTE 11/7: negative for intracardiac shunt  LDL 77,       ENDO:   Goal euglycemia (FS goal 120-180)  Hx DM, A1C 9.6  c/w Lantus 12u at bedtime and Lispro 4u  Medium ISS before meals and BRODY at bedime  TSH 1.27, wnl    HEME/ONC:               SQL 40mg  SCDs  11/6 LED: negative    RENAL:   IVL  Goal is normonatremia  Replete lytes PRN    ID:   Afebrile  Monitor leukocytosis  Monitor platelets    GI:    Diet: CCD  LBM: 11/8  Bowel regimen w/ senna and miralax  no PPI indicated at this time    Dispo: Acute rehab    Will discuss with Dr Ulrich  [id number]

## 2024-11-10 NOTE — PROGRESS NOTE ADULT - SUBJECTIVE AND OBJECTIVE BOX
Teto Vásquez MD  Interventional Cardiology / Advance Heart Failure and Cardiac Transplant Specialist  [place] Office : [phone] [place] [address] [facility] [address]  Tel:   [place] Office : [address] [facility] [place] [address]  Tel: [phone]       Pt is lying in bed comfortable not in distress, no chest pains no SOB no palpitations  	  MEDICATIONS:  clopidogrel Tablet 75 milliGRAM(s) Oral <User Schedule>  enoxaparin Injectable 40 milliGRAM(s) SubCutaneous every 24 hours  metoprolol tartrate 12.5 milliGRAM(s) Oral every 12 hours      albuterol    90 MICROgram(s) HFA Inhaler 2 Puff(s) Inhalation every 12 hours    acetaminophen     Tablet .. 650 milliGRAM(s) Oral every 6 hours PRN  aspirin 325 milliGRAM(s) Oral <User Schedule>    polyethylene glycol 3350 17 Gram(s) Oral daily  senna 2 Tablet(s) Oral at bedtime    atorvastatin 80 milliGRAM(s) Oral at bedtime  insulin glargine Injectable (LANTUS) 12 Unit(s) SubCutaneous at bedtime  insulin lispro (ADMELOG) corrective regimen sliding scale   SubCutaneous three times a day before meals  insulin lispro (ADMELOG) corrective regimen sliding scale   SubCutaneous at bedtime  insulin lispro Injectable (ADMELOG) 4 Unit(s) SubCutaneous three times a day before meals        PAST MEDICAL/SURGICAL HISTORY  PAST MEDICAL & SURGICAL HISTORY:  Diabetes mellitus      HTN (hypertension)      Hyperlipidemia          SOCIAL HISTORY: Substance Use (street drugs): ( x ) never used  (  ) other:    FAMILY HISTORY:         PHYSICAL EXAM:  T(C): 36.7 (11-10-24 @ 20:00), Max: 37.2 (11-09-24 @ 20:56)  HR: 81 (11-10-24 @ 20:00) (69 - 84)  BP: 167/78 (11-10-24 @ 16:00) (112/75 - 167/78)  RR: 18 (11-10-24 @ 20:00) (18 - 19)  SpO2: 95% (11-10-24 @ 20:00) (94% - 97%)  Wt(kg): --  I&O's Summary    09 Nov 2024 07:01  -  10 Nov 2024 07:00  --------------------------------------------------------  IN: 120 mL / OUT: 850 mL / NET: -730 mL    10 Nov 2024 07:01  -  10 Nov 2024 20:04  --------------------------------------------------------  IN: 600 mL / OUT: 400 mL / NET: 200 mL          GENERAL: NAD  EYES:   PERRLA   ENMT:   Moist mucous membranes, Good dentition, No lesions  Cardiovascular: Normal S1 S2, No JVD, No murmurs, No edema  Respiratory: Lungs clear to auscultation	  Gastrointestinal:  Soft, Non-tender, + BS	  Extremities: right sided paralysis                                    10.5   11.02 )-----------( 391      ( 09 Nov 2024 07:08 )             32.6     11-09    136  |  104  |  22  ----------------------------<  189[H]  4.1   |  21[L]  |  1.06    Ca    8.9      09 Nov 2024 06:58  Phos  2.9     11-09  Mg     1.9     11-09      proBNP:   Lipid Profile:   HgA1c:   TSH:     Consultant(s) Notes Reviewed:  [x ] YES  [ ] NO    Care Discussed with Consultants/Other Providers [ x] YES  [ ] NO    Imaging Personally Reviewed independently:  [x] YES  [ ] NO    All labs, radiologic studies, vitals, orders and medications list reviewed. Patient is seen and examined at bedside. Case discussed with medical team.

## 2024-11-11 ENCOUNTER — INPATIENT (INPATIENT)
Facility: HOSPITAL | Age: 69
LOS: 21 days | Discharge: HOME | DRG: 66 | End: 2024-12-03
Attending: PHYSICAL MEDICINE & REHABILITATION | Admitting: PHYSICAL MEDICINE & REHABILITATION
Payer: COMMERCIAL

## 2024-11-11 VITALS
HEART RATE: 73 BPM | SYSTOLIC BLOOD PRESSURE: 111 MMHG | TEMPERATURE: 98 F | OXYGEN SATURATION: 93 % | RESPIRATION RATE: 18 BRPM | DIASTOLIC BLOOD PRESSURE: 79 MMHG

## 2024-11-11 VITALS
DIASTOLIC BLOOD PRESSURE: 92 MMHG | HEIGHT: 68 IN | TEMPERATURE: 98 F | OXYGEN SATURATION: 96 % | RESPIRATION RATE: 14 BRPM | HEART RATE: 85 BPM | WEIGHT: 148.59 LBS | SYSTOLIC BLOOD PRESSURE: 150 MMHG

## 2024-11-11 DIAGNOSIS — I63.9 CEREBRAL INFARCTION, UNSPECIFIED: ICD-10-CM

## 2024-11-11 LAB
GLUCOSE BLDC GLUCOMTR-MCNC: 140 MG/DL — HIGH (ref 70–99)
GLUCOSE BLDC GLUCOMTR-MCNC: 176 MG/DL — HIGH (ref 70–99)
GLUCOSE BLDC GLUCOMTR-MCNC: 189 MG/DL — HIGH (ref 70–99)
GLUCOSE BLDC GLUCOMTR-MCNC: 231 MG/DL — HIGH (ref 70–99)
SARS-COV-2 RNA SPEC QL NAA+PROBE: SIGNIFICANT CHANGE UP

## 2024-11-11 PROCEDURE — 99232 SBSQ HOSP IP/OBS MODERATE 35: CPT

## 2024-11-11 PROCEDURE — 93010 ELECTROCARDIOGRAM REPORT: CPT

## 2024-11-11 RX ORDER — VALSARTAN 160 MG/1
1 TABLET ORAL
Refills: 0 | DISCHARGE

## 2024-11-11 RX ORDER — ALBUTEROL 90 MCG
2 AEROSOL (GRAM) INHALATION
Qty: 0 | Refills: 0 | DISCHARGE
Start: 2024-11-11

## 2024-11-11 RX ORDER — ALBUTEROL 90 MCG
2 AEROSOL (GRAM) INHALATION EVERY 12 HOURS
Refills: 0 | Status: DISCONTINUED | OUTPATIENT
Start: 2024-11-11 | End: 2024-12-03

## 2024-11-11 RX ORDER — ENOXAPARIN SODIUM 30 MG/.3ML
40 INJECTION SUBCUTANEOUS EVERY 24 HOURS
Refills: 0 | Status: DISCONTINUED | OUTPATIENT
Start: 2024-11-11 | End: 2024-12-03

## 2024-11-11 RX ORDER — CLOPIDOGREL 75 MG/1
75 TABLET, FILM COATED ORAL DAILY
Refills: 0 | Status: DISCONTINUED | OUTPATIENT
Start: 2024-11-12 | End: 2024-12-03

## 2024-11-11 RX ORDER — 0.9 % SODIUM CHLORIDE 0.9 %
1000 INTRAVENOUS SOLUTION INTRAVENOUS
Refills: 0 | Status: DISCONTINUED | OUTPATIENT
Start: 2024-11-11 | End: 2024-12-03

## 2024-11-11 RX ORDER — METOPROLOL TARTRATE 50 MG
12.5 TABLET ORAL
Qty: 0 | Refills: 0 | DISCHARGE
Start: 2024-11-11

## 2024-11-11 RX ORDER — ACETAMINOPHEN 500MG 500 MG/1
650 TABLET, COATED ORAL EVERY 6 HOURS
Refills: 0 | Status: DISCONTINUED | OUTPATIENT
Start: 2024-11-11 | End: 2024-11-14

## 2024-11-11 RX ORDER — INSULIN GLARGINE 100 [IU]/ML
12 INJECTION, SOLUTION SUBCUTANEOUS AT BEDTIME
Refills: 0 | Status: DISCONTINUED | OUTPATIENT
Start: 2024-11-11 | End: 2024-11-11

## 2024-11-11 RX ORDER — FAMOTIDINE 20 MG/1
40 TABLET, FILM COATED ORAL DAILY
Refills: 0 | Status: DISCONTINUED | OUTPATIENT
Start: 2024-11-12 | End: 2024-12-03

## 2024-11-11 RX ORDER — PANTOPRAZOLE SODIUM 40 MG/1
40 TABLET, DELAYED RELEASE ORAL
Refills: 0 | Status: DISCONTINUED | OUTPATIENT
Start: 2024-11-12 | End: 2024-11-11

## 2024-11-11 RX ORDER — ACETAMINOPHEN 500 MG
2 TABLET ORAL
Qty: 0 | Refills: 0 | DISCHARGE
Start: 2024-11-11

## 2024-11-11 RX ORDER — ASPIRIN/MAG CARB/ALUMINUM AMIN 325 MG
1 TABLET ORAL
Qty: 0 | Refills: 0 | DISCHARGE
Start: 2024-11-11

## 2024-11-11 RX ORDER — ENOXAPARIN SODIUM 40MG/0.4ML
40 SYRINGE (ML) SUBCUTANEOUS
Qty: 0 | Refills: 0 | DISCHARGE
Start: 2024-11-11

## 2024-11-11 RX ORDER — POLYETHYLENE GLYCOL 3350 17 G/17G
17 POWDER, FOR SOLUTION ORAL
Qty: 0 | Refills: 0 | DISCHARGE
Start: 2024-11-11

## 2024-11-11 RX ORDER — SENNA 187 MG
2 TABLET ORAL
Qty: 0 | Refills: 0 | DISCHARGE
Start: 2024-11-11

## 2024-11-11 RX ORDER — GLUCAGON INJECTION, SOLUTION 0.5 MG/.1ML
1 INJECTION, SOLUTION SUBCUTANEOUS ONCE
Refills: 0 | Status: DISCONTINUED | OUTPATIENT
Start: 2024-11-11 | End: 2024-12-03

## 2024-11-11 RX ORDER — METOPROLOL TARTRATE 50 MG
1 TABLET ORAL
Refills: 0 | DISCHARGE

## 2024-11-11 RX ORDER — SENNOSIDES 8.6 MG
2 TABLET ORAL AT BEDTIME
Refills: 0 | Status: DISCONTINUED | OUTPATIENT
Start: 2024-11-11 | End: 2024-12-03

## 2024-11-11 RX ORDER — INSULIN GLARGINE 100 [IU]/ML
12 INJECTION, SOLUTION SUBCUTANEOUS AT BEDTIME
Refills: 0 | Status: DISCONTINUED | OUTPATIENT
Start: 2024-11-11 | End: 2024-11-12

## 2024-11-11 RX ORDER — POLYETHYLENE GLYCOL 3350 17 G/17G
17 POWDER, FOR SOLUTION ORAL DAILY
Refills: 0 | Status: DISCONTINUED | OUTPATIENT
Start: 2024-11-12 | End: 2024-12-03

## 2024-11-11 RX ORDER — METOPROLOL TARTRATE 100 MG/1
12.5 TABLET, FILM COATED ORAL EVERY 12 HOURS
Refills: 0 | Status: DISCONTINUED | OUTPATIENT
Start: 2024-11-11 | End: 2024-11-22

## 2024-11-11 RX ORDER — CLOPIDOGREL 75 MG/1
1 TABLET ORAL
Qty: 0 | Refills: 0 | DISCHARGE
Start: 2024-11-11

## 2024-11-11 RX ORDER — ACETAMINOPHEN, DIPHENHYDRAMINE HCL, PHENYLEPHRINE HCL 325; 25; 5 MG/1; MG/1; MG/1
6 TABLET ORAL AT BEDTIME
Refills: 0 | Status: DISCONTINUED | OUTPATIENT
Start: 2024-11-11 | End: 2024-12-03

## 2024-11-11 RX ADMIN — ENOXAPARIN SODIUM 40 MILLIGRAM(S): 30 INJECTION SUBCUTANEOUS at 23:19

## 2024-11-11 RX ADMIN — Medication 12.5 MILLIGRAM(S): at 05:04

## 2024-11-11 RX ADMIN — Medication 2 PUFF(S): at 05:04

## 2024-11-11 RX ADMIN — Medication 4 UNIT(S): at 17:46

## 2024-11-11 RX ADMIN — Medication 2: at 11:41

## 2024-11-11 RX ADMIN — INSULIN GLARGINE 12 UNIT(S): 100 INJECTION, SOLUTION SUBCUTANEOUS at 21:35

## 2024-11-11 RX ADMIN — Medication 500 MILLIGRAM(S): at 18:06

## 2024-11-11 RX ADMIN — METOPROLOL TARTRATE 12.5 MILLIGRAM(S): 100 TABLET, FILM COATED ORAL at 18:06

## 2024-11-11 RX ADMIN — CLOPIDOGREL 75 MILLIGRAM(S): 75 TABLET ORAL at 05:04

## 2024-11-11 RX ADMIN — Medication 2 TABLET(S): at 21:22

## 2024-11-11 RX ADMIN — POLYETHYLENE GLYCOL 3350 17 GRAM(S): 17 POWDER, FOR SOLUTION ORAL at 11:42

## 2024-11-11 RX ADMIN — Medication 2: at 17:46

## 2024-11-11 RX ADMIN — Medication 20 MILLIGRAM(S): at 21:22

## 2024-11-11 RX ADMIN — Medication 325 MILLIGRAM(S): at 05:04

## 2024-11-11 RX ADMIN — Medication 4 UNIT(S): at 07:57

## 2024-11-11 RX ADMIN — Medication 4 UNIT(S): at 11:41

## 2024-11-11 NOTE — H&P ADULT - ASSESSMENT
Assessment/Plan:  PEPE KIM is a 68 y/o M with PMHx of HTN, HLD, DM, originally presented to [facility] on 10/27 as a code stroke after developed right sided weakness, pulmonary edema and hypertensive emergency in the setting of NSTEMI/HF. 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 Ulrich on 11/5.  Now admitted to [facility] with right sided deficits for initiation of a multidisciplinary rehab program consisting focused on functional mobility, transfers and ADLs.    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 Ulrich on 11/5.   -Carotid dopplers: patent L carotid stent  -Aspirin 325mg daily  -Plavix 75mg daily      Deficits: R hemiparesis   Comprehensive Multidisciplinary Rehab Program:  - Start comprehensive rehab program, 3 hours a day, 5 days a week.  - PT 1hr/day: Focused on improving strength, endurance, coordination, balance, functional mobility, and transfers  - OT 1hr/day: Focused on improving strength, fine motor skills, coordination, posture and ADLs.    - Speech Therapy 1hr/day: to diagnose and treat deficits in swallowing, cognition and communication.   - Activity Limitations: Decreased social, vocational and leisure activities, decreased self care and ADLs, decreased mobility, decreased ability to manage household and finances.     -----------------------------------------------------------------------------------  Concurrent Medical Problems    #NSTEMI  #CHF  #HLD/HTN  - EKG (11/6)-  NSR with occasional PVC ,  Incomplete RBBB, T wave abnormality, consider inferolateral ischemia   -TTE at [facility] showed EF 45-50%.  - Repeat TTE 11/7--> Agitated saline injection was negative for intracardiac shunt  -Metoprolol 12.5mg BID   -Aspirin 325mg daily  -Plavix 75mg daily   -Atorvastatin 80mg HS (LDL 77, )   -Elevated troponins---> continue to trend  -Cardiac cath deferred outpatient f/u d/t acute CVA    #Asthma  -Albuterol PRN    #T2DM  -A1c 9.6  -Lantus 12U  -FBG ACHS + Mod ISS    #Mood/Cognition:  Neuropsychology consult PRN    #Sleep:   Maintain quiet hours and low stim environment.  Melatonin 6mg HS PRN to maximize participation in therapy during the day.     #Pain Management:  Analgesic: Tylenol PRN    #GI/Bowel:  Senna QHS, Miralax PRN Daily  GI ppx: Pantoprazole 40mg daily     #/Bladder:   - PVR x1 on admission  (SC if > 400)    #Skin/Pressure Injury:   - Skin assessment on admission: ***    #Diet  SLP consult for swallow function evaluation and treatment  Current Diet: Regular, carb consistent diet   [X] Aspiration Precautions  Nutrition consult     #Precautions / PROPHYLAXIS:   - Falls  - ortho: Weight bearing status: WBAT   - Lungs: Aspiration, Incentive Spirometer   - DVT ppx: Lovenox 40mg daily, SCDs  - Pressure injury/Skin: OOB to Chair, PT/OT      ---------------  Code Status: FULL  Emergency Contact:    Outpatient Follow-up (Specialty/Name of physician):    Farhat Ulrich  Radiology  [address], [address]  [place] [address]  Phone: [phone]  Fax: [phone]      --------------  Goals: Safe discharge to Home*****  Estimated Length of Stay: 10-14 days  Rehab Potential: Good  Medical Prognosis: Good  Estimated Disposition: Home with Home Care  ---------------    PRESCREEN COMPARISON:  I have reviewed the prescreen information and I have found no relevant changes between the preadmission screening and my post admission evaluation.    RATIONALE FOR INPATIENT ADMISSION: Patient demonstrates the following:  [X] Medically appropriate for rehabilitation admission  [X] Has attainable rehab goals with an appropriate initial discharge plan  [X]Has rehabilitation potential (expected to make a significant improvement within a reasonable period of time)  [X] Requires close medical management by a rehab physician, rehab nursing care, Hospitalist and comprehensive interdisciplinary team (including PT, OT and/or SLP, Prosthetics and Orthotics)       Assessment/Plan:  PEPE KIM is a 70 y/o M with PMHx of HTN, HLD, DM, originally presented to [facility] on 10/27 as a code stroke after developed right sided weakness, pulmonary edema and hypertensive emergency in the setting of NSTEMI/HF. 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 for angio/LICA stent w/ Dr. Ulrich on 11/5.  Now admitted to [facility] with right sided deficits for initiation of a multidisciplinary rehab program consisting focused on functional mobility, transfers and ADLs.    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 Ulrich on 11/5.   -Carotid dopplers: patent L carotid stent  -Aspirin 325mg daily  -Plavix 75mg daily      Deficits: R hemiparesis   Comprehensive Multidisciplinary Rehab Program:  - Start comprehensive rehab program, 3 hours a day, 5 days a week.  - PT 1hr/day: Focused on improving strength, endurance, coordination, balance, functional mobility, and transfers  - OT 1hr/day: Focused on improving strength, fine motor skills, coordination, posture and ADLs.    - Speech Therapy 1hr/day: to diagnose and treat deficits in swallowing, cognition and communication.   - Activity Limitations: Decreased social, vocational and leisure activities, decreased self care and ADLs, decreased mobility, decreased ability to manage household and finances.     -----------------------------------------------------------------------------------  Concurrent Medical Problems    #NSTEMI  #CHF  #HLD/HTN  - EKG (11/6)-  NSR with occasional PVC ,  Incomplete RBBB, T wave abnormality, consider inferolateral ischemia   -TTE at [facility] showed EF 45-50%.  - Repeat TTE 11/7--> Agitated saline injection was negative for intracardiac shunt  -Metoprolol 12.5mg BID   -Aspirin 325mg daily  -Plavix 75mg daily   -Atorvastatin 80mg HS (LDL 77, )   -Elevated troponins---> continue to trend  -Cardiac cath deferred outpatient f/u d/t acute CVA  -Baseline EKG on admission    #Asthma  -Albuterol PRN    #T2DM  -A1c 9.6  -Lantus 12U  -FBG ACHS + Mod ISS    #Mood/Cognition:  Neuropsychology consult PRN    #Sleep:   Maintain quiet hours and low stim environment.  Melatonin 6mg HS PRN to maximize participation in therapy during the day.     #Pain Management:  Analgesic: Tylenol PRN    #GI/Bowel:  Senna QHS, Miralax PRN Daily  GI ppx: Pantoprazole 40mg daily     #/Bladder:   - PVR x1 on admission  (SC if > 400)    #Skin/Pressure Injury:   - Skin assessment on admission: ***    #Diet  SLP consult for swallow function evaluation and treatment  Current Diet: Regular, carb consistent diet   [X] Aspiration Precautions  Nutrition consult     #Precautions / PROPHYLAXIS:   - Falls  - ortho: Weight bearing status: WBAT   - Lungs: Aspiration, Incentive Spirometer   - DVT ppx: Lovenox 40mg daily, SCDs  - Pressure injury/Skin: OOB to Chair, PT/OT      ---------------  Code Status: FULL  Emergency Contact:    Outpatient Follow-up (Specialty/Name of physician):    Farhat Ulrich  Radiology  [address], [address]  [place] [address]  Phone: [phone]  Fax: [phone]      --------------  Goals: Safe discharge to Home*****  Estimated Length of Stay: 10-14 days  Rehab Potential: Good  Medical Prognosis: Good  Estimated Disposition: Home with Home Care  ---------------    PRESCREEN COMPARISON:  I have reviewed the prescreen information and I have found no relevant changes between the preadmission screening and my post admission evaluation.    RATIONALE FOR INPATIENT ADMISSION: Patient demonstrates the following:  [X] Medically appropriate for rehabilitation admission  [X] Has attainable rehab goals with an appropriate initial discharge plan  [X]Has rehabilitation potential (expected to make a significant improvement within a reasonable period of time)  [X] Requires close medical management by a rehab physician, rehab nursing care, Hospitalist and comprehensive interdisciplinary team (including PT, OT and/or SLP, Prosthetics and Orthotics)       Assessment/Plan:  PEPE KIM is a 70 y/o M with PMHx of HTN, HLD, DM, originally presented to [facility] on 10/27 as a code stroke after developed right sided weakness, pulmonary edema and hypertensive emergency in the setting of NSTEMI/HF. 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 Ulrich on 11/5.  Now admitted to [facility] with right sided deficits for initiation of a multidisciplinary rehab program consisting focused on functional mobility, transfers and ADLs.    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 Ulrich on 11/5.   -Carotid dopplers: patent L carotid stent  -Aspirin 325mg daily  -Plavix 75mg daily      Deficits: R hemiparesis   Comprehensive Multidisciplinary Rehab Program:  - Start comprehensive rehab program, 3 hours a day, 5 days a week.  - PT 1hr/day: Focused on improving strength, endurance, coordination, balance, functional mobility, and transfers  - OT 1hr/day: Focused on improving strength, fine motor skills, coordination, posture and ADLs.    - Speech Therapy 1hr/day: to diagnose and treat deficits in swallowing, cognition and communication.   - Activity Limitations: Decreased social, vocational and leisure activities, decreased self care and ADLs, decreased mobility, decreased ability to manage household and finances.     -----------------------------------------------------------------------------------  Concurrent Medical Problems    #NSTEMI  #CHF  #HLD/HTN  - EKG (11/6)-  NSR with occasional PVC ,  Incomplete RBBB, T wave abnormality, consider inferolateral ischemia   -TTE at [facility] showed EF 45-50%.  - Repeat TTE 11/7--> Agitated saline injection was negative for intracardiac shunt  -Metoprolol 12.5mg BID   -Aspirin 325mg daily  -Plavix 75mg daily   -Atorvastatin 80mg HS (LDL 77, )   -Elevated troponins---> (10/27: 104--> 148--> 190--> 10/28: 236-->10/2--> 312--> 10/30: 298)   -Cardiac cath deferred outpatient f/u d/t acute CVA  -Baseline EKG on admission    #Asthma  -Albuterol PRN    #T2DM  -A1c 9.6  -Lantus 12U  -FBG ACHS + Mod ISS    #Mood/Cognition:  Neuropsychology consult PRN    #Sleep:   Maintain quiet hours and low stim environment.  Melatonin 6mg HS PRN to maximize participation in therapy during the day.     #Pain Management:  Analgesic: Tylenol PRN    #GI/Bowel:  Senna QHS, Miralax PRN Daily  GI ppx: Pantoprazole 40mg daily     #/Bladder:   - PVR x1 on admission  (SC if > 400)    #Skin/Pressure Injury:   - Skin assessment on admission: ***    #Diet  SLP consult for swallow function evaluation and treatment  Current Diet: Regular, carb consistent diet   [X] Aspiration Precautions  Nutrition consult     #Precautions / PROPHYLAXIS:   - Falls  - ortho: Weight bearing status: WBAT   - Lungs: Aspiration, Incentive Spirometer   - DVT ppx: Lovenox 40mg daily, SCDs  - Pressure injury/Skin: OOB to Chair, PT/OT      ---------------  Code Status: FULL  Emergency Contact:    Outpatient Follow-up (Specialty/Name of physician):    Farhat Ulrich  Radiology  [address], [address]  [place] [address]  Phone: [phone]  Fax: [phone]      --------------  Goals: Safe discharge to Home*****  Estimated Length of Stay: 10-14 days  Rehab Potential: Good  Medical Prognosis: Good  Estimated Disposition: Home with Home Care  ---------------    PRESCREEN COMPARISON:  I have reviewed the prescreen information and I have found no relevant changes between the preadmission screening and my post admission evaluation.    RATIONALE FOR INPATIENT ADMISSION: Patient demonstrates the following:  [X] Medically appropriate for rehabilitation admission  [X] Has attainable rehab goals with an appropriate initial discharge plan  [X]Has rehabilitation potential (expected to make a significant improvement within a reasonable period of time)  [X] Requires close medical management by a rehab physician, rehab nursing care, Hospitalist and comprehensive interdisciplinary team (including PT, OT and/or SLP, Prosthetics and Orthotics)       PEPE KIM is a 70 y/o M with PMHx of HTN, HLD, DM, who presented to [facility] on 10/27 with right sided weakness/code stroke, pulmonary edema and hypertensive emergency in the setting of NSTEMI/HF. 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 Ulrich on 11/5/24.  Now admitted to [facility] with right sided deficits for initiation of a multidisciplinary rehab program consisting focused on functional mobility, transfers and ADLs.    # L MCA Infarct with right body involvement  - CTA +right carotid stenosis of 50% and left carotid stenosis of 90%  - MR brain w/ L watershed/MCA strokes in the setting of high grade proximal LICA stenosis >90%  - S/p angio/LICA stent w/ Dr. Ulrich on 11/5.   - Aspirin 325mg daily  - Plavix 75mg daily   - Start comprehensive rehab program, 3 hours a day, 5 days a week. PT OT SLP  - Precautions: cardiac, DM, fall,         # NSTEMI  # CHF  # HLD/HTN  - EKG (11/6)-  NSR with occasional PVC ,  Incomplete RBBB, T wave abnormality, consider inferolateral ischemia   - Elevated troponins---> (10/27: 104--> 148--> 190--> 10/28: 236-->10/2--> 312--> 10/30: 298)   - TTE at [facility] showed EF 45-50%.  - Metoprolol 12.5mg BID   - Aspirin 325mg daily  - Plavix 75mg daily   - Atorvastatin 80mg HS (LDL 77, )   - Cardiac cath deferred outpatient f/u d/t acute CVA  - Baseline EKG on admission  - hospitalist consult    # Asthma  - Albuterol PRN    # T2DM  - A1c 9.6  - Lantus 12U  - FBG ACHS + Mod ISS  - hospitalist and nutrition consults    # Sleep:   - Melatonin 6mg HS PRN     # Pain Management:  - Tylenol PRN    # GI/Bowel:  - Senna QHS, Miralax PRN Daily  - GI ppx: Pantoprazole 40mg daily     # /Bladder:   - PVR x1 on admission  (SC if > 400)    # Skin/Pressure Injury:   - Skin assessment on admission: ***    # Diet  - Regular, carb consistent diet, DASH/TLC  - Nutrition consult     # DVT ppx:   - Lovenox 40mg daily, SCDs    # LABS  EKG  CBC CMP 11/12    ---------------  Code Status: FULL  Emergency Contact:    Outpatient Follow-up (Specialty/Name of physician):    Farhat Ulrich  Radiology  [address], [address]  [place] [address]  Phone: [phone]  Fax: [phone]      - PEPE KIM is a 68 y/o M with PMHx of HTN, HLD, DM, who presented to [facility] on 10/27 with right sided weakness/code stroke, pulmonary edema and hypertensive emergency in the setting of NSTEMI/HF. 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 Ulrich on 11/5/24.  Now admitted to [facility] with right sided deficits for initiation of a multidisciplinary rehab program consisting focused on functional mobility, transfers and ADLs.    # L MCA Infarct with right body involvement  - CTA +right carotid stenosis of 50% and left carotid stenosis of 90%  - MR brain w/ L watershed/MCA strokes in the setting of high grade proximal LICA stenosis >90%  - S/p angio/LICA stent w/ Dr. Ulrich on 11/5.   - Aspirin 325mg daily  - Plavix 75mg daily   - Start comprehensive rehab program, 3 hours a day, 5 days a week. PT OT SLP  - Precautions: cardiac, DM, fall,         # NSTEMI  # CHF  # HLD/HTN  - EKG (11/6)-  NSR with occasional PVC ,  Incomplete RBBB, T wave abnormality, consider inferolateral ischemia   - Elevated troponins---> (10/27: 104--> 148--> 190--> 10/28: 236-->10/2--> 312--> 10/30: 298)   - TTE at [facility] showed EF 45-50%.  - Metoprolol 12.5mg BID   - Aspirin 325mg daily  - Plavix 75mg daily   - Atorvastatin 20mg HS (LDL 77, )   - Cardiac cath deferred outpatient f/u d/t acute CVA  - Baseline EKG on admission  - hospitalist consult    # Asthma  - Albuterol PRN    # T2DM  - A1c 9.6  - Lantus 12U can be titrated up to 50U if needed  - FBG ACHS + Mod ISS  - hospitalist and nutrition consults    # Sleep:   - Melatonin 6mg HS PRN     # Pain Management:  - Tylenol PRN    # GI/Bowel:  - Senna QHS, Miralax PRN Daily  - GI ppx: famotidine 40mg daily     # /Bladder:   - PVR x1 on admission  (SC if > 400)    # Skin/Pressure Injury:   turn patient over q2 hours    # Diet  - Regular, carb consistent diet, DASH/TLC  - Nutrition consult     # DVT ppx:   - Lovenox 40mg daily, SCDs    # LABS  EKG  CBC CMP 11/12    ---------------  Code Status: FULL  Emergency Contact:    Outpatient Follow-up (Specialty/Name of physician):    Farhat Ulrich  Radiology  [address], [address]  [place] [address]  Phone: [phone]  Fax: [phone]      - PEPE KIM is a 70 y/o M with PMHx of HTN, HLD, DM, who presented to [facility] on 10/27 with right sided weakness/code stroke, pulmonary edema and hypertensive emergency in the setting of NSTEMI/HF. CTA found to have right carotid stenosis of 50% and left carotid stenosis of 90%, as well as segmental occlusion in thoracic aorta  Patient was transferred from [facility] to [facility] for angio/LICA stent w/ Dr. Ulrich on 11/5/24.  Now admitted to [facility] with right sided deficits for initiation of a multidisciplinary rehab program consisting focused on functional mobility, transfers and ADLs.    # L MCA Infarct with right body involvement  - CTA +right carotid stenosis of 50% and left carotid stenosis of 90%  - MR brain w/ L watershed/MCA strokes in the setting of high grade proximal LICA stenosis >90%  - S/p angio/LICA stent w/ Dr. Ulrich on 11/5.   - Aspirin 325mg daily  - Plavix 75mg daily   - Start comprehensive rehab program, 3 hours a day, 5 days a week. PT OT SLP  - Precautions: cardiac, DM, fall,     # NSTEMI  # CHF  # HLD/HTN  - EKG (11/6)-  NSR with occasional PVC ,  Incomplete RBBB, T wave abnormality, consider inferolateral ischemia   - Elevated troponins---> (10/27: 104--> 148--> 190--> 10/28: 236-->10/2--> 312--> 10/30: 298)   - TTE at [facility] showed EF 45-50%.  - Metoprolol 12.5mg BID   - Aspirin 325mg daily  - Plavix 75mg daily   - Atorvastatin 20mg HS (LDL 77, )   - Cardiac cath deferred outpatient f/u d/t acute CVA  - Baseline EKG on admission  - hospitalist consult  - BP (111/79 - 150/92) 11/12    # Asthma  - Albuterol PRN    # T2DM  - A1c 9.6  - Lantus 12U can be titrated up to 50U if needed  - FBG ACHS + Mod ISS  - hospitalist and nutrition consults    # Sleep:   - Melatonin 6mg HS PRN     # Pain Management:  - Tylenol PRN    # GI/Bowel:  - Senna QHS, Miralax PRN Daily  - GI ppx: famotidine 40mg daily     # /Bladder:   - PVR x1 on admission  (SC if > 400)    # Skin/Pressure Injury:   turn patient over q2 hours    # Diet  - Regular, carb consistent diet, DASH/TLC  - Nutrition consult     # DVT ppx:   - Lovenox 40mg daily, SCDs    # LABS  EKG  CBC CMP 11/12    ---------------  Code Status: FULL  Emergency Contact:    Outpatient Follow-up (Specialty/Name of physician):    Farhat Ulrich  Radiology  [address], [address]  [place] [address]  Phone: [phone]  Fax: [phone]      - PEPE KIM is a 68 y/o M with PMHx of HTN, HLD, DM, who presented to [facility] on 10/27 with right sided weakness/code stroke, pulmonary edema and hypertensive emergency in the setting of NSTEMI/HF. 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 Ulrich on 11/5/24.  Now admitted to [facility] with right sided deficits for initiation of a multidisciplinary rehab program consisting focused on functional mobility, transfers and ADLs.    # L MCA Infarct with right body involvement  - CTA +right carotid stenosis of 50% and left carotid stenosis of 90%  - MR brain w/ L watershed/MCA strokes in the setting of high grade proximal LICA stenosis >90%  - S/p angio/LICA stent w/ Dr. Ulrich on 11/5.   - Aspirin 325mg daily  - Plavix 75mg daily   - Start comprehensive rehab program, 3 hours a day, 5 days a week. PT OT SLP  - Precautions: cardiac, DM, fall,     # NSTEMI  # CHF  # HLD/HTN  - EKG (11/6)-  NSR with occasional PVC ,  Incomplete RBBB, T wave abnormality, consider inferolateral ischemia   - EKG 11/11:  Normal sinus rhythm, ST and T wave abnormality, consider anterolateral ischemia. Abnormal ECG  - Elevated troponins---> (10/27: 104--> 148--> 190--> 10/28: 236-->10/2--> 312--> 10/30: 298)   - TTE at [facility] showed EF 45-50%.  - Metoprolol 12.5mg BID   - Aspirin 325mg daily  - Plavix 75mg daily   - Atorvastatin 20mg HS (LDL 77, )   - Cardiac cath deferred outpatient f/u d/t acute CVA  - Baseline EKG on admission  - hospitalist consult  - BP (111/79 - 150/92) 11/12    # Asthma  - Albuterol PRN    # T2DM  - A1c 9.6  - Lantus 12U can be titrated up to 50U if needed  - FBG ACHS + Mod ISS  - hospitalist and nutrition consults    # leukocytosis  - fluctuating, afebrile, high of 14.27 11/6.. Today 12.16  - afebrile, no tachy. Will monitor for now  - UA 11/27 negative, CXR 11/5 prominent markings but attributed to cardiogenic etiology rather than infectious. If WBC increased will repeat    # Sleep:   - Melatonin 6mg HS PRN     # Pain Management:  - Tylenol PRN    # GI/Bowel:  - Senna QHS, Miralax PRN Daily  - GI ppx: famotidine 40mg daily     # /Bladder:   - PVR x1 on admission  (SC if > 400)    # Skin/Pressure Injury:   turn patient over q2 hours    # Diet  - Regular, carb consistent diet, DASH/TLC  - Nutrition consult     # DVT ppx:   - Lovenox 40mg daily, SCDs    # LABS  EKG  CBC 11/13  CBC CMP 11/14    ---------------  Code Status: FULL  Emergency Contact:    Outpatient Follow-up (Specialty/Name of physician):    Farhat Ulrich  Radiology  [address], [address]  [place] [address]  Phone: [phone]  Fax: [phone] - PEPE KIM is a 70 y/o M with PMHx of HTN, HLD, DM, who presented to [facility] on 10/27 with right sided weakness/code stroke, pulmonary edema and hypertensive emergency in the setting of NSTEMI/HF. CTA found to have right carotid stenosis of 50% and left carotid stenosis of 90%, as well as segmental occlusion in thoracic aorta  Patient was transferred from [facility] to [facility] for angio/LICA stent w/ Dr. Ulrich on 11/5/24.  Now admitted to [facility] with right sided deficits for initiation of a multidisciplinary rehab program consisting focused on functional mobility, transfers and ADLs.    # L MCA Infarct with right body involvement  - CTA +right carotid stenosis of 50% and left carotid stenosis of 90%  - MR brain w/ L watershed/MCA strokes in the setting of high grade proximal LICA stenosis >90%  - S/p angio/LICA stent w/ Dr. Ulrich on 11/5.   - Aspirin 325mg daily  - Plavix 75mg daily   - Start comprehensive rehab program, 3 hours a day, 5 days a week. PT OT SLP  - Precautions: cardiac, DM, fall,     # NSTEMI  # CHF  # HLD/HTN  - EKG (11/6)-  NSR with occasional PVC ,  Incomplete RBBB, T wave abnormality, consider inferolateral ischemia   - EKG 11/11:  Normal sinus rhythm, ST and T wave abnormality, consider anterolateral ischemia.   - Elevated troponins---> (10/27: 104--> 148--> 190--> 10/28: 236-->10/2--> 312--> 10/30: 298)   - TTE at [facility] showed EF 45-50%.  - Metoprolol 12.5mg BID   - Aspirin 325mg daily  - Plavix 75mg daily   - Atorvastatin 20mg HS (LDL 77, )   - Cardiac cath deferred outpatient f/u d/t acute CVA  - Baseline EKG on admission  - hospitalist consult  - BP (111/79 - 150/92) 11/12    # Asthma  - Albuterol PRN    # T2DM  - A1c 9.6  - Lantus 12U can be titrated up to 50U if needed  - FBG ACHS + Mod ISS  - hospitalist and nutrition consults    # leukocytosis  - fluctuating, afebrile, high of 14.27 11/6.. Today 12.16  - afebrile, no tachy. Will monitor for now  - UA 11/27 negative, CXR 11/5 prominent markings but attributed to cardiogenic etiology rather than infectious. If WBC increased will repeat    # Sleep:   - Melatonin 6mg HS PRN     # Pain Management:  - Tylenol PRN    # GI/Bowel:  - Senna QHS, Miralax PRN Daily  - GI ppx: famotidine 40mg daily     # /Bladder:   - PVR x1 on admission  (SC if > 400)    # Skin/Pressure Injury:   turn patient over q2 hours    # Diet  - Regular, carb consistent diet, DASH/TLC  - Nutrition consult     # DVT ppx:   - Lovenox 40mg daily, SCDs    # LABS  CBC 11/13  CBC CMP 11/14    ---------------  Code Status: FULL  Emergency Contact:    Outpatient Follow-up (Specialty/Name of physician):    Farhat Ulrich  Radiology  [address], [address]  [place] [address]  Phone: [phone]  Fax: [phone]      - PEPE KIM is a 68 y/o M with PMHx of HTN, HLD, DM, who [facility] with right sided weakness/code stroke, pulmonary edema and hypertensive emergency in the setting of NSTEMI/HF. 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 Ulrich on 11/5/24.  Now admitted to [facility] with right sided deficits for initiation of a multidisciplinary rehab program consisting focused on functional mobility, transfers and ADLs.    # L MCA Infarct with right body involvement  - CTA +right carotid stenosis of 50% and left carotid stenosis of 90%  - MR brain w/ L watershed/MCA strokes in the setting of high grade proximal LICA stenosis >90%  - S/p angio/LICA stent w/ Dr. Ulrich on 11/5.   - Aspirin 325mg daily  - Plavix 75mg daily   - Start comprehensive rehab program, 3 hours a day, 5 days a week. PT OT SLP  - Precautions: cardiac, DM, fall,     # NSTEMI  # CHF  # HLD/HTN  - EKG (11/6)-  NSR with occasional PVC ,  Incomplete RBBB, T wave abnormality, consider inferolateral ischemia   - EKG 11/11:  Normal sinus rhythm, ST and T wave abnormality, consider anterolateral ischemia.   - Elevated troponins---> (10/27: 104--> 148--> 190--> 10/28: 236-->10/2--> 312--> 10/30: 298)   - TTE at [facility] showed EF 45-50%.  - Metoprolol 12.5mg BID   - Aspirin 325mg daily  - Plavix 75mg daily   - Atorvastatin 20mg HS (LDL 77, )   - Cardiac cath deferred outpatient f/u d/t acute CVA  - Baseline EKG on admission  - hospitalist consult  - BP (111/79 - 150/92) 11/12    # Asthma  - Albuterol PRN    # T2DM, uncontrolled with hyperglycemia  - A1c 9.6  - Lantus 12U--> increased 15u 11/12  - FBG ACHS + Mod ISS  - hospitalist and nutrition consults    # leukocytosis  - fluctuating, afebrile, high of 14.27 11/6.. Today 12.16  - afebrile, no tachy. Will monitor for now  - UA 11/27 negative, CXR 11/5 prominent markings but attributed to cardiogenic etiology rather than infectious. If WBC increased will repeat    # Sleep:   - Melatonin 6mg HS PRN     # Pain Management:  - Tylenol PRN    # GI/Bowel:  - Senna QHS, Miralax PRN Daily  - GI ppx: famotidine 40mg daily     # /Bladder:   - PVR x1 on admission  (SC if > 400)    # Skin/Pressure Injury:   turn patient over q2 hours    # Diet  - Regular, carb consistent diet, DASH/TLC  - Nutrition consult     # DVT ppx:   - Lovenox 40mg daily, SCDs    # LABS  CBC 11/13  CBC CMP 11/14    ---------------  Code Status: FULL  Emergency Contact:    Outpatient Follow-up (Specialty/Name of physician):    Farhat Ulrich  Radiology  [address], [address]  [place] [address]  Phone: [phone]  Fax: [phone] -

## 2024-11-11 NOTE — PROGRESS NOTE ADULT - SUBJECTIVE AND OBJECTIVE BOX
Teto Vásquez MD  Interventional Cardiology / Endovascular Specialist  [place] Office : [phone] [place] [address] [place] [address]  Tel:   [place] Office : [address] [facility] [place] [address]  Tel: [phone]    Pt is lying in bed comfortable not in distress, no chest pains no SOB no palpitations  	  	  MEDICATIONS:  clopidogrel Tablet 75 milliGRAM(s) Oral <User Schedule>  enoxaparin Injectable 40 milliGRAM(s) SubCutaneous every 24 hours  metoprolol tartrate 12.5 milliGRAM(s) Oral every 12 hours      albuterol    90 MICROgram(s) HFA Inhaler 2 Puff(s) Inhalation every 12 hours    acetaminophen     Tablet .. 650 milliGRAM(s) Oral every 6 hours PRN  aspirin 325 milliGRAM(s) Oral <User Schedule>    polyethylene glycol 3350 17 Gram(s) Oral daily  senna 2 Tablet(s) Oral at bedtime    atorvastatin 80 milliGRAM(s) Oral at bedtime  insulin glargine Injectable (LANTUS) 12 Unit(s) SubCutaneous at bedtime  insulin lispro (ADMELOG) corrective regimen sliding scale   SubCutaneous at bedtime  insulin lispro (ADMELOG) corrective regimen sliding scale   SubCutaneous three times a day before meals  insulin lispro Injectable (ADMELOG) 4 Unit(s) SubCutaneous three times a day before meals        PAST MEDICAL/SURGICAL HISTORY  PAST MEDICAL & SURGICAL HISTORY:  Diabetes mellitus      HTN (hypertension)      Hyperlipidemia          SOCIAL HISTORY: Substance Use (street drugs): ( x ) never used  (  ) other:    FAMILY HISTORY:      REVIEW OF SYSTEMS:  CONSTITUTIONAL: No fever, weight loss, or fatigue  EYES: No eye pain, visual disturbances, or discharge  ENMT:  No difficulty hearing, tinnitus, vertigo; No sinus or throat pain  BREASTS: No pain, masses, or nipple discharge  GASTROINTESTINAL: No abdominal or epigastric pain. No nausea, vomiting, or hematemesis; No diarrhea or constipation. No melena or hematochezia.  GENITOURINARY: No dysuria, frequency, hematuria, or incontinence  NEUROLOGICAL: No headaches, memory loss, loss of strength, numbness, or tremors  ENDOCRINE: No heat or cold intolerance; No hair loss  MUSCULOSKELETAL: No joint pain or swelling; No muscle, back, or extremity pain  PSYCHIATRIC: No depression, anxiety, mood swings, or difficulty sleeping  HEME/LYMPH: No easy bruising, or bleeding gums  All others negative    PHYSICAL EXAM:  T(C): 36.9 (11-11-24 @ 19:41), Max: 36.9 (11-11-24 @ 00:54)  HR: 81 (11-11-24 @ 19:41) (69 - 88)  BP: 112/77 (11-11-24 @ 19:41) (106/69 - 181/86)  RR: 16 (11-11-24 @ 19:41) (14 - 18)  SpO2: 94% (11-11-24 @ 19:41) (93% - 96%)  Wt(kg): --  I&O's Summary    10 Nov 2024 07:01  -  11 Nov 2024 07:00  --------------------------------------------------------  IN: 600 mL / OUT: 1500 mL / NET: -900 mL    11 Nov 2024 07:01  -  11 Nov 2024 23:32  --------------------------------------------------------  IN: 600 mL / OUT: 0 mL / NET: 600 mL      Height (cm): 172.7 (11-11 @ 16:34)  Weight (kg): 67.4 (11-11 @ 16:34)  BMI (kg/m2): 22.6 (11-11 @ 16:34)  BSA (m2): 1.8 (11-11 @ 16:34)      GENERAL: NAD  EYES:   PERRLA   ENMT:   Moist mucous membranes, Good dentition, No lesions  Cardiovascular: Normal S1 S2, No JVD, No murmurs, No edema  Respiratory: Lungs clear to auscultation	  Gastrointestinal:  Soft, Non-tender, + BS	  Extremities: right sided paralysis              proBNP:   Lipid Profile:   HgA1c:   TSH:     Consultant(s) Notes Reviewed:  [x ] YES  [ ] NO    Care Discussed with Consultants/Other Providers [ x] YES  [ ] NO    Imaging Personally Reviewed independently:  [x] YES  [ ] NO    All labs, radiologic studies, vitals, orders and medications list reviewed. Patient is seen and examined at bedside. Case discussed with medical team.

## 2024-11-11 NOTE — H&P ADULT - NSHPLABSRESULTS_GEN_ALL_CORE
Complete Blood Count + Automated Diff in AM (11.09.24 @ 07:08)   WBC Count: 11.02 K/uL  RBC Count: 3.69 M/uL  Hemoglobin: 10.5 g/dL  Hematocrit: 32.6 %  Mean Cell Volume: 88.3 fl  Mean Cell Hemoglobin: 28.5 pg  Mean Cell Hemoglobin Conc: 32.2 g/dL  Red Cell Distrib Width: 13.5 %  Platelet Count - Automated: 391 K/uL  Auto Neutrophil #: 7.30 K/uL  Auto Lymphocyte #: 2.43 K/uL  Auto Monocyte #: 0.87 K/uL  Auto Eosinophil #: 0.33 K/uL  Auto Basophil #: 0.04 K/uL  Auto Neutrophil %: 66.1: Differential percentages must be correlated with absolute numbers for   clinical significance. %  Auto Lymphocyte %: 22.1 %  Auto Monocyte %: 7.9 %  Auto Eosinophil %: 3.0 %  Auto Basophil %: 0.4 %  Auto Immature Granulocyte %: 0.5: (Includes meta, myelo and promyelocytes). Mild elevations in immature   granulocytes may be seen with many inflammatory processes and pregnancy;   clinical correlation suggested. %  Nucleated RBC: 0 /100 WBCs    Basic Metabolic Panel in AM (11.09.24 @ 06:58)   Sodium: 136 mmol/L  Potassium: 4.1 mmol/L  Chloride: 104 mmol/L  Carbon Dioxide: 21 mmol/L  Anion Gap: 11 mmol/L  Blood Urea Nitrogen: 22 mg/dL  Creatinine: 1.06 mg/dL  Glucose: 189 mg/dL  Calcium: 8.9 mg/dL  eGFR: 76: The estimated glomerular filtration rate (eGFR) calculation is based on   the 2021 CKD-EPI creatinine equation, which is validated in male and   female population 18 years of age and older (N Engl J Med 2021;   385:2245-6443). mL/min/1.73m2    < from: CT Angio Head w/ IV Cont (11.05.24 @ 11:36) >    IMPRESSION: 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   Left V4 stenosis. High-grade stenosis left internal carotid artery at the   carotid bifurcation in the neck. No definite intracranial stenosis.    Recommend repeat chest x-ray to evaluate upper lobe consolidation versus   pulmonary edema.    < from: Xray Chest 1 View- PORTABLE-Urgent (Xray Chest 1 View- PORTABLE-Urgent .) (11.05.24 @ 16:59) >    IMPRESSION: Prominent lung markings likely cardiogenic rather than   infectious.    < from: [facility] Duplex Lower Ext Vein Scan, Bilat (11.06.24 @ 09:02) >    IMPRESSION:  No evidence of deep venous thrombosis in either lower extremity.    < from: CT Head No Cont (11.07.24 @ 15:37) >    IMPRESSION:    No new acute infarct or hemorrhage. Subacute infarcts in the left frontal   lobe and left centrum semiovale, as seen on prior exam.    < from: [facility] Duplex Carotid, Bilat (11.07.24 @ 20:36) >    IMPRESSION: No significant hemodynamic stenosis of the right carotid   artery.  Patent left internal carotid artery stent crossing the bulb.  Left external carotid artery stenosis.  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    Measurement of carotid stenosis is based on updated recommendations for   carotid stenosis interpretation criteria from the Intersocietal   Accreditation Commission (IAC) Vascular Testing Board, modified from the   Society of Radiologists in Ultrasound (SRU) Consensus Conference Criteria   for Internal Carotid Artery Stenosis.

## 2024-11-11 NOTE — H&P ADULT - NSHPREVIEWOFSYSTEMS_GEN_ALL_CORE
REVIEW OF SYSTEMS  Constitutional: No fever, No Chills, No fatigue  HEENT: No eye pain, No visual disturbances  Pulm: No cough,  No shortness of breath  Cardio: No chest pain, No palpitations  GI:  No abdominal pain, No nausea, No vomiting  : No dysuria, No frequency  Neuro: No headaches, No memory loss  Skin: No itching, No rashes  Endo: No temperature intolerance  MSK: No joint pain, No joint swelling  Psych:  No depression, No anxiety

## 2024-11-11 NOTE — PROGRESS NOTE ADULT - PROVIDER SPECIALTY LIST ADULT
Cardiology
NSICU
Neurosurgery
NSICU
NSICU
Neurosurgery
Intervent Radiology
NSICU
Rehab Medicine
Cardiology
Neurosurgery

## 2024-11-11 NOTE — H&P ADULT - HISTORY OF PRESENT ILLNESS
68 y/o M with PMHx of HTN, HLD, DM, originally presented to [facility] on 10/27 as a code stroke after developed right sided weakness, pulmonary edema and hypertensive emergency in the setting of NSTEMI/HF. 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 Ulrich on 11/5.  MR brain w/ L watershed/MCA strokes in the setting of high grade proximal LICA stenosis >90% at bifurcation. S/p catheter cerebral angiogram, angioplasty and left carotid wall stent placement with Dr. Farhat Ulrich on 11/6/2024. Right groin was accessed but catheter could not be advanced 2/2 to near occlusion of the right iliac, puncture closed with closure device and procedure proceeded through right radial artery. Case was c/b hypotension requiring multiple neosticks and IVF boluses to maintain hemodynamic stability. Post-op Carotid dopplers completed on 11/7/2024 demonstrated patent left ICA stent. Cardiology was consulted for remote history of NSTEMI, f/u Echo shows EF 45% segmental wall motion abnormalities, will continue ASA/ Plavix and f/u outpatient with another cardiac cath which was deferred d/t acute stroke. Post op course c/b fluctuating RLE MMT exam, repeat CTH stable. Patient optimized and was evaluated by PM&R and therapy for functional deficits, gait/ADL impairments and acute rehabilitation was recommended. Patient was cleared for discharge to [facility] IRU on 11/11/24.  68 y/o M with PMHx of HTN, HLD, DM, originally presented to [facility] on 10/27 as a code stroke after developed right sided weakness, pulmonary edema and hypertensive emergency in the setting of NSTEMI/HF. 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 for angio/LICA stent w/ Dr. Ulrich on 11/5/24.    MR brain revealed L watershed/MCA strokes in the setting of high grade proximal LICA stenosis >90% at bifurcation. Patient underwent catheter cerebral angiogram, angioplasty and left carotid wall stent placement with Dr. Farhat Ulrich on 11/6/2024. Right groin was accessed but catheter could not be advanced 2/2 to near occlusion of the right iliac, puncture closed with closure device and procedure proceeded through right radial artery. Case was complicated by hypotension requiring multiple neosticks and IVF boluses to maintain hemodynamic stability. Post-op Carotid dopplers completed on 11/7/2024 demonstrated patent left ICA stent.    Cardiology was consulted for remote history of NSTEMI, f/u Echo shows EF 45% segmental wall motion abnormalities, Recommended to continue ASA/ Plavix and f/u outpatient with another cardiac cath which was deferred d/t acute stroke. Post op course c/b fluctuating RLE MMT exam, repeat CTH stable.     Patient was cleared for discharge to [facility] IRF on 11/11/24.  68 y/o M with PMHx of HTN, HLD, DM, originally presented to [facility] on 10/27 as a code stroke after developed right sided weakness, pulmonary edema and hypertensive emergency in the setting of NSTEMI/HF. 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 Ulrich on 11/5/24. [facility]  #[id number] used.     MR brain revealed L watershed/MCA strokes in the setting of high grade proximal LICA stenosis >90% at bifurcation. Patient underwent catheter cerebral angiogram, angioplasty and left carotid wall stent placement with Dr. Farhat Ulrich on 11/6/2024. Right groin was accessed but catheter could not be advanced 2/2 to near occlusion of the right iliac, puncture closed with closure device and procedure proceeded through right radial artery. Case was complicated by hypotension requiring multiple neosticks and IVF boluses to maintain hemodynamic stability. Post-op Carotid dopplers completed on 11/7/2024 demonstrated patent left ICA stent.    Cardiology was consulted for remote history of NSTEMI, f/u Echo shows EF 45% segmental wall motion abnormalities, Recommended to continue ASA/ Plavix and f/u outpatient with another cardiac cath which was deferred d/t acute stroke. Post op course c/b fluctuating RLE MMT exam, repeat CTH stable.     Patient was cleared for discharge to [facility] IRF on 11/11/24.

## 2024-11-11 NOTE — PROGRESS NOTE ADULT - ASSESSMENT
69 yr old male presented as code stroke found to have L carotid stenosis    Procedure: L carotid angioplasty 11/07/2024 - POD 3    PLAN    NEURO:   Neuro checks Q4h  Carotid dopplers: patent L carotid stent  Stroke core measures  c/w ASA 325mg and Plavix 75mg  Pain control w/ tylenol  Activity: Increase as tolerated  PT/OT/ Speech and language  Pending AR    PULM:   Sp02> 94%  Incentive spirometry, on RA  Hx Asthma  c/w albuterol inhaler    CV:  SBP liberalized to 100-180, continue to monitor exams  Hx HTN  C/w metoprolol 12.5mg Q12  c/w Atorvastatin 80mg  c/w DAPT  Hx NSTEMI/HF, last TTE at [facility] showed EF 45-50%. In house cards at [facility] recommended cath but was deferred due to acute stroke  In house cards consulted, pt will need cath, follow-up outpatient   rpt TTE 11/7: negative for intracardiac shunt     ENDO:   Goal euglycemia (FS goal 120-180)  Hx DM, A1C 9.6  c/w Lantus 12u at bedtime and Lispro 4u  Medium ISS before meals and BRODY at bedime  TSH 1.27, wnl    HEME/ONC:               SQL 40mg  SCDs  11/6 LED: negative    RENAL:   IVL  Goal is normonatremia  Replete lytes PRN    ID:   Afebrile  Monitor leukocytosis  Monitor platelets    GI:    Diet: CCD  LBM: 11/10  Bowel regimen w/ senna and miralax  no PPI indicated at this time    Dispo: Acute rehab    Will discuss with Dr Ulrich  [id number]

## 2024-11-11 NOTE — PROGRESS NOTE ADULT - SUBJECTIVE AND OBJECTIVE BOX
Patient seen for rehab follow up  CC: CVA     no new complaints     REVIEW OF SYSTEMS  Constitutional - No fever,  No fatigue  HEENT - No vertigo, No neck pain  Neurological - No headaches, No memory loss  Psychiatric - No depression, No anxiety    FUNCTIONAL PROGRESS  PT 11/8  bed mobility mod assist   transfers mod assist   gait mod assist x 2, 5 feet     VITALS  T(C): 36.3 (11-11-24 @ 09:05), Max: 36.9 (11-11-24 @ 00:54)  HR: 69 (11-11-24 @ 09:05) (69 - 88)  BP: 135/72 (11-11-24 @ 09:05) (106/69 - 181/86)  RR: 18 (11-11-24 @ 09:05) (18 - 18)  SpO2: 94% (11-11-24 @ 09:05) (94% - 96%)  Wt(kg): --    MEDICATIONS   acetaminophen     Tablet .. 650 milliGRAM(s) every 6 hours PRN  albuterol    90 MICROgram(s) HFA Inhaler 2 Puff(s) every 12 hours  aspirin 325 milliGRAM(s) <User Schedule>  atorvastatin 80 milliGRAM(s) at bedtime  clopidogrel Tablet 75 milliGRAM(s) <User Schedule>  enoxaparin Injectable 40 milliGRAM(s) every 24 hours  insulin glargine Injectable (LANTUS) 12 Unit(s) at bedtime  insulin lispro (ADMELOG) corrective regimen sliding scale   at bedtime  insulin lispro (ADMELOG) corrective regimen sliding scale   three times a day before meals  insulin lispro Injectable (ADMELOG) 4 Unit(s) three times a day before meals  metoprolol tartrate 12.5 milliGRAM(s) every 12 hours  polyethylene glycol 3350 17 Gram(s) daily  senna 2 Tablet(s) at bedtime      RECENT LABS - Reviewed        < from: CT Head No Cont (11.07.24 @ 15:37) >    IMPRESSION:    No new acute infarct or hemorrhage. Subacute infarcts in the left frontal   lobe and left centrum semiovale, as seen on prior exam.      < end of copied text >    < from: MR Head No Cont (10.29.24 @ 10:56) >    IMPRESSION:    1.  Acute infarcts in the high left MCA watershed distribution.  2.  Irregular flow-void in the cavernous segment of the left ICA. This   could be attributed to slow/turbulent flow. A severe stenosis is not   entirely excluded. Correlate with recent CTA of the head for further   evaluation.  3.  Chronic ischemic changes.    < end of copied text >        ----------------------------------------------------------------------------------------  PHYSICAL EXAM  Constitutional - NAD, Comfortable, in bed   Chest - Breathing comfortably on room air   Cardiovascular - S1S2   Extremities - No C/C/E, No calf tenderness   Neurologic Exam -   follows commands                 Cognitive - Awake, Alert, AAO to self, place: hospital with choices, month, year, situation     Communication - aphasia         Motor - right sided hemiplegia        Psychiatric - Mood stable, Affect WNL  ----------------------------------------------------------------------------------------  ASSESSMENT/PLAN  69yMale h/o HTN, DM with functional deficits after CVA with aphasia, right sided weakness   s/p left carotid angioplasty  CT/MRI with acute left MCA infarcts   NSTEMI, EF 45%, outpatient follow up for cath  DVT PPX - SCDs lovenox   Rehab -    patient requires mod assist with mobility, needs PT/OT follow up    continue bedside therapy while admitted to prevent secondary complications of immobility, bed mobility, transfer training, progressive ambulation, equipment evaluation, ADLs, bracing/splinting   OOB throughout the day with staff, OOB to chair with meals/3 hours daily        Recommend ACUTE inpatient rehabilitation for the functional deficits consisting of 3 hours of multidisciplinary intense therapy/day x 5 days/week x 2-4 weeks depending on progress at rehabilitation facility, 24 hour RN/daily PMR physician for comorbid medical management.      Patient will be able to participate in and benefit from intense rehabilitation therapies for 3 hours a day x 5 days/week to maximize independence.     Rehab recommendations are dependent on functional progress and participation with bedside therapy       Will continue to follow for ongoing rehab needs and recommendations.                   35 minutes spent on total encounter  with chart review of PT/OT/SLP notes, exam, imaging, counseling and education on inpatient rehabilitation, coordination of care with rehab team and

## 2024-11-11 NOTE — PROGRESS NOTE ADULT - REASON FOR ADMISSION
Lt carotid stenosis
carotid stenosis
CVA
L ICA stenosis
L carotid stenosis
L ICA stenosis
Stroke
L ICA stenosis
L carotid stenosis
Left Carotid stenosis

## 2024-11-11 NOTE — H&P ADULT - NSHPSOCIALHISTORY_GEN_ALL_CORE
SOCIAL HISTORY  Smoking - Denies  EtOH - Denies  Drugs - Denies    FUNCTIONAL HISTORY  Patient lives in a private house with wife and 6 steps to enter, none inside. prior to admission, pt was independent in all ADLs and functional tasks without AE.       CURRENT FUNCTIONAL STATUS  - Bed Mobility: Mod A; 1PA  - Transfers: Mod A; 1PA  - Gait: Mod A; 2PA; 3 lateral steps     - ADLs:  -Lower Body Dressing: Mod A; 1PA

## 2024-11-11 NOTE — H&P ADULT - ATTENDING COMMENTS
Progress note amended to include my discussions with patient, resident, hospitalist, RN, SW, PT and my findings    - EKG pending  - leukocytosis: fluctuating, afebrile, high of 14.27 11/6.    RECENT LABS    Vital Signs Last 24 Hrs  T(C): 36.7 (12 Nov 2024 07:47), Max: 36.9 (11 Nov 2024 19:41)  T(F): 98 (12 Nov 2024 07:47), Max: 98.4 (11 Nov 2024 19:41)  HR: 92 (12 Nov 2024 07:47) (69 - 92)  BP: 147/102 (12 Nov 2024 07:47) (111/79 - 150/92)  BP(mean): --  RR: 16 (12 Nov 2024 07:47) (14 - 18)  SpO2: 95% (12 Nov 2024 07:47) (93% - 96%)    Parameters below as of 12 Nov 2024 07:47  Patient On (Oxygen Delivery Method): room air                              11.9   12.16 )-----------( 403      ( 12 Nov 2024 05:55 )             36.0     11-12    135  |  100  |  23  ----------------------------<  166[H]  3.9   |  29  |  1.11    Ca    9.3      12 Nov 2024 05:55    TPro  7.4  /  Alb  2.7[L]  /  TBili  0.7  /  DBili  x   /  AST  50[H]  /  ALT  74[H]  /  AlkPhos  78  11-12      Urinalysis Basic - ( 12 Nov 2024 05:55 )    Color: x / Appearance: x / SG: x / pH: x  Gluc: 166 mg/dL / Ketone: x  / Bili: x / Urobili: x   Blood: x / Protein: x / Nitrite: x   Leuk Esterase: x / RBC: x / WBC x   Sq Epi: x / Non Sq Epi: x / Bacteria: x      CAPILLARY BLOOD GLUCOSE      POCT Blood Glucose.: 186 mg/dL (12 Nov 2024 08:34)  POCT Blood Glucose.: 231 mg/dL (11 Nov 2024 21:20)  POCT Blood Glucose.: 189 mg/dL (11 Nov 2024 16:49)  POCT Blood Glucose.: 176 mg/dL (11 Nov 2024 11:21) Progress note amended to include my discussions with patient, resident, hospitalist, RN, SW, PT and my findings    Patient was seen with [facility]  this morning #[id number] 11/12/24 at 10:35 AM. Patient had completed therapy session and was sleepy but arousable; was much more alert and interactive, answering questions in English earlier in morning. Required several attempts to awaken, and had periods of increased alertness followed by episodes snoring. Did open fairly readily with just verbal stim. He appeared comfortable, denied any pain, denied any difficulty sleeping (nursing also confirmed no reports of sleep issues overnight). Vitals stable. No cough, no N/V, no diarrhea    . lungs are clear, abd soft no R/G left UE: normal tone and ROM, no pain or grimacing with ROm, left shoulder 4+/5 elbow flexion 5/5 extension 5/5 gross grasp 5/5. right shoulder 1/5 shrug, no TTP GH joint 0/5 elbow flexion 0/5 finger flexion. Flaccid, no pain with PROM shoulder, no hand swelling. left HF 3/5 quad 4+/5 ankle PF 5/5 DF 5/5. left HF 2/5 quad 2-/5 0/5 elicited this morning ankle DF. Calves are soft, no clonus bilateral ankles. no pedal edema. Patient is sl dysarthric, reduced speech intelligbility ( had to ask several times for response clarification, but also exacerbated by periods of sleepiness). able to independently say full name, that he's in "rehab". Can follow simple 1 step commands, benefits from cues at times due to reduced attention and fluctuating arousal    - EKG 11/11:  Normal sinus rhythm, ST and T wave abnormality, consider anterolateral ischemia. Abnormal ECG (h/o NSTEMI, similar findings as prior)  - leukocytosis: fluctuating, afebrile, high of 14.27 11/6.. Today 12.16, afebrile, no tachy. Will monitor for now UA 11/27 negative, CXR 11/5 prominent markings but attributed to cardiogenic etiology rather than infectious. If WBC increased will repeat  - comprehensive rehab evaluation in progress    RECENT LABS    Vital Signs Last 24 Hrs  T(C): 36.7 (12 Nov 2024 07:47), Max: 36.9 (11 Nov 2024 19:41)  T(F): 98 (12 Nov 2024 07:47), Max: 98.4 (11 Nov 2024 19:41)  HR: 92 (12 Nov 2024 07:47) (69 - 92)  BP: 147/102 (12 Nov 2024 07:47) (111/79 - 150/92)  BP(mean): --  RR: 16 (12 Nov 2024 07:47) (14 - 18)  SpO2: 95% (12 Nov 2024 07:47) (93% - 96%)    Parameters below as of 12 Nov 2024 07:47  Patient On (Oxygen Delivery Method): room air                              11.9   12.16 )-----------( 403      ( 12 Nov 2024 05:55 )             36.0     11-12    135  |  100  |  23  ----------------------------<  166[H]  3.9   |  29  |  1.11    Ca    9.3      12 Nov 2024 05:55    TPro  7.4  /  Alb  2.7[L]  /  TBili  0.7  /  DBili  x   /  AST  50[H]  /  ALT  74[H]  /  AlkPhos  78  11-12      Urinalysis Basic - ( 12 Nov 2024 05:55 )    Color: x / Appearance: x / SG: x / pH: x  Gluc: 166 mg/dL / Ketone: x  / Bili: x / Urobili: x   Blood: x / Protein: x / Nitrite: x   Leuk Esterase: x / RBC: x / WBC x   Sq Epi: x / Non Sq Epi: x / Bacteria: x      CAPILLARY BLOOD GLUCOSE      POCT Blood Glucose.: 186 mg/dL (12 Nov 2024 08:34)  POCT Blood Glucose.: 231 mg/dL (11 Nov 2024 21:20)  POCT Blood Glucose.: 189 mg/dL (11 Nov 2024 16:49)  POCT Blood Glucose.: 176 mg/dL (11 Nov 2024 11:21)

## 2024-11-11 NOTE — PROGRESS NOTE ADULT - SUBJECTIVE AND OBJECTIVE BOX
69M Hungarian speaking hx HTN, HLD, DM, initial (last wk) p/f code stroke Rt sided weakness i/s/o NSTEMI/HF, xfer from [facility] for angio/LICA stent w/ Dr. Ulrich. 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 Exam: AOx3, mild Rt facial, RUE 0/5, RLE 3/5, LLE 5/5, SILT  (05 Nov 2024 22:37)    POD # 4 S/P L carotid angioplasty     Overnight event: no acute event    Vital Signs Last 24 Hrs  T(C): 36.3 (11 Nov 2024 09:05), Max: 36.9 (11 Nov 2024 00:54)  T(F): 97.3 (11 Nov 2024 09:05), Max: 98.4 (11 Nov 2024 00:54)  HR: 69 (11 Nov 2024 09:05) (69 - 88)  BP: 135/72 (11 Nov 2024 09:05) (106/69 - 181/86)  BP(mean): --  RR: 18 (11 Nov 2024 09:05) (18 - 18)  SpO2: 94% (11 Nov 2024 09:05) (94% - 96%)    Parameters below as of 11 Nov 2024 09:05  Patient On (Oxygen Delivery Method): room air                Stroke Core Measures      DRAIN OUTPUT:     NEUROIMAGING:     PHYSICAL EXAM:    General: No Acute Distress     Neurological:  Awake, alert oriented x 3, mild Rt facial, RUE 0/5, RLE 4/5, Lt side 5/5    Pulmonary: Clear to Auscultation, No Rales, No Rhonchi, No Wheezes     Cardiovascular: S1, S2, Regular Rate and Rhythm     Gastrointestinal: Soft, Nontender, Nondistended     Incision:     MEDICATIONS:   Antibiotics:    Neuro:  acetaminophen     Tablet .. 650 milliGRAM(s) Oral every 6 hours PRN Temp greater or equal to 38C (100.4F), Mild Pain (1 - 3)  aspirin 325 milliGRAM(s) Oral <User Schedule>    Anticoagulation:  clopidogrel Tablet 75 milliGRAM(s) Oral <User Schedule>  enoxaparin Injectable 40 milliGRAM(s) SubCutaneous every 24 hours    Cardiology:  metoprolol tartrate 12.5 milliGRAM(s) Oral every 12 hours    Endo:   atorvastatin 80 milliGRAM(s) Oral at bedtime  insulin glargine Injectable (LANTUS) 12 Unit(s) SubCutaneous at bedtime  insulin lispro (ADMELOG) corrective regimen sliding scale   SubCutaneous three times a day before meals  insulin lispro (ADMELOG) corrective regimen sliding scale   SubCutaneous at bedtime  insulin lispro Injectable (ADMELOG) 4 Unit(s) SubCutaneous three times a day before meals    Pulm:  albuterol    90 MICROgram(s) HFA Inhaler 2 Puff(s) Inhalation every 12 hours    GI/:  polyethylene glycol 3350 17 Gram(s) Oral daily  senna 2 Tablet(s) Oral at bedtime    Other:    albuterol    90 MICROgram(s) HFA Inhaler 2 Puff(s) Inhalation every 12 hours  polyethylene glycol 3350 17 Gram(s) Oral daily  senna 2 Tablet(s) Oral at bedtime

## 2024-11-11 NOTE — PATIENT PROFILE ADULT - HOME ACCESSIBILITY CONCERNS
LORazepam (Ativan) 1 MG One tablet three times daily.    Discontinued by: Garland Marcelino MD 07/13/22 0041 [Dose Adjustment]    Refill denied.   none

## 2024-11-11 NOTE — H&P ADULT - NSHPPHYSICALEXAM_GEN_ALL_CORE
Constitutional - NAD, Comfortable  HEENT - NCAT, EOMI  Neck - Supple  Chest - good chest expansion, good respiratory effort, CTAB   Cardio - warm and well perfused, RRR  Abdomen -  Soft, NTND  Extremities - No peripheral edema  Neurologic Exam:                    Cognitive -             Orientation: Awake, Alert, AAO to self, place, date, year but had difficulty with situation            Memory: 2/3 words recalled     Speech - Appears to be fluent and comprehensive but it is in Lao      Cranial Nerves - No facial asymmetry, Tongue midline, EOMI, Shoulder shrug intact on right     Motor -                      LEFT    UE - ShAB 5/5, EF 5/5, EE 5/5, WE 5/5,  WNL                    RIGHT UE - ShAB 0/5, EF 2/5, EE 1/5, WE 0/5,  0/5                    LEFT    LE - HF 5/5, KE 5/5, DF 5/5, PF 5/5                    RIGHT LE - HF 3/5, KE 3/5, DF 2/5, PF 3/5        Sensory - Intact to LT bilateral     Reflexes - DTR 2/4 bilaterally, POSITIVE Momin's b/l, 1-2 beats clonus right ankle     Coordination - FTN impaired  Psychiatric - Mood stable Constitutional - NAD, Comfortable  HEENT - EOMI    Chest - good chest expansion, good respiratory effort, CTAB   Cardio - warm and well perfused, RRR  Abdomen -  Soft, NTND  Extremities - No peripheral edema  Neurologic Exam:                    Cognitive -             Orientation: Awake, Alert, AAO to self, place, date, year but had difficulty with situation            Memory: 2/3 words recalled     Speech - Appears to be fluent and comprehensive but it is in Swedish      Cranial Nerves - No facial asymmetry, Tongue midline, EOMI, Shoulder shrug intact on right     Motor -                      LEFT    UE - ShAB 5/5, EF 5/5, EE 5/5, WE 5/5,  WNL                    RIGHT UE - ShAB 0/5, EF 2/5, EE 1/5, WE 0/5,  0/5                    LEFT    LE - HF 5/5, KE 5/5, DF 5/5, PF 5/5                    RIGHT LE - HF 3/5, KE 3/5, DF 2/5, PF 3/5        Sensory - Intact to LT bilateral     Reflexes - DTR 2/4 bilaterally, POSITIVE Momin's b/l, 1-2 beats clonus right ankle     Coordination - FTN impaired  Psychiatric - Mood stable

## 2024-11-12 LAB
ALBUMIN SERPL ELPH-MCNC: 2.7 G/DL — LOW (ref 3.3–5)
ALP SERPL-CCNC: 78 U/L — SIGNIFICANT CHANGE UP (ref 40–120)
ALT FLD-CCNC: 74 U/L — HIGH (ref 10–45)
ANION GAP SERPL CALC-SCNC: 6 MMOL/L — SIGNIFICANT CHANGE UP (ref 5–17)
AST SERPL-CCNC: 50 U/L — HIGH (ref 10–40)
BASOPHILS # BLD AUTO: 0.06 K/UL — SIGNIFICANT CHANGE UP (ref 0–0.2)
BASOPHILS NFR BLD AUTO: 0.5 % — SIGNIFICANT CHANGE UP (ref 0–2)
BILIRUB SERPL-MCNC: 0.7 MG/DL — SIGNIFICANT CHANGE UP (ref 0.2–1.2)
BUN SERPL-MCNC: 23 MG/DL — SIGNIFICANT CHANGE UP (ref 7–23)
CALCIUM SERPL-MCNC: 9.3 MG/DL — SIGNIFICANT CHANGE UP (ref 8.4–10.5)
CHLORIDE SERPL-SCNC: 100 MMOL/L — SIGNIFICANT CHANGE UP (ref 96–108)
CO2 SERPL-SCNC: 29 MMOL/L — SIGNIFICANT CHANGE UP (ref 22–31)
CREAT SERPL-MCNC: 1.11 MG/DL — SIGNIFICANT CHANGE UP (ref 0.5–1.3)
EGFR: 72 ML/MIN/1.73M2 — SIGNIFICANT CHANGE UP
EOSINOPHIL # BLD AUTO: 0.42 K/UL — SIGNIFICANT CHANGE UP (ref 0–0.5)
EOSINOPHIL NFR BLD AUTO: 3.5 % — SIGNIFICANT CHANGE UP (ref 0–6)
GLUCOSE BLDC GLUCOMTR-MCNC: 132 MG/DL — HIGH (ref 70–99)
GLUCOSE BLDC GLUCOMTR-MCNC: 186 MG/DL — HIGH (ref 70–99)
GLUCOSE BLDC GLUCOMTR-MCNC: 189 MG/DL — HIGH (ref 70–99)
GLUCOSE BLDC GLUCOMTR-MCNC: 205 MG/DL — HIGH (ref 70–99)
GLUCOSE SERPL-MCNC: 166 MG/DL — HIGH (ref 70–99)
HCT VFR BLD CALC: 36 % — LOW (ref 39–50)
HGB BLD-MCNC: 11.9 G/DL — LOW (ref 13–17)
IMM GRANULOCYTES NFR BLD AUTO: 0.3 % — SIGNIFICANT CHANGE UP (ref 0–0.9)
LYMPHOCYTES # BLD AUTO: 2.77 K/UL — SIGNIFICANT CHANGE UP (ref 1–3.3)
LYMPHOCYTES # BLD AUTO: 22.8 % — SIGNIFICANT CHANGE UP (ref 13–44)
MCHC RBC-ENTMCNC: 28.7 PG — SIGNIFICANT CHANGE UP (ref 27–34)
MCHC RBC-ENTMCNC: 33.1 G/DL — SIGNIFICANT CHANGE UP (ref 32–36)
MCV RBC AUTO: 86.7 FL — SIGNIFICANT CHANGE UP (ref 80–100)
MONOCYTES # BLD AUTO: 1.15 K/UL — HIGH (ref 0–0.9)
MONOCYTES NFR BLD AUTO: 9.5 % — SIGNIFICANT CHANGE UP (ref 2–14)
NEUTROPHILS # BLD AUTO: 7.72 K/UL — HIGH (ref 1.8–7.4)
NEUTROPHILS NFR BLD AUTO: 63.4 % — SIGNIFICANT CHANGE UP (ref 43–77)
NRBC # BLD: 0 /100 WBCS — SIGNIFICANT CHANGE UP (ref 0–0)
PLATELET # BLD AUTO: 403 K/UL — HIGH (ref 150–400)
POTASSIUM SERPL-MCNC: 3.9 MMOL/L — SIGNIFICANT CHANGE UP (ref 3.5–5.3)
POTASSIUM SERPL-SCNC: 3.9 MMOL/L — SIGNIFICANT CHANGE UP (ref 3.5–5.3)
PROT SERPL-MCNC: 7.4 G/DL — SIGNIFICANT CHANGE UP (ref 6–8.3)
RBC # BLD: 4.15 M/UL — LOW (ref 4.2–5.8)
RBC # FLD: 13.2 % — SIGNIFICANT CHANGE UP (ref 10.3–14.5)
SODIUM SERPL-SCNC: 135 MMOL/L — SIGNIFICANT CHANGE UP (ref 135–145)
WBC # BLD: 12.16 K/UL — HIGH (ref 3.8–10.5)
WBC # FLD AUTO: 12.16 K/UL — HIGH (ref 3.8–10.5)

## 2024-11-12 PROCEDURE — 99223 1ST HOSP IP/OBS HIGH 75: CPT

## 2024-11-12 PROCEDURE — 99222 1ST HOSP IP/OBS MODERATE 55: CPT

## 2024-11-12 RX ORDER — INSULIN GLARGINE 100 [IU]/ML
15 INJECTION, SOLUTION SUBCUTANEOUS AT BEDTIME
Refills: 0 | Status: DISCONTINUED | OUTPATIENT
Start: 2024-11-12 | End: 2024-12-03

## 2024-11-12 RX ADMIN — CLOPIDOGREL 75 MILLIGRAM(S): 75 TABLET, FILM COATED ORAL at 12:19

## 2024-11-12 RX ADMIN — FAMOTIDINE 40 MILLIGRAM(S): 20 TABLET, FILM COATED ORAL at 17:20

## 2024-11-12 RX ADMIN — Medication 4 UNIT(S): at 12:19

## 2024-11-12 RX ADMIN — Medication 4 UNIT(S): at 16:29

## 2024-11-12 RX ADMIN — Medication 325 MILLIGRAM(S): at 05:16

## 2024-11-12 RX ADMIN — Medication 2 PUFF(S): at 20:26

## 2024-11-12 RX ADMIN — Medication 4: at 12:19

## 2024-11-12 RX ADMIN — Medication 500 MILLIGRAM(S): at 09:02

## 2024-11-12 RX ADMIN — Medication 2 TABLET(S): at 21:38

## 2024-11-12 RX ADMIN — ENOXAPARIN SODIUM 40 MILLIGRAM(S): 30 INJECTION SUBCUTANEOUS at 23:18

## 2024-11-12 RX ADMIN — Medication 4 UNIT(S): at 08:35

## 2024-11-12 RX ADMIN — INSULIN GLARGINE 15 UNIT(S): 100 INJECTION, SOLUTION SUBCUTANEOUS at 21:38

## 2024-11-12 RX ADMIN — Medication 500 MILLIGRAM(S): at 17:21

## 2024-11-12 RX ADMIN — Medication 20 MILLIGRAM(S): at 21:38

## 2024-11-12 RX ADMIN — METOPROLOL TARTRATE 12.5 MILLIGRAM(S): 100 TABLET, FILM COATED ORAL at 05:17

## 2024-11-12 RX ADMIN — Medication 2: at 08:35

## 2024-11-12 NOTE — DIETITIAN INITIAL EVALUATION ADULT - PERTINENT MEDS FT
MEDICATIONS  (STANDING):  albuterol    90 MICROgram(s) HFA Inhaler 2 Puff(s) Inhalation every 12 hours  aspirin 325 milliGRAM(s) Oral <User Schedule>  atorvastatin 20 milliGRAM(s) Oral at bedtime  clopidogrel Tablet 75 milliGRAM(s) Oral daily  dextrose 5%. 1000 milliLiter(s) (50 mL/Hr) IV Continuous <Continuous>  dextrose 5%. 1000 milliLiter(s) (100 mL/Hr) IV Continuous <Continuous>  dextrose 50% Injectable 12.5 Gram(s) IV Push once  dextrose 50% Injectable 25 Gram(s) IV Push once  dextrose 50% Injectable 25 Gram(s) IV Push once  enoxaparin Injectable 40 milliGRAM(s) SubCutaneous every 24 hours  famotidine    Tablet 40 milliGRAM(s) Oral daily  glucagon  Injectable 1 milliGRAM(s) IntraMuscular once  insulin glargine Injectable (LANTUS) 15 Unit(s) SubCutaneous at bedtime  insulin lispro (ADMELOG) corrective regimen sliding scale   SubCutaneous at bedtime  insulin lispro (ADMELOG) corrective regimen sliding scale   SubCutaneous three times a day before meals  insulin lispro Injectable (ADMELOG) 4 Unit(s) SubCutaneous before dinner  insulin lispro Injectable (ADMELOG) 4 Unit(s) SubCutaneous before lunch  insulin lispro Injectable (ADMELOG) 4 Unit(s) SubCutaneous before breakfast  metFORMIN 500 milliGRAM(s) Oral two times a day  metoprolol tartrate 12.5 milliGRAM(s) Oral every 12 hours  polyethylene glycol 3350 17 Gram(s) Oral daily  senna 2 Tablet(s) Oral at bedtime    MEDICATIONS  (PRN):  acetaminophen     Tablet .. 650 milliGRAM(s) Oral every 6 hours PRN Temp greater or equal to 38C (100.4F), Mild Pain (1 - 3)  dextrose Oral Gel 15 Gram(s) Oral once PRN Blood Glucose LESS THAN 70 milliGRAM(s)/deciliter  melatonin 6 milliGRAM(s) Oral at bedtime PRN Insomnia

## 2024-11-12 NOTE — CONSULT NOTE ADULT - ASSESSMENT
PEPE KIM is a 68 y/o M with PMHx of HTN, HLD, DM, who presented to [facility] on 10/27 with right sided weakness/code stroke, pulmonary edema and hypertensive emergency in the setting of NSTEMI/HF. 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 Ulrich on 11/5/24.  Now admitted to [facility] with right sided deficits for initiation of a multidisciplinary rehab program consisting focused on functional mobility, transfers and ADLs.      # L MCA Infarct with right body involvement  - CTA +right carotid stenosis of 50% and left carotid stenosis of 90%  - MR brain w/ L watershed/MCA strokes in the setting of high grade proximal LICA stenosis >90%  - S/p angio/LICA stent w/ Dr. Ulrich on 11/5.   - Aspirin 325mg daily  - Plavix 75mg daily   - Lipitor   - Start comprehensive rehab program, 3 hours a day, 5 days a week. PT OT SLP  - Precautions: cardiac, DM, fall,     # NSTEMI  # CHF  # HLD/HTN  - EKG (11/6)-  NSR with occasional PVC ,  Incomplete RBBB, T wave abnormality, consider inferolateral ischemia   - Elevated troponins---> (10/27: 104--> 148--> 190--> 10/28: 236-->10/2--> 312--> 10/30: 298)   - TTE at [facility] showed EF 45-50%.  - Metoprolol 12.5mg BID   - Aspirin 325mg daily  - Plavix 75mg daily   - Atorvastatin 20mg HS (LDL 77, )   - Cardiac cath deferred outpatient f/u d/t acute CVA    # Asthma  - Albuterol PRN    # T2DM  - A1c 9.6  - Lantus 15U can be titrated up to 50U if needed  - FBG ACHS + Mod ISS  - metformin  ordered      # GI/Bowel:  - Senna QHS, Miralax PRN Daily  - GI ppx: famotidine 40mg daily         # DVT ppx:   - Lovenox 40mg daily, SCDs       ID [id number] PEPE KIM is a 68 y/o M with PMHx of HTN, HLD, DM, who presented to [facility] on 10/27 with right sided weakness/code stroke, pulmonary edema and hypertensive emergency in the setting of NSTEMI/HF. 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 Ulrich on 11/5/24.  Now admitted to [facility] with right sided deficits for initiation of a multidisciplinary rehab program consisting focused on functional mobility, transfers and ADLs.      # L MCA Infarct with right body involvement  - CTA +right carotid stenosis of 50% and left carotid stenosis of 90%  - MR brain w/ L watershed/MCA strokes in the setting of high grade proximal LICA stenosis >90%  - S/p angio/LICA stent w/ Dr. Ulrich on 11/5.   - Aspirin 325mg daily  - Plavix 75mg daily   - Lipitor   - Start comprehensive rehab program, 3 hours a day, 5 days a week. PT OT SLP  - Precautions: cardiac, DM, fall,     # NSTEMI  # CHF  # HLD/HTN  - EKG (11/6)-  NSR with occasional PVC ,  Incomplete RBBB, T wave abnormality, consider inferolateral ischemia   - Elevated troponins---> (10/27: 104--> 148--> 190--> 10/28: 236-->10/2--> 312--> 10/30: 298)   - TTE at [facility] showed EF 45-50%.  - Metoprolol 12.5mg BID   - Aspirin 325mg daily  - Plavix 75mg daily   - Atorvastatin 20mg HS (LDL 77, )   - Cardiac cath deferred outpatient f/u d/t acute CVA    # Asthma  - Albuterol PRN    # T2DM with hyperglycemia   - A1c 9.6  - Lantus 15U can be titrated up to 50U if needed  - FBG ACHS + Mod ISS  - metformin  ordered      # GI/Bowel:  - Senna QHS, Miralax PRN Daily  - GI ppx: famotidine 40mg daily         # DVT ppx:   - Lovenox 40mg daily, SCDs       ID [id number]

## 2024-11-12 NOTE — CONSULT NOTE ADULT - SUBJECTIVE AND OBJECTIVE BOX
Patient is a 69y old  Male who presents with a chief complaint of L MCA Infarct with right body involvement (11 Nov 2024 14:19)      HPI:  68 y/o M with PMHx of HTN, HLD, DM, originally presented to [facility] on 10/27 as a code stroke after developed right sided weakness, pulmonary edema and hypertensive emergency in the setting of NSTEMI/HF. 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 Ulrich on 11/5/24. Albanian  #[id number] used.     MR brain revealed L watershed/MCA strokes in the setting of high grade proximal LICA stenosis >90% at bifurcation. Patient underwent catheter cerebral angiogram, angioplasty and left carotid wall stent placement with Dr. Farhat Ulrich on 11/6/2024. Right groin was accessed but catheter could not be advanced 2/2 to near occlusion of the right iliac, puncture closed with closure device and procedure proceeded through right radial artery. Case was complicated by hypotension requiring multiple neosticks and IVF boluses to maintain hemodynamic stability. Post-op Carotid dopplers completed on 11/7/2024 demonstrated patent left ICA stent.    Cardiology was consulted for remote history of NSTEMI, f/u Echo shows EF 45% segmental wall motion abnormalities, Recommended to continue ASA/ Plavix and f/u outpatient with another cardiac cath which was deferred d/t acute stroke. Post op course c/b fluctuating RLE MMT exam, repeat CTH stable.     Patient was cleared for discharge to [facility] on 11/11/24.  (11 Nov 2024 14:19)      TODAY:  Patient seen and examined in NAD  No overnight event    PAST MEDICAL & SURGICAL HISTORY:  Diabetes mellitus  HTN (hypertension)  Hyperlipidemia      family history- non contributory       social history   Smoking - Denies  EtOH - Denies  Drugs - Denies    ALLERGIES:    No Known Allergies      REVIEW OF SYSTEMS:  CONSTITUTIONAL: No fever, weight loss, or fatigue  EYES: No eye pain, visual disturbances, or discharge  RESPIRATORY: No cough, wheezing, chills or hemoptysis; No shortness of breath  CARDIOVASCULAR: No chest pain, palpitations, dizziness, or leg swelling  GASTROINTESTINAL: No abdominal or epigastric pain. No nausea, vomiting, or hematemesis; No diarrhea or constipation. No melena or hematochezia.  GENITOURINARY: No dysuria, frequency, hematuria, or incontinence  NEUROLOGICAL: No headaches, memory loss, loss of strength, numbness, or tremors  SKIN: No itching, burning, rashes, or lesions   MUSCULOSKELETAL: No joint pain or swelling; No muscle, back, or extremity pain  PSYCHIATRIC: No depression, anxiety, mood swings, or difficulty sleeping    ALL OTHER SYSTEMS REVIEWED AND ARE NEGATIVE    VITAL SIGNS:  T(C): 36.7 (11-12-24 @ 07:47), Max: 36.9 (11-11-24 @ 19:41)  HR: 92 (11-12-24 @ 07:47) (73 - 92)  BP: 147/102 (11-12-24 @ 07:47) (111/79 - 150/92)  RR: 16 (11-12-24 @ 07:47) (14 - 18)  SpO2: 95% (11-12-24 @ 07:47) (93% - 96%)  Wt(kg): --Vital Signs Last 24 Hrs  T(C): 36.7 (12 Nov 2024 07:47), Max: 36.9 (11 Nov 2024 19:41)  T(F): 98 (12 Nov 2024 07:47), Max: 98.4 (11 Nov 2024 19:41)  HR: 92 (12 Nov 2024 07:47) (73 - 92)  BP: 147/102 (12 Nov 2024 07:47) (111/79 - 150/92)  BP(mean): --  RR: 16 (12 Nov 2024 07:47) (14 - 18)  SpO2: 95% (12 Nov 2024 07:47) (93% - 96%)    Parameters below as of 12 Nov 2024 07:47  Patient On (Oxygen Delivery Method): room air        Constitutional - NAD, Comfortable  HEENT - EOMI  Chest - good chest expansion, good respiratory effort, CTAB   Cardio - warm and well perfused, RRR  Abdomen -  Soft, NTND  Extremities - No peripheral edema  Neurologic Exam:  Awake, Alert, Appears to be fluent and comprehensive but it is in Albanian   Psychiatric - Mood stable    LABS:                        11.9   12.16 )-----------( 403      ( 12 Nov 2024 05:55 )             36.0     11-12    135  |  100  |  23  ----------------------------<  166[H]  3.9   |  29  |  1.11    Ca    9.3      12 Nov 2024 05:55    TPro  7.4  /  Alb  2.7[L]  /  TBili  0.7  /  DBili  x   /  AST  50[H]  /  ALT  74[H]  /  AlkPhos  78  11-12      Urinalysis Basic - ( 12 Nov 2024 05:55 )    Color: x / Appearance: x / SG: x / pH: x  Gluc: 166 mg/dL / Ketone: x  / Bili: x / Urobili: x   Blood: x / Protein: x / Nitrite: x   Leuk Esterase: x / RBC: x / WBC x   Sq Epi: x / Non Sq Epi: x / Bacteria: x       CAPILLARY BLOOD GLUCOSE      POCT Blood Glucose.: 205 mg/dL (12 Nov 2024 12:18)  POCT Blood Glucose.: 186 mg/dL (12 Nov 2024 08:34)  POCT Blood Glucose.: 231 mg/dL (11 Nov 2024 21:20)  POCT Blood Glucose.: 189 mg/dL (11 Nov 2024 16:49)        Urinalysis Basic - ( 12 Nov 2024 05:55 )    Color: x / Appearance: x / SG: x / pH: x  Gluc: 166 mg/dL / Ketone: x  / Bili: x / Urobili: x   Blood: x / Protein: x / Nitrite: x   Leuk Esterase: x / RBC: x / WBC x   Sq Epi: x / Non Sq Epi: x / Bacteria: x          Previous Consultant(s) Notes Reviewed:  YES  Care Discussed with Consultants/Other Providers YES  Previous Imaging Personally Reviewed:  YES

## 2024-11-12 NOTE — DIETITIAN INITIAL EVALUATION ADULT - PERTINENT LABORATORY DATA
11-12    135  |  100  |  23  ----------------------------<  166[H]  3.9   |  29  |  1.11    Ca    9.3      12 Nov 2024 05:55    TPro  7.4  /  Alb  2.7[L]  /  TBili  0.7  /  DBili  x   /  AST  50[H]  /  ALT  74[H]  /  AlkPhos  78  11-12  POCT Blood Glucose.: 205 mg/dL (11-12-24 @ 12:18)  A1C with Estimated Average Glucose Result: 9.4 % (10-28-24 @ 03:06)

## 2024-11-12 NOTE — DIETITIAN INITIAL EVALUATION ADULT - OTHER INFO
68 y/o M with PMHx of HTN, HLD, DM, who presented to [facility] on 10/27 with right sided weakness/code stroke, pulmonary edema and hypertensive emergency in the setting of NSTEMI/HF. 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 Ulrich on 11/5/24.  Now admitted to [facility] with right sided deficits for initiation of a multidisciplinary rehab program consisting focused on functional mobility, transfers and ADLs.    Pt tolerating a consistent carbohydrate (no snacks) diet with good PO intake, eating >75% of meals Per nursing flowsheets. Pt provided with verbal and written nutrition education. No change in appetite/weight per pt's family. Denies nausea, vomiting, diarrhea, constipation. Pt denies chewing/swallowing difficulties. Noted with HbA1c of 9.4 (H) indicating poor glycemic control PTA. Pt currently on metformin & insulin for glycemic management.

## 2024-11-12 NOTE — DIETITIAN INITIAL EVALUATION ADULT - NS FNS ENTERAL CURRENT ORDER
Current diet order meets estimated nutrient requirements Picato Counseling:  I discussed with the patient the risks of Picato including but not limited to erythema, scaling, itching, weeping, crusting, and pain.

## 2024-11-12 NOTE — DIETITIAN INITIAL EVALUATION ADULT - ORAL INTAKE PTA/DIET HISTORY
Pt's son provided Azeri translation per pt preference. PTA, pt had a good appetite, did not follow any diet or monitor carbohydrate/sugar intake. Pt is Halal, does not eat pork.

## 2024-11-13 LAB
GLUCOSE BLDC GLUCOMTR-MCNC: 124 MG/DL — HIGH (ref 70–99)
GLUCOSE BLDC GLUCOMTR-MCNC: 141 MG/DL — HIGH (ref 70–99)
GLUCOSE BLDC GLUCOMTR-MCNC: 153 MG/DL — HIGH (ref 70–99)
GLUCOSE BLDC GLUCOMTR-MCNC: 181 MG/DL — HIGH (ref 70–99)
HCT VFR BLD CALC: 37 % — LOW (ref 39–50)
HGB BLD-MCNC: 12.2 G/DL — LOW (ref 13–17)
MCHC RBC-ENTMCNC: 28.8 PG — SIGNIFICANT CHANGE UP (ref 27–34)
MCHC RBC-ENTMCNC: 33 G/DL — SIGNIFICANT CHANGE UP (ref 32–36)
MCV RBC AUTO: 87.3 FL — SIGNIFICANT CHANGE UP (ref 80–100)
NRBC # BLD: 0 /100 WBCS — SIGNIFICANT CHANGE UP (ref 0–0)
PLATELET # BLD AUTO: 382 K/UL — SIGNIFICANT CHANGE UP (ref 150–400)
RBC # BLD: 4.24 M/UL — SIGNIFICANT CHANGE UP (ref 4.2–5.8)
RBC # FLD: 13.3 % — SIGNIFICANT CHANGE UP (ref 10.3–14.5)
WBC # BLD: 11.24 K/UL — HIGH (ref 3.8–10.5)
WBC # FLD AUTO: 11.24 K/UL — HIGH (ref 3.8–10.5)

## 2024-11-13 PROCEDURE — 99232 SBSQ HOSP IP/OBS MODERATE 35: CPT

## 2024-11-13 RX ADMIN — Medication 4 UNIT(S): at 08:46

## 2024-11-13 RX ADMIN — Medication 4 UNIT(S): at 17:10

## 2024-11-13 RX ADMIN — METOPROLOL TARTRATE 12.5 MILLIGRAM(S): 100 TABLET, FILM COATED ORAL at 17:09

## 2024-11-13 RX ADMIN — Medication 2: at 17:09

## 2024-11-13 RX ADMIN — Medication 2: at 08:46

## 2024-11-13 RX ADMIN — Medication 4 UNIT(S): at 12:14

## 2024-11-13 RX ADMIN — Medication 500 MILLIGRAM(S): at 08:45

## 2024-11-13 RX ADMIN — METOPROLOL TARTRATE 12.5 MILLIGRAM(S): 100 TABLET, FILM COATED ORAL at 05:14

## 2024-11-13 RX ADMIN — Medication 500 MILLIGRAM(S): at 17:09

## 2024-11-13 RX ADMIN — FAMOTIDINE 40 MILLIGRAM(S): 20 TABLET, FILM COATED ORAL at 12:13

## 2024-11-13 RX ADMIN — ENOXAPARIN SODIUM 40 MILLIGRAM(S): 30 INJECTION SUBCUTANEOUS at 23:08

## 2024-11-13 RX ADMIN — Medication 2 PUFF(S): at 21:50

## 2024-11-13 RX ADMIN — INSULIN GLARGINE 15 UNIT(S): 100 INJECTION, SOLUTION SUBCUTANEOUS at 21:37

## 2024-11-13 RX ADMIN — Medication 20 MILLIGRAM(S): at 21:37

## 2024-11-13 RX ADMIN — Medication 325 MILLIGRAM(S): at 05:14

## 2024-11-13 RX ADMIN — CLOPIDOGREL 75 MILLIGRAM(S): 75 TABLET, FILM COATED ORAL at 12:13

## 2024-11-13 NOTE — PROGRESS NOTE ADULT - ASSESSMENT
PEPE KIM is a 68 y/o M with PMHx of HTN, HLD, DM, who presented to [facility] on 10/27 with right sided weakness/code stroke, pulmonary edema and hypertensive emergency in the setting of NSTEMI/HF. 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 Ulrich on 11/5/24.     # L MCA Infarct with right body involvement  - CTA +right carotid stenosis of 50% and left carotid stenosis of 90%  - MR brain w/ L watershed/MCA strokes in the setting of high grade proximal LICA stenosis >90%  - S/p angio/LICA stent w/ Dr. Ulrich on 11/5.   - Aspirin 325mg daily  - Plavix 75mg daily   - Continue comprehensive rehab program, 3 hours a day, 5 days a week. PT OT SLP  - Precautions: cardiac, DM, fall,     # NSTEMI  # CHF  # HLD/HTN  - EKG (11/6)-  NSR with occasional PVC ,  Incomplete RBBB, T wave abnormality, consider inferolateral ischemia   - EKG 11/11:  Normal sinus rhythm, ST and T wave abnormality, consider anterolateral ischemia.   - TTE: EF 45-50%.  - Metoprolol 12.5mg BID   - Aspirin 325mg daily  - Plavix 75mg daily   - Atorvastatin 20mg HS (LDL 77, )   - Cardiac cath deferred outpatient f/u d/t acute CVA  - BP (112/75 - 114/77) 11/13    # Asthma  - Albuterol PRN  - in remission    # T2DM, uncontrolled with hyperglycemia  - A1c 9.6  - Lantus  increased 15u 11/12  - FBG ACHS + Mod ISS  - -189 11/13    # leukocytosis  - afebrile, no tachy. Will monitor for now  - UA 11/27 negative, CXR 11/5 prominent markings but attributed to cardiogenic etiology rather than infectious.  - WBC 14.27 11/6.-->12.16 11/12--> 11.24 11/13    # Sleep:   - Melatonin 6mg HS PRN     # Pain Management:  - Tylenol PRN    # GI/Bowel:  - Senna QHS, Miralax PRN Daily  - GI ppx: famotidine 40mg daily     # /Bladder:   - PVR x1 on admission  (SC if > 400)    # Diet  - Regular, carb consistent diet, DASH/TLC    # DVT ppx:   - Lovenox 40mg daily, SCDs    # Case discussed in IDT rounds 11/13 (initial)  -       - barriers:  - goals:  - target:     # LABS  CBC CMP 11/14    ---------------  Code Status: FULL  Emergency Contact:    Outpatient Follow-up (Specialty/Name of physician):    Farhat Ulrich  Radiology  [address], [address]  [place] [address]  Phone: [phone]  Fax: [phone]      - PEPE KIM is a 68 y/o M with PMHx of HTN, HLD, DM, who presented to [facility] on 10/27/24 with right sided weakness/code stroke, pulmonary edema and hypertensive emergency in the setting of NSTEMI/HF. 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 Ulrich on 11/5/24.     # L MCA Infarct with right body involvement  - CTA +right carotid stenosis of 50% and left carotid stenosis of 90%  - MR brain w/ L watershed/MCA strokes in the setting of high grade proximal LICA stenosis >90%  - S/p angio/LICA stent w/ Dr. Ulrich on 11/5.   - Aspirin 325mg daily  - Plavix 75mg daily   - Continue comprehensive rehab program, 3 hours a day, 5 days a week. PT OT SLP  - Precautions: cardiac, DM, fall,     # NSTEMI  # CHF  # HLD/HTN  - EKG (11/6)-  NSR with occasional PVC ,  Incomplete RBBB, T wave abnormality, consider inferolateral ischemia   - EKG 11/11:  Normal sinus rhythm, ST and T wave abnormality, consider anterolateral ischemia.   - TTE: EF 45-50%.  - Metoprolol 12.5mg BID   - Aspirin 325mg daily  - Plavix 75mg daily   - Atorvastatin 20mg HS (LDL 77, )   - Cardiac cath deferred outpatient f/u d/t acute CVA  - BP (112/75 - 114/77) 11/13    # Asthma  - Albuterol PRN  - in remission    # T2DM, uncontrolled with hyperglycemia  - A1c 9.6  - Lantus  increased 15u 11/12  - FBG ACHS + Mod ISS  - -189 11/13    # leukocytosis  - afebrile, no tachy. Will monitor for now  - UA 11/27 negative, CXR 11/5 prominent markings but attributed to cardiogenic etiology rather than infectious.  - WBC 14.27 11/6.-->12.16 11/12--> 11.24 11/13    # Sleep:   - Melatonin 6mg HS PRN     # Pain Management:  - Tylenol PRN    # GI/Bowel:  - Senna QHS, Miralax PRN Daily  - GI ppx: famotidine 40mg daily     # /Bladder:   - PVR x1 on admission  (SC if > 400)    # Diet  - Regular, carb consistent diet, DASH/TLC    # DVT ppx:   - Lovenox 40mg daily, SCDs    # Case discussed in IDT rounds 11/13 (initial)  - incontinent B/B, skin intact, lives with spouse in [place] with 6 ANA MARIA, regular solid thin liquids, mod non-fluent aphasia with breakdown in semi complex receptive level, able to generate 3-word phrases with cues, max assist toilet and showering, mod UB/max LB dressing, supervision eating and oral hygiene, mod assist bed mobility and transfers, ambulates 10 feet with WBQC and mod assist  - barriers: right hemiparesis, stairs, incontinence, aphasia, reduced caregiver support, reduced endurance  - goals: intermittent supervision waking hours, CG transfers, supervision bADLs, CS transfers and ambulation household distances "Pepe" "to get it back". Ambulate to bathroom with CS, communicate wants and needs min assist  - target: dc home 12/3 with caregiver support and home PT OT SLP  - caregiver training    # LABS  CBC CMP 11/14    ---------------  Code Status: FULL  Emergency Contact:    Outpatient Follow-up (Specialty/Name of physician):    Farhat Ulrich  Radiology  [address], [address]  [place] [address]  Phone: [phone]  Fax: [phone]      - PEPE KIM is a 70 y/o M with PMHx of HTN, HLD, DM, who presented to [facility] on 10/27/24 with right sided weakness/code stroke, pulmonary edema and hypertensive emergency in the setting of NSTEMI/HF. 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 Ulrich on 11/5/24.     # L MCA Infarct with right body involvement  - CTA +right carotid stenosis of 50% and left carotid stenosis of 90%  - MR brain w/ L watershed/MCA strokes in the setting of high grade proximal LICA stenosis >90%  - S/p angio/LICA stent w/ Dr. Ulrich on 11/5.   - Aspirin 325mg daily  - Plavix 75mg daily   - Continue comprehensive rehab program, 3 hours a day, 5 days a week. PT OT SLP  - Precautions: cardiac, DM, fall,     # NSTEMI  # CHF  # HLD/HTN  - EKG (11/6)-  NSR with occasional PVC ,  Incomplete RBBB, T wave abnormality, consider inferolateral ischemia   - EKG 11/11:  Normal sinus rhythm, ST and T wave abnormality, consider anterolateral ischemia.   - TTE: EF 45-50%.  - Metoprolol 12.5mg BID   - Aspirin 325mg daily  - Plavix 75mg daily   - Atorvastatin 20mg HS (LDL 77, )   - Cardiac cath deferred outpatient f/u d/t acute CVA  - BP (112/75 - 114/77) 11/13. Episode soft BP and drop in PT, asymptomatic. Monitor, patient also instructed to notify staff if becomes symptomatic especially given cardiac history. Verbalized assent    # Asthma  - Albuterol PRN  - in remission    # T2DM, uncontrolled with hyperglycemia  - A1c 9.6  - Lantus  increased 15u 11/12  - FBG ACHS + Mod ISS  - -189 11/13    # leukocytosis  - afebrile, no tachy. Will monitor for now  - UA 11/27 negative, CXR 11/5 prominent markings but attributed to cardiogenic etiology rather than infectious.  - WBC 14.27 11/6.-->12.16 11/12--> 11.24 11/13    # Sleep:   - Melatonin 6mg HS PRN     # Pain Management:  - Tylenol PRN    # GI/Bowel:  - Senna QHS, Miralax PRN Daily  - GI ppx: famotidine 40mg daily     # /Bladder:   - PVR x1 on admission  (SC if > 400)    # Diet  - Regular, carb consistent diet, DASH/TLC    # DVT ppx:   - Lovenox 40mg daily, SCDs    # Case discussed in IDT rounds 11/13 (initial)  - incontinent B/B, skin intact, lives with spouse in  with 6 ANA MARIA, regular solid thin liquids, mod non-fluent aphasia with breakdown in semi complex receptive level, able to generate 3-word phrases with cues, max assist toilet and showering, mod UB/max LB dressing, supervision eating and oral hygiene, mod assist bed mobility and transfers, ambulates 10 feet with WBQC and mod assist  - barriers: right hemiparesis, stairs, incontinence, aphasia, reduced caregiver support, reduced endurance  - goals: intermittent supervision waking hours, CG transfers, supervision bADLs, CS transfers and ambulation household distances "Pepe" "to get it back". Ambulate to bathroom with CS, communicate wants and needs min assist  - target: dc home 12/3 with caregiver support and home PT OT SLP  - caregiver training    # LABS  CBC CMP 11/14  monitor BP    ---------------  Code Status: FULL  Emergency Contact:    Outpatient Follow-up (Specialty/Name of physician):    Farhat Ulrich  Radiology  [address], [address]  [place] [address]  Phone: [phone]  Fax: [phone]      -

## 2024-11-13 NOTE — PROGRESS NOTE ADULT - SUBJECTIVE AND OBJECTIVE BOX
Patient is a 69y old  Male who presents with a chief complaint of Cerebral infarction     (12 Nov 2024 15:52)      HPI:  70 y/o M with PMHx of HTN, HLD, DM, originally presented to [facility] on 10/27 as a code stroke after developed right sided weakness, pulmonary edema and hypertensive emergency in the setting of NSTEMI/HF. 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 Ulrich on 11/5/24. [facility]  #[id number] used.     MR brain revealed L watershed/MCA strokes in the setting of high grade proximal LICA stenosis >90% at bifurcation. Patient underwent catheter cerebral angiogram, angioplasty and left carotid wall stent placement with Dr. Farhat Ulrich on 11/6/2024. Right groin was accessed but catheter could not be advanced 2/2 to near occlusion of the right iliac, puncture closed with closure device and procedure proceeded through right radial artery. Case was complicated by hypotension requiring multiple neosticks and IVF boluses to maintain hemodynamic stability. Post-op Carotid dopplers completed on 11/7/2024 demonstrated patent left ICA stent.    Cardiology was consulted for remote history of NSTEMI, f/u Echo shows EF 45% segmental wall motion abnormalities, Recommended to continue ASA/ Plavix and f/u outpatient with another cardiac cath which was deferred d/t acute stroke. Post op course c/b fluctuating RLE MMT exam, repeat CTH stable.     Patient was cleared for discharge to [facility] IRF on 11/11/24.  (11 Nov 2024 14:19)      PAST MEDICAL & SURGICAL HISTORY:  Diabetes mellitus      HTN (hypertension)      Hyperlipidemia          MEDICATIONS  (STANDING):  albuterol    90 MICROgram(s) HFA Inhaler 2 Puff(s) Inhalation every 12 hours  aspirin 325 milliGRAM(s) Oral <User Schedule>  atorvastatin 20 milliGRAM(s) Oral at bedtime  clopidogrel Tablet 75 milliGRAM(s) Oral daily  dextrose 5%. 1000 milliLiter(s) (50 mL/Hr) IV Continuous <Continuous>  dextrose 5%. 1000 milliLiter(s) (100 mL/Hr) IV Continuous <Continuous>  dextrose 50% Injectable 25 Gram(s) IV Push once  dextrose 50% Injectable 25 Gram(s) IV Push once  dextrose 50% Injectable 12.5 Gram(s) IV Push once  enoxaparin Injectable 40 milliGRAM(s) SubCutaneous every 24 hours  famotidine    Tablet 40 milliGRAM(s) Oral daily  glucagon  Injectable 1 milliGRAM(s) IntraMuscular once  insulin glargine Injectable (LANTUS) 15 Unit(s) SubCutaneous at bedtime  insulin lispro (ADMELOG) corrective regimen sliding scale   SubCutaneous at bedtime  insulin lispro (ADMELOG) corrective regimen sliding scale   SubCutaneous three times a day before meals  insulin lispro Injectable (ADMELOG) 4 Unit(s) SubCutaneous before lunch  insulin lispro Injectable (ADMELOG) 4 Unit(s) SubCutaneous before dinner  insulin lispro Injectable (ADMELOG) 4 Unit(s) SubCutaneous before breakfast  metFORMIN 500 milliGRAM(s) Oral two times a day  metoprolol tartrate 12.5 milliGRAM(s) Oral every 12 hours  polyethylene glycol 3350 17 Gram(s) Oral daily  senna 2 Tablet(s) Oral at bedtime    MEDICATIONS  (PRN):  acetaminophen     Tablet .. 650 milliGRAM(s) Oral every 6 hours PRN Temp greater or equal to 38C (100.4F), Mild Pain (1 - 3)  dextrose Oral Gel 15 Gram(s) Oral once PRN Blood Glucose LESS THAN 70 milliGRAM(s)/deciliter  melatonin 6 milliGRAM(s) Oral at bedtime PRN Insomnia      Allergies    No Known Allergies    Intolerances          VITALS  69y  Vital Signs Last 24 Hrs  T(C): 36.4 (12 Nov 2024 20:53), Max: 36.4 (12 Nov 2024 20:53)  T(F): 97.6 (12 Nov 2024 20:53), Max: 97.6 (12 Nov 2024 20:53)  HR: 77 (13 Nov 2024 05:12) (77 - 83)  BP: 112/75 (13 Nov 2024 05:12) (112/75 - 114/77)  BP(mean): --  RR: 15 (12 Nov 2024 20:53) (15 - 15)  SpO2: 98% (12 Nov 2024 20:53) (98% - 98%)    Parameters below as of 12 Nov 2024 20:53  Patient On (Oxygen Delivery Method): room air      Daily     Daily         RECENT LABS:                          12.2   11.24 )-----------( 382      ( 13 Nov 2024 05:32 )             37.0     11-12    135  |  100  |  23  ----------------------------<  166[H]  3.9   |  29  |  1.11    Ca    9.3      12 Nov 2024 05:55    TPro  7.4  /  Alb  2.7[L]  /  TBili  0.7  /  DBili  x   /  AST  50[H]  /  ALT  74[H]  /  AlkPhos  78  11-12    LIVER FUNCTIONS - ( 12 Nov 2024 05:55 )  Alb: 2.7 g/dL / Pro: 7.4 g/dL / ALK PHOS: 78 U/L / ALT: 74 U/L / AST: 50 U/L / GGT: x             Urinalysis Basic - ( 12 Nov 2024 05:55 )    Color: x / Appearance: x / SG: x / pH: x  Gluc: 166 mg/dL / Ketone: x  / Bili: x / Urobili: x   Blood: x / Protein: x / Nitrite: x   Leuk Esterase: x / RBC: x / WBC x   Sq Epi: x / Non Sq Epi: x / Bacteria: x          CAPILLARY BLOOD GLUCOSE      POCT Blood Glucose.: 189 mg/dL (12 Nov 2024 21:37)  POCT Blood Glucose.: 132 mg/dL (12 Nov 2024 16:28)  POCT Blood Glucose.: 205 mg/dL (12 Nov 2024 12:18)  POCT Blood Glucose.: 186 mg/dL (12 Nov 2024 08:34)

## 2024-11-13 NOTE — PROGRESS NOTE ADULT - COMMENTS
Patient seen with PT. [facility] language line  #[id number] . Patient much more alert today, sustained eye opening. He is responding to questions, low vocal volume, reduced speech intelligibility, slow output, usually 1 word responses although did have 5 word phrase x 1. Denies difficulty sleeping, H/A, or pain. Denies dizziness, CP, palpitations, during gait (had soft BP and drop from 113/71 to 91/60. no tachycardia

## 2024-11-14 LAB
ALBUMIN SERPL ELPH-MCNC: 2.8 G/DL — LOW (ref 3.3–5)
ALP SERPL-CCNC: 75 U/L — SIGNIFICANT CHANGE UP (ref 40–120)
ALT FLD-CCNC: 72 U/L — HIGH (ref 10–45)
ANION GAP SERPL CALC-SCNC: 9 MMOL/L — SIGNIFICANT CHANGE UP (ref 5–17)
AST SERPL-CCNC: 46 U/L — HIGH (ref 10–40)
BASOPHILS # BLD AUTO: 0.07 K/UL — SIGNIFICANT CHANGE UP (ref 0–0.2)
BASOPHILS NFR BLD AUTO: 0.6 % — SIGNIFICANT CHANGE UP (ref 0–2)
BILIRUB SERPL-MCNC: 0.5 MG/DL — SIGNIFICANT CHANGE UP (ref 0.2–1.2)
BUN SERPL-MCNC: 27 MG/DL — HIGH (ref 7–23)
CALCIUM SERPL-MCNC: 9.4 MG/DL — SIGNIFICANT CHANGE UP (ref 8.4–10.5)
CHLORIDE SERPL-SCNC: 100 MMOL/L — SIGNIFICANT CHANGE UP (ref 96–108)
CO2 SERPL-SCNC: 26 MMOL/L — SIGNIFICANT CHANGE UP (ref 22–31)
CREAT SERPL-MCNC: 1.27 MG/DL — SIGNIFICANT CHANGE UP (ref 0.5–1.3)
EGFR: 61 ML/MIN/1.73M2 — SIGNIFICANT CHANGE UP
EOSINOPHIL # BLD AUTO: 0.41 K/UL — SIGNIFICANT CHANGE UP (ref 0–0.5)
EOSINOPHIL NFR BLD AUTO: 3.7 % — SIGNIFICANT CHANGE UP (ref 0–6)
GLUCOSE BLDC GLUCOMTR-MCNC: 129 MG/DL — HIGH (ref 70–99)
GLUCOSE BLDC GLUCOMTR-MCNC: 131 MG/DL — HIGH (ref 70–99)
GLUCOSE BLDC GLUCOMTR-MCNC: 140 MG/DL — HIGH (ref 70–99)
GLUCOSE BLDC GLUCOMTR-MCNC: 181 MG/DL — HIGH (ref 70–99)
GLUCOSE SERPL-MCNC: 153 MG/DL — HIGH (ref 70–99)
HCT VFR BLD CALC: 38.4 % — LOW (ref 39–50)
HGB BLD-MCNC: 12.6 G/DL — LOW (ref 13–17)
IMM GRANULOCYTES NFR BLD AUTO: 0.3 % — SIGNIFICANT CHANGE UP (ref 0–0.9)
LYMPHOCYTES # BLD AUTO: 2.59 K/UL — SIGNIFICANT CHANGE UP (ref 1–3.3)
LYMPHOCYTES # BLD AUTO: 23.2 % — SIGNIFICANT CHANGE UP (ref 13–44)
MCHC RBC-ENTMCNC: 28.7 PG — SIGNIFICANT CHANGE UP (ref 27–34)
MCHC RBC-ENTMCNC: 32.8 G/DL — SIGNIFICANT CHANGE UP (ref 32–36)
MCV RBC AUTO: 87.5 FL — SIGNIFICANT CHANGE UP (ref 80–100)
MONOCYTES # BLD AUTO: 0.96 K/UL — HIGH (ref 0–0.9)
MONOCYTES NFR BLD AUTO: 8.6 % — SIGNIFICANT CHANGE UP (ref 2–14)
NEUTROPHILS # BLD AUTO: 7.11 K/UL — SIGNIFICANT CHANGE UP (ref 1.8–7.4)
NEUTROPHILS NFR BLD AUTO: 63.6 % — SIGNIFICANT CHANGE UP (ref 43–77)
NRBC # BLD: 0 /100 WBCS — SIGNIFICANT CHANGE UP (ref 0–0)
PLATELET # BLD AUTO: 409 K/UL — HIGH (ref 150–400)
POTASSIUM SERPL-MCNC: 4 MMOL/L — SIGNIFICANT CHANGE UP (ref 3.5–5.3)
POTASSIUM SERPL-SCNC: 4 MMOL/L — SIGNIFICANT CHANGE UP (ref 3.5–5.3)
PROT SERPL-MCNC: 7.7 G/DL — SIGNIFICANT CHANGE UP (ref 6–8.3)
RBC # BLD: 4.39 M/UL — SIGNIFICANT CHANGE UP (ref 4.2–5.8)
RBC # FLD: 13.3 % — SIGNIFICANT CHANGE UP (ref 10.3–14.5)
SODIUM SERPL-SCNC: 135 MMOL/L — SIGNIFICANT CHANGE UP (ref 135–145)
WBC # BLD: 11.17 K/UL — HIGH (ref 3.8–10.5)
WBC # FLD AUTO: 11.17 K/UL — HIGH (ref 3.8–10.5)

## 2024-11-14 PROCEDURE — 99232 SBSQ HOSP IP/OBS MODERATE 35: CPT

## 2024-11-14 RX ADMIN — Medication 500 MILLIGRAM(S): at 17:01

## 2024-11-14 RX ADMIN — ACETAMINOPHEN, DIPHENHYDRAMINE HCL, PHENYLEPHRINE HCL 6 MILLIGRAM(S): 325; 25; 5 TABLET ORAL at 21:06

## 2024-11-14 RX ADMIN — Medication 2 TABLET(S): at 21:03

## 2024-11-14 RX ADMIN — METOPROLOL TARTRATE 12.5 MILLIGRAM(S): 100 TABLET, FILM COATED ORAL at 05:14

## 2024-11-14 RX ADMIN — METOPROLOL TARTRATE 12.5 MILLIGRAM(S): 100 TABLET, FILM COATED ORAL at 17:01

## 2024-11-14 RX ADMIN — Medication 4 UNIT(S): at 12:33

## 2024-11-14 RX ADMIN — Medication 2 PUFF(S): at 08:04

## 2024-11-14 RX ADMIN — FAMOTIDINE 40 MILLIGRAM(S): 20 TABLET, FILM COATED ORAL at 12:34

## 2024-11-14 RX ADMIN — Medication 20 MILLIGRAM(S): at 21:03

## 2024-11-14 RX ADMIN — POLYETHYLENE GLYCOL 3350 17 GRAM(S): 17 POWDER, FOR SOLUTION ORAL at 12:34

## 2024-11-14 RX ADMIN — ENOXAPARIN SODIUM 40 MILLIGRAM(S): 30 INJECTION SUBCUTANEOUS at 22:19

## 2024-11-14 RX ADMIN — Medication 325 MILLIGRAM(S): at 05:14

## 2024-11-14 RX ADMIN — Medication 4 UNIT(S): at 17:00

## 2024-11-14 RX ADMIN — Medication 4 UNIT(S): at 07:59

## 2024-11-14 RX ADMIN — INSULIN GLARGINE 15 UNIT(S): 100 INJECTION, SOLUTION SUBCUTANEOUS at 21:05

## 2024-11-14 RX ADMIN — Medication 500 MILLIGRAM(S): at 07:59

## 2024-11-14 RX ADMIN — Medication 2 PUFF(S): at 21:53

## 2024-11-14 RX ADMIN — CLOPIDOGREL 75 MILLIGRAM(S): 75 TABLET, FILM COATED ORAL at 12:34

## 2024-11-14 RX ADMIN — Medication 2: at 17:00

## 2024-11-14 NOTE — PROGRESS NOTE ADULT - COMMENTS
Patient was seen with [facility] language line  [facility] #[id number].    Patient was sleepy after therapies, arousable but required continued verbal cues to respond (similar to other late morning exams following therapy; per staff, was awake, doing well, participating earlier in day). Denied difficulty sleeping overnight or pain or H/a. Appears comfortable.

## 2024-11-14 NOTE — PROGRESS NOTE ADULT - ASSESSMENT
PEPE KIM is a 68 y/o M with PMHx of HTN, HLD, DM, who presented to [facility] on 10/27/24 with right sided weakness/code stroke, pulmonary edema and hypertensive emergency in the setting of NSTEMI/HF. 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 Ulrich on 11/5/24.     # L MCA Infarct with right body involvement  - CTA +right carotid stenosis of 50% and left carotid stenosis of 90%  - MR brain w/ L watershed/MCA strokes in the setting of high grade proximal LICA stenosis >90%  - S/p angio/LICA stent w/ Dr. Ulrich on 11/5.   - Aspirin 325mg daily  - Plavix 75mg daily   - Continue comprehensive rehab program, 3 hours a day, 5 days a week. PT OT SLP  - Precautions: cardiac, DM, fall,     # NSTEMI  # CHF  # HLD/HTN  - EKG 11/11:  Normal sinus rhythm, ST and T wave abnormality, consider anterolateral ischemia. (similar to prior 11/6)  - TTE: EF 45-50%.  - Metoprolol 12.5mg BID   - Aspirin 325mg daily  - Plavix 75mg daily   - Atorvastatin 20mg HS (LDL 77, )   - Cardiac cath deferred outpatient f/u d/t acute CVA  - BP (108/73 - 139/71) HR 76-86 11/14    # Asthma  - Albuterol PRN  - in remission    # T2DM, uncontrolled with hyperglycemia  - A1c 9.6  - Lantus increased 15u 11/12  - admelog 4u qAC  - FBG ACHS + Mod ISS  - -153 11/14 significantly better controlled    # leukocytosis  - afebrile, no tachy. Will monitor for now  - UA 11/27 negative, CXR 11/5 prominent markings but attributed to cardiogenic etiology rather than infectious.  - WBC 14.27 11/6.--> 11.17 11/4  - CBC 11/18    # mild transaminitis  -  AST  46[H]  /  ALT  72[H]  /  AlkPhos  75  11-14 very mildly uptrending over past 2 weeks. Will continue to monitor, asymptomatic  - avoid hepatotoxic meds. DC tylenol  - continue low dose statin for now given CVA and cAD  - CMP 11/18    # Sleep:   - Melatonin 6mg HS PRN     # Pain Management:  - Tylenol PRN    # GI/Bowel:  - Senna QHS, Miralax PRN Daily  - GI ppx: famotidine 40mg daily     # /Bladder:   - PVR x1 on admission  (SC if > 400)    # Diet  - Regular, carb consistent diet, DASH/TLC    # DVT ppx:   - Lovenox 40mg daily, SCDs    # Case discussed in IDT rounds 11/13 (initial)  - incontinent B/B, skin intact, lives with spouse in  with 6 ANA MARIA, regular solid thin liquids, mod non-fluent aphasia with breakdown in semi complex receptive level, able to generate 3-word phrases with cues, max assist toilet and showering, mod UB/max LB dressing, supervision eating and oral hygiene, mod assist bed mobility and transfers, ambulates 10 feet with WBQC and mod assist  - barriers: right hemiparesis, stairs, incontinence, aphasia, reduced caregiver support, reduced endurance  - goals: intermittent supervision waking hours, CG transfers, supervision bADLs, CS transfers and ambulation household distances "Pepe" "to get it back". Ambulate to bathroom with CS, communicate wants and needs min assist  - target: dc home 12/3 with caregiver support and home PT OT SLP  - caregiver training    # LABS  CBC CMP 11/18    ---------------  Code Status: FULL  Emergency Contact:    Outpatient Follow-up (Specialty/Name of physician):    Farhat Ulrich  Radiology  [address], [address]  [place] [address]  Phone: [phone]  Fax: [phone]      -

## 2024-11-14 NOTE — PROGRESS NOTE ADULT - ADDITIONAL PE
PT functional status 11/13:   Sit <-> Supine Min/Mod A x 1, Sit <-> Stand Min/Mod A x 1,   Transfers via stand-step transfer with Min/Mod A x 1

## 2024-11-14 NOTE — PROGRESS NOTE ADULT - SUBJECTIVE AND OBJECTIVE BOX
Patient is a 69y old  Male who presents with a chief complaint of L MCA Infarct with right body involvement (13 Nov 2024 08:02)      HPI:  68 y/o M with PMHx of HTN, HLD, DM, originally presented to [facility] on 10/27 as a code stroke after developed right sided weakness, pulmonary edema and hypertensive emergency in the setting of NSTEMI/HF. 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 Ulrich on 11/5/24. [facility]  #[id number] used.     MR brain revealed L watershed/MCA strokes in the setting of high grade proximal LICA stenosis >90% at bifurcation. Patient underwent catheter cerebral angiogram, angioplasty and left carotid wall stent placement with Dr. Farhat Ulrich on 11/6/2024. Right groin was accessed but catheter could not be advanced 2/2 to near occlusion of the right iliac, puncture closed with closure device and procedure proceeded through right radial artery. Case was complicated by hypotension requiring multiple neosticks and IVF boluses to maintain hemodynamic stability. Post-op Carotid dopplers completed on 11/7/2024 demonstrated patent left ICA stent.    Cardiology was consulted for remote history of NSTEMI, f/u Echo shows EF 45% segmental wall motion abnormalities, Recommended to continue ASA/ Plavix and f/u outpatient with another cardiac cath which was deferred d/t acute stroke. Post op course c/b fluctuating RLE MMT exam, repeat CTH stable.     Patient was cleared for discharge to [facility] IRF on 11/11/24.  (11 Nov 2024 14:19)      PAST MEDICAL & SURGICAL HISTORY:  Diabetes mellitus      HTN (hypertension)      Hyperlipidemia          MEDICATIONS  (STANDING):  albuterol    90 MICROgram(s) HFA Inhaler 2 Puff(s) Inhalation every 12 hours  aspirin 325 milliGRAM(s) Oral <User Schedule>  atorvastatin 20 milliGRAM(s) Oral at bedtime  clopidogrel Tablet 75 milliGRAM(s) Oral daily  dextrose 5%. 1000 milliLiter(s) (50 mL/Hr) IV Continuous <Continuous>  dextrose 5%. 1000 milliLiter(s) (100 mL/Hr) IV Continuous <Continuous>  dextrose 50% Injectable 12.5 Gram(s) IV Push once  dextrose 50% Injectable 25 Gram(s) IV Push once  dextrose 50% Injectable 25 Gram(s) IV Push once  enoxaparin Injectable 40 milliGRAM(s) SubCutaneous every 24 hours  famotidine    Tablet 40 milliGRAM(s) Oral daily  glucagon  Injectable 1 milliGRAM(s) IntraMuscular once  insulin glargine Injectable (LANTUS) 15 Unit(s) SubCutaneous at bedtime  insulin lispro (ADMELOG) corrective regimen sliding scale   SubCutaneous three times a day before meals  insulin lispro (ADMELOG) corrective regimen sliding scale   SubCutaneous at bedtime  insulin lispro Injectable (ADMELOG) 4 Unit(s) SubCutaneous before dinner  insulin lispro Injectable (ADMELOG) 4 Unit(s) SubCutaneous before lunch  insulin lispro Injectable (ADMELOG) 4 Unit(s) SubCutaneous before breakfast  metFORMIN 500 milliGRAM(s) Oral two times a day  metoprolol tartrate 12.5 milliGRAM(s) Oral every 12 hours  polyethylene glycol 3350 17 Gram(s) Oral daily  senna 2 Tablet(s) Oral at bedtime    MEDICATIONS  (PRN):  acetaminophen     Tablet .. 650 milliGRAM(s) Oral every 6 hours PRN Temp greater or equal to 38C (100.4F), Mild Pain (1 - 3)  dextrose Oral Gel 15 Gram(s) Oral once PRN Blood Glucose LESS THAN 70 milliGRAM(s)/deciliter  melatonin 6 milliGRAM(s) Oral at bedtime PRN Insomnia      Allergies    No Known Allergies    Intolerances          VITALS  69y  Vital Signs Last 24 Hrs  T(C): 36.4 (13 Nov 2024 19:55), Max: 36.4 (13 Nov 2024 19:55)  T(F): 97.6 (13 Nov 2024 19:55), Max: 97.6 (13 Nov 2024 19:55)  HR: 82 (14 Nov 2024 08:04) (76 - 86)  BP: 114/81 (14 Nov 2024 08:00) (108/73 - 139/71)  BP(mean): --  RR: 16 (14 Nov 2024 08:00) (15 - 16)  SpO2: 94% (14 Nov 2024 08:04) (94% - 98%)    Parameters below as of 14 Nov 2024 08:04  Patient On (Oxygen Delivery Method): room air      Daily     Daily         RECENT LABS:                          12.6   11.17 )-----------( 409      ( 14 Nov 2024 07:10 )             38.4     11-14    135  |  100  |  27[H]  ----------------------------<  153[H]  4.0   |  26  |  1.27    Ca    9.4      14 Nov 2024 07:10    TPro  7.7  /  Alb  2.8[L]  /  TBili  0.5  /  DBili  x   /  AST  46[H]  /  ALT  72[H]  /  AlkPhos  75  11-14    LIVER FUNCTIONS - ( 14 Nov 2024 07:10 )  Alb: 2.8 g/dL / Pro: 7.7 g/dL / ALK PHOS: 75 U/L / ALT: 72 U/L / AST: 46 U/L / GGT: x             Urinalysis Basic - ( 14 Nov 2024 07:10 )    Color: x / Appearance: x / SG: x / pH: x  Gluc: 153 mg/dL / Ketone: x  / Bili: x / Urobili: x   Blood: x / Protein: x / Nitrite: x   Leuk Esterase: x / RBC: x / WBC x   Sq Epi: x / Non Sq Epi: x / Bacteria: x          CAPILLARY BLOOD GLUCOSE      POCT Blood Glucose.: 129 mg/dL (14 Nov 2024 12:31)  POCT Blood Glucose.: 131 mg/dL (14 Nov 2024 07:52)  POCT Blood Glucose.: 124 mg/dL (13 Nov 2024 21:36)  POCT Blood Glucose.: 153 mg/dL (13 Nov 2024 17:02)

## 2024-11-15 LAB
GLUCOSE BLDC GLUCOMTR-MCNC: 112 MG/DL — HIGH (ref 70–99)
GLUCOSE BLDC GLUCOMTR-MCNC: 144 MG/DL — HIGH (ref 70–99)
GLUCOSE BLDC GLUCOMTR-MCNC: 166 MG/DL — HIGH (ref 70–99)
GLUCOSE BLDC GLUCOMTR-MCNC: 202 MG/DL — HIGH (ref 70–99)

## 2024-11-15 PROCEDURE — 99232 SBSQ HOSP IP/OBS MODERATE 35: CPT

## 2024-11-15 RX ORDER — SODIUM CHLORIDE 9 MG/ML
250 INJECTION, SOLUTION INTRAMUSCULAR; INTRAVENOUS; SUBCUTANEOUS ONCE
Refills: 0 | Status: COMPLETED | OUTPATIENT
Start: 2024-11-15 | End: 2024-11-15

## 2024-11-15 RX ADMIN — METOPROLOL TARTRATE 12.5 MILLIGRAM(S): 100 TABLET, FILM COATED ORAL at 17:32

## 2024-11-15 RX ADMIN — Medication 2 PUFF(S): at 08:31

## 2024-11-15 RX ADMIN — FAMOTIDINE 40 MILLIGRAM(S): 20 TABLET, FILM COATED ORAL at 12:16

## 2024-11-15 RX ADMIN — Medication 4 UNIT(S): at 17:32

## 2024-11-15 RX ADMIN — Medication 2 PUFF(S): at 21:33

## 2024-11-15 RX ADMIN — SODIUM CHLORIDE 250 MILLILITER(S): 9 INJECTION, SOLUTION INTRAMUSCULAR; INTRAVENOUS; SUBCUTANEOUS at 18:45

## 2024-11-15 RX ADMIN — Medication 4 UNIT(S): at 08:11

## 2024-11-15 RX ADMIN — Medication 2 TABLET(S): at 22:01

## 2024-11-15 RX ADMIN — Medication 500 MILLIGRAM(S): at 06:39

## 2024-11-15 RX ADMIN — ENOXAPARIN SODIUM 40 MILLIGRAM(S): 30 INJECTION SUBCUTANEOUS at 22:25

## 2024-11-15 RX ADMIN — Medication 4 UNIT(S): at 12:14

## 2024-11-15 RX ADMIN — Medication 4: at 12:15

## 2024-11-15 RX ADMIN — Medication 325 MILLIGRAM(S): at 06:39

## 2024-11-15 RX ADMIN — INSULIN GLARGINE 15 UNIT(S): 100 INJECTION, SOLUTION SUBCUTANEOUS at 22:02

## 2024-11-15 RX ADMIN — CLOPIDOGREL 75 MILLIGRAM(S): 75 TABLET, FILM COATED ORAL at 12:16

## 2024-11-15 RX ADMIN — Medication 500 MILLIGRAM(S): at 17:32

## 2024-11-15 RX ADMIN — METOPROLOL TARTRATE 12.5 MILLIGRAM(S): 100 TABLET, FILM COATED ORAL at 06:39

## 2024-11-15 RX ADMIN — Medication 20 MILLIGRAM(S): at 22:01

## 2024-11-15 RX ADMIN — POLYETHYLENE GLYCOL 3350 17 GRAM(S): 17 POWDER, FOR SOLUTION ORAL at 12:16

## 2024-11-15 NOTE — PROGRESS NOTE ADULT - SUBJECTIVE AND OBJECTIVE BOX
Patient is a 69y old  Male who presents with a chief complaint of L MCA Infarct with right body involvement (13 Nov 2024 08:02)      HPI:  70 y/o M with PMHx of HTN, HLD, DM, originally presented to [facility] on 10/27 as a code stroke after developed right sided weakness, pulmonary edema and hypertensive emergency in the setting of NSTEMI/HF. 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 Ulrich on 11/5/24. [facility]  #[id number] used.     MR brain revealed L watershed/MCA strokes in the setting of high grade proximal LICA stenosis >90% at bifurcation. Patient underwent catheter cerebral angiogram, angioplasty and left carotid wall stent placement with Dr. Farhat Ulrich on 11/6/2024. Right groin was accessed but catheter could not be advanced 2/2 to near occlusion of the right iliac, puncture closed with closure device and procedure proceeded through right radial artery. Case was complicated by hypotension requiring multiple neosticks and IVF boluses to maintain hemodynamic stability. Post-op Carotid dopplers completed on 11/7/2024 demonstrated patent left ICA stent.    Cardiology was consulted for remote history of NSTEMI, f/u Echo shows EF 45% segmental wall motion abnormalities, Recommended to continue ASA/ Plavix and f/u outpatient with another cardiac cath which was deferred d/t acute stroke. Post op course c/b fluctuating RLE MMT exam, repeat CTH stable.     Patient was cleared for discharge to [facility] IRF on 11/11/24.  (11 Nov 2024 14:19)      SUBJECTIVE/ROS  Patient was resting comfortably in chair during therapy when seen. Used . Low BP reported in therapy. 80/59. Orthostatics were checked- BP rayshawn when standing. Asymptomatic, no lightheadedness, dizziness, headaches, fatigue etc. Otherwise no issues overnight.     Other comprehensive ROS negative.       PAST MEDICAL & SURGICAL HISTORY:  Diabetes mellitus      HTN (hypertension)      Hyperlipidemia          MEDICATIONS  (STANDING):  albuterol    90 MICROgram(s) HFA Inhaler 2 Puff(s) Inhalation every 12 hours  aspirin 325 milliGRAM(s) Oral <User Schedule>  atorvastatin 20 milliGRAM(s) Oral at bedtime  clopidogrel Tablet 75 milliGRAM(s) Oral daily  dextrose 5%. 1000 milliLiter(s) (50 mL/Hr) IV Continuous <Continuous>  dextrose 5%. 1000 milliLiter(s) (100 mL/Hr) IV Continuous <Continuous>  dextrose 50% Injectable 12.5 Gram(s) IV Push once  dextrose 50% Injectable 25 Gram(s) IV Push once  dextrose 50% Injectable 25 Gram(s) IV Push once  enoxaparin Injectable 40 milliGRAM(s) SubCutaneous every 24 hours  famotidine    Tablet 40 milliGRAM(s) Oral daily  glucagon  Injectable 1 milliGRAM(s) IntraMuscular once  insulin glargine Injectable (LANTUS) 15 Unit(s) SubCutaneous at bedtime  insulin lispro (ADMELOG) corrective regimen sliding scale   SubCutaneous at bedtime  insulin lispro (ADMELOG) corrective regimen sliding scale   SubCutaneous three times a day before meals  insulin lispro Injectable (ADMELOG) 4 Unit(s) SubCutaneous before lunch  insulin lispro Injectable (ADMELOG) 4 Unit(s) SubCutaneous before breakfast  insulin lispro Injectable (ADMELOG) 4 Unit(s) SubCutaneous before dinner  metFORMIN 500 milliGRAM(s) Oral two times a day  metoprolol tartrate 12.5 milliGRAM(s) Oral every 12 hours  polyethylene glycol 3350 17 Gram(s) Oral daily  senna 2 Tablet(s) Oral at bedtime  sodium chloride 0.9% Bolus 250 milliLiter(s) IV Bolus once    MEDICATIONS  (PRN):  dextrose Oral Gel 15 Gram(s) Oral once PRN Blood Glucose LESS THAN 70 milliGRAM(s)/deciliter  melatonin 6 milliGRAM(s) Oral at bedtime PRN Insomnia        Allergies    No Known Allergies    Intolerances          VITALS  T(C): 36.8 (11-15-24 @ 08:00), Max: 36.8 (11-15-24 @ 08:00)  T(F): 98.3 (11-15-24 @ 08:00), Max: 98.3 (11-15-24 @ 08:00)  HR: 69 (11-15-24 @ 08:00) (69 - 75)  BP: 106/71 (11-15-24 @ 08:00) (106/71 - 113/74)  ABP: --  ABP(mean): --  RR: 17 (11-15-24 @ 08:00) (16 - 17)  SpO2: 97% (11-15-24 @ 08:00) (94% - 97%)        PHYSICAL EXAM:   Constitutional - NAD, Comfortable  HEENT - NCAT, EOMI, dry mucous membranes  Chest - good chest expansion, good respiratory effort  Cardio - warm and well perfused  Abdomen -  Nondistended  Extremities - No peripheral edema  Neurologic Exam:                           Orientation: Awake, Alert  Psychiatric - Mood stable, Affect WNL        LAB                        12.6   11.17 )-----------( 409      ( 14 Nov 2024 07:10 )             38.4     11-14    135  |  100  |  27[H]  ----------------------------<  153[H]  4.0   |  26  |  1.27    Ca    9.4      14 Nov 2024 07:10    TPro  7.7  /  Alb  2.8[L]  /  TBili  0.5  /  DBili  x   /  AST  46[H]  /  ALT  72[H]  /  AlkPhos  75  11-14    LIVER FUNCTIONS - ( 14 Nov 2024 07:10 )  Alb: 2.8 g/dL / Pro: 7.7 g/dL / ALK PHOS: 75 U/L / ALT: 72 U/L / AST: 46 U/L / GGT: x                 Urinalysis Basic - ( 14 Nov 2024 07:10 )    Color: x / Appearance: x / SG: x / pH: x  Gluc: 153 mg/dL / Ketone: x  / Bili: x / Urobili: x   Blood: x / Protein: x / Nitrite: x   Leuk Esterase: x / RBC: x / WBC x   Sq Epi: x / Non Sq Epi: x / Bacteria: x                       Patient is a 69y old  Male who presents with a chief complaint of L MCA Infarct with right body involvement (13 Nov 2024 08:02)      HPI:  70 y/o M with PMHx of HTN, HLD, DM, originally presented to [facility] on 10/27 as a code stroke after developed right sided weakness, pulmonary edema and hypertensive emergency in the setting of NSTEMI/HF. 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 Ulrich on 11/5/24. [facility]  #[id number] used.     MR brain revealed L watershed/MCA strokes in the setting of high grade proximal LICA stenosis >90% at bifurcation. Patient underwent catheter cerebral angiogram, angioplasty and left carotid wall stent placement with Dr. Farhat Ulrich on 11/6/2024. Right groin was accessed but catheter could not be advanced 2/2 to near occlusion of the right iliac, puncture closed with closure device and procedure proceeded through right radial artery. Case was complicated by hypotension requiring multiple neosticks and IVF boluses to maintain hemodynamic stability. Post-op Carotid dopplers completed on 11/7/2024 demonstrated patent left ICA stent.    Cardiology was consulted for remote history of NSTEMI, f/u Echo shows EF 45% segmental wall motion abnormalities, Recommended to continue ASA/ Plavix and f/u outpatient with another cardiac cath which was deferred d/t acute stroke. Post op course c/b fluctuating RLE MMT exam, repeat CTH stable.     Patient was cleared for discharge to [facility] IRF on 11/11/24.  (11 Nov 2024 14:19)      SUBJECTIVE/ROS  Used  language line# [id number] during PT session.   Patient was resting comfortably in chair during therapy when seen. Low BP reported in therapy. 80/59. Orthostatics were checked- BP rayshawn when standing. Asymptomatic, no lightheadedness, dizziness, headaches, fatigue etc. Otherwise no issues overnight.     Other comprehensive ROS negative.       PAST MEDICAL & SURGICAL HISTORY:  Diabetes mellitus      HTN (hypertension)      Hyperlipidemia          MEDICATIONS  (STANDING):  albuterol    90 MICROgram(s) HFA Inhaler 2 Puff(s) Inhalation every 12 hours  aspirin 325 milliGRAM(s) Oral <User Schedule>  atorvastatin 20 milliGRAM(s) Oral at bedtime  clopidogrel Tablet 75 milliGRAM(s) Oral daily  dextrose 5%. 1000 milliLiter(s) (50 mL/Hr) IV Continuous <Continuous>  dextrose 5%. 1000 milliLiter(s) (100 mL/Hr) IV Continuous <Continuous>  dextrose 50% Injectable 12.5 Gram(s) IV Push once  dextrose 50% Injectable 25 Gram(s) IV Push once  dextrose 50% Injectable 25 Gram(s) IV Push once  enoxaparin Injectable 40 milliGRAM(s) SubCutaneous every 24 hours  famotidine    Tablet 40 milliGRAM(s) Oral daily  glucagon  Injectable 1 milliGRAM(s) IntraMuscular once  insulin glargine Injectable (LANTUS) 15 Unit(s) SubCutaneous at bedtime  insulin lispro (ADMELOG) corrective regimen sliding scale   SubCutaneous at bedtime  insulin lispro (ADMELOG) corrective regimen sliding scale   SubCutaneous three times a day before meals  insulin lispro Injectable (ADMELOG) 4 Unit(s) SubCutaneous before lunch  insulin lispro Injectable (ADMELOG) 4 Unit(s) SubCutaneous before breakfast  insulin lispro Injectable (ADMELOG) 4 Unit(s) SubCutaneous before dinner  metFORMIN 500 milliGRAM(s) Oral two times a day  metoprolol tartrate 12.5 milliGRAM(s) Oral every 12 hours  polyethylene glycol 3350 17 Gram(s) Oral daily  senna 2 Tablet(s) Oral at bedtime  sodium chloride 0.9% Bolus 250 milliLiter(s) IV Bolus once    MEDICATIONS  (PRN):  dextrose Oral Gel 15 Gram(s) Oral once PRN Blood Glucose LESS THAN 70 milliGRAM(s)/deciliter  melatonin 6 milliGRAM(s) Oral at bedtime PRN Insomnia        Allergies    No Known Allergies    Intolerances          VITALS  T(C): 36.8 (11-15-24 @ 08:00), Max: 36.8 (11-15-24 @ 08:00)  T(F): 98.3 (11-15-24 @ 08:00), Max: 98.3 (11-15-24 @ 08:00)  HR: 69 (11-15-24 @ 08:00) (69 - 75)  BP: 106/71 (11-15-24 @ 08:00) (106/71 - 113/74)  ABP: --  ABP(mean): --  RR: 17 (11-15-24 @ 08:00) (16 - 17)  SpO2: 97% (11-15-24 @ 08:00) (94% - 97%)        PHYSICAL EXAM:   Constitutional - NAD, Comfortable  HEENT - NCAT, EOMI, dry mucous membranes  Chest - good chest expansion, good respiratory effort  Cardio - warm and well perfused  Abdomen -  Nondistended  Extremities - No peripheral edema  Neurologic Exam:                           Orientation: Awake, Alert  Psychiatric - Mood stable, Affect WNL        LAB                        12.6   11.17 )-----------( 409      ( 14 Nov 2024 07:10 )             38.4     11-14    135  |  100  |  27[H]  ----------------------------<  153[H]  4.0   |  26  |  1.27    Ca    9.4      14 Nov 2024 07:10    TPro  7.7  /  Alb  2.8[L]  /  TBili  0.5  /  DBili  x   /  AST  46[H]  /  ALT  72[H]  /  AlkPhos  75  11-14    LIVER FUNCTIONS - ( 14 Nov 2024 07:10 )  Alb: 2.8 g/dL / Pro: 7.7 g/dL / ALK PHOS: 75 U/L / ALT: 72 U/L / AST: 46 U/L / GGT: x                 Urinalysis Basic - ( 14 Nov 2024 07:10 )    Color: x / Appearance: x / SG: x / pH: x  Gluc: 153 mg/dL / Ketone: x  / Bili: x / Urobili: x   Blood: x / Protein: x / Nitrite: x   Leuk Esterase: x / RBC: x / WBC x   Sq Epi: x / Non Sq Epi: x / Bacteria: x

## 2024-11-15 NOTE — PROGRESS NOTE ADULT - SUBJECTIVE AND OBJECTIVE BOX
PROGRESS NOTE:     Patient is a 69y old  Male who presents with a chief complaint of L MCA Infarct with right body involvement (15 Nov 2024 11:24)      SUBJECTIVE / OVERNIGHT EVENTS: pt was seen and evaluated today  no complains offered  BP low and reportedly patient appeared dry so 250 cc ordered for him     ADDITIONAL REVIEW OF SYSTEMS: as per HPI    MEDICATIONS  (STANDING):  albuterol    90 MICROgram(s) HFA Inhaler 2 Puff(s) Inhalation every 12 hours  aspirin 325 milliGRAM(s) Oral <User Schedule>  atorvastatin 20 milliGRAM(s) Oral at bedtime  clopidogrel Tablet 75 milliGRAM(s) Oral daily  dextrose 5%. 1000 milliLiter(s) (50 mL/Hr) IV Continuous <Continuous>  dextrose 5%. 1000 milliLiter(s) (100 mL/Hr) IV Continuous <Continuous>  dextrose 50% Injectable 25 Gram(s) IV Push once  dextrose 50% Injectable 25 Gram(s) IV Push once  dextrose 50% Injectable 12.5 Gram(s) IV Push once  enoxaparin Injectable 40 milliGRAM(s) SubCutaneous every 24 hours  famotidine    Tablet 40 milliGRAM(s) Oral daily  glucagon  Injectable 1 milliGRAM(s) IntraMuscular once  insulin glargine Injectable (LANTUS) 15 Unit(s) SubCutaneous at bedtime  insulin lispro (ADMELOG) corrective regimen sliding scale   SubCutaneous at bedtime  insulin lispro (ADMELOG) corrective regimen sliding scale   SubCutaneous three times a day before meals  insulin lispro Injectable (ADMELOG) 4 Unit(s) SubCutaneous before lunch  insulin lispro Injectable (ADMELOG) 4 Unit(s) SubCutaneous before dinner  insulin lispro Injectable (ADMELOG) 4 Unit(s) SubCutaneous before breakfast  metFORMIN 500 milliGRAM(s) Oral two times a day  metoprolol tartrate 12.5 milliGRAM(s) Oral every 12 hours  polyethylene glycol 3350 17 Gram(s) Oral daily  senna 2 Tablet(s) Oral at bedtime  sodium chloride 0.9% Bolus 250 milliLiter(s) IV Bolus once    MEDICATIONS  (PRN):  dextrose Oral Gel 15 Gram(s) Oral once PRN Blood Glucose LESS THAN 70 milliGRAM(s)/deciliter  melatonin 6 milliGRAM(s) Oral at bedtime PRN Insomnia      CAPILLARY BLOOD GLUCOSE      POCT Blood Glucose.: 202 mg/dL (15 Nov 2024 12:13)  POCT Blood Glucose.: 144 mg/dL (15 Nov 2024 08:09)  POCT Blood Glucose.: 140 mg/dL (14 Nov 2024 20:59)  POCT Blood Glucose.: 181 mg/dL (14 Nov 2024 16:59)  POCT Blood Glucose.: 129 mg/dL (14 Nov 2024 12:31)    I&O's Summary    14 Nov 2024 07:01  -  15 Nov 2024 07:00  --------------------------------------------------------  IN: 0 mL / OUT: 1 mL / NET: -1 mL        PHYSICAL EXAM:  Vital Signs Last 24 Hrs  T(C): 36.8 (15 Nov 2024 08:00), Max: 36.8 (15 Nov 2024 08:00)  T(F): 98.3 (15 Nov 2024 08:00), Max: 98.3 (15 Nov 2024 08:00)  HR: 69 (15 Nov 2024 08:00) (69 - 75)  BP: 106/71 (15 Nov 2024 08:00) (106/71 - 113/74)  BP(mean): --  RR: 17 (15 Nov 2024 08:00) (16 - 17)  SpO2: 97% (15 Nov 2024 08:00) (94% - 97%)    Parameters below as of 15 Nov 2024 08:00  Patient On (Oxygen Delivery Method): room air        Constitutional - NAD, Comfortable  HEENT - EOMI  Chest - good chest expansion, good respiratory effort, CTAB   Cardio - warm and well perfused, RRR  Abdomen -  Soft, NTND  Extremities - No peripheral edema  Neurologic Exam:  Awake, Alert,   Psychiatric - Mood stable      LABS:                        12.6   11.17 )-----------( 409      ( 14 Nov 2024 07:10 )             38.4     11-14    135  |  100  |  27[H]  ----------------------------<  153[H]  4.0   |  26  |  1.27    Ca    9.4      14 Nov 2024 07:10    TPro  7.7  /  Alb  2.8[L]  /  TBili  0.5  /  DBili  x   /  AST  46[H]  /  ALT  72[H]  /  AlkPhos  75  11-14          Urinalysis Basic - ( 14 Nov 2024 07:10 )    Color: x / Appearance: x / SG: x / pH: x  Gluc: 153 mg/dL / Ketone: x  / Bili: x / Urobili: x   Blood: x / Protein: x / Nitrite: x   Leuk Esterase: x / RBC: x / WBC x   Sq Epi: x / Non Sq Epi: x / Bacteria: x          case discussed during IDR and  with consultants

## 2024-11-15 NOTE — PROGRESS NOTE ADULT - ATTENDING COMMENTS
Progress note amended to include my discussions with patient, resident, hospitalist, RN, SW, PT and my findings    Patient was in therapy when he developed soft BP, but was not orthostatic. HR not tachycardic, and no respiratory distress. His Cr has been slowly risking from 11/6 0.73 to 1.27 yesterday, slightly dehydrated.  ml x 1 ordered, will repeat BMP in AM 11/16.     RECENT LABS    Vital Signs Last 24 Hrs  T(C): 36.8 (15 Nov 2024 08:00), Max: 36.8 (15 Nov 2024 08:00)  T(F): 98.3 (15 Nov 2024 08:00), Max: 98.3 (15 Nov 2024 08:00)  HR: 69 (15 Nov 2024 08:00) (69 - 75)  BP: 106/71 (15 Nov 2024 08:00) (106/71 - 113/74)  BP(mean): --  RR: 17 (15 Nov 2024 08:00) (16 - 17)  SpO2: 97% (15 Nov 2024 08:00) (94% - 97%)    Parameters below as of 15 Nov 2024 08:00  Patient On (Oxygen Delivery Method): room air                              12.6   11.17 )-----------( 409      ( 14 Nov 2024 07:10 )             38.4     11-14    135  |  100  |  27[H]  ----------------------------<  153[H]  4.0   |  26  |  1.27    Ca    9.4      14 Nov 2024 07:10    TPro  7.7  /  Alb  2.8[L]  /  TBili  0.5  /  DBili  x   /  AST  46[H]  /  ALT  72[H]  /  AlkPhos  75  11-14      Urinalysis Basic - ( 14 Nov 2024 07:10 )    Color: x / Appearance: x / SG: x / pH: x  Gluc: 153 mg/dL / Ketone: x  / Bili: x / Urobili: x   Blood: x / Protein: x / Nitrite: x   Leuk Esterase: x / RBC: x / WBC x   Sq Epi: x / Non Sq Epi: x / Bacteria: x      CAPILLARY BLOOD GLUCOSE      POCT Blood Glucose.: 202 mg/dL (15 Nov 2024 12:13)  POCT Blood Glucose.: 144 mg/dL (15 Nov 2024 08:09)  POCT Blood Glucose.: 140 mg/dL (14 Nov 2024 20:59)  POCT Blood Glucose.: 181 mg/dL (14 Nov 2024 16:59) Progress note amended to include my discussions with patient, resident, hospitalist, RN, SW, PT and my findings    Patient was in therapy when he developed soft BP, but was not orthostatic. Alert, sustained eye opening and fair overall arousal. HR not tachycardic, and no respiratory distress. Denies CP, SOB, palpitations (his verbal output is reduced, but his yes/no for simple personal information appears reliable) His Cr has been slowly risking from 11/6 0.73 to 1.27 yesterday, slightly dehydrated.  ml x 1 ordered, will repeat BMP in AM 11/16. FS otherwise have been better controlled, and leukocytosis improving this week without signs of complaints c/w infection.    on exam, lungs are clear, cor RRR. abd is soft no R/G +BS. he continues to have no AROM RUE but no pain with PROM, no hand swelling. ambulated 20 feet x2 at left hallway railing. Also 50 feet x2 with marija-walker. Both requiring min/mod A x1 and WC follow in PT    [facility] program        RECENT LABS    Vital Signs Last 24 Hrs  T(C): 36.8 (15 Nov 2024 08:00), Max: 36.8 (15 Nov 2024 08:00)  T(F): 98.3 (15 Nov 2024 08:00), Max: 98.3 (15 Nov 2024 08:00)  HR: 69 (15 Nov 2024 08:00) (69 - 75)  BP: 106/71 (15 Nov 2024 08:00) (106/71 - 113/74)  BP(mean): --  RR: 17 (15 Nov 2024 08:00) (16 - 17)  SpO2: 97% (15 Nov 2024 08:00) (94% - 97%)    Parameters below as of 15 Nov 2024 08:00  Patient On (Oxygen Delivery Method): room air                              12.6   11.17 )-----------( 409      ( 14 Nov 2024 07:10 )             38.4     11-14    135  |  100  |  27[H]  ----------------------------<  153[H]  4.0   |  26  |  1.27    Ca    9.4      14 Nov 2024 07:10    TPro  7.7  /  Alb  2.8[L]  /  TBili  0.5  /  DBili  x   /  AST  46[H]  /  ALT  72[H]  /  AlkPhos  75  11-14      Urinalysis Basic - ( 14 Nov 2024 07:10 )    Color: x / Appearance: x / SG: x / pH: x  Gluc: 153 mg/dL / Ketone: x  / Bili: x / Urobili: x   Blood: x / Protein: x / Nitrite: x   Leuk Esterase: x / RBC: x / WBC x   Sq Epi: x / Non Sq Epi: x / Bacteria: x      CAPILLARY BLOOD GLUCOSE      POCT Blood Glucose.: 202 mg/dL (15 Nov 2024 12:13)  POCT Blood Glucose.: 144 mg/dL (15 Nov 2024 08:09)  POCT Blood Glucose.: 140 mg/dL (14 Nov 2024 20:59)  POCT Blood Glucose.: 181 mg/dL (14 Nov 2024 16:59)

## 2024-11-15 NOTE — PROGRESS NOTE ADULT - ASSESSMENT
PEPE KIM is a 68 y/o M with PMHx of HTN, HLD, DM, who presented to [facility] on 10/27/24 with right sided weakness/code stroke, pulmonary edema and hypertensive emergency in the setting of NSTEMI/HF. 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 Ulrich on 11/5/24.     # L MCA Infarct with right body involvement  - CTA +right carotid stenosis of 50% and left carotid stenosis of 90%  - MR brain w/ L watershed/MCA strokes in the setting of high grade proximal LICA stenosis >90%  - S/p angio/LICA stent w/ Dr. Ulrich on 11/5.   - Aspirin 325mg daily  - Plavix 75mg daily   - Continue comprehensive rehab program, 3 hours a day, 5 days a week. PT OT SLP  - Precautions: cardiac, DM, fall,     # NSTEMI  # CHF  # HLD/HTN  - EKG 11/11:  Normal sinus rhythm, ST and T wave abnormality, consider anterolateral ischemia. (similar to prior 11/6)  - TTE: EF 45-50%.  - Metoprolol 12.5mg BID   - Aspirin 325mg daily  - Plavix 75mg daily   - Atorvastatin 20mg HS (LDL 77, )   - Cardiac cath deferred outpatient f/u d/t acute CVA    #Episodic hypotension  - low BP 2 days in a row during therapy  - 11/15 reportedly 80/59 in therapy  - BP improved on standing, likely not orthostatic in nature  - ordered 250 cc bolus as pt appeared dry    # Asthma  - Albuterol PRN  - in remission    # T2DM, uncontrolled with hyperglycemia  - A1c 9.6  - Lantus increased 15u 11/12  - admelog 4u qAC  - FBG ACHS + Mod ISS  - -180s 11/15     # leukocytosis  - afebrile, no tachy. Will monitor for now  - UA 11/27 negative, CXR 11/5 prominent markings but attributed to cardiogenic etiology rather than infectious.  - WBC 14.27 11/6.--> 11.17 11/4  - CBC 11/18    # mild transaminitis  -  AST  46[H]  /  ALT  72[H]  /  AlkPhos  75  11-14 very mildly uptrending over past 2 weeks. Will continue to monitor, asymptomatic  - avoid hepatotoxic meds. DC tylenol  - continue low dose statin for now given CVA and cAD  - CMP 11/18    # Sleep:   - Melatonin 6mg HS PRN     # Pain Management:  - Tylenol PRN    # GI/Bowel:  - Senna QHS, Miralax PRN Daily  - GI ppx: famotidine 40mg daily     # /Bladder:   - PVR x1 on admission  (SC if > 400)    # Diet  - Regular, carb consistent diet, DASH/TLC    # DVT ppx:   - Lovenox 40mg daily, SCDs    # Case discussed in IDT rounds 11/13 (initial)  - incontinent B/B, skin intact, lives with spouse in [place] with 6 ANA MARIA, regular solid thin liquids, mod non-fluent aphasia with breakdown in semi complex receptive level, able to generate 3-word phrases with cues, max assist toilet and showering, mod UB/max LB dressing, supervision eating and oral hygiene, mod assist bed mobility and transfers, ambulates 10 feet with WBQC and mod assist  - barriers: right hemiparesis, stairs, incontinence, aphasia, reduced caregiver support, reduced endurance  - goals: intermittent supervision waking hours, CG transfers, supervision bADLs, CS transfers and ambulation household distances "Pepe" "to get it back". Ambulate to bathroom with CS, communicate wants and needs min assist  - target: dc home 12/3 with caregiver support and home PT OT SLP  - caregiver training    # LABS  CBC CMP 11/18    ---------------  Code Status: FULL  Emergency Contact:    Outpatient Follow-up (Specialty/Name of physician):    Farhat Ulrich  Radiology  [address], [address]  [place] [address]  Phone: [phone]  Fax: [phone]      - PEPE KIM is a 68 y/o M with PMHx of HTN, HLD, DM, who presented to [facility] on 10/27/24 with right sided weakness/code stroke, pulmonary edema and hypertensive emergency in the setting of NSTEMI/HF. CTA found to have right carotid stenosis of 50% and left carotid stenosis of 90%, as well as segmental occlusion in thoracic aorta  Patient was transferred from [facility] to [facility] for angio/LICA stent w/ Dr. Ulrich on 11/5/24.     # L MCA Infarct with right body involvement  - CTA +right carotid stenosis of 50% and left carotid stenosis of 90%  - MR brain w/ L watershed/MCA strokes in the setting of high grade proximal LICA stenosis >90%  - S/p angio/LICA stent w/ Dr. Ulrich on 11/5.   - Aspirin 325mg daily  - Plavix 75mg daily   - Continue comprehensive rehab program, 3 hours a day, 5 days a week. PT OT SLP  - Precautions: cardiac, DM, fall,     # NSTEMI  # CHF  # HLD/HTN  - EKG 11/11:  Normal sinus rhythm, ST and T wave abnormality, consider anterolateral ischemia. (similar to prior 11/6)  - TTE: EF 45-50%.  - Metoprolol 12.5mg BID   - Aspirin 325mg daily  - Plavix 75mg daily   - Atorvastatin 20mg HS (LDL 77, )   - Cardiac cath deferred outpatient f/u d/t acute CVA    # Episodic hypotension  - low BP 2 days in a row during therapy  - 11/15 80/59 in therapy  - BP improved on standing, likely not orthostatic in nature  - ordered 250 cc bolus as pt appeared dry  - BMP in AM 11/16    # Asthma  - Albuterol PRN  - in remission    # T2DM, uncontrolled with hyperglycemia  - A1c 9.6  - Lantus increased 15u 11/12  - admelog 4u qAC  - FBG ACHS + Mod ISS  - -180s 11/15     # leukocytosis  - afebrile, no tachy. Will monitor for now  - UA 11/27 negative, CXR 11/5 prominent markings but attributed to cardiogenic etiology rather than infectious.  - WBC 14.27 11/6.--> 11.17 11/4 improving  - CBC 11/18    # mild transaminitis  -  AST  46[H]  /  ALT  72[H]  /  AlkPhos  75  11-14 very mildly uptrending over past 2 weeks. Will continue to monitor, asymptomatic  - avoid hepatotoxic meds. DC tylenol  - continue low dose statin for now given CVA and cAD  - CMP 11/18    # Sleep:   - Melatonin 6mg HS PRN     # Pain Management:  - Tylenol PRN    # GI/Bowel:  - Senna QHS, Miralax PRN Daily  - GI ppx: famotidine 40mg daily     # /Bladder:   - PVR x1 on admission  (SC if > 400)    # Diet  - Regular, carb consistent diet, DASH/TLC    # DVT ppx:   - Lovenox 40mg daily, SCDs    # Case discussed in IDT rounds 11/13 (initial)  - incontinent B/B, skin intact, lives with spouse in  with 6 ANA MARIA, regular solid thin liquids, mod non-fluent aphasia with breakdown in semi complex receptive level, able to generate 3-word phrases with cues, max assist toilet and showering, mod UB/max LB dressing, supervision eating and oral hygiene, mod assist bed mobility and transfers, ambulates 10 feet with WBQC and mod assist  - barriers: right hemiparesis, stairs, incontinence, aphasia, reduced caregiver support, reduced endurance  - goals: intermittent supervision waking hours, CG transfers, supervision bADLs, CS transfers and ambulation household distances "Pepe" "to get it back". Ambulate to bathroom with CS, communicate wants and needs min assist  - target: dc home 12/3 with caregiver support and home PT OT SLP  - caregiver training    # LABS  BMP 11/16  monitor BP aleah OOB  CBC CMP 11/18    ---------------  Code Status: FULL  Emergency Contact:    Outpatient Follow-up (Specialty/Name of physician):    Farhat Ulrich  Radiology  [address], [address]  [place] [address]  Phone: [phone]  Fax: [phone]      -

## 2024-11-15 NOTE — PROGRESS NOTE ADULT - ASSESSMENT
PEPE KIM is a 68 y/o M with PMHx of HTN, HLD, DM, who presented to [facility] on 10/27 with right sided weakness/code stroke, pulmonary edema and hypertensive emergency in the setting of NSTEMI/HF. 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 Ulrich on 11/5/24.  Now admitted to [facility] with right sided deficits for initiation of a multidisciplinary rehab program consisting focused on functional mobility, transfers and ADLs.      # L MCA Infarct with right body involvement  - CTA +right carotid stenosis of 50% and left carotid stenosis of 90%  - MR brain w/ L watershed/MCA strokes in the setting of high grade proximal LICA stenosis >90%  - S/p angio/LICA stent w/ Dr. Ulrich on 11/5.   - Aspirin 325mg daily  - Plavix 75mg daily   - Lipitor         # NSTEMI  # CHF  # HLD/HTN  - EKG (11/6)-  NSR with occasional PVC ,  Incomplete RBBB, T wave abnormality, consider inferolateral ischemia   - Elevated troponins---> (10/27: 104--> 148--> 190--> 10/28: 236-->10/2--> 312--> 10/30: 298)   - TTE at [facility] showed EF 45-50%.  - Metoprolol 12.5mg BID   - Aspirin 325mg daily  - Plavix 75mg daily   - Atorvastatin 20mg HS (LDL 77, )   - Cardiac cath deferred outpatient f/u d/t acute CVA    # Asthma  - Albuterol PRN    # T2DM with hyperglycemia   - A1c 9.6  - Lantus 15U can be titrated up to 50U if needed  - FBG ACHS + Mod ISS  - metformin  ordered      # GI/Bowel:  - Senna QHS, Miralax PRN Daily  - GI ppx: famotidine 40mg daily     # DVT ppx:   - Lovenox 40mg daily, SCDs

## 2024-11-16 LAB
ANION GAP SERPL CALC-SCNC: 10 MMOL/L — SIGNIFICANT CHANGE UP (ref 5–17)
BUN SERPL-MCNC: 28 MG/DL — HIGH (ref 7–23)
CALCIUM SERPL-MCNC: 9.5 MG/DL — SIGNIFICANT CHANGE UP (ref 8.4–10.5)
CHLORIDE SERPL-SCNC: 101 MMOL/L — SIGNIFICANT CHANGE UP (ref 96–108)
CO2 SERPL-SCNC: 25 MMOL/L — SIGNIFICANT CHANGE UP (ref 22–31)
CREAT SERPL-MCNC: 1.1 MG/DL — SIGNIFICANT CHANGE UP (ref 0.5–1.3)
EGFR: 72 ML/MIN/1.73M2 — SIGNIFICANT CHANGE UP
GLUCOSE BLDC GLUCOMTR-MCNC: 110 MG/DL — HIGH (ref 70–99)
GLUCOSE BLDC GLUCOMTR-MCNC: 135 MG/DL — HIGH (ref 70–99)
GLUCOSE BLDC GLUCOMTR-MCNC: 135 MG/DL — HIGH (ref 70–99)
GLUCOSE BLDC GLUCOMTR-MCNC: 172 MG/DL — HIGH (ref 70–99)
GLUCOSE SERPL-MCNC: 133 MG/DL — HIGH (ref 70–99)
POTASSIUM SERPL-MCNC: 3.8 MMOL/L — SIGNIFICANT CHANGE UP (ref 3.5–5.3)
POTASSIUM SERPL-SCNC: 3.8 MMOL/L — SIGNIFICANT CHANGE UP (ref 3.5–5.3)
SODIUM SERPL-SCNC: 136 MMOL/L — SIGNIFICANT CHANGE UP (ref 135–145)

## 2024-11-16 PROCEDURE — 99232 SBSQ HOSP IP/OBS MODERATE 35: CPT

## 2024-11-16 RX ADMIN — Medication 2: at 12:30

## 2024-11-16 RX ADMIN — METOPROLOL TARTRATE 12.5 MILLIGRAM(S): 100 TABLET, FILM COATED ORAL at 05:48

## 2024-11-16 RX ADMIN — CLOPIDOGREL 75 MILLIGRAM(S): 75 TABLET, FILM COATED ORAL at 12:31

## 2024-11-16 RX ADMIN — POLYETHYLENE GLYCOL 3350 17 GRAM(S): 17 POWDER, FOR SOLUTION ORAL at 12:32

## 2024-11-16 RX ADMIN — Medication 2 PUFF(S): at 08:20

## 2024-11-16 RX ADMIN — Medication 4 UNIT(S): at 17:07

## 2024-11-16 RX ADMIN — Medication 500 MILLIGRAM(S): at 08:01

## 2024-11-16 RX ADMIN — Medication 2 TABLET(S): at 21:12

## 2024-11-16 RX ADMIN — Medication 500 MILLIGRAM(S): at 17:09

## 2024-11-16 RX ADMIN — ENOXAPARIN SODIUM 40 MILLIGRAM(S): 30 INJECTION SUBCUTANEOUS at 22:25

## 2024-11-16 RX ADMIN — Medication 4 UNIT(S): at 12:31

## 2024-11-16 RX ADMIN — Medication 4 UNIT(S): at 07:59

## 2024-11-16 RX ADMIN — Medication 20 MILLIGRAM(S): at 21:12

## 2024-11-16 RX ADMIN — INSULIN GLARGINE 15 UNIT(S): 100 INJECTION, SOLUTION SUBCUTANEOUS at 21:12

## 2024-11-16 RX ADMIN — FAMOTIDINE 40 MILLIGRAM(S): 20 TABLET, FILM COATED ORAL at 12:31

## 2024-11-16 RX ADMIN — Medication 325 MILLIGRAM(S): at 05:48

## 2024-11-16 RX ADMIN — METOPROLOL TARTRATE 12.5 MILLIGRAM(S): 100 TABLET, FILM COATED ORAL at 17:09

## 2024-11-16 NOTE — PROGRESS NOTE ADULT - COMMENTS
Patient seen with [facility] language line  Lauren #[id number]    Patient sitting in hallway. He looks improved, more verbal, less fatigued, although he reports feeling fine, "same like yesterday." He says he cannot state whether his lightheadedness/dizziness is better because he does not recall feeling that way yesterday "fine" then and now. Denies difficulty sleeping, no complaints Patient seen with [facility] language line  Lauren #[id number]    Patient sitting in hallway. He looks improved, more verbal, less fatigued, although he reports feeling fine, "same like yesterday." He says he cannot state whether his lightheadedness/dizziness is better because he does not recall feeling that way yesterday "fine" then and now. Denies difficulty sleeping, no complaints    Last BM 11/13

## 2024-11-16 NOTE — PROGRESS NOTE ADULT - SUBJECTIVE AND OBJECTIVE BOX
Patient is a 69y old  Male who presents with a chief complaint of L MCA Infarct with right body involvement (15 Nov 2024 12:15)      HPI:  70 y/o M with PMHx of HTN, HLD, DM, originally presented to [facility] on 10/27 as a code stroke after developed right sided weakness, pulmonary edema and hypertensive emergency in the setting of NSTEMI/HF. CTA + right carotid stenosis of 50% and left carotid stenosis of 90%, as well as segmental occlusion in thoracic aorta  Patient was transferred from [facility] to [facility] for angio/LICA stent w/ Dr. Ulrich on 11/5/24. [facility]  #[id number] used.     MR brain revealed L watershed/MCA strokes in the setting of high grade proximal LICA stenosis >90% at bifurcation. Patient underwent catheter cerebral angiogram, angioplasty and left carotid wall stent placement with Dr. Farhat Ulrich on 11/6/2024. Right groin was accessed but catheter could not be advanced 2/2 to near occlusion of the right iliac, puncture closed with closure device and procedure proceeded through right radial artery. Case was complicated by hypotension requiring multiple neosticks and IVF boluses to maintain hemodynamic stability. Post-op Carotid dopplers completed on 11/7/2024 demonstrated patent left ICA stent.    Cardiology was consulted for remote history of NSTEMI, f/u Echo shows EF 45% segmental wall motion abnormalities, Recommended to continue ASA/ Plavix and f/u outpatient with another cardiac cath which was deferred d/t acute stroke. Post op course c/b fluctuating RLE MMT exam, repeat CTH stable.     Patient was cleared for discharge to [facility] on 11/11/24.  (11 Nov 2024 14:19)      PAST MEDICAL & SURGICAL HISTORY:  Diabetes mellitus      HTN (hypertension)      Hyperlipidemia          MEDICATIONS  (STANDING):  albuterol    90 MICROgram(s) HFA Inhaler 2 Puff(s) Inhalation every 12 hours  aspirin 325 milliGRAM(s) Oral <User Schedule>  atorvastatin 20 milliGRAM(s) Oral at bedtime  clopidogrel Tablet 75 milliGRAM(s) Oral daily  dextrose 5%. 1000 milliLiter(s) (100 mL/Hr) IV Continuous <Continuous>  dextrose 5%. 1000 milliLiter(s) (50 mL/Hr) IV Continuous <Continuous>  dextrose 50% Injectable 25 Gram(s) IV Push once  dextrose 50% Injectable 12.5 Gram(s) IV Push once  dextrose 50% Injectable 25 Gram(s) IV Push once  enoxaparin Injectable 40 milliGRAM(s) SubCutaneous every 24 hours  famotidine    Tablet 40 milliGRAM(s) Oral daily  glucagon  Injectable 1 milliGRAM(s) IntraMuscular once  insulin glargine Injectable (LANTUS) 15 Unit(s) SubCutaneous at bedtime  insulin lispro (ADMELOG) corrective regimen sliding scale   SubCutaneous three times a day before meals  insulin lispro (ADMELOG) corrective regimen sliding scale   SubCutaneous at bedtime  insulin lispro Injectable (ADMELOG) 4 Unit(s) SubCutaneous before breakfast  insulin lispro Injectable (ADMELOG) 4 Unit(s) SubCutaneous before lunch  insulin lispro Injectable (ADMELOG) 4 Unit(s) SubCutaneous before dinner  metFORMIN 500 milliGRAM(s) Oral two times a day  metoprolol tartrate 12.5 milliGRAM(s) Oral every 12 hours  polyethylene glycol 3350 17 Gram(s) Oral daily  senna 2 Tablet(s) Oral at bedtime    MEDICATIONS  (PRN):  bisacodyl Suppository 10 milliGRAM(s) Rectal daily PRN Constipation  dextrose Oral Gel 15 Gram(s) Oral once PRN Blood Glucose LESS THAN 70 milliGRAM(s)/deciliter  melatonin 6 milliGRAM(s) Oral at bedtime PRN Insomnia      Allergies    No Known Allergies    Intolerances          VITALS  69y  Vital Signs Last 24 Hrs  T(C): 36.5 (16 Nov 2024 08:00), Max: 36.5 (16 Nov 2024 08:00)  T(F): 97.7 (16 Nov 2024 08:00), Max: 97.7 (16 Nov 2024 08:00)  HR: 67 (16 Nov 2024 08:00) (64 - 79)  BP: 100/69 (16 Nov 2024 08:00) (100/69 - 156/71)  BP(mean): --  RR: 17 (16 Nov 2024 08:00) (15 - 17)  SpO2: 94% (16 Nov 2024 08:00) (94% - 98%)    Parameters below as of 16 Nov 2024 08:00  Patient On (Oxygen Delivery Method): room air      Daily     Daily         RECENT LABS:      11-16    136  |  101  |  28[H]  ----------------------------<  133[H]  3.8   |  25  |  1.10    Ca    9.5      16 Nov 2024 06:25          Urinalysis Basic - ( 16 Nov 2024 06:25 )    Color: x / Appearance: x / SG: x / pH: x  Gluc: 133 mg/dL / Ketone: x  / Bili: x / Urobili: x   Blood: x / Protein: x / Nitrite: x   Leuk Esterase: x / RBC: x / WBC x   Sq Epi: x / Non Sq Epi: x / Bacteria: x          CAPILLARY BLOOD GLUCOSE      POCT Blood Glucose.: 172 mg/dL (16 Nov 2024 12:29)  POCT Blood Glucose.: 135 mg/dL (16 Nov 2024 07:57)  POCT Blood Glucose.: 166 mg/dL (15 Nov 2024 22:00)  POCT Blood Glucose.: 112 mg/dL (15 Nov 2024 16:52)

## 2024-11-16 NOTE — PROGRESS NOTE ADULT - ASSESSMENT
PEPE KIM is a 70 y/o M with PMHx of HTN, HLD, DM, who presented to [facility] on 10/27/24 with right sided weakness/code stroke, pulmonary edema and hypertensive emergency in the setting of NSTEMI/HF. CTA found to have right carotid stenosis of 50% and left carotid stenosis of 90%, as well as segmental occlusion in thoracic aorta  Patient was transferred from [facility] to [facility] for angio/LICA stent w/ Dr. Ulrich on 11/5/24.     # L MCA Infarct with right body involvement  - CTA +right carotid stenosis of 50% and left carotid stenosis of 90%  - MR brain w/ L watershed/MCA strokes in the setting of high grade proximal LICA stenosis >90%  - S/p angio/LICA stent w/ Dr. Ulrich on 11/5.   - Aspirin 325mg daily  - Plavix 75mg daily   - Continue comprehensive rehab program, 3 hours a day, 5 days a week. PT OT SLP  - Precautions: cardiac, DM, fall,     # NSTEMI  # CHF  # HLD/HTN  - EKG 11/11:  Normal sinus rhythm, ST and T wave abnormality, consider anterolateral ischemia. (similar to prior 11/6)  - TTE: EF 45-50%.  - Metoprolol 12.5mg BID   - Aspirin 325mg daily  - Plavix 75mg daily   - Atorvastatin 20mg HS (LDL 77, )   - Cardiac cath deferred outpatient f/u d/t acute CVA    # Episodic hypotension  - low BP 2 days in a row during therapy  - 11/15 80/59 in therapy  - BP improved on standing, likely not orthostatic in nature. Likely dehydration  - ordered 250 cc bolus   - BP ) (100/69 - 156/71) 11/16  - BUn/Cr 28/1.1 11/16 Continue to encourage po fluids, no signs overload 11/16    # Asthma  - Albuterol PRN  - in remission    # T2DM, uncontrolled with hyperglycemia  - A1c 9.6  - Lantus increased 15u 11/12  - admelog 4u qAC  - FBG ACHS + Mod ISS    # leukocytosis  - afebrile, no tachy. Will monitor for now  - UA 11/27 negative, CXR 11/5 prominent markings but attributed to cardiogenic etiology rather than infectious.  - WBC 14.27 11/6.--> 11.17 11/4 improving  - CBC 11/18    # mild transaminitis  -  AST  46[H]  /  ALT  72[H]  /  AlkPhos  75  11-14 very mildly uptrending over past 2 weeks. Will continue to monitor, asymptomatic  - avoid hepatotoxic meds. DC tylenol  - continue low dose statin for now given CVA and cAD  - CMP 11/18    # Sleep:   - Melatonin 6mg HS PRN     # Pain Management:  - Tylenol PRN    # GI/Bowel:  - Senna QHS, Miralax PRN Daily  - GI ppx: famotidine 40mg daily     # /Bladder:   - PVR x1 on admission  (SC if > 400)    # Diet  - Regular, carb consistent diet, DASH/TLC    # DVT ppx:   - Lovenox 40mg daily, SCDs    # Case discussed in IDT rounds 11/13 (initial)  - incontinent B/B, skin intact, lives with spouse in  with 6 ANA MARIA, regular solid thin liquids, mod non-fluent aphasia with breakdown in semi complex receptive level, able to generate 3-word phrases with cues, max assist toilet and showering, mod UB/max LB dressing, supervision eating and oral hygiene, mod assist bed mobility and transfers, ambulates 10 feet with WBQC and mod assist  - barriers: right hemiparesis, stairs, incontinence, aphasia, reduced caregiver support, reduced endurance  - goals: intermittent supervision waking hours, CG transfers, supervision bADLs, CS transfers and ambulation household distances "Pepe" "to get it back". Ambulate to bathroom with CS, communicate wants and needs min assist  - target: dc home 12/3 with caregiver support and home PT OT SLP  - caregiver training    # LABS  CBC CMP 11/18    ---------------  Code Status: FULL  Emergency Contact:    Outpatient Follow-up (Specialty/Name of physician):    Farhat Ulrich  Radiology  [address], [address]  [place] [address]  Phone: [phone]  Fax: [phone]      - PEPE KIM is a 70 y/o M with PMHx of HTN, HLD, DM, who presented to [facility] on 10/27/24 with right sided weakness/code stroke, pulmonary edema and hypertensive emergency in the setting of NSTEMI/HF. 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 to [facility] for angio/LICA stent w/ Dr. Ulrich on 11/5/24.     # L MCA Infarct with right body involvement  - CTA +right carotid stenosis of 50% and left carotid stenosis of 90%  - MR brain w/ L watershed/MCA strokes in the setting of high grade proximal LICA stenosis >90%  - S/p angio/LICA stent w/ Dr. Ulrich on 11/5.   - Aspirin 325mg daily  - Plavix 75mg daily   - Continue comprehensive rehab program, 3 hours a day, 5 days a week. PT OT SLP  - Precautions: cardiac, DM, fall,     # NSTEMI  # CHF  # HLD/HTN  - EKG 11/11:  Normal sinus rhythm, ST and T wave abnormality, consider anterolateral ischemia. (similar to prior 11/6)  - TTE: EF 45-50%.  - Metoprolol 12.5mg BID   - Aspirin 325mg daily  - Plavix 75mg daily   - Atorvastatin 20mg HS (LDL 77, )   - Cardiac cath deferred outpatient f/u d/t acute CVA    # Episodic hypotension  - low BP 2 days in a row during therapy  - 11/15 80/59 in therapy  - BP improved on standing, likely not orthostatic in nature. Likely dehydration  - ordered 250 cc bolus   - BP ) (100/69 - 156/71) 11/16  - BUn/Cr 28/1.1 11/16 Continue to encourage po fluids, no signs overload 11/16    # Asthma  - Albuterol PRN  - in remission    # T2DM, uncontrolled with hyperglycemia  - A1c 9.6  - Lantus increased 15u 11/12  - admelog 4u qAC  - FBG ACHS + Mod ISS    # leukocytosis  - afebrile, no tachy. Will monitor for now  - UA 11/27 negative, CXR 11/5 prominent markings but attributed to cardiogenic etiology rather than infectious.  - WBC 14.27 11/6.--> 11.17 11/4 improving  - CBC 11/18    # mild transaminitis  -  AST  46[H]  /  ALT  72[H]  /  AlkPhos  75  11-14 very mildly uptrending over past 2 weeks. Will continue to monitor, asymptomatic  - avoid hepatotoxic meds. DC tylenol  - continue low dose statin for now given CVA and cAD  - CMP 11/18    # Sleep:   - Melatonin 6mg HS PRN     # Pain Management:  - Tylenol PRN    # GI/Bowel:  - Senna QHS, Miralax PRN Daily  - GI ppx: famotidine 40mg daily   - dulcolax suppository PRN added 11/16    # /Bladder:   - PVR x1 on admission  (SC if > 400)    # Diet  - Regular, carb consistent diet, DASH/TLC    # DVT ppx:   - Lovenox 40mg daily, SCDs    # Case discussed in IDT rounds 11/13 (initial)  - incontinent B/B, skin intact, lives with spouse in  with 6 ANA MARIA, regular solid thin liquids, mod non-fluent aphasia with breakdown in semi complex receptive level, able to generate 3-word phrases with cues, max assist toilet and showering, mod UB/max LB dressing, supervision eating and oral hygiene, mod assist bed mobility and transfers, ambulates 10 feet with WBQC and mod assist  - barriers: right hemiparesis, stairs, incontinence, aphasia, reduced caregiver support, reduced endurance  - goals: intermittent supervision waking hours, CG transfers, supervision bADLs, CS transfers and ambulation household distances "Pepe" "to get it back". Ambulate to bathroom with CS, communicate wants and needs min assist  - target: dc home 12/3 with caregiver support and home PT OT SLP  - caregiver training    # LABS  CBC CMP 11/18    ---------------  Code Status: FULL  Emergency Contact:    Outpatient Follow-up (Specialty/Name of physician):    Farhat Ulrich  Radiology  [address], [address]  [place] [address]  Phone: [phone]  Fax: [phone]      -

## 2024-11-17 LAB
GLUCOSE BLDC GLUCOMTR-MCNC: 132 MG/DL — HIGH (ref 70–99)
GLUCOSE BLDC GLUCOMTR-MCNC: 143 MG/DL — HIGH (ref 70–99)
GLUCOSE BLDC GLUCOMTR-MCNC: 158 MG/DL — HIGH (ref 70–99)
GLUCOSE BLDC GLUCOMTR-MCNC: 182 MG/DL — HIGH (ref 70–99)

## 2024-11-17 PROCEDURE — 99232 SBSQ HOSP IP/OBS MODERATE 35: CPT

## 2024-11-17 PROCEDURE — 71045 X-RAY EXAM CHEST 1 VIEW: CPT | Mod: 26

## 2024-11-17 RX ADMIN — FAMOTIDINE 40 MILLIGRAM(S): 20 TABLET, FILM COATED ORAL at 12:09

## 2024-11-17 RX ADMIN — Medication 500 MILLIGRAM(S): at 07:51

## 2024-11-17 RX ADMIN — Medication 4 UNIT(S): at 17:41

## 2024-11-17 RX ADMIN — Medication 4 UNIT(S): at 07:49

## 2024-11-17 RX ADMIN — METOPROLOL TARTRATE 12.5 MILLIGRAM(S): 100 TABLET, FILM COATED ORAL at 17:41

## 2024-11-17 RX ADMIN — Medication 4 UNIT(S): at 12:11

## 2024-11-17 RX ADMIN — Medication 2: at 12:11

## 2024-11-17 RX ADMIN — Medication 325 MILLIGRAM(S): at 05:42

## 2024-11-17 RX ADMIN — CLOPIDOGREL 75 MILLIGRAM(S): 75 TABLET, FILM COATED ORAL at 12:09

## 2024-11-17 RX ADMIN — Medication 2: at 17:42

## 2024-11-17 RX ADMIN — INSULIN GLARGINE 15 UNIT(S): 100 INJECTION, SOLUTION SUBCUTANEOUS at 21:56

## 2024-11-17 RX ADMIN — Medication 20 MILLIGRAM(S): at 21:55

## 2024-11-17 RX ADMIN — METOPROLOL TARTRATE 12.5 MILLIGRAM(S): 100 TABLET, FILM COATED ORAL at 05:42

## 2024-11-17 RX ADMIN — ENOXAPARIN SODIUM 40 MILLIGRAM(S): 30 INJECTION SUBCUTANEOUS at 22:31

## 2024-11-17 RX ADMIN — Medication 500 MILLIGRAM(S): at 17:41

## 2024-11-17 RX ADMIN — Medication 2 PUFF(S): at 08:42

## 2024-11-17 NOTE — PROGRESS NOTE ADULT - ASSESSMENT
PEPE KIM is a 70 y/o M with PMHx of HTN, HLD, DM, who presented to [facility] on 10/27/24 with right sided weakness/code stroke, pulmonary edema and hypertensive emergency in the setting of NSTEMI/HF. 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 to [facility] for angio/LICA stent w/ Dr. Ulrich on 11/5/24.     # L MCA Infarct with right body involvement  - CTA +right carotid stenosis of 50% and left carotid stenosis of 90%  - MR brain w/ L watershed/MCA strokes in the setting of high grade proximal LICA stenosis >90%  - S/p angio/LICA stent w/ Dr. Ulrich on 11/5.   - Aspirin 325mg daily  - Plavix 75mg daily   - Continue comprehensive rehab program, 3 hours a day, 5 days a week. PT OT SLP  - Precautions: cardiac, DM, fall,     # NSTEMI  # CHF  # HLD/HTN  - EKG 11/11:  Normal sinus rhythm, ST and T wave abnormality, consider anterolateral ischemia. (similar to prior 11/6)  - TTE: EF 45-50%.  - Metoprolol 12.5mg BID   - Aspirin 325mg daily  - Plavix 75mg daily   - Atorvastatin 20mg HS (LDL 77, )   - Cardiac cath deferred outpatient f/u d/t acute CVA    # Episodic hypotension  - Likely 2/2 dehydration  - ordered 250 cc bolus. Encourage po fluids  - BP  (101/66 - 114/76) 11/17    # Asthma  - Albuterol PRN  - in remission. O2 sat stable RA, asymptomatic    # T2DM, uncontrolled with hyperglycemia  - A1c 9.6  - Lantus increased 15u 11/12  - admelog 4u qAC  - FBG ACHS + Mod ISS  - controlled 11/17    # leukocytosis  - afebrile, no tachy. Will monitor for now  - UA 11/27 negative, CXR 11/5 prominent markings but attributed to cardiogenic etiology rather than infectious.  - WBC 14.27 11/6.--> 11.17 11/4 improving  - CBC 11/18    # mild transaminitis  -  AST  46[H]  /  ALT  72[H]  /  AlkPhos  75  11-14 very mildly uptrending over past 2 weeks. Will continue to monitor, asymptomatic  - avoid hepatotoxic meds. DC tylenol  - continue low dose statin for now given CVA and cAD  - CMP 11/18    # Sleep:   - Melatonin 6mg HS PRN     # Pain Management:  - Tylenol PRN    # GI/Bowel:  - Senna QHS, Miralax PRN Daily  - GI ppx: famotidine 40mg daily   - dulcolax suppository PRN added 11/16    # /Bladder:   - PVR x1 on admission  (SC if > 400)    # Diet  - Regular, carb consistent diet, DASH/TLC    # DVT ppx:   - Lovenox 40mg daily, SCDs    # Case discussed in IDT rounds 11/13 (initial)  - incontinent B/B, skin intact, lives with spouse in [place] with 6 ANA MARIA, regular solid thin liquids, mod non-fluent aphasia with breakdown in semi complex receptive level, able to generate 3-word phrases with cues, max assist toilet and showering, mod UB/max LB dressing, supervision eating and oral hygiene, mod assist bed mobility and transfers, ambulates 10 feet with WBQC and mod assist  - barriers: right hemiparesis, stairs, incontinence, aphasia, reduced caregiver support, reduced endurance  - goals: intermittent supervision waking hours, CG transfers, supervision bADLs, CS transfers and ambulation household distances "Pepe" "to get it back". Ambulate to bathroom with CS, communicate wants and needs min assist  - target: dc home 12/3 with caregiver support and home PT OT SLP  - caregiver training    # LABS  CBC CMP 11/18    ---------------  Code Status: FULL  Emergency Contact:    Outpatient Follow-up (Specialty/Name of physician):    Farhat Ulrich  Radiology  [address], [address]  [place] [address]  Phone: [phone]  Fax: [phone]      - PEPE KIM is a 68 y/o M with PMHx of HTN, HLD, DM, who presented to [facility] on 10/27/24 with right sided weakness/code stroke, pulmonary edema and hypertensive emergency in the setting of NSTEMI/HF. 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 for angio/LICA stent w/ Dr. Ulrich on 11/5/24.     # L MCA Infarct with right body involvement  - CTA +right carotid stenosis of 50% and left carotid stenosis of 90%  - MR brain w/ L watershed/MCA strokes in the setting of high grade proximal LICA stenosis >90%  - S/p angio/LICA stent w/ Dr. Ulrich on 11/5.   - Aspirin 325mg daily  - Plavix 75mg daily   - Continue comprehensive rehab program, 3 hours a day, 5 days a week. PT OT SLP  - Precautions: cardiac, DM, fall,     # NSTEMI  # CHF  # HLD/HTN  - EKG 11/11:  Normal sinus rhythm, ST and T wave abnormality, consider anterolateral ischemia. (similar to prior 11/6)  - TTE: EF 45-50%.  - Metoprolol 12.5mg BID   - Aspirin 325mg daily  - Plavix 75mg daily   - Atorvastatin 20mg HS (LDL 77, )   - Cardiac cath deferred outpatient f/u d/t acute CVA  - CXR repeated 11/17: preliminary shows some improvement in congestive changes left lung. f/u official read    # Episodic hypotension  - Likely 2/2 dehydration  - ordered 250 cc bolus. Encourage po fluids  - BP  (101/66 - 114/76) 11/17    # Asthma  - Albuterol PRN  - in remission. O2 sat stable RA, asymptomatic    # T2DM, uncontrolled with hyperglycemia  - A1c 9.6  - Lantus increased 15u 11/12  - admelog 4u qAC  - FBG ACHS + Mod ISS  - controlled 11/17    # leukocytosis  - afebrile, no tachy. Will monitor for now  - UA 11/27 negative, CXR 11/5 prominent markings but attributed to cardiogenic etiology rather than infectious.  - WBC 14.27 11/6.--> 11.17 11/4 improving  - CBC 11/18    # mild transaminitis  -  AST  46[H]  /  ALT  72[H]  /  AlkPhos  75  11-14 very mildly uptrending over past 2 weeks. Will continue to monitor, asymptomatic  - avoid hepatotoxic meds. DC tylenol  - continue low dose statin for now given CVA and cAD  - CMP 11/18    # Sleep:   - Melatonin 6mg HS PRN     # Pain Management:  - Tylenol PRN    # GI/Bowel:  - Senna QHS, Miralax PRN Daily  - GI ppx: famotidine 40mg daily   - dulcolax suppository PRN added 11/16    # /Bladder:   - PVR x1 on admission  (SC if > 400)    # Diet  - Regular, carb consistent diet, DASH/TLC    # DVT ppx:   - Lovenox 40mg daily, SCDs    # Case discussed in IDT rounds 11/13 (initial)  - incontinent B/B, skin intact, lives with spouse in  with 6 ANA MARIA, regular solid thin liquids, mod non-fluent aphasia with breakdown in semi complex receptive level, able to generate 3-word phrases with cues, max assist toilet and showering, mod UB/max LB dressing, supervision eating and oral hygiene, mod assist bed mobility and transfers, ambulates 10 feet with WBQC and mod assist  - barriers: right hemiparesis, stairs, incontinence, aphasia, reduced caregiver support, reduced endurance  - goals: intermittent supervision waking hours, CG transfers, supervision bADLs, CS transfers and ambulation household distances "Pepe" "to get it back". Ambulate to bathroom with CS, communicate wants and needs min assist  - target: dc home 12/3 with caregiver support and home PT OT SLP  - caregiver training    # LABS  CBC CMP 11/18    ---------------  Code Status: FULL  Emergency Contact:    Outpatient Follow-up (Specialty/Name of physician):    Farhat Ulrich  Radiology  [address], [address]  [place] [address]  Phone: [phone]  Fax: [phone]      -

## 2024-11-17 NOTE — PROGRESS NOTE ADULT - COMMENTS
Case discussed with language line  [facility] #[id number]    Patient answers both English and Maltese. He reports good sleep. Feels well, no H/A, no pain, no B/B complaints. Good appetite

## 2024-11-17 NOTE — PROGRESS NOTE ADULT - SUBJECTIVE AND OBJECTIVE BOX
Patient is a 69y old  Male who presents with a chief complaint of L MCA Infarct with right body involvement (16 Nov 2024 12:58)      HPI:  70 y/o M with PMHx of HTN, HLD, DM, originally presented to [facility] on 10/27 as a code stroke after developed right sided weakness, pulmonary edema and hypertensive emergency in the setting of NSTEMI/HF. 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 Ulrich on 11/5/24. [facility]  #[id number] used.     MR brain revealed L watershed/MCA strokes in the setting of high grade proximal LICA stenosis >90% at bifurcation. Patient underwent catheter cerebral angiogram, angioplasty and left carotid wall stent placement with Dr. Farhat Ulrich on 11/6/2024. Right groin was accessed but catheter could not be advanced 2/2 to near occlusion of the right iliac, puncture closed with closure device and procedure proceeded through right radial artery. Case was complicated by hypotension requiring multiple neosticks and IVF boluses to maintain hemodynamic stability. Post-op Carotid dopplers completed on 11/7/2024 demonstrated patent left ICA stent.    Cardiology was consulted for remote history of NSTEMI, f/u Echo shows EF 45% segmental wall motion abnormalities, Recommended to continue ASA/ Plavix and f/u outpatient with another cardiac cath which was deferred d/t acute stroke. Post op course c/b fluctuating RLE MMT exam, repeat CTH stable.     Patient was cleared for discharge to [facility] IRF on 11/11/24.  (11 Nov 2024 14:19)      PAST MEDICAL & SURGICAL HISTORY:  Diabetes mellitus      HTN (hypertension)      Hyperlipidemia          MEDICATIONS  (STANDING):  albuterol    90 MICROgram(s) HFA Inhaler 2 Puff(s) Inhalation every 12 hours  aspirin 325 milliGRAM(s) Oral <User Schedule>  atorvastatin 20 milliGRAM(s) Oral at bedtime  clopidogrel Tablet 75 milliGRAM(s) Oral daily  dextrose 5%. 1000 milliLiter(s) (50 mL/Hr) IV Continuous <Continuous>  dextrose 5%. 1000 milliLiter(s) (100 mL/Hr) IV Continuous <Continuous>  dextrose 50% Injectable 12.5 Gram(s) IV Push once  dextrose 50% Injectable 25 Gram(s) IV Push once  dextrose 50% Injectable 25 Gram(s) IV Push once  enoxaparin Injectable 40 milliGRAM(s) SubCutaneous every 24 hours  famotidine    Tablet 40 milliGRAM(s) Oral daily  glucagon  Injectable 1 milliGRAM(s) IntraMuscular once  insulin glargine Injectable (LANTUS) 15 Unit(s) SubCutaneous at bedtime  insulin lispro (ADMELOG) corrective regimen sliding scale   SubCutaneous three times a day before meals  insulin lispro (ADMELOG) corrective regimen sliding scale   SubCutaneous at bedtime  insulin lispro Injectable (ADMELOG) 4 Unit(s) SubCutaneous before breakfast  insulin lispro Injectable (ADMELOG) 4 Unit(s) SubCutaneous before lunch  insulin lispro Injectable (ADMELOG) 4 Unit(s) SubCutaneous before dinner  metFORMIN 500 milliGRAM(s) Oral two times a day  metoprolol tartrate 12.5 milliGRAM(s) Oral every 12 hours  polyethylene glycol 3350 17 Gram(s) Oral daily  senna 2 Tablet(s) Oral at bedtime    MEDICATIONS  (PRN):  bisacodyl Suppository 10 milliGRAM(s) Rectal daily PRN Constipation  dextrose Oral Gel 15 Gram(s) Oral once PRN Blood Glucose LESS THAN 70 milliGRAM(s)/deciliter  melatonin 6 milliGRAM(s) Oral at bedtime PRN Insomnia      Allergies    No Known Allergies    Intolerances          VITALS  69y  Vital Signs Last 24 Hrs  T(C): 36.6 (17 Nov 2024 07:30), Max: 36.6 (16 Nov 2024 20:20)  T(F): 97.9 (17 Nov 2024 07:30), Max: 97.9 (16 Nov 2024 20:20)  HR: 63 (17 Nov 2024 07:30) (63 - 78)  BP: 107/74 (17 Nov 2024 07:30) (101/66 - 114/76)  BP(mean): --  RR: 17 (17 Nov 2024 07:30) (15 - 17)  SpO2: 96% (17 Nov 2024 07:30) (95% - 96%)    Parameters below as of 17 Nov 2024 07:30  Patient On (Oxygen Delivery Method): room air      Daily     Daily         RECENT LABS:      11-16    136  |  101  |  28[H]  ----------------------------<  133[H]  3.8   |  25  |  1.10    Ca    9.5      16 Nov 2024 06:25          Urinalysis Basic - ( 16 Nov 2024 06:25 )    Color: x / Appearance: x / SG: x / pH: x  Gluc: 133 mg/dL / Ketone: x  / Bili: x / Urobili: x   Blood: x / Protein: x / Nitrite: x   Leuk Esterase: x / RBC: x / WBC x   Sq Epi: x / Non Sq Epi: x / Bacteria: x          CAPILLARY BLOOD GLUCOSE      POCT Blood Glucose.: 132 mg/dL (17 Nov 2024 07:47)  POCT Blood Glucose.: 135 mg/dL (16 Nov 2024 21:10)  POCT Blood Glucose.: 110 mg/dL (16 Nov 2024 17:05)  POCT Blood Glucose.: 172 mg/dL (16 Nov 2024 12:29)

## 2024-11-17 NOTE — PROGRESS NOTE ADULT - ASSESSMENT
PEPE KIM is a 68 y/o M with PMHx of HTN, HLD, DM, who presented to [facility] on 10/27 with right sided weakness/code stroke, pulmonary edema and hypertensive emergency in the setting of NSTEMI/HF. 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 Ulrich on 11/5/24.  Now admitted to [facility] with right sided deficits for initiation of a multidisciplinary rehab program consisting focused on functional mobility, transfers and ADLs.    # L MCA Infarct with right body involvement  - CTA +right carotid stenosis of 50% and left carotid stenosis of 90%  - MR brain w/ L watershed/MCA strokes in the setting of high grade proximal LICA stenosis >90%  - S/p angio/LICA stent w/ Dr. Ulrich on 11/5.   - Aspirin 325mg daily  - Plavix 75mg daily   - Lipitor     # NSTEMI  # CHF  # HLD/HTN  - EKG (11/6)-  NSR with occasional PVC ,  Incomplete RBBB, T wave abnormality, consider inferolateral ischemia   - Elevated troponins---> (10/27: 104--> 148--> 190--> 10/28: 236-->10/2--> 312--> 10/30: 298)   - TTE at [facility] showed EF 45-50%.  - Metoprolol 12.5mg BID   - Aspirin 325mg daily  - Plavix 75mg daily   - Atorvastatin 20mg HS (LDL 77, )   - Cardiac cath deferred outpatient f/u d/t acute CVA    # Asthma  - Albuterol PRN    # T2DM with hyperglycemia   - A1c 9.6  - c/w Lispro 4u with meals and Lantus 15U, can be titrated up to 50U if needed, reviewed med rec  - FBG ACHS + Mod ISS  - metformin  BID    #Transaminitis  - monitor, pt w/o abd TTP    # DVT ppx:   - Lovenox 40mg daily, SCDs  Discussed treatment plan with IDT.

## 2024-11-17 NOTE — PROGRESS NOTE ADULT - SUBJECTIVE AND OBJECTIVE BOX
CC: Patient is a 69y old  Male who presents with a chief complaint of L MCA Infarct with right body involvement (17 Nov 2024 10:21)      Interval History: Patient seen and examined at bedside. No acute overnight events. No complaints this morning.    ALLERGIES:  No Known Allergies    MEDICATIONS  (STANDING):  albuterol    90 MICROgram(s) HFA Inhaler 2 Puff(s) Inhalation every 12 hours  aspirin 325 milliGRAM(s) Oral <User Schedule>  atorvastatin 20 milliGRAM(s) Oral at bedtime  clopidogrel Tablet 75 milliGRAM(s) Oral daily  dextrose 5%. 1000 milliLiter(s) (50 mL/Hr) IV Continuous <Continuous>  dextrose 5%. 1000 milliLiter(s) (100 mL/Hr) IV Continuous <Continuous>  dextrose 50% Injectable 12.5 Gram(s) IV Push once  dextrose 50% Injectable 25 Gram(s) IV Push once  dextrose 50% Injectable 25 Gram(s) IV Push once  enoxaparin Injectable 40 milliGRAM(s) SubCutaneous every 24 hours  famotidine    Tablet 40 milliGRAM(s) Oral daily  glucagon  Injectable 1 milliGRAM(s) IntraMuscular once  insulin glargine Injectable (LANTUS) 15 Unit(s) SubCutaneous at bedtime  insulin lispro (ADMELOG) corrective regimen sliding scale   SubCutaneous three times a day before meals  insulin lispro (ADMELOG) corrective regimen sliding scale   SubCutaneous at bedtime  insulin lispro Injectable (ADMELOG) 4 Unit(s) SubCutaneous before breakfast  insulin lispro Injectable (ADMELOG) 4 Unit(s) SubCutaneous before lunch  insulin lispro Injectable (ADMELOG) 4 Unit(s) SubCutaneous before dinner  metFORMIN 500 milliGRAM(s) Oral two times a day  metoprolol tartrate 12.5 milliGRAM(s) Oral every 12 hours  polyethylene glycol 3350 17 Gram(s) Oral daily  senna 2 Tablet(s) Oral at bedtime    MEDICATIONS  (PRN):  bisacodyl Suppository 10 milliGRAM(s) Rectal daily PRN Constipation  dextrose Oral Gel 15 Gram(s) Oral once PRN Blood Glucose LESS THAN 70 milliGRAM(s)/deciliter  melatonin 6 milliGRAM(s) Oral at bedtime PRN Insomnia    Vital Signs Last 24 Hrs  T(F): 97.9 (17 Nov 2024 07:30), Max: 97.9 (16 Nov 2024 20:20)  HR: 63 (17 Nov 2024 07:30) (63 - 78)  BP: 107/74 (17 Nov 2024 07:30) (101/66 - 114/76)  RR: 17 (17 Nov 2024 07:30) (15 - 17)  SpO2: 96% (17 Nov 2024 07:30) (95% - 96%)  I&O's Summary    16 Nov 2024 07:01  -  17 Nov 2024 07:00  --------------------------------------------------------  IN: 0 mL / OUT: 2 mL / NET: -2 mL          PHYSICAL EXAM:  GENERAL: pt laying in bed in NAD  CHEST/LUNG: nonlabored breathing; normal respiratory effort   HEART: Regular rate and rhythm; No murmurs noted  ABDOMEN: Soft, Nontender, Nondistended; Bowel sounds present   MUSCULOSKELETAL/EXTREMITIES: no cyanosis, no edema noted to BLE  NERVOUS SYSTEM:    Sensation intact; follows commands  PSYCH: Appropriate affect, Alert & Oriented x 3     LABS:      11-16    136  |  101  |  28  ----------------------------<  133  3.8   |  25  |  1.10    Ca    9.5      16 Nov 2024 06:25                  10-30 Chol 138 mg/dL LDL -- HDL 28 mg/dL Trig 163 mg/dL              POCT Blood Glucose.: 132 mg/dL (17 Nov 2024 07:47)  POCT Blood Glucose.: 135 mg/dL (16 Nov 2024 21:10)  POCT Blood Glucose.: 110 mg/dL (16 Nov 2024 17:05)  POCT Blood Glucose.: 172 mg/dL (16 Nov 2024 12:29)      Urinalysis Basic - ( 16 Nov 2024 06:25 )    Color: x / Appearance: x / SG: x / pH: x  Gluc: 133 mg/dL / Ketone: x  / Bili: x / Urobili: x   Blood: x / Protein: x / Nitrite: x   Leuk Esterase: x / RBC: x / WBC x   Sq Epi: x / Non Sq Epi: x / Bacteria: x        COVID-19 PCR: NotDetec (11-11-24 @ 16:20)      Care Discussed with Consultants/Other Providers: Yes

## 2024-11-18 LAB
ALBUMIN SERPL ELPH-MCNC: 2.8 G/DL — LOW (ref 3.3–5)
ALP SERPL-CCNC: 66 U/L — SIGNIFICANT CHANGE UP (ref 40–120)
ALT FLD-CCNC: 75 U/L — HIGH (ref 10–45)
ANION GAP SERPL CALC-SCNC: 7 MMOL/L — SIGNIFICANT CHANGE UP (ref 5–17)
AST SERPL-CCNC: 38 U/L — SIGNIFICANT CHANGE UP (ref 10–40)
BASOPHILS # BLD AUTO: 0.07 K/UL — SIGNIFICANT CHANGE UP (ref 0–0.2)
BASOPHILS NFR BLD AUTO: 0.7 % — SIGNIFICANT CHANGE UP (ref 0–2)
BILIRUB SERPL-MCNC: 0.4 MG/DL — SIGNIFICANT CHANGE UP (ref 0.2–1.2)
BUN SERPL-MCNC: 29 MG/DL — HIGH (ref 7–23)
CALCIUM SERPL-MCNC: 9.5 MG/DL — SIGNIFICANT CHANGE UP (ref 8.4–10.5)
CHLORIDE SERPL-SCNC: 104 MMOL/L — SIGNIFICANT CHANGE UP (ref 96–108)
CO2 SERPL-SCNC: 29 MMOL/L — SIGNIFICANT CHANGE UP (ref 22–31)
CREAT SERPL-MCNC: 1.12 MG/DL — SIGNIFICANT CHANGE UP (ref 0.5–1.3)
EGFR: 71 ML/MIN/1.73M2 — SIGNIFICANT CHANGE UP
EOSINOPHIL # BLD AUTO: 0.49 K/UL — SIGNIFICANT CHANGE UP (ref 0–0.5)
EOSINOPHIL NFR BLD AUTO: 5.2 % — SIGNIFICANT CHANGE UP (ref 0–6)
GLUCOSE BLDC GLUCOMTR-MCNC: 115 MG/DL — HIGH (ref 70–99)
GLUCOSE BLDC GLUCOMTR-MCNC: 139 MG/DL — HIGH (ref 70–99)
GLUCOSE BLDC GLUCOMTR-MCNC: 144 MG/DL — HIGH (ref 70–99)
GLUCOSE BLDC GLUCOMTR-MCNC: 152 MG/DL — HIGH (ref 70–99)
GLUCOSE SERPL-MCNC: 131 MG/DL — HIGH (ref 70–99)
HCT VFR BLD CALC: 37.7 % — LOW (ref 39–50)
HGB BLD-MCNC: 12.4 G/DL — LOW (ref 13–17)
IMM GRANULOCYTES NFR BLD AUTO: 0.2 % — SIGNIFICANT CHANGE UP (ref 0–0.9)
LYMPHOCYTES # BLD AUTO: 3.15 K/UL — SIGNIFICANT CHANGE UP (ref 1–3.3)
LYMPHOCYTES # BLD AUTO: 33.2 % — SIGNIFICANT CHANGE UP (ref 13–44)
MCHC RBC-ENTMCNC: 28.8 PG — SIGNIFICANT CHANGE UP (ref 27–34)
MCHC RBC-ENTMCNC: 32.9 G/DL — SIGNIFICANT CHANGE UP (ref 32–36)
MCV RBC AUTO: 87.5 FL — SIGNIFICANT CHANGE UP (ref 80–100)
MONOCYTES # BLD AUTO: 0.77 K/UL — SIGNIFICANT CHANGE UP (ref 0–0.9)
MONOCYTES NFR BLD AUTO: 8.1 % — SIGNIFICANT CHANGE UP (ref 2–14)
NEUTROPHILS # BLD AUTO: 4.99 K/UL — SIGNIFICANT CHANGE UP (ref 1.8–7.4)
NEUTROPHILS NFR BLD AUTO: 52.6 % — SIGNIFICANT CHANGE UP (ref 43–77)
NRBC # BLD: 0 /100 WBCS — SIGNIFICANT CHANGE UP (ref 0–0)
PLATELET # BLD AUTO: 426 K/UL — HIGH (ref 150–400)
POTASSIUM SERPL-MCNC: 4 MMOL/L — SIGNIFICANT CHANGE UP (ref 3.5–5.3)
POTASSIUM SERPL-SCNC: 4 MMOL/L — SIGNIFICANT CHANGE UP (ref 3.5–5.3)
PROT SERPL-MCNC: 7.7 G/DL — SIGNIFICANT CHANGE UP (ref 6–8.3)
RBC # BLD: 4.31 M/UL — SIGNIFICANT CHANGE UP (ref 4.2–5.8)
RBC # FLD: 13.2 % — SIGNIFICANT CHANGE UP (ref 10.3–14.5)
SODIUM SERPL-SCNC: 140 MMOL/L — SIGNIFICANT CHANGE UP (ref 135–145)
WBC # BLD: 9.49 K/UL — SIGNIFICANT CHANGE UP (ref 3.8–10.5)
WBC # FLD AUTO: 9.49 K/UL — SIGNIFICANT CHANGE UP (ref 3.8–10.5)

## 2024-11-18 PROCEDURE — 99232 SBSQ HOSP IP/OBS MODERATE 35: CPT

## 2024-11-18 RX ADMIN — Medication 2: at 12:16

## 2024-11-18 RX ADMIN — Medication 2 PUFF(S): at 20:21

## 2024-11-18 RX ADMIN — ENOXAPARIN SODIUM 40 MILLIGRAM(S): 30 INJECTION SUBCUTANEOUS at 22:33

## 2024-11-18 RX ADMIN — Medication 4 UNIT(S): at 16:53

## 2024-11-18 RX ADMIN — CLOPIDOGREL 75 MILLIGRAM(S): 75 TABLET, FILM COATED ORAL at 12:13

## 2024-11-18 RX ADMIN — Medication 20 MILLIGRAM(S): at 22:32

## 2024-11-18 RX ADMIN — Medication 4 UNIT(S): at 07:35

## 2024-11-18 RX ADMIN — FAMOTIDINE 40 MILLIGRAM(S): 20 TABLET, FILM COATED ORAL at 12:13

## 2024-11-18 RX ADMIN — Medication 500 MILLIGRAM(S): at 07:34

## 2024-11-18 RX ADMIN — Medication 325 MILLIGRAM(S): at 06:06

## 2024-11-18 RX ADMIN — Medication 500 MILLIGRAM(S): at 18:36

## 2024-11-18 RX ADMIN — Medication 4 UNIT(S): at 12:15

## 2024-11-18 RX ADMIN — INSULIN GLARGINE 15 UNIT(S): 100 INJECTION, SOLUTION SUBCUTANEOUS at 22:33

## 2024-11-18 NOTE — PROGRESS NOTE ADULT - COMMENTS
Patient seen with assistance of [facility] language line  Chace #[id number]    Patient looks well, no cough, no SOB, no wheezing. The results of his CXR were reviewed; he remembered getting a study done, but didn't remember exactly when. He does seem to verbalize more, although processing and initiation are issues. NAD

## 2024-11-18 NOTE — PROGRESS NOTE ADULT - SUBJECTIVE AND OBJECTIVE BOX
Patient is a 69y old  Male who presents with a chief complaint of L MCA Infarct with right body involvement (17 Nov 2024 10:51)      HPI:  68 y/o M with PMHx of HTN, HLD, DM, originally presented to [facility] on 10/27 as a code stroke after developed right sided weakness, pulmonary edema and hypertensive emergency in the setting of NSTEMI/HF. 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 Ulrich on 11/5/24. [facility]  #[id number] used.     MR brain revealed L watershed/MCA strokes in the setting of high grade proximal LICA stenosis >90% at bifurcation. Patient underwent catheter cerebral angiogram, angioplasty and left carotid wall stent placement with Dr. Farhat Ulrich on 11/6/2024. Right groin was accessed but catheter could not be advanced 2/2 to near occlusion of the right iliac, puncture closed with closure device and procedure proceeded through right radial artery. Case was complicated by hypotension requiring multiple neosticks and IVF boluses to maintain hemodynamic stability. Post-op Carotid dopplers completed on 11/7/2024 demonstrated patent left ICA stent.    Cardiology was consulted for remote history of NSTEMI, f/u Echo shows EF 45% segmental wall motion abnormalities, Recommended to continue ASA/ Plavix and f/u outpatient with another cardiac cath which was deferred d/t acute stroke. Post op course c/b fluctuating RLE MMT exam, repeat CTH stable.     Patient was cleared for discharge to [facility] on 11/11/24.  (11 Nov 2024 14:19)      PAST MEDICAL & SURGICAL HISTORY:  Diabetes mellitus      HTN (hypertension)      Hyperlipidemia          MEDICATIONS  (STANDING):  albuterol    90 MICROgram(s) HFA Inhaler 2 Puff(s) Inhalation every 12 hours  aspirin 325 milliGRAM(s) Oral <User Schedule>  atorvastatin 20 milliGRAM(s) Oral at bedtime  clopidogrel Tablet 75 milliGRAM(s) Oral daily  dextrose 5%. 1000 milliLiter(s) (50 mL/Hr) IV Continuous <Continuous>  dextrose 5%. 1000 milliLiter(s) (100 mL/Hr) IV Continuous <Continuous>  dextrose 50% Injectable 25 Gram(s) IV Push once  dextrose 50% Injectable 12.5 Gram(s) IV Push once  dextrose 50% Injectable 25 Gram(s) IV Push once  enoxaparin Injectable 40 milliGRAM(s) SubCutaneous every 24 hours  famotidine    Tablet 40 milliGRAM(s) Oral daily  glucagon  Injectable 1 milliGRAM(s) IntraMuscular once  insulin glargine Injectable (LANTUS) 15 Unit(s) SubCutaneous at bedtime  insulin lispro (ADMELOG) corrective regimen sliding scale   SubCutaneous three times a day before meals  insulin lispro (ADMELOG) corrective regimen sliding scale   SubCutaneous at bedtime  insulin lispro Injectable (ADMELOG) 4 Unit(s) SubCutaneous before breakfast  insulin lispro Injectable (ADMELOG) 4 Unit(s) SubCutaneous before lunch  insulin lispro Injectable (ADMELOG) 4 Unit(s) SubCutaneous before dinner  metFORMIN 500 milliGRAM(s) Oral two times a day  metoprolol tartrate 12.5 milliGRAM(s) Oral every 12 hours  polyethylene glycol 3350 17 Gram(s) Oral daily  senna 2 Tablet(s) Oral at bedtime    MEDICATIONS  (PRN):  bisacodyl Suppository 10 milliGRAM(s) Rectal daily PRN Constipation  dextrose Oral Gel 15 Gram(s) Oral once PRN Blood Glucose LESS THAN 70 milliGRAM(s)/deciliter  melatonin 6 milliGRAM(s) Oral at bedtime PRN Insomnia      Allergies    No Known Allergies    Intolerances          VITALS  69y  Vital Signs Last 24 Hrs  T(C): 36.6 (18 Nov 2024 07:30), Max: 36.7 (17 Nov 2024 19:58)  T(F): 97.9 (18 Nov 2024 07:30), Max: 98 (17 Nov 2024 19:58)  HR: 69 (18 Nov 2024 07:30) (64 - 78)  BP: 99/73 (18 Nov 2024 07:30) (99/73 - 108/70)  BP(mean): --  RR: 15 (18 Nov 2024 07:30) (14 - 15)  SpO2: 96% (18 Nov 2024 07:30) (95% - 96%)    Parameters below as of 18 Nov 2024 07:30  Patient On (Oxygen Delivery Method): room air      Daily     Daily         RECENT LABS:                          12.4   9.49  )-----------( 426      ( 18 Nov 2024 06:17 )             37.7     11-18    140  |  104  |  29[H]  ----------------------------<  131[H]  4.0   |  29  |  1.12    Ca    9.5      18 Nov 2024 06:17    TPro  7.7  /  Alb  2.8[L]  /  TBili  0.4  /  DBili  x   /  AST  38  /  ALT  75[H]  /  AlkPhos  66  11-18    LIVER FUNCTIONS - ( 18 Nov 2024 06:17 )  Alb: 2.8 g/dL / Pro: 7.7 g/dL / ALK PHOS: 66 U/L / ALT: 75 U/L / AST: 38 U/L / GGT: x             Urinalysis Basic - ( 18 Nov 2024 06:17 )    Color: x / Appearance: x / SG: x / pH: x  Gluc: 131 mg/dL / Ketone: x  / Bili: x / Urobili: x   Blood: x / Protein: x / Nitrite: x   Leuk Esterase: x / RBC: x / WBC x   Sq Epi: x / Non Sq Epi: x / Bacteria: x          CAPILLARY BLOOD GLUCOSE      POCT Blood Glucose.: 139 mg/dL (18 Nov 2024 07:31)  POCT Blood Glucose.: 143 mg/dL (17 Nov 2024 21:43)  POCT Blood Glucose.: 158 mg/dL (17 Nov 2024 16:50)  POCT Blood Glucose.: 182 mg/dL (17 Nov 2024 12:07)

## 2024-11-18 NOTE — PROGRESS NOTE ADULT - ASSESSMENT
PEPE KIM is a 68 y/o M with PMHx of HTN, HLD, DM, who presented to [facility] on 10/27/24 with right sided weakness/code stroke, pulmonary edema and hypertensive emergency in the setting of NSTEMI/HF. 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 Ulrich on 11/5/24.     # L MCA Infarct with right body involvement  - CTA +right carotid stenosis of 50% and left carotid stenosis of 90%  - S/p angio/LICA stent w/ Dr. Ulrich on 11/5.   - Aspirin 325mg daily  - Plavix 75mg daily   - Continue comprehensive rehab program, 3 hours a day, 5 days a week. PT OT SLP  - Precautions: cardiac, DM, fall,     # HLD/HTN  # Episodic hypotension  - Likely 2/2 dehydration  - s/p 250 cc bolus. Encourage po fluids  - metoprolol  - Atorvastatin 20mg HS   - BP (99/73 - 108/70) 11/18    # NSTEMI  # CHF  - EKG 11/11:  Normal sinus rhythm, ST and T wave abnormality, consider anterolateral ischemia. (similar to prior 11/6)  - TTE: EF 45-50%.  - Metoprolol 12.5mg BID   - Aspirin 325mg daily  - Plavix 75mg daily   - statin  - Cardiac cath deferred outpatient f/u d/t acute CVA  - CXR repeated 11/17: Mild congestive heart failure. Small pleural effusions. No pneumothorax.  - O2 sat 95-96% RA 11/18    # Asthma  - Albuterol PRN  - in remission.     # T2DM, uncontrolled with hyperglycemia  - A1c 9.6  - Lantus increased 15u 11/12  - admelog 4u qAC  - FBG ACHS + Mod ISS    # leukocytosis  - afebrile, no tachy. Will monitor for now  - UA 11/27 negative, CXR without evidence infection  - WBC 14.27 11/6.--> 11.17 11/4 --> 9.49 11/18 resolved  - CBC 11/21    # mild transaminitis  -  AST  46[H]  /  ALT  72[H]  /  AlkPhos  75  11-14 --> AST  38  /  ALT  75[H]  /  AlkPhos  66  11-18 stable/improved  - avoid hepatotoxic meds. DC tylenol  - continue low dose statin for now given CVA and cAD  - CMP 11/21    # Sleep:   - Melatonin 6mg HS PRN     # Pain Management:  - Tylenol PRN    # GI/Bowel:  - Senna QHS, Miralax PRN Daily  - GI ppx: famotidine 40mg daily   - dulcolax suppository PRN     # Diet  - Regular, carb consistent diet, DASH/TLC    # DVT ppx:   - Lovenox 40mg daily, SCDs    # Case discussed in IDT rounds 11/13 (initial)  - incontinent B/B, skin intact, lives with spouse in [place] with 6 ANA MARIA, regular solid thin liquids, mod non-fluent aphasia with breakdown in semi complex receptive level, able to generate 3-word phrases with cues, max assist toilet and showering, mod UB/max LB dressing, supervision eating and oral hygiene, mod assist bed mobility and transfers, ambulates 10 feet with WBQC and mod assist  - barriers: right hemiparesis, stairs, incontinence, aphasia, reduced caregiver support, reduced endurance  - goals: intermittent supervision waking hours, CG transfers, supervision bADLs, CS transfers and ambulation household distances "Pepe" "to get it back". Ambulate to bathroom with CS, communicate wants and needs min assist  - target: dc home 12/3 with caregiver support and home PT OT SLP  - caregiver training    # LABS  CBC CMP 11/21    ---------------  Code Status: FULL  Emergency Contact:    Outpatient Follow-up (Specialty/Name of physician):    Farhat Ulrich  Radiology  [address], [address]  [place] [address]  Phone: [phone]  Fax: [phone]      - PEPE KIM is a 68 y/o M with PMHx of HTN, HLD, DM, who presented to [facility] on 10/27/24 with right sided weakness/code stroke, pulmonary edema and hypertensive emergency in the setting of NSTEMI/HF. 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 Ulrich on 11/5/24.     # L MCA Infarct with right body involvement  - CTA +right carotid stenosis of 50% and left carotid stenosis of 90%  - S/p angio/LICA stent w/ Dr. Ulrich on 11/5.   - Aspirin 325mg daily  - Plavix 75mg daily   - Continue comprehensive rehab program, 3 hours a day, 5 days a week. PT OT SLP  - Precautions: cardiac, DM, fall,     # HLD/HTN  # Episodic hypotension  - Likely 2/2 dehydration  - s/p 250 cc bolus. Encourage po fluids  - metoprolol  - Atorvastatin 20mg HS   - BP (99/73 - 108/70) 11/18    # NSTEMI  # CHF  - EKG 11/11:  Normal sinus rhythm, ST and T wave abnormality, consider anterolateral ischemia. (similar to prior 11/6)  - TTE: EF 45-50%.  - Metoprolol 12.5mg BID   - Aspirin 325mg daily  - Plavix 75mg daily   - statin  - Cardiac cath deferred outpatient f/u d/t acute CVA  - CXR repeated 11/17: Mild congestive heart failure. Small pleural effusions. No pneumothorax. Discussed with patient  - O2 sat 95-96% RA 11/18    # Asthma  - Albuterol PRN  - in remission.     # T2DM, uncontrolled with hyperglycemia  - A1c 9.6  - Lantus increased 15u 11/12  - admelog 4u qAC  - FBG ACHS + Mod ISS    # leukocytosis  - afebrile, no tachy. Will monitor for now  - UA 11/27 negative, CXR without evidence infection  - WBC 14.27 11/6.--> 11.17 11/4 --> 9.49 11/18 resolved  - CBC 11/21    # mild transaminitis  -  AST  46[H]  /  ALT  72[H]  /  AlkPhos  75  11-14 --> AST  38  /  ALT  75[H]  /  AlkPhos  66  11-18 stable/improved  - avoid hepatotoxic meds. DC tylenol  - continue low dose statin for now given CVA and cAD  - CMP 11/21    # Sleep:   - Melatonin 6mg HS PRN     # Pain Management:  - Tylenol PRN    # GI/Bowel:  - Senna QHS, Miralax PRN Daily  - GI ppx: famotidine 40mg daily   - dulcolax suppository PRN     # Diet  - Regular, carb consistent diet, DASH/TLC    # DVT ppx:   - Lovenox 40mg daily, SCDs    # Case discussed in IDT rounds 11/13 (initial)  - incontinent B/B, skin intact, lives with spouse in [place] with 6 ANA MARIA, regular solid thin liquids, mod non-fluent aphasia with breakdown in semi complex receptive level, able to generate 3-word phrases with cues, max assist toilet and showering, mod UB/max LB dressing, supervision eating and oral hygiene, mod assist bed mobility and transfers, ambulates 10 feet with WBQC and mod assist  - barriers: right hemiparesis, stairs, incontinence, aphasia, reduced caregiver support, reduced endurance  - goals: intermittent supervision waking hours, CG transfers, supervision bADLs, CS transfers and ambulation household distances "Pepe" "to get it back". Ambulate to bathroom with CS, communicate wants and needs min assist  - target: dc home 12/3 with caregiver support and home PT OT SLP  - caregiver training    # LABS  CBC CMP 11/21    ---------------  Code Status: FULL  Emergency Contact:    Outpatient Follow-up (Specialty/Name of physician):    Farhat Ulrich  Radiology  [address], [address]  [place] [address]  Phone: [phone]  Fax: [phone]      -

## 2024-11-19 LAB
GLUCOSE BLDC GLUCOMTR-MCNC: 106 MG/DL — HIGH (ref 70–99)
GLUCOSE BLDC GLUCOMTR-MCNC: 127 MG/DL — HIGH (ref 70–99)
GLUCOSE BLDC GLUCOMTR-MCNC: 164 MG/DL — HIGH (ref 70–99)
GLUCOSE BLDC GLUCOMTR-MCNC: 178 MG/DL — HIGH (ref 70–99)

## 2024-11-19 PROCEDURE — 99232 SBSQ HOSP IP/OBS MODERATE 35: CPT

## 2024-11-19 RX ORDER — CLOPIDOGREL 75 MG/1
1 TABLET, FILM COATED ORAL
Qty: 0 | Refills: 0 | DISCHARGE
Start: 2024-11-19

## 2024-11-19 RX ORDER — SENNOSIDES 8.6 MG
2 TABLET ORAL
Qty: 0 | Refills: 0 | DISCHARGE
Start: 2024-11-19

## 2024-11-19 RX ORDER — FAMOTIDINE 20 MG/1
1 TABLET, FILM COATED ORAL
Qty: 0 | Refills: 0 | DISCHARGE
Start: 2024-11-19

## 2024-11-19 RX ORDER — ALBUTEROL 90 MCG
2 AEROSOL (GRAM) INHALATION
Qty: 0 | Refills: 0 | DISCHARGE
Start: 2024-11-19

## 2024-11-19 RX ORDER — FAMOTIDINE 10 MG/ML
1 INJECTION INTRAVENOUS
Refills: 0 | DISCHARGE

## 2024-11-19 RX ORDER — POLYETHYLENE GLYCOL 3350 17 G/17G
17 POWDER, FOR SOLUTION ORAL
Qty: 0 | Refills: 0 | DISCHARGE
Start: 2024-11-19

## 2024-11-19 RX ORDER — ACETAMINOPHEN, DIPHENHYDRAMINE HCL, PHENYLEPHRINE HCL 325; 25; 5 MG/1; MG/1; MG/1
2 TABLET ORAL
Qty: 0 | Refills: 0 | DISCHARGE
Start: 2024-11-19

## 2024-11-19 RX ADMIN — INSULIN GLARGINE 15 UNIT(S): 100 INJECTION, SOLUTION SUBCUTANEOUS at 21:10

## 2024-11-19 RX ADMIN — FAMOTIDINE 40 MILLIGRAM(S): 20 TABLET, FILM COATED ORAL at 12:37

## 2024-11-19 RX ADMIN — Medication 325 MILLIGRAM(S): at 06:03

## 2024-11-19 RX ADMIN — Medication 2: at 08:34

## 2024-11-19 RX ADMIN — Medication 500 MILLIGRAM(S): at 09:28

## 2024-11-19 RX ADMIN — METOPROLOL TARTRATE 12.5 MILLIGRAM(S): 100 TABLET, FILM COATED ORAL at 17:29

## 2024-11-19 RX ADMIN — Medication 500 MILLIGRAM(S): at 17:28

## 2024-11-19 RX ADMIN — METOPROLOL TARTRATE 12.5 MILLIGRAM(S): 100 TABLET, FILM COATED ORAL at 06:03

## 2024-11-19 RX ADMIN — Medication 4 UNIT(S): at 08:35

## 2024-11-19 RX ADMIN — Medication 4 UNIT(S): at 12:36

## 2024-11-19 RX ADMIN — ENOXAPARIN SODIUM 40 MILLIGRAM(S): 30 INJECTION SUBCUTANEOUS at 22:31

## 2024-11-19 RX ADMIN — Medication 20 MILLIGRAM(S): at 21:10

## 2024-11-19 RX ADMIN — Medication 4 UNIT(S): at 17:29

## 2024-11-19 RX ADMIN — POLYETHYLENE GLYCOL 3350 17 GRAM(S): 17 POWDER, FOR SOLUTION ORAL at 12:35

## 2024-11-19 RX ADMIN — CLOPIDOGREL 75 MILLIGRAM(S): 75 TABLET, FILM COATED ORAL at 12:36

## 2024-11-19 RX ADMIN — Medication 2: at 12:36

## 2024-11-19 NOTE — DISCHARGE NOTE PROVIDER - NSDCCPCAREPLAN_GEN_ALL_CORE_FT
PRINCIPAL DISCHARGE DIAGNOSIS  Diagnosis: Acute ischemic left MCA stroke  Assessment and Plan of Treatment: You had a stroke in a major blood vessel in the brain. This has impacted your strength on the right side, so you went to rehab to help regain your function. Please follow up with Dr. Dalal      SECONDARY DISCHARGE DIAGNOSES  Diagnosis: NSTEMI (non-ST elevation myocardial infarction)  Assessment and Plan of Treatment: You had a heart attack a the previous hospital. Repeat Chest X-ray performed during rehab showed improving heart failure. Please continue taking asirpin, plavix, metoprolol and statin as these medications are crucial for your heart health.    Diagnosis: Hypertension  Assessment and Plan of Treatment: Continue taking your metoprolol 12.5 twice per day and follow up with your PCP.     PRINCIPAL DISCHARGE DIAGNOSIS  Diagnosis: Acute ischemic left MCA stroke  Assessment and Plan of Treatment: You had a stroke in a major blood vessel in the brain. This has impacted your strength on the right side, so you went to rehab to help regain your function. Please follow up with neurology and PMR for monitoring and additional therapy recommendations      SECONDARY DISCHARGE DIAGNOSES  Diagnosis: NSTEMI (non-ST elevation myocardial infarction)  Assessment and Plan of Treatment: You had a heart attack a the previous hospital. Repeat Chest X-ray performed during rehab showed improving heart failure. Please continue taking asirpin, plavix, metoprolol and statin as these medications are crucial for your heart health.    Diagnosis: Hypertension  Assessment and Plan of Treatment: Continue taking your metoprolol 12.5 twice per day and follow up with your PCP.    Diagnosis: Type 2 diabetes mellitus  Assessment and Plan of Treatment: you have been started on metformin 1000 mg twice a day and trulicity one a week. Follow up with your PCP and endocriniologist for medication management, glucose monitoring, and renal function monitoring. Drink adequate fluids

## 2024-11-19 NOTE — DISCHARGE NOTE PROVIDER - HOSPITAL COURSE
HPI:  68 y/o M with PMHx of HTN, HLD, DM, originally presented to [facility] on 10/27 as a code stroke after developed right sided weakness, pulmonary edema and hypertensive emergency in the setting of NSTEMI/HF. 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 Ulrich on 11/5/24. [facility]  #[id number] used.     MR brain revealed L watershed/MCA strokes in the setting of high grade proximal LICA stenosis >90% at bifurcation. Patient underwent catheter cerebral angiogram, angioplasty and left carotid wall stent placement with Dr. Farhat Ulrich on 11/6/2024. Right groin was accessed but catheter could not be advanced 2/2 to near occlusion of the right iliac, puncture closed with closure device and procedure proceeded through right radial artery. Case was complicated by hypotension requiring multiple neosticks and IVF boluses to maintain hemodynamic stability. Post-op Carotid dopplers completed on 11/7/2024 demonstrated patent left ICA stent.    Cardiology was consulted for remote history of NSTEMI, f/u Echo shows EF 45% segmental wall motion abnormalities, Recommended to continue ASA/ Plavix and f/u outpatient with another cardiac cath which was deferred d/t acute stroke. Post op course c/b fluctuating RLE MMT exam, repeat CTH stable.     Patient was evaluated by PM&R and therapy for gait/ADL impairments and recommended acute rehabilitation. Patient was medically optimized for discharge to [facility] on 11/11-. Admitted with gait instability, ADL, and functional impairments.     Rehab course significant for episodic hypotension; we gave him gentle fluids which has helped.    All other medical co-morbidities were stable. Patient tolerated course of inpatient PT/OT/SLP rehab with significant improvements and met rehab goals prior to discharge. Patient was medically cleared on ___ for discharge to home. HPI:  70 y/o M with PMHx of HTN, HLD, DM, originally presented to [facility] on 10/27 as a code stroke after developed right sided weakness, pulmonary edema and hypertensive emergency in the setting of NSTEMI/HF. 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 Ulrich on 11/5/24. [facility]  #[id number] used.     MR brain revealed L watershed/MCA strokes in the setting of high grade proximal LICA stenosis >90% at bifurcation. Patient underwent catheter cerebral angiogram, angioplasty and left carotid wall stent placement with Dr. Farhat Ulrich on 11/6/2024. Right groin was accessed but catheter could not be advanced 2/2 to near occlusion of the right iliac, puncture closed with closure device and procedure proceeded through right radial artery. Case was complicated by hypotension requiring multiple neosticks and IVF boluses to maintain hemodynamic stability. Post-op Carotid dopplers completed on 11/7/2024 demonstrated patent left ICA stent.    Cardiology was consulted for remote history of NSTEMI, f/u Echo shows EF 45% segmental wall motion abnormalities, Recommended to continue ASA/ Plavix and f/u outpatient with another cardiac cath which was deferred d/t acute stroke. Post op course c/b fluctuating RLE MMT exam, repeat CTH stable.     Patient was evaluated by PM&R and therapy for gait/ADL impairments and recommended acute rehabilitation. Patient was medically optimized for discharge to [facility] on 11/11-. Admitted with gait instability, ADL, and functional impairments.     Rehab course significant for episodic hypotension; we gave him gentle fluids which has helped. CXR performed 11/17 showed persistent but improving congestion in left lung. O2 sats remained stable on RA and he was asymptomatic.    All other medical co-morbidities were stable. Patient tolerated course of inpatient PT/OT/SLP rehab with significant improvements and met rehab goals prior to discharge. Patient was medically cleared on ___ for discharge to home. HPI:  70 y/o M with PMHx of HTN, HLD, DM, originally presented to [facility] on 10/27 as a code stroke after developed right sided weakness, pulmonary edema and hypertensive emergency in the setting of NSTEMI/HF. 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 Ulrich on 11/5/24. [facility]  #[id number] used.     MR brain revealed L watershed/MCA strokes in the setting of high grade proximal LICA stenosis >90% at bifurcation. Patient underwent catheter cerebral angiogram, angioplasty and left carotid wall stent placement with Dr. Farhat Ulrich on 11/6/2024. Right groin was accessed but catheter could not be advanced 2/2 to near occlusion of the right iliac, puncture closed with closure device and procedure proceeded through right radial artery. Case was complicated by hypotension requiring multiple neosticks and IVF boluses to maintain hemodynamic stability. Post-op Carotid dopplers completed on 11/7/2024 demonstrated patent left ICA stent.    Cardiology was consulted for remote history of NSTEMI, f/u Echo shows EF 45% segmental wall motion abnormalities, Recommended to continue ASA/ Plavix and f/u outpatient with another cardiac cath which was deferred d/t acute stroke. Post op course c/b fluctuating RLE MMT exam, repeat CTH stable.     Patient was evaluated by PM&R and therapy for gait/ADL impairments and recommended acute rehabilitation. Patient was medically optimized for discharge to [facility] on 11/11-. Admitted with gait instability, ADL, and functional impairments.     Rehab course significant for episodic hypotension; we gave him gentle fluids which has helped. CXR performed 11/17 showed persistent but improving congestion in left lung. O2 sats remained stable on RA and he was asymptomatic. Provided hinged AFO for foot drop.     All other medical co-morbidities were stable. Patient tolerated course of inpatient PT/OT/SLP rehab with significant improvements and met rehab goals prior to discharge. Patient was medically cleared on 12/3 for discharge to home. HPI:  68 y/o M with PMHx of HTN, HLD, DM, originally presented to [facility] on 10/27 as a code stroke after developed right sided weakness, pulmonary edema and hypertensive emergency in the setting of NSTEMI/HF. 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 to [facility] for angio/LICA stent w/ Dr. Ulrich on 11/5/24. [facility]  #[id number] used.     MR brain revealed L watershed/MCA strokes in the setting of high grade proximal LICA stenosis >90% at bifurcation. Patient underwent catheter cerebral angiogram, angioplasty and left carotid wall stent placement with Dr. Farhat Ulrich on 11/6/2024. Right groin was accessed but catheter could not be advanced 2/2 to near occlusion of the right iliac, puncture closed with closure device and procedure proceeded through right radial artery. Case was complicated by hypotension requiring multiple neosticks and IVF boluses to maintain hemodynamic stability. Post-op Carotid dopplers completed on 11/7/2024 demonstrated patent left ICA stent.    Cardiology was consulted for remote history of NSTEMI, f/u Echo shows EF 45% segmental wall motion abnormalities, Recommended to continue ASA/ Plavix and f/u outpatient with another cardiac cath which was deferred d/t acute stroke. Post op course c/b fluctuating RLE MMT exam, repeat CTH stable.     Patient was evaluated by PM&R and therapy for gait/ADL impairments and recommended acute rehabilitation. Patient was medically optimized for discharge to [facility] on 11/11-. Admitted with gait instability, ADL, and functional impairments.     Rehab course significant for episodic hypotension; we gave him gentle fluids which has helped. CXR performed 11/17 showed persistent but improving congestion in left lung. O2 sats remained stable on RA and he was asymptomatic. Provided hinged AFO for foot drop.     Diabetes nursing saw you and recommended that you take metformin ER 1000mg twice per day and trulicity 0.75mg/0.5mL once per week for 4 weeks then 1.5mg/0.5mL after that. Please follow up with endocrinology prior to increasing dose.     All other medical co-morbidities were stable. Patient tolerated course of inpatient PT/OT/SLP rehab with significant improvements and met rehab goals prior to discharge. Patient was medically cleared on 12/3 for discharge to home. HPI:  70 y/o M with PMHx of HTN, HLD, DM, originally presented to [facility] on 10/27 as a code stroke after developed right sided weakness, pulmonary edema and hypertensive emergency in the setting of NSTEMI/HF. 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 Ulrich on 11/5/24. [facility]  #[id number] used.     MR brain revealed L watershed/MCA strokes in the setting of high grade proximal LICA stenosis >90% at bifurcation. Patient underwent catheter cerebral angiogram, angioplasty and left carotid wall stent placement with Dr. Farhat Ulrich on 11/6/2024. Right groin was accessed but catheter could not be advanced 2/2 to near occlusion of the right iliac, puncture closed with closure device and procedure proceeded through right radial artery. Case was complicated by hypotension requiring multiple neosticks and IVF boluses to maintain hemodynamic stability. Post-op Carotid dopplers completed on 11/7/2024 demonstrated patent left ICA stent.    Cardiology was consulted for remote history of NSTEMI, f/u Echo shows EF 45% segmental wall motion abnormalities, Recommended to continue ASA/ Plavix and f/u outpatient with another cardiac cath which was deferred d/t acute stroke. Post op course c/b fluctuating RLE MMT exam, repeat CTH stable.     Patient was evaluated by PM&R and therapy for gait/ADL impairments and recommended acute rehabilitation. Patient was medically optimized for discharge to [facility] on 11/11-. Admitted with gait instability, ADL, and functional impairments.     Rehab course significant for episodic hypotension, related to reduced fluid intake/dehydration./mild NOVA; we gave him gentle fluids which has helped. Cr on discharge improved 1.17. His asthma was stable during his stay, and he had a transient leukocytosis and transaminitis which both resolved. CXR performed 11/17 showed persistent but improving congestion in left lung. O2 sats remained stable on RA and he was asymptomatic. He was evaluated during his stay for right hinged AFO  with liner for foot drop.     Diabetes nursing saw you and recommended that you take metformin ER 1000mg twice per day and trulicity 0.75mg/0.5mL once per week for 4 weeks then 1.5mg/0.5mL after that. Please follow up with endocrinology prior to increasing dose.     All other medical co-morbidities were stable. Patient tolerated course of inpatient PT/OT/SLP rehab with significant improvements and met rehab goals prior to discharge. Patient was medically cleared on 12/3 for discharge to home.

## 2024-11-19 NOTE — PROGRESS NOTE ADULT - SUBJECTIVE AND OBJECTIVE BOX
Patient is a 69y old  Male who presents with a chief complaint of L MCA Infarct with right body involvement (18 Nov 2024 09:00)      HPI:  68 y/o M with PMHx of HTN, HLD, DM, originally presented to [facility] on 10/27 as a code stroke after developed right sided weakness, pulmonary edema and hypertensive emergency in the setting of NSTEMI/HF. 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 Ulrich on 11/5/24. [facility]  #[id number] used.     MR brain revealed L watershed/MCA strokes in the setting of high grade proximal LICA stenosis >90% at bifurcation. Patient underwent catheter cerebral angiogram, angioplasty and left carotid wall stent placement with Dr. Farhat Ulrich on 11/6/2024. Right groin was accessed but catheter could not be advanced 2/2 to near occlusion of the right iliac, puncture closed with closure device and procedure proceeded through right radial artery. Case was complicated by hypotension requiring multiple neosticks and IVF boluses to maintain hemodynamic stability. Post-op Carotid dopplers completed on 11/7/2024 demonstrated patent left ICA stent.    Cardiology was consulted for remote history of NSTEMI, f/u Echo shows EF 45% segmental wall motion abnormalities, Recommended to continue ASA/ Plavix and f/u outpatient with another cardiac cath which was deferred d/t acute stroke. Post op course c/b fluctuating RLE MMT exam, repeat CTH stable.     Patient was cleared for discharge to [facility] IRF on 11/11/24.  (11 Nov 2024 14:19)      PAST MEDICAL & SURGICAL HISTORY:  Diabetes mellitus      HTN (hypertension)      Hyperlipidemia          MEDICATIONS  (STANDING):  albuterol    90 MICROgram(s) HFA Inhaler 2 Puff(s) Inhalation every 12 hours  aspirin 325 milliGRAM(s) Oral <User Schedule>  atorvastatin 20 milliGRAM(s) Oral at bedtime  clopidogrel Tablet 75 milliGRAM(s) Oral daily  dextrose 5%. 1000 milliLiter(s) (50 mL/Hr) IV Continuous <Continuous>  dextrose 5%. 1000 milliLiter(s) (100 mL/Hr) IV Continuous <Continuous>  dextrose 50% Injectable 25 Gram(s) IV Push once  dextrose 50% Injectable 12.5 Gram(s) IV Push once  dextrose 50% Injectable 25 Gram(s) IV Push once  enoxaparin Injectable 40 milliGRAM(s) SubCutaneous every 24 hours  famotidine    Tablet 40 milliGRAM(s) Oral daily  glucagon  Injectable 1 milliGRAM(s) IntraMuscular once  insulin glargine Injectable (LANTUS) 15 Unit(s) SubCutaneous at bedtime  insulin lispro (ADMELOG) corrective regimen sliding scale   SubCutaneous three times a day before meals  insulin lispro (ADMELOG) corrective regimen sliding scale   SubCutaneous at bedtime  insulin lispro Injectable (ADMELOG) 4 Unit(s) SubCutaneous before breakfast  insulin lispro Injectable (ADMELOG) 4 Unit(s) SubCutaneous before lunch  insulin lispro Injectable (ADMELOG) 4 Unit(s) SubCutaneous before dinner  metFORMIN 500 milliGRAM(s) Oral two times a day  metoprolol tartrate 12.5 milliGRAM(s) Oral every 12 hours  polyethylene glycol 3350 17 Gram(s) Oral daily  senna 2 Tablet(s) Oral at bedtime    MEDICATIONS  (PRN):  bisacodyl Suppository 10 milliGRAM(s) Rectal daily PRN Constipation  dextrose Oral Gel 15 Gram(s) Oral once PRN Blood Glucose LESS THAN 70 milliGRAM(s)/deciliter  melatonin 6 milliGRAM(s) Oral at bedtime PRN Insomnia      Allergies    No Known Allergies    Intolerances          VITALS  69y  Vital Signs Last 24 Hrs  T(C): 36.6 (18 Nov 2024 19:48), Max: 36.6 (18 Nov 2024 19:48)  T(F): 97.9 (18 Nov 2024 19:48), Max: 97.9 (18 Nov 2024 19:48)  HR: 79 (19 Nov 2024 06:05) (79 - 82)  BP: 118/84 (19 Nov 2024 06:05) (103/71 - 118/84)  BP(mean): --  RR: 15 (18 Nov 2024 19:48) (15 - 15)  SpO2: 94% (18 Nov 2024 19:48) (94% - 94%)    Parameters below as of 18 Nov 2024 19:48  Patient On (Oxygen Delivery Method): room air      Daily     Daily         RECENT LABS:                          12.4   9.49  )-----------( 426      ( 18 Nov 2024 06:17 )             37.7     11-18    140  |  104  |  29[H]  ----------------------------<  131[H]  4.0   |  29  |  1.12    Ca    9.5      18 Nov 2024 06:17    TPro  7.7  /  Alb  2.8[L]  /  TBili  0.4  /  DBili  x   /  AST  38  /  ALT  75[H]  /  AlkPhos  66  11-18    LIVER FUNCTIONS - ( 18 Nov 2024 06:17 )  Alb: 2.8 g/dL / Pro: 7.7 g/dL / ALK PHOS: 66 U/L / ALT: 75 U/L / AST: 38 U/L / GGT: x             Urinalysis Basic - ( 18 Nov 2024 06:17 )    Color: x / Appearance: x / SG: x / pH: x  Gluc: 131 mg/dL / Ketone: x  / Bili: x / Urobili: x   Blood: x / Protein: x / Nitrite: x   Leuk Esterase: x / RBC: x / WBC x   Sq Epi: x / Non Sq Epi: x / Bacteria: x          CAPILLARY BLOOD GLUCOSE      POCT Blood Glucose.: 164 mg/dL (19 Nov 2024 08:30)  POCT Blood Glucose.: 144 mg/dL (18 Nov 2024 22:32)  POCT Blood Glucose.: 115 mg/dL (18 Nov 2024 16:51)  POCT Blood Glucose.: 152 mg/dL (18 Nov 2024 12:15)

## 2024-11-19 NOTE — DISCHARGE NOTE PROVIDER - NSFOLLOWUPCLINICS_GEN_ALL_ED_FT
[facility]  Endocrinology  [address] [place] [address]  Phone: [phone]  Fax:   Follow Up Time: 2 weeks

## 2024-11-19 NOTE — PROGRESS NOTE ADULT - COMMENTS
Patient seen this morning with [facility] language line  #[id number]. Seen again with wife and son at bedside; son declines , and serves at time although both he and wife respond in English    Patient alert, comfortable. Denies dizziness, H/A or SOB. No difficulty sleeping and no acute issues overnight.

## 2024-11-19 NOTE — DISCHARGE NOTE PROVIDER - CARE PROVIDER_API CALL
Farhat Ulrich  Radiology  [address], [address]  [place] [address]  Phone: [phone]  Fax: [phone]  Follow Up Time: 2 weeks    Lynda Grimm  Physical/Rehab Medicine  [address] [place] [address]  Phone: [phone]  Fax: [phone]  Follow Up Time: 1 month   Farhat Ulrich  Radiology  [address], [address]  [place] [address]  Phone: [phone]  Fax: [phone]  Follow Up Time: 2 weeks    Lynda Grimm  Physical/Rehab Medicine  [address] [place] [address]  Phone: [phone]  Fax: [phone]  Follow Up Time: 1 month    Teto Fabian  Thoracic and Cardiac Surgery  [address] [place] [address]  Phone: [phone]  Fax: [phone]  Follow Up Time: 1 month

## 2024-11-19 NOTE — DISCHARGE NOTE PROVIDER - PROVIDER TOKENS
PROVIDER:[TOKEN:[[id number]],FOLLOWUP:[2 weeks]],PROVIDER:[TOKEN:[[id number]],FOLLOWUP:[1 month]] PROVIDER:[TOKEN:[[id number]],FOLLOWUP:[2 weeks]],PROVIDER:[TOKEN:[[id number]],FOLLOWUP:[1 month]],PROVIDER:[TOKEN:[[id number]],FOLLOWUP:[1 month]]

## 2024-11-19 NOTE — PROGRESS NOTE ADULT - ASSESSMENT
PEPE KIM is a 68 y/o M with PMHx of HTN, HLD, DM, who presented to [facility] on 10/27/24 with right sided weakness/code stroke, pulmonary edema and hypertensive emergency in the setting of NSTEMI/HF. 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 Ulrich on 11/5/24.     # L MCA Infarct with right body involvement  - CTA +right carotid stenosis of 50% and left carotid stenosis of 90%  - S/p angio/LICA stent w/ Dr. Ulrich on 11/5.   - Aspirin 325mg and Plavix 75mg daily   - Atorvastatin 20mg HS   - Continue comprehensive rehab program, 3 hours a day, 5 days a week. PT OT SLP  - Precautions: cardiac, DM, fall,     # HLD/HTN  - Episodic hypotension, Likely 2/2 dehydration  - s/p 250 cc bolus. Encourage po fluids  - metoprolol  - Atorvastatin 20mg HS   - BP  (103/71 - 118/84) 11/19    # NSTEMI  # CHF  - EKG 11/11:  Normal sinus rhythm, ST and T wave abnormality, consider anterolateral ischemia. (similar to prior 11/6)  - TTE: EF 45-50%.  - CXR 11/17: Mild congestive heart failure. Small pleural effusions. No pneumothorax. Discussed with patient  - Metoprolol 12.5mg BID   - Aspirin 325mg daily, plavix  - statin  - Cardiac cath deferred outpatient f/u d/t acute CVA    # Asthma  - Albuterol PRN  - in remission.     # T2DM, uncontrolled with hyperglycemia  - A1c 9.6  - Lantus increased 15u 11/12  - admelog 4u qAC  - FBG ACHS + Mod ISS    # leukocytosis  - afebrile, no tachy. Will monitor for now  - UA 11/27 negative, CXR without evidence infection  - WBC 14.27 11/6.--> 11.17 11/4 --> 9.49 11/18 resolved  - CBC 11/21    # mild transaminitis  -  AST  46[H]  /  ALT  72[H]  /  AlkPhos  75  11-14 --> AST  38  /  ALT  75[H]  /  AlkPhos  66  11-18 stable/improved  - avoid hepatotoxic meds. DC tylenol  - continue low dose statin for now given CVA and cAD  - CMP 11/21    # Sleep:   - Melatonin 6mg HS PRN     # Pain Management:  - Tylenol PRN    # GI/Bowel:  - Senna QHS, Miralax PRN Daily  - GI ppx: famotidine 40mg daily   - dulcolax suppository PRN     # Diet  - Regular, carb consistent diet, DASH/TLC    # DVT ppx:   - Lovenox 40mg daily, SCDs    # Case discussed in IDT rounds 11/13 (initial)  - incontinent B/B, skin intact, lives with spouse in  with 6 ANA MARIA, regular solid thin liquids, mod non-fluent aphasia with breakdown in semi complex receptive level, able to generate 3-word phrases with cues, max assist toilet and showering, mod UB/max LB dressing, supervision eating and oral hygiene, mod assist bed mobility and transfers, ambulates 10 feet with WBQC and mod assist  - barriers: right hemiparesis, stairs, incontinence, aphasia, reduced caregiver support, reduced endurance  - goals: intermittent supervision waking hours, CG transfers, supervision bADLs, CS transfers and ambulation household distances "Pepe" "to get it back". Ambulate to bathroom with CS, communicate wants and needs min assist  - target: dc home 12/3 with caregiver support and home PT OT SLP  - caregiver training    # LABS  CBC CMP 11/21    ---------------  Code Status: FULL  Emergency Contact:    Outpatient Follow-up (Specialty/Name of physician):    Farhat Ulrich  Radiology  [address], [address]  [place] [address]  Phone: [phone]  Fax: [phone]      - PEPE KIM is a 68 y/o M with PMHx of HTN, HLD, DM, who presented to [facility] on 10/27/24 with right sided weakness/code stroke, pulmonary edema and hypertensive emergency in the setting of NSTEMI/HF. 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 Ulrich on 11/5/24.     # L MCA Infarct with right body involvement  - CTA +right carotid stenosis of 50% and left carotid stenosis of 90%  - S/p angio/LICA stent w/ Dr. Ulrich on 11/5.   - Aspirin 325mg and Plavix 75mg daily   - Atorvastatin 20mg HS   - Continue comprehensive rehab program, 3 hours a day, 5 days a week. PT OT SLP  - will need right AFO for foot drop on dc  - Precautions: cardiac, DM, fall,     # HLD/HTN  - Episodic hypotension, Likely 2/2 dehydration  - s/p 250 cc bolus. Encourage po fluids  - metoprolol  - Atorvastatin 20mg HS   - BP  (103/71 - 118/84) 11/19    # NSTEMI  # CHF  - EKG 11/11:  Normal sinus rhythm, ST and T wave abnormality, consider anterolateral ischemia. (similar to prior 11/6)  - TTE: EF 45-50%.  - CXR 11/17: Mild congestive heart failure. Small pleural effusions. No pneumothorax. Discussed with patient  - Metoprolol 12.5mg BID   - Aspirin 325mg daily, plavix  - statin  - Cardiac cath deferred outpatient f/u d/t acute CVA    # Asthma  - Albuterol PRN  - in remission.     # T2DM, uncontrolled with hyperglycemia  - A1c 9.6  - Lantus increased 15u 11/12  - admelog 4u qAC  - FBG ACHS + Mod ISS    # leukocytosis  - afebrile, no tachy. Will monitor for now  - UA 11/27 negative, CXR without evidence infection  - WBC 14.27 11/6.--> 11.17 11/4 --> 9.49 11/18 resolved  - CBC 11/21    # mild transaminitis  -  AST  46[H]  /  ALT  72[H]  /  AlkPhos  75  11-14 --> AST  38  /  ALT  75[H]  /  AlkPhos  66  11-18 stable/improved  - avoid hepatotoxic meds. DC tylenol  - continue low dose statin for now given CVA and cAD  - CMP 11/21    # Sleep:   - Melatonin 6mg HS PRN     # Pain Management:  - Tylenol PRN    # GI/Bowel:  - Senna QHS, Miralax PRN Daily  - GI ppx: famotidine 40mg daily   - dulcolax suppository PRN     # Diet  - Regular, carb consistent diet, DASH/TLC    # DVT ppx:   - Lovenox 40mg daily, SCDs    # Case discussed in IDT rounds 11/13 (initial)  - incontinent B/B, skin intact, lives with spouse in [place] with 6 ANA MARIA, regular solid thin liquids, mod non-fluent aphasia with breakdown in semi complex receptive level, able to generate 3-word phrases with cues, max assist toilet and showering, mod UB/max LB dressing, supervision eating and oral hygiene, mod assist bed mobility and transfers, ambulates 10 feet with WBQC and mod assist  - barriers: right hemiparesis, stairs, incontinence, aphasia, reduced caregiver support, reduced endurance  - goals: intermittent supervision waking hours, CG transfers, supervision bADLs, CS transfers and ambulation household distances "Pepe" "to get it back". Ambulate to bathroom with CS, communicate wants and needs min assist  - target: dc home 12/3 with caregiver support and home PT OT SLP  - caregiver training  - Reviewed medical status, current functional status, motor recovery, cognition, probable AFO Rx pror to dc with patient and family 11/19    # LABS  CBC CMP 11/21    ---------------  Code Status: FULL  Emergency Contact:    Outpatient Follow-up (Specialty/Name of physician):    Farhat Ulrich  Radiology  [address], [address]  [place] [address]  Phone: [phone]  Fax: [phone]      -

## 2024-11-19 NOTE — DISCHARGE NOTE PROVIDER - NSDCMRMEDTOKEN_GEN_ALL_CORE_FT
albuterol 90 mcg/inh inhalation aerosol: 2 puff(s) inhaled every 12 hours  aspirin 325 mg oral tablet: 1 tab(s) orally once a day Take in morning  atorvastatin 20 mg oral tablet: 1 tab(s) orally once a day (at bedtime)  Basaglar KwikPen 100 units/mL subcutaneous solution: 12 unit(s) subcutaneous once a day (at bedtime) MAY BE INCREASED UP TO 50 UNITS ( PRE ADMISSION DOSE) AS BLOOD SUGAR LEVEL PERMITS  clopidogrel 75 mg oral tablet: 1 tab(s) orally once a day  dulaglutide 1.5 mg/0.5 mL subcutaneous solution: 1.5 milligram(s) subcutaneously once a week  famotidine 40 mg oral tablet: 1 tab(s) orally once a day  melatonin 3 mg oral tablet: 2 tab(s) orally once a day (at bedtime) As needed Insomnia  metFORMIN 500 mg oral tablet: 1 tab(s) orally 2 times a day  metoprolol: 12.5 milligram(s) orally 2 times a day HOLD FOR SB/P&lt; 110 OR HR&lt;60  polyethylene glycol 3350 oral powder for reconstitution: 17 gram(s) orally once a day  repaglinide 2 mg oral tablet: 1 tab(s) orally 3 times a day  senna leaf extract oral tablet: 2 tab(s) orally once a day (at bedtime)   acetaminophen 325 mg oral tablet: 2 tab(s) orally every 6 hours As needed Temp greater or equal to 38C (100.4F), Mild Pain (1 - 3)  AFO: Eval for AFO Right foot  Basaglar KwikPen 100 units/mL subcutaneous solution: 12 unit(s) subcutaneous once a day (at bedtime) MAY BE INCREASED UP TO 50 UNITS ( PRE ADMISSION DOSE) AS BLOOD SUGAR LEVEL PERMITS  dulaglutide 1.5 mg/0.5 mL subcutaneous solution: 1.5 milligram(s) subcutaneously once a week  melatonin 3 mg oral tablet: 2 tab(s) orally once a day (at bedtime) As needed Insomnia  metFORMIN 500 mg oral tablet: 1 tab(s) orally 2 times a day  polyethylene glycol 3350 oral powder for reconstitution: 17 gram(s) orally once a day  repaglinide 2 mg oral tablet: 1 tab(s) orally 3 times a day  senna leaf extract oral tablet: 2 tab(s) orally once a day (at bedtime)   acetaminophen 325 mg oral tablet: 2 tab(s) orally every 6 hours As needed Temp greater or equal to 38C (100.4F), Mild Pain (1 - 3)  AFO: Eval for AFO Right foot  albuterol 90 mcg/inh inhalation aerosol: 2 puff(s) inhaled every 12 hours  aspirin 325 mg oral tablet: 1 tab(s) orally once a day Take in morning  atorvastatin 20 mg oral tablet: 1 tab(s) orally once a day (at bedtime)  clopidogrel 75 mg oral tablet: 1 tab(s) orally once a day  dulaglutide 1.5 mg/0.5 mL subcutaneous solution: 0.75 milligram(s) subcutaneously once a week  famotidine 40 mg oral tablet: 1 tab(s) orally once a day  glucometer (per patient&#x27;s insurance): Test blood sugars four times a day. Dispense #1 glucometer.  lancets: 1 application subcutaneously 4 times a day  melatonin 3 mg oral tablet: 2 tab(s) orally once a day (at bedtime) As needed Insomnia  metFORMIN 1000 mg oral tablet, extended release: 1 tab(s) orally 2 times a day take with breakfast and dinner  Metoprolol Succinate ER 25 mg oral tablet, extended release: 1 tab(s) orally once a day  polyethylene glycol 3350 oral powder for reconstitution: 17 gram(s) orally once a day  senna leaf extract oral tablet: 2 tab(s) orally once a day (at bedtime)  test strips (per patient&#x27;s insurance): 1 application subcutaneously 4 times a day. ** Compatible with patient&#x27;s glucometer **   AFO: Eval for AFO Right foot  albuterol 90 mcg/inh inhalation aerosol: 2 puff(s) inhaled every 12 hours  aspirin 325 mg oral tablet: 1 tab(s) orally once a day Take in morning  atorvastatin 20 mg oral tablet: 1 tab(s) orally once a day (at bedtime)  clopidogrel 75 mg oral tablet: 1 tab(s) orally once a day  dulaglutide 1.5 mg/0.5 mL subcutaneous solution: 0.75 milligram(s) subcutaneously once a week  famotidine 40 mg oral tablet: 1 tab(s) orally once a day  glucometer (per patient&#x27;s insurance): Test blood sugars four times a day. Dispense #1 glucometer.  lancets: 1 application subcutaneously 4 times a day  metFORMIN 1000 mg oral tablet, extended release: 1 tab(s) orally 2 times a day take with breakfast and dinner  Metoprolol Succinate ER 25 mg oral tablet, extended release: 1 tab(s) orally once a day  senna leaf extract oral tablet: 2 tab(s) orally once a day (at bedtime)  test strips (per patient&#x27;s insurance): 1 application subcutaneously 4 times a day. ** Compatible with patient&#x27;s glucometer **

## 2024-11-19 NOTE — DISCHARGE NOTE PROVIDER - CARE PROVIDERS DIRECT ADDRESSES
,[email or web address]

## 2024-11-20 LAB
GLUCOSE BLDC GLUCOMTR-MCNC: 114 MG/DL — HIGH (ref 70–99)
GLUCOSE BLDC GLUCOMTR-MCNC: 135 MG/DL — HIGH (ref 70–99)
GLUCOSE BLDC GLUCOMTR-MCNC: 142 MG/DL — HIGH (ref 70–99)
GLUCOSE BLDC GLUCOMTR-MCNC: 184 MG/DL — HIGH (ref 70–99)

## 2024-11-20 PROCEDURE — 99232 SBSQ HOSP IP/OBS MODERATE 35: CPT

## 2024-11-20 RX ADMIN — CLOPIDOGREL 75 MILLIGRAM(S): 75 TABLET, FILM COATED ORAL at 12:20

## 2024-11-20 RX ADMIN — Medication 4 UNIT(S): at 16:49

## 2024-11-20 RX ADMIN — Medication 2 TABLET(S): at 22:00

## 2024-11-20 RX ADMIN — Medication 20 MILLIGRAM(S): at 22:00

## 2024-11-20 RX ADMIN — Medication 2 PUFF(S): at 21:49

## 2024-11-20 RX ADMIN — POLYETHYLENE GLYCOL 3350 17 GRAM(S): 17 POWDER, FOR SOLUTION ORAL at 12:20

## 2024-11-20 RX ADMIN — Medication 4 UNIT(S): at 12:19

## 2024-11-20 RX ADMIN — ENOXAPARIN SODIUM 40 MILLIGRAM(S): 30 INJECTION SUBCUTANEOUS at 22:02

## 2024-11-20 RX ADMIN — Medication 4 UNIT(S): at 07:59

## 2024-11-20 RX ADMIN — INSULIN GLARGINE 15 UNIT(S): 100 INJECTION, SOLUTION SUBCUTANEOUS at 22:00

## 2024-11-20 RX ADMIN — METOPROLOL TARTRATE 12.5 MILLIGRAM(S): 100 TABLET, FILM COATED ORAL at 05:07

## 2024-11-20 RX ADMIN — METOPROLOL TARTRATE 12.5 MILLIGRAM(S): 100 TABLET, FILM COATED ORAL at 17:06

## 2024-11-20 RX ADMIN — Medication 325 MILLIGRAM(S): at 05:07

## 2024-11-20 RX ADMIN — FAMOTIDINE 40 MILLIGRAM(S): 20 TABLET, FILM COATED ORAL at 12:20

## 2024-11-20 RX ADMIN — Medication 500 MILLIGRAM(S): at 07:58

## 2024-11-20 RX ADMIN — Medication 500 MILLIGRAM(S): at 17:06

## 2024-11-20 NOTE — PROGRESS NOTE ADULT - ASSESSMENT
PEPE KIM is a 68 y/o M with PMHx of HTN, HLD, DM, who presented to [facility] on 10/27 with right sided weakness/code stroke, pulmonary edema and hypertensive emergency in the setting of NSTEMI/HF. 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 Ulrich on 11/5/24.  Now admitted to [facility] with right sided deficits for initiation of a multidisciplinary rehab program consisting focused on functional mobility, transfers and ADLs.    # L MCA Infarct with right body involvement  - CTA +right carotid stenosis of 50% and left carotid stenosis of 90%  - MR brain w/ L watershed/MCA strokes in the setting of high grade proximal LICA stenosis >90%  - S/p angio/LICA stent w/ Dr. Ulrich on 11/5.   - Aspirin 325mg daily  - Plavix 75mg daily   - Lipitor     # NSTEMI  # CHF  # HLD/HTN  - EKG (11/6)-  NSR with occasional PVC ,  Incomplete RBBB, T wave abnormality, consider inferolateral ischemia   - Elevated troponins---> (10/27: 104--> 148--> 190--> 10/28: 236-->10/2--> 312--> 10/30: 298)   - TTE at [facility] showed EF 45-50%.  - Metoprolol 12.5mg BID   - Aspirin 325mg daily  - Plavix 75mg daily   - Atorvastatin 20mg HS (LDL 77, )   - Cardiac cath deferred outpatient f/u d/t acute CVA    # Asthma  - Albuterol PRN    # T2DM with hyperglycemia   - A1c 9.6  - c/w Lispro 4u with meals and Lantus 15U   - FBG ACHS + Mod ISS  - metformin  BID    #Transaminitis  - monitor, pt w/o abd TTP    # DVT ppx:   - Lovenox 40mg daily, SCDs

## 2024-11-20 NOTE — PROGRESS NOTE ADULT - ASSESSMENT
PEPE KIM is a 68 y/o M with PMHx of HTN, HLD, DM, who presented to [facility] on 10/27/24 with right sided weakness/code stroke, pulmonary edema and hypertensive emergency in the setting of NSTEMI/HF. 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 Ulrich on 11/5/24.     # L MCA Infarct with right body involvement  - CTA +right carotid stenosis of 50% and left carotid stenosis of 90%  - S/p angio/LICA stent w/ Dr. Ulrich on 11/5.   - Aspirin 325mg and Plavix 75mg daily   - Atorvastatin 20mg HS   - Continue comprehensive rehab program, 3 hours a day, 5 days a week. PT OT SLP  - will need right AFO for foot drop on dc  - Precautions: cardiac, DM, fall,     # HLD/HTN  - Episodic hypotension, Likely 2/2 dehydration  - s/p 250 cc bolus. Encourage po fluids  - metoprolol  - Atorvastatin 20mg HS   - BP  (103/71 - 118/84) 11/19    # NSTEMI  # CHF  - EKG 11/11:  Normal sinus rhythm, ST and T wave abnormality, consider anterolateral ischemia. (similar to prior 11/6)  - TTE: EF 45-50%.  - CXR 11/17: Mild congestive heart failure. Small pleural effusions. No pneumothorax. Discussed with patient  - Metoprolol 12.5mg BID   - Aspirin 325mg daily, plavix  - statin  - Cardiac cath deferred outpatient f/u d/t acute CVA    # Asthma  - Albuterol PRN  - in remission.     # T2DM, uncontrolled with hyperglycemia  - A1c 9.6  - Lantus increased 15u 11/12  - admelog 4u qAC  - FBG ACHS + Mod ISS    # leukocytosis  - afebrile, no tachy. Will monitor for now  - UA 11/27 negative, CXR without evidence infection  - WBC 14.27 11/6.--> 11.17 11/4 --> 9.49 11/18 resolved  - CBC 11/21    # mild transaminitis  -  AST  46[H]  /  ALT  72[H]  /  AlkPhos  75  11-14 --> AST  38  /  ALT  75[H]  /  AlkPhos  66  11-18 stable/improved  - avoid hepatotoxic meds. DC tylenol  - continue low dose statin for now given CVA and cAD  - CMP 11/21    # Sleep:   - Melatonin 6mg HS PRN     # Pain Management:  - Tylenol PRN    # GI/Bowel:  - Senna QHS, Miralax PRN Daily  - GI ppx: famotidine 40mg daily   - dulcolax suppository PRN     # Diet  - Regular, carb consistent diet, DASH/TLC    # DVT ppx:   - Lovenox 40mg daily, SCDs    # Case discussed in IDT rounds 11/20 (follow up)  -   - barriers: right hemiparesis, stairs, incontinence, aphasia, reduced caregiver support, reduced endurance  - goals: intermittent supervision waking hours, CG transfers, supervision bADLs, CS transfers and ambulation household distances "Pepe" "to get it back". Ambulate to bathroom with CS, communicate wants and needs min assist  - target: dc home 12/3 with caregiver support and home PT OT SLP  - caregiver training  - Reviewed medical status, current functional status, motor recovery, cognition, probable AFO Rx pror to dc with patient and family 11/19    # LABS  CBC CMP 11/21    ---------------  Code Status: FULL  Emergency Contact:    Outpatient Follow-up (Specialty/Name of physician):    Farhat Ulrich  Radiology  [address], [address]  [place] [address]  Phone: [phone]  Fax: [phone]      - PEPE KIM is a 68 y/o M with PMHx of HTN, HLD, DM, who presented to [facility] on 10/27/24 with right sided weakness/code stroke, pulmonary edema and hypertensive emergency in the setting of NSTEMI/HF. 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 Ulrich on 11/5/24.     # L MCA Infarct with right body involvement  - CTA +right carotid stenosis of 50% and left carotid stenosis of 90%  - S/p angio/LICA stent w/ Dr. Ulrich on 11/5.   - Aspirin 325mg and Plavix 75mg daily   - Atorvastatin 20mg HS   - Continue comprehensive rehab program, 3 hours a day, 5 days a week. PT OT SLP  - will need right AFO for foot drop on dc  - Precautions: cardiac, DM, fall,     # right foot drop    # HLD/HTN  - Episodic hypotension, Likely 2/2 dehydration  - s/p 250 cc bolus. Encourage po fluids  - metoprolol  - Atorvastatin 20mg HS   - BP  (103/71 - 118/84) 11/19    # NSTEMI  # CHF  - EKG 11/11:  Normal sinus rhythm, ST and T wave abnormality, consider anterolateral ischemia. (similar to prior 11/6)  - TTE: EF 45-50%.  - CXR 11/17: Mild congestive heart failure. Small pleural effusions. No pneumothorax. Discussed with patient  - Metoprolol 12.5mg BID   - Aspirin 325mg daily, plavix  - statin  - Cardiac cath deferred outpatient f/u d/t acute CVA    # Asthma  - Albuterol PRN  - in remission.     # T2DM, uncontrolled with hyperglycemia  - A1c 9.6  - Lantus increased 15u 11/12  - admelog 4u qAC  - FBG ACHS + Mod ISS    # leukocytosis  - afebrile, no tachy. Will monitor for now  - UA 11/27 negative, CXR without evidence infection  - WBC 14.27 11/6.--> 11.17 11/4 --> 9.49 11/18 resolved  - CBC 11/21    # mild transaminitis  -  AST  46[H]  /  ALT  72[H]  /  AlkPhos  75  11-14 --> AST  38  /  ALT  75[H]  /  AlkPhos  66  11-18 stable/improved  - avoid hepatotoxic meds. DC tylenol  - continue low dose statin for now given CVA and cAD  - CMP 11/21    # Sleep:   - Melatonin 6mg HS PRN     # Pain Management:  - Tylenol PRN    # GI/Bowel:  - Senna QHS, Miralax PRN Daily  - GI ppx: famotidine 40mg daily   - dulcolax suppository PRN     # Diet  - Regular, carb consistent diet, DASH/TLC    # DVT ppx:   - Lovenox 40mg daily, SCDs    # Case discussed in IDT rounds 11/20 (follow up)  - skin intact, occ incontinent urine, regular solid thin liquids, mild-mod non fluent aphasia, improved comprehension, set up eating/supervision oral hygiene, max toilet hygiene and showering, mod UB/LB, max footwear, min-mod toilet and shower transfers, needs lots of cues for marija dressing techniques ambulates 75 feet with WBQC and min-mod assist, negotiates 4 steps with 1 HR and min-mod assist  - barriers: right hemiparesis, stairs, incontinence, aphasia, reduced caregiver support, reduced endurance  - goals: intermittent supervision waking hours, CG transfers, supervision bADLs, CS transfers and ambulation household distances "Pepe" "to get it back". Ambulate to bathroom with CS (progressing), communicate wants and needs min assist (progressing)  - target: dc home 12/3 with caregiver support and home PT OT SLP, Cg/min assist dressing, BADLs, supervision transfers and ambulation level surfaces home environment  - caregiver training      # LABS  CBC CMP 11/21    ---------------  Code Status: FULL  Emergency Contact:    Outpatient Follow-up (Specialty/Name of physician):    Farhat Ulrich  Radiology  [address], [address]  [place] [address]  Phone: [phone]  Fax: [phone]      - PEPE KIM is a 68 y/o M with PMHx of HTN, HLD, DM, who presented to [facility] on 10/27/24 with right sided weakness/code stroke, pulmonary edema and hypertensive emergency in the setting of NSTEMI/HF. 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 Ulrich on 11/5/24.     # L MCA Infarct with right body involvement  - CTA +right carotid stenosis of 50% and left carotid stenosis of 90%  - S/p angio/LICA stent w/ Dr. Ulrich on 11/5.   - Aspirin 325mg and Plavix 75mg daily   - Atorvastatin 20mg HS   - Continue comprehensive rehab program, 3 hours a day, 5 days a week. PT OT SLP  - will need right AFO for foot drop on dc  - Precautions: cardiac, DM, fall,     # right foot drop  - Right foot AFO rx with pt.     # HLD/HTN  - Episodic hypotension, Likely 2/2 dehydration  - s/p 250 cc bolus. Encourage po fluids  - metoprolol  - Atorvastatin 20mg HS     # NSTEMI  # CHF  - EKG 11/11:  Normal sinus rhythm, ST and T wave abnormality, consider anterolateral ischemia. (similar to prior 11/6)  - TTE: EF 45-50%.  - CXR 11/17: Mild congestive heart failure. Small pleural effusions. No pneumothorax. Discussed with patient  - Metoprolol 12.5mg BID   - Aspirin 325mg daily, plavix  - statin  - Cardiac cath deferred outpatient f/u d/t acute CVA    # Asthma  - Albuterol PRN  - in remission.     # T2DM, uncontrolled with hyperglycemia  - A1c 9.6  - Lantus increased 15u 11/12  - admelog 4u qAC  - FBG ACHS + Mod ISS    # leukocytosis  - afebrile, no tachy. Will monitor for now  - UA 11/27 negative, CXR without evidence infection  - WBC 14.27 11/6.--> 11.17 11/4 --> 9.49 11/18 resolved  - CBC 11/21    # mild transaminitis  -  AST  46[H]  /  ALT  72[H]  /  AlkPhos  75  11-14 --> AST  38  /  ALT  75[H]  /  AlkPhos  66  11-18 stable/improved  - avoid hepatotoxic meds. DC tylenol  - continue low dose statin for now given CVA and cAD  - CMP 11/21    # Sleep:   - Melatonin 6mg HS PRN     # Pain Management:  - Tylenol PRN    # GI/Bowel:  - Senna QHS, Miralax PRN Daily  - GI ppx: famotidine 40mg daily   - dulcolax suppository PRN     # Diet  - Regular, carb consistent diet, DASH/TLC    # DVT ppx:   - Lovenox 40mg daily, SCDs    # Case discussed in IDT rounds 11/20 (follow up)  - skin intact, occ incontinent urine, regular solid thin liquids, mild-mod non fluent aphasia, improved comprehension, set up eating/supervision oral hygiene, max toilet hygiene and showering, mod UB/LB, max footwear, min-mod toilet and shower transfers, needs lots of cues for marija dressing techniques ambulates 75 feet with WBQC and min-mod assist, negotiates 4 steps with 1 HR and min-mod assist  - barriers: right hemiparesis, stairs, incontinence, aphasia, reduced caregiver support, reduced endurance  - goals: intermittent supervision waking hours, CG transfers, supervision bADLs, CS transfers and ambulation household distances "Pepe" "to get it back". Ambulate to bathroom with CS (progressing), communicate wants and needs min assist (progressing)  - target: dc home 12/3 with caregiver support and home PT OT SLP, Cg/min assist dressing, BADLs, supervision transfers and ambulation level surfaces home environment  - caregiver training      # LABS  CBC CMP 11/21    ---------------  Code Status: FULL  Emergency Contact:    Outpatient Follow-up (Specialty/Name of physician):    Farhat Ulrich  Radiology  [address], [address]  [place] [address]  Phone: [phone]  Fax: [phone]      - PEPE KIM is a 68 y/o M with PMHx of HTN, HLD, DM, who presented to [facility] on 10/27/24 with right sided weakness/code stroke, pulmonary edema and hypertensive emergency in the setting of NSTEMI/HF. 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 Ulrich on 11/5/24.     # L MCA Infarct with right body involvement  - CTA +right carotid stenosis of 50% and left carotid stenosis of 90%  - S/p angio/LICA stent w/ Dr. Ulrich on 11/5.   - Aspirin 325mg and Plavix 75mg daily   - Atorvastatin 20mg HS   - Continue comprehensive rehab program, 3 hours a day, 5 days a week. PT OT SLP  - will need right AFO for foot drop on dc  - Precautions: cardiac, DM, fall,     # right foot drop  - Right foot AFO: Patient with right hemiparesis secondary to left MCA stroke. He has right HF strength 4/5 quad 4+/5 ham 4/5 ankle PF 4-/5 DF 3.5 and currently walks 75 feet with a WBQC in therapies and will be a functional ambulator. He is also a diabetic. Recommend custom hinged AFO with varus flange to control inversion of ankle/foot, and to improve clearance during swing phase and stability/safety during gait/stance phase. Should be lined due to diabetes history. This cannot be accomplished with a stock brace and he will require for > 6 months.    # HLD/HTN  - Episodic hypotension, Likely 2/2 dehydration  - s/p 250 cc bolus. Encourage po fluids  - metoprolol  - Atorvastatin 20mg HS   - BP 96/56 - 117/82) 11/20    # NSTEMI  # CHF  - EKG 11/11:  Normal sinus rhythm, ST and T wave abnormality, consider anterolateral ischemia. (similar to prior 11/6)  - TTE: EF 45-50%.  - CXR 11/17: Mild congestive heart failure. Small pleural effusions. No pneumothorax. Discussed with patient  - Metoprolol 12.5mg BID   - Aspirin 325mg daily, plavix  - statin  - Cardiac cath deferred outpatient f/u d/t acute CVA    # Asthma  - Albuterol PRN  - in remission.     # T2DM, uncontrolled with hyperglycemia  - A1c 9.6  - Lantus increased 15u 11/12  - admelog 4u qAC  - FBG ACHS + Mod ISS    # leukocytosis  - afebrile, no tachy. Will monitor for now  - UA 11/27 negative, CXR without evidence infection  - WBC 14.27 11/6.--> 11.17 11/4 --> 9.49 11/18 resolved  - CBC 11/21    # mild transaminitis  -  AST  46[H]  /  ALT  72[H]  /  AlkPhos  75  11-14 --> AST  38  /  ALT  75[H]  /  AlkPhos  66  11-18 stable/improved  - avoid hepatotoxic meds. DC tylenol  - continue low dose statin for now given CVA and cAD  - CMP 11/21    # Sleep:   - Melatonin 6mg HS PRN     # Pain Management:  - Tylenol PRN    # GI/Bowel:  - Senna QHS, Miralax PRN Daily  - GI ppx: famotidine 40mg daily   - dulcolax suppository PRN     # Diet  - Regular, carb consistent diet, DASH/TLC    # DVT ppx:   - Lovenox 40mg daily, SCDs    # Case discussed in IDT rounds 11/20 (follow up)  - skin intact, occ incontinent urine, regular solid thin liquids, mild-mod non fluent aphasia, improved comprehension, set up eating/supervision oral hygiene, max toilet hygiene and showering, mod UB/LB, max footwear, min-mod toilet and shower transfers, needs lots of cues for marija dressing techniques ambulates 75 feet with WBQC and min-mod assist, negotiates 4 steps with 1 HR and min-mod assist  - barriers: right hemiparesis, stairs, incontinence, aphasia, reduced caregiver support, reduced endurance  - goals: intermittent supervision waking hours, CG transfers, supervision bADLs, CS transfers and ambulation household distances "Pepe" "to get it back". Ambulate to bathroom with CS (progressing), communicate wants and needs min assist (progressing)  - target: dc home 12/3 with caregiver support and home PT OT SLP, Cg/min assist dressing, BADLs, supervision transfers and ambulation level surfaces home environment  - caregiver training      # LABS  CBC CMP 11/21  AFO  ---------------  Code Status: FULL  Emergency Contact:    Outpatient Follow-up (Specialty/Name of physician):    Farhat Ulrich  Radiology  [address]  [place] [address]  Phone: [phone]  Fax: [phone] -

## 2024-11-20 NOTE — PROGRESS NOTE ADULT - SUBJECTIVE AND OBJECTIVE BOX
Patient is a 69y old  Male who presents with a chief complaint of L MCA Infarct with right body involvement (20 Nov 2024 10:57)    Patient seen and examined at bedside. No acute overnight events.     ALLERGIES:  No Known Allergies    MEDICATIONS  (STANDING):  albuterol    90 MICROgram(s) HFA Inhaler 2 Puff(s) Inhalation every 12 hours  aspirin 325 milliGRAM(s) Oral <User Schedule>  atorvastatin 20 milliGRAM(s) Oral at bedtime  clopidogrel Tablet 75 milliGRAM(s) Oral daily  dextrose 5%. 1000 milliLiter(s) (50 mL/Hr) IV Continuous <Continuous>  dextrose 5%. 1000 milliLiter(s) (100 mL/Hr) IV Continuous <Continuous>  dextrose 50% Injectable 25 Gram(s) IV Push once  dextrose 50% Injectable 12.5 Gram(s) IV Push once  dextrose 50% Injectable 25 Gram(s) IV Push once  enoxaparin Injectable 40 milliGRAM(s) SubCutaneous every 24 hours  famotidine    Tablet 40 milliGRAM(s) Oral daily  glucagon  Injectable 1 milliGRAM(s) IntraMuscular once  insulin glargine Injectable (LANTUS) 15 Unit(s) SubCutaneous at bedtime  insulin lispro (ADMELOG) corrective regimen sliding scale   SubCutaneous three times a day before meals  insulin lispro (ADMELOG) corrective regimen sliding scale   SubCutaneous at bedtime  insulin lispro Injectable (ADMELOG) 4 Unit(s) SubCutaneous before breakfast  insulin lispro Injectable (ADMELOG) 4 Unit(s) SubCutaneous before lunch  insulin lispro Injectable (ADMELOG) 4 Unit(s) SubCutaneous before dinner  metFORMIN 500 milliGRAM(s) Oral two times a day  metoprolol tartrate 12.5 milliGRAM(s) Oral every 12 hours  polyethylene glycol 3350 17 Gram(s) Oral daily  senna 2 Tablet(s) Oral at bedtime    MEDICATIONS  (PRN):  bisacodyl Suppository 10 milliGRAM(s) Rectal daily PRN Constipation  dextrose Oral Gel 15 Gram(s) Oral once PRN Blood Glucose LESS THAN 70 milliGRAM(s)/deciliter  melatonin 6 milliGRAM(s) Oral at bedtime PRN Insomnia    Vital Signs Last 24 Hrs  T(F): 97.8 (20 Nov 2024 08:02), Max: 97.9 (19 Nov 2024 20:00)  HR: 66 (20 Nov 2024 08:37) (66 - 78)  BP: 117/80 (20 Nov 2024 08:02) (96/56 - 117/82)  RR: 15 (20 Nov 2024 08:02) (14 - 16)  SpO2: 97% (20 Nov 2024 08:37) (96% - 97%)  I&O's Summary    PHYSICAL EXAM:  General: NAD, awake, alert   ENT: MMM, no tonsilar exudate  Neck: Supple, No JVD  Lungs: Clear to auscultation bilaterally, no wheezes. Good air entry bilaterally   Cardio: RRR, S1/S2, No murmurs  Abdomen: Soft, Nontender, Nondistended; Bowel sounds present  Extremities: No calf tenderness, No pitting edema    LABS:                        12.4   9.49  )-----------( 426      ( 18 Nov 2024 06:17 )             37.7       11-18    140  |  104  |  29  ----------------------------<  131  4.0   |  29  |  1.12    Ca    9.5      18 Nov 2024 06:17    TPro  7.7  /  Alb  2.8  /  TBili  0.4  /  DBili  x   /  AST  38  /  ALT  75  /  AlkPhos  66  11-18       10-30 Chol 138 mg/dL LDL -- HDL 28 mg/dL Trig 163 mg/dL    POCT Blood Glucose.: 114 mg/dL (20 Nov 2024 12:17)  POCT Blood Glucose.: 142 mg/dL (20 Nov 2024 07:55)  POCT Blood Glucose.: 127 mg/dL (19 Nov 2024 21:09)  POCT Blood Glucose.: 106 mg/dL (19 Nov 2024 17:27)    Urinalysis Basic - ( 18 Nov 2024 06:17 )    Color: x / Appearance: x / SG: x / pH: x  Gluc: 131 mg/dL / Ketone: x  / Bili: x / Urobili: x   Blood: x / Protein: x / Nitrite: x   Leuk Esterase: x / RBC: x / WBC x   Sq Epi: x / Non Sq Epi: x / Bacteria: x    COVID-19 PCR: NotDetec (11-11-24 @ 16:20)      RADIOLOGY & ADDITIONAL TESTS:     Care Discussed with Consultants/Other Providers:

## 2024-11-20 NOTE — PROGRESS NOTE ADULT - NS ATTEND AMEND GEN_ALL_CORE FT
Progress note amended to include my discussions with the patient, NP, hospitalist, RN, SW, PT, and my findings.     Patient seen with [facility] language line  # [id number] 12:08 PM. Patient in wheelchair, feels comfortable. And answers most of the questions in English without assist from . His BP has been soft, but he denies lightheadedness, dizziness, CP, SOB. He was encouraged to increased po intake of fluids to maintain BP, (several bottles water at bedside) We also discussed plans for casting AFO given foot drop, although motor strength improved: right HF strength 4/5 quad 4+/5 ham 4/5 ankle PF 4-/5 DF 3.5 and currently walks 75 feet with a WBQC. Will need liner due to DM, to be cast tomorrow. Overall mood, endurance, and simple attention improved    FS significantly better controlled. Continue current program    RECENT LABS    Vital Signs Last 24 Hrs  T(C): 36.6 (20 Nov 2024 08:02), Max: 36.6 (19 Nov 2024 20:00)  T(F): 97.8 (20 Nov 2024 08:02), Max: 97.9 (19 Nov 2024 20:00)  HR: 66 (20 Nov 2024 08:37) (66 - 78)  BP: 117/80 (20 Nov 2024 08:02) (96/56 - 117/82)  BP(mean): --  RR: 15 (20 Nov 2024 08:02) (14 - 16)  SpO2: 97% (20 Nov 2024 08:37) (96% - 97%)    Parameters below as of 20 Nov 2024 08:37  Patient On (Oxygen Delivery Method): room air                    CAPILLARY BLOOD GLUCOSE      POCT Blood Glucose.: 114 mg/dL (20 Nov 2024 12:17)  POCT Blood Glucose.: 142 mg/dL (20 Nov 2024 07:55)  POCT Blood Glucose.: 127 mg/dL (19 Nov 2024 21:09)  POCT Blood Glucose.: 106 mg/dL (19 Nov 2024 17:27)

## 2024-11-20 NOTE — PROGRESS NOTE ADULT - SUBJECTIVE AND OBJECTIVE BOX
Patient is a 69y old  Male who presents with a chief complaint of L MCA Infarct with right body involvement (19 Nov 2024 12:34)      HPI:  68 y/o M with PMHx of HTN, HLD, DM, originally presented to [facility] on 10/27 as a code stroke after developed right sided weakness, pulmonary edema and hypertensive emergency in the setting of NSTEMI/HF. 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 Ulrich on 11/5/24. [facility]  #[id number] used.     MR brain revealed L watershed/MCA strokes in the setting of high grade proximal LICA stenosis >90% at bifurcation. Patient underwent catheter cerebral angiogram, angioplasty and left carotid wall stent placement with Dr. Farhat Ulrich on 11/6/2024. Right groin was accessed but catheter could not be advanced 2/2 to near occlusion of the right iliac, puncture closed with closure device and procedure proceeded through right radial artery. Case was complicated by hypotension requiring multiple neosticks and IVF boluses to maintain hemodynamic stability. Post-op Carotid dopplers completed on 11/7/2024 demonstrated patent left ICA stent.    Cardiology was consulted for remote history of NSTEMI, f/u Echo shows EF 45% segmental wall motion abnormalities, Recommended to continue ASA/ Plavix and f/u outpatient with another cardiac cath which was deferred d/t acute stroke. Post op course c/b fluctuating RLE MMT exam, repeat CTH stable.     Patient was cleared for discharge to [facility] IRF on 11/11/24.  (11 Nov 2024 14:19)      PAST MEDICAL & SURGICAL HISTORY:  Diabetes mellitus      HTN (hypertension)      Hyperlipidemia          MEDICATIONS  (STANDING):  albuterol    90 MICROgram(s) HFA Inhaler 2 Puff(s) Inhalation every 12 hours  aspirin 325 milliGRAM(s) Oral <User Schedule>  atorvastatin 20 milliGRAM(s) Oral at bedtime  clopidogrel Tablet 75 milliGRAM(s) Oral daily  dextrose 5%. 1000 milliLiter(s) (100 mL/Hr) IV Continuous <Continuous>  dextrose 5%. 1000 milliLiter(s) (50 mL/Hr) IV Continuous <Continuous>  dextrose 50% Injectable 25 Gram(s) IV Push once  dextrose 50% Injectable 12.5 Gram(s) IV Push once  dextrose 50% Injectable 25 Gram(s) IV Push once  enoxaparin Injectable 40 milliGRAM(s) SubCutaneous every 24 hours  famotidine    Tablet 40 milliGRAM(s) Oral daily  glucagon  Injectable 1 milliGRAM(s) IntraMuscular once  insulin glargine Injectable (LANTUS) 15 Unit(s) SubCutaneous at bedtime  insulin lispro (ADMELOG) corrective regimen sliding scale   SubCutaneous three times a day before meals  insulin lispro (ADMELOG) corrective regimen sliding scale   SubCutaneous at bedtime  insulin lispro Injectable (ADMELOG) 4 Unit(s) SubCutaneous before breakfast  insulin lispro Injectable (ADMELOG) 4 Unit(s) SubCutaneous before lunch  insulin lispro Injectable (ADMELOG) 4 Unit(s) SubCutaneous before dinner  metFORMIN 500 milliGRAM(s) Oral two times a day  metoprolol tartrate 12.5 milliGRAM(s) Oral every 12 hours  polyethylene glycol 3350 17 Gram(s) Oral daily  senna 2 Tablet(s) Oral at bedtime    MEDICATIONS  (PRN):  bisacodyl Suppository 10 milliGRAM(s) Rectal daily PRN Constipation  dextrose Oral Gel 15 Gram(s) Oral once PRN Blood Glucose LESS THAN 70 milliGRAM(s)/deciliter  melatonin 6 milliGRAM(s) Oral at bedtime PRN Insomnia      Allergies    No Known Allergies    Intolerances          VITALS  69y  Vital Signs Last 24 Hrs  T(C): 36.6 (20 Nov 2024 08:02), Max: 36.6 (19 Nov 2024 20:00)  T(F): 97.8 (20 Nov 2024 08:02), Max: 97.9 (19 Nov 2024 20:00)  HR: 66 (20 Nov 2024 08:37) (66 - 78)  BP: 117/80 (20 Nov 2024 08:02) (96/56 - 117/82)  BP(mean): --  RR: 15 (20 Nov 2024 08:02) (14 - 16)  SpO2: 97% (20 Nov 2024 08:37) (96% - 97%)    Parameters below as of 20 Nov 2024 08:37  Patient On (Oxygen Delivery Method): room air      Daily     Daily         RECENT LABS:                      CAPILLARY BLOOD GLUCOSE      POCT Blood Glucose.: 142 mg/dL (20 Nov 2024 07:55)  POCT Blood Glucose.: 127 mg/dL (19 Nov 2024 21:09)  POCT Blood Glucose.: 106 mg/dL (19 Nov 2024 17:27)  POCT Blood Glucose.: 178 mg/dL (19 Nov 2024 12:34)               Patient is a 69y old  Male who presents with a chief complaint of L MCA Infarct with right body involvement (19 Nov 2024 12:34)      HPI:  68 y/o M with PMHx of HTN, HLD, DM, originally presented to [facility] on 10/27 as a code stroke after developed right sided weakness, pulmonary edema and hypertensive emergency in the setting of NSTEMI/HF. 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 Ulrich on 11/5/24. [facility]  #[id number] used.     MR brain revealed L watershed/MCA strokes in the setting of high grade proximal LICA stenosis >90% at bifurcation. Patient underwent catheter cerebral angiogram, angioplasty and left carotid wall stent placement with Dr. Farhat Ulrich on 11/6/2024. Right groin was accessed but catheter could not be advanced 2/2 to near occlusion of the right iliac, puncture closed with closure device and procedure proceeded through right radial artery. Case was complicated by hypotension requiring multiple neosticks and IVF boluses to maintain hemodynamic stability. Post-op Carotid dopplers completed on 11/7/2024 demonstrated patent left ICA stent.    Cardiology was consulted for remote history of NSTEMI, f/u Echo shows EF 45% segmental wall motion abnormalities, Recommended to continue ASA/ Plavix and f/u outpatient with another cardiac cath which was deferred d/t acute stroke. Post op course c/b fluctuating RLE MMT exam, repeat CTH stable.     Patient was cleared for discharge to [facility] IRF on 11/11/24.  (11 Nov 2024 14:19)      PAST MEDICAL & SURGICAL HISTORY:  Diabetes mellitus      HTN (hypertension)      Hyperlipidemia          MEDICATIONS  (STANDING):  albuterol    90 MICROgram(s) HFA Inhaler 2 Puff(s) Inhalation every 12 hours  aspirin 325 milliGRAM(s) Oral <User Schedule>  atorvastatin 20 milliGRAM(s) Oral at bedtime  clopidogrel Tablet 75 milliGRAM(s) Oral daily  dextrose 5%. 1000 milliLiter(s) (100 mL/Hr) IV Continuous <Continuous>  dextrose 5%. 1000 milliLiter(s) (50 mL/Hr) IV Continuous <Continuous>  dextrose 50% Injectable 25 Gram(s) IV Push once  dextrose 50% Injectable 12.5 Gram(s) IV Push once  dextrose 50% Injectable 25 Gram(s) IV Push once  enoxaparin Injectable 40 milliGRAM(s) SubCutaneous every 24 hours  famotidine    Tablet 40 milliGRAM(s) Oral daily  glucagon  Injectable 1 milliGRAM(s) IntraMuscular once  insulin glargine Injectable (LANTUS) 15 Unit(s) SubCutaneous at bedtime  insulin lispro (ADMELOG) corrective regimen sliding scale   SubCutaneous three times a day before meals  insulin lispro (ADMELOG) corrective regimen sliding scale   SubCutaneous at bedtime  insulin lispro Injectable (ADMELOG) 4 Unit(s) SubCutaneous before breakfast  insulin lispro Injectable (ADMELOG) 4 Unit(s) SubCutaneous before lunch  insulin lispro Injectable (ADMELOG) 4 Unit(s) SubCutaneous before dinner  metFORMIN 500 milliGRAM(s) Oral two times a day  metoprolol tartrate 12.5 milliGRAM(s) Oral every 12 hours  polyethylene glycol 3350 17 Gram(s) Oral daily  senna 2 Tablet(s) Oral at bedtime    MEDICATIONS  (PRN):  bisacodyl Suppository 10 milliGRAM(s) Rectal daily PRN Constipation  dextrose Oral Gel 15 Gram(s) Oral once PRN Blood Glucose LESS THAN 70 milliGRAM(s)/deciliter  melatonin 6 milliGRAM(s) Oral at bedtime PRN Insomnia      Allergies    No Known Allergies    Intolerances          VITALS  69y  Vital Signs Last 24 Hrs  T(C): 36.6 (20 Nov 2024 08:02), Max: 36.6 (19 Nov 2024 20:00)  T(F): 97.8 (20 Nov 2024 08:02), Max: 97.9 (19 Nov 2024 20:00)  HR: 66 (20 Nov 2024 08:37) (66 - 78)  BP: 117/80 (20 Nov 2024 08:02) (96/56 - 117/82)  BP(mean): --  RR: 15 (20 Nov 2024 08:02) (14 - 16)  SpO2: 97% (20 Nov 2024 08:37) (96% - 97%)    Parameters below as of 20 Nov 2024 08:37  Patient On (Oxygen Delivery Method): room air      Daily     Daily         RECENT LABS:            Review of Systems:   · Additional ROS  	Patient seen this morning with [facility] language line  #[id number].   Patient alert, comfortable. Denies dizziness, H/A or SOB. No difficulty sleeping and no acute issues overnight.    Physical Exam:   · Constitutional Comments	Patient comfortable NAD, On RA, no cough or diaphoresis. Good spirits, cooeprative, good- simple attention  · Respiratory	clear to auscultation bilaterally; no wheezes; no rales; no rhonchi; fair- inspiratory effort  · Cardiovascular	regular rate and rhythm  · Gastrointestinal	soft; nontender  · Motor	no pretibial edema  calves soft no TTP bilaterally  right shoulder 1/5 elbow extension 1/5 0.5 flexion and wrist/fingers hypotonic                CAPILLARY BLOOD GLUCOSE      POCT Blood Glucose.: 142 mg/dL (20 Nov 2024 07:55)  POCT Blood Glucose.: 127 mg/dL (19 Nov 2024 21:09)  POCT Blood Glucose.: 106 mg/dL (19 Nov 2024 17:27)  POCT Blood Glucose.: 178 mg/dL (19 Nov 2024 12:34)               Patient is a 69y old  Male who presents with a chief complaint of L MCA Infarct with right body involvement (19 Nov 2024 12:34)      HPI:  68 y/o M with PMHx of HTN, HLD, DM, originally presented to [facility] on 10/27 as a code stroke after developed right sided weakness, pulmonary edema and hypertensive emergency in the setting of NSTEMI/HF. 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 Ulrich on 11/5/24. [facility]  #[id number] used.     MR brain revealed L watershed/MCA strokes in the setting of high grade proximal LICA stenosis >90% at bifurcation. Patient underwent catheter cerebral angiogram, angioplasty and left carotid wall stent placement with Dr. Farhat Ulrich on 11/6/2024. Right groin was accessed but catheter could not be advanced 2/2 to near occlusion of the right iliac, puncture closed with closure device and procedure proceeded through right radial artery. Case was complicated by hypotension requiring multiple neosticks and IVF boluses to maintain hemodynamic stability. Post-op Carotid dopplers completed on 11/7/2024 demonstrated patent left ICA stent.    Cardiology was consulted for remote history of NSTEMI, f/u Echo shows EF 45% segmental wall motion abnormalities, Recommended to continue ASA/ Plavix and f/u outpatient with another cardiac cath which was deferred d/t acute stroke. Post op course c/b fluctuating RLE MMT exam, repeat CTH stable.     Patient was cleared for discharge to [facility] IRF on 11/11/24.  (11 Nov 2024 14:19)      PAST MEDICAL & SURGICAL HISTORY:  Diabetes mellitus      HTN (hypertension)      Hyperlipidemia          MEDICATIONS  (STANDING):  albuterol    90 MICROgram(s) HFA Inhaler 2 Puff(s) Inhalation every 12 hours  aspirin 325 milliGRAM(s) Oral <User Schedule>  atorvastatin 20 milliGRAM(s) Oral at bedtime  clopidogrel Tablet 75 milliGRAM(s) Oral daily  dextrose 5%. 1000 milliLiter(s) (100 mL/Hr) IV Continuous <Continuous>  dextrose 5%. 1000 milliLiter(s) (50 mL/Hr) IV Continuous <Continuous>  dextrose 50% Injectable 25 Gram(s) IV Push once  dextrose 50% Injectable 12.5 Gram(s) IV Push once  dextrose 50% Injectable 25 Gram(s) IV Push once  enoxaparin Injectable 40 milliGRAM(s) SubCutaneous every 24 hours  famotidine    Tablet 40 milliGRAM(s) Oral daily  glucagon  Injectable 1 milliGRAM(s) IntraMuscular once  insulin glargine Injectable (LANTUS) 15 Unit(s) SubCutaneous at bedtime  insulin lispro (ADMELOG) corrective regimen sliding scale   SubCutaneous three times a day before meals  insulin lispro (ADMELOG) corrective regimen sliding scale   SubCutaneous at bedtime  insulin lispro Injectable (ADMELOG) 4 Unit(s) SubCutaneous before breakfast  insulin lispro Injectable (ADMELOG) 4 Unit(s) SubCutaneous before lunch  insulin lispro Injectable (ADMELOG) 4 Unit(s) SubCutaneous before dinner  metFORMIN 500 milliGRAM(s) Oral two times a day  metoprolol tartrate 12.5 milliGRAM(s) Oral every 12 hours  polyethylene glycol 3350 17 Gram(s) Oral daily  senna 2 Tablet(s) Oral at bedtime    MEDICATIONS  (PRN):  bisacodyl Suppository 10 milliGRAM(s) Rectal daily PRN Constipation  dextrose Oral Gel 15 Gram(s) Oral once PRN Blood Glucose LESS THAN 70 milliGRAM(s)/deciliter  melatonin 6 milliGRAM(s) Oral at bedtime PRN Insomnia      Allergies    No Known Allergies    Intolerances          VITALS  69y  Vital Signs Last 24 Hrs  T(C): 36.6 (20 Nov 2024 08:02), Max: 36.6 (19 Nov 2024 20:00)  T(F): 97.8 (20 Nov 2024 08:02), Max: 97.9 (19 Nov 2024 20:00)  HR: 66 (20 Nov 2024 08:37) (66 - 78)  BP: 117/80 (20 Nov 2024 08:02) (96/56 - 117/82)  BP(mean): --  RR: 15 (20 Nov 2024 08:02) (14 - 16)  SpO2: 97% (20 Nov 2024 08:37) (96% - 97%)    Parameters below as of 20 Nov 2024 08:37  Patient On (Oxygen Delivery Method): room air      Daily     Daily         RECENT LABS:            Review of Systems:   · Additional ROS  	Patient seen this morning with [facility] language line  #[id number].   Patient alert, comfortable. Denies dizziness, H/A or SOB. No difficulty sleeping and no acute issues overnight.    Physical Exam:   · Constitutional Comments	Patient comfortable NAD, On RA, no cough or diaphoresis. Good spirits, cooeprative, good- simple attention  · Respiratory	clear to auscultation bilaterally; no wheezes; no rales; no rhonchi; fair- inspiratory effort  · Cardiovascular	regular rate and rhythm  · Gastrointestinal	soft; nontender  · Motor	no pretibial edema  calves soft no TTP bilaterally  right shoulder 1/5 elbow extension 1/5 0.5 flexion and wrist/fingers hypotonic                CAPILLARY BLOOD GLUCOSE      POCT Blood Glucose.: 142 mg/dL (20 Nov 2024 07:55)  POCT Blood Glucose.: 127 mg/dL (19 Nov 2024 21:09)  POCT Blood Glucose.: 106 mg/dL (19 Nov 2024 17:27)  POCT Blood Glucose.: 178 mg/dL (19 Nov 2024 12:34)

## 2024-11-21 LAB
ALBUMIN SERPL ELPH-MCNC: 2.9 G/DL — LOW (ref 3.3–5)
ALP SERPL-CCNC: 67 U/L — SIGNIFICANT CHANGE UP (ref 40–120)
ALT FLD-CCNC: 64 U/L — HIGH (ref 10–45)
ANION GAP SERPL CALC-SCNC: 8 MMOL/L — SIGNIFICANT CHANGE UP (ref 5–17)
AST SERPL-CCNC: 39 U/L — SIGNIFICANT CHANGE UP (ref 10–40)
BASOPHILS # BLD AUTO: 0.07 K/UL — SIGNIFICANT CHANGE UP (ref 0–0.2)
BASOPHILS NFR BLD AUTO: 0.8 % — SIGNIFICANT CHANGE UP (ref 0–2)
BILIRUB SERPL-MCNC: 0.4 MG/DL — SIGNIFICANT CHANGE UP (ref 0.2–1.2)
BUN SERPL-MCNC: 26 MG/DL — HIGH (ref 7–23)
CALCIUM SERPL-MCNC: 9.6 MG/DL — SIGNIFICANT CHANGE UP (ref 8.4–10.5)
CHLORIDE SERPL-SCNC: 103 MMOL/L — SIGNIFICANT CHANGE UP (ref 96–108)
CO2 SERPL-SCNC: 27 MMOL/L — SIGNIFICANT CHANGE UP (ref 22–31)
CREAT SERPL-MCNC: 1.08 MG/DL — SIGNIFICANT CHANGE UP (ref 0.5–1.3)
EGFR: 74 ML/MIN/1.73M2 — SIGNIFICANT CHANGE UP
EOSINOPHIL # BLD AUTO: 0.5 K/UL — SIGNIFICANT CHANGE UP (ref 0–0.5)
EOSINOPHIL NFR BLD AUTO: 5.6 % — SIGNIFICANT CHANGE UP (ref 0–6)
GLUCOSE BLDC GLUCOMTR-MCNC: 118 MG/DL — HIGH (ref 70–99)
GLUCOSE BLDC GLUCOMTR-MCNC: 130 MG/DL — HIGH (ref 70–99)
GLUCOSE BLDC GLUCOMTR-MCNC: 164 MG/DL — HIGH (ref 70–99)
GLUCOSE BLDC GLUCOMTR-MCNC: 180 MG/DL — HIGH (ref 70–99)
GLUCOSE SERPL-MCNC: 139 MG/DL — HIGH (ref 70–99)
HCT VFR BLD CALC: 39.4 % — SIGNIFICANT CHANGE UP (ref 39–50)
HGB BLD-MCNC: 12.9 G/DL — LOW (ref 13–17)
IMM GRANULOCYTES NFR BLD AUTO: 0.4 % — SIGNIFICANT CHANGE UP (ref 0–0.9)
LYMPHOCYTES # BLD AUTO: 2.9 K/UL — SIGNIFICANT CHANGE UP (ref 1–3.3)
LYMPHOCYTES # BLD AUTO: 32.2 % — SIGNIFICANT CHANGE UP (ref 13–44)
MCHC RBC-ENTMCNC: 28.5 PG — SIGNIFICANT CHANGE UP (ref 27–34)
MCHC RBC-ENTMCNC: 32.7 G/DL — SIGNIFICANT CHANGE UP (ref 32–36)
MCV RBC AUTO: 87 FL — SIGNIFICANT CHANGE UP (ref 80–100)
MONOCYTES # BLD AUTO: 0.77 K/UL — SIGNIFICANT CHANGE UP (ref 0–0.9)
MONOCYTES NFR BLD AUTO: 8.6 % — SIGNIFICANT CHANGE UP (ref 2–14)
NEUTROPHILS # BLD AUTO: 4.72 K/UL — SIGNIFICANT CHANGE UP (ref 1.8–7.4)
NEUTROPHILS NFR BLD AUTO: 52.4 % — SIGNIFICANT CHANGE UP (ref 43–77)
NRBC # BLD: 0 /100 WBCS — SIGNIFICANT CHANGE UP (ref 0–0)
PLATELET # BLD AUTO: 432 K/UL — HIGH (ref 150–400)
POTASSIUM SERPL-MCNC: 4 MMOL/L — SIGNIFICANT CHANGE UP (ref 3.5–5.3)
POTASSIUM SERPL-SCNC: 4 MMOL/L — SIGNIFICANT CHANGE UP (ref 3.5–5.3)
PROT SERPL-MCNC: 7.6 G/DL — SIGNIFICANT CHANGE UP (ref 6–8.3)
RBC # BLD: 4.53 M/UL — SIGNIFICANT CHANGE UP (ref 4.2–5.8)
RBC # FLD: 13.1 % — SIGNIFICANT CHANGE UP (ref 10.3–14.5)
SODIUM SERPL-SCNC: 138 MMOL/L — SIGNIFICANT CHANGE UP (ref 135–145)
WBC # BLD: 9 K/UL — SIGNIFICANT CHANGE UP (ref 3.8–10.5)
WBC # FLD AUTO: 9 K/UL — SIGNIFICANT CHANGE UP (ref 3.8–10.5)

## 2024-11-21 PROCEDURE — 99232 SBSQ HOSP IP/OBS MODERATE 35: CPT

## 2024-11-21 RX ADMIN — Medication 2 TABLET(S): at 21:10

## 2024-11-21 RX ADMIN — Medication 4 UNIT(S): at 11:19

## 2024-11-21 RX ADMIN — FAMOTIDINE 40 MILLIGRAM(S): 20 TABLET, FILM COATED ORAL at 11:20

## 2024-11-21 RX ADMIN — METOPROLOL TARTRATE 12.5 MILLIGRAM(S): 100 TABLET, FILM COATED ORAL at 05:21

## 2024-11-21 RX ADMIN — INSULIN GLARGINE 15 UNIT(S): 100 INJECTION, SOLUTION SUBCUTANEOUS at 21:10

## 2024-11-21 RX ADMIN — Medication 4 UNIT(S): at 16:52

## 2024-11-21 RX ADMIN — Medication 4 UNIT(S): at 07:44

## 2024-11-21 RX ADMIN — Medication 20 MILLIGRAM(S): at 21:10

## 2024-11-21 RX ADMIN — Medication 500 MILLIGRAM(S): at 05:21

## 2024-11-21 RX ADMIN — Medication 325 MILLIGRAM(S): at 05:21

## 2024-11-21 RX ADMIN — Medication 2: at 07:43

## 2024-11-21 RX ADMIN — CLOPIDOGREL 75 MILLIGRAM(S): 75 TABLET, FILM COATED ORAL at 11:19

## 2024-11-21 RX ADMIN — Medication 500 MILLIGRAM(S): at 17:33

## 2024-11-21 RX ADMIN — ENOXAPARIN SODIUM 40 MILLIGRAM(S): 30 INJECTION SUBCUTANEOUS at 22:37

## 2024-11-21 RX ADMIN — METOPROLOL TARTRATE 12.5 MILLIGRAM(S): 100 TABLET, FILM COATED ORAL at 17:33

## 2024-11-21 NOTE — PROGRESS NOTE ADULT - ASSESSMENT
PEPE KIM is a 70 y/o M with PMHx of HTN, HLD, DM, who presented to [facility] on 10/27/24 with right sided weakness/code stroke, pulmonary edema and hypertensive emergency in the setting of NSTEMI/HF. 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 Ulrich on 11/5/24.     # L MCA Infarct with right body involvement  - CTA +right carotid stenosis of 50% and left carotid stenosis of 90%  - S/p angio/LICA stent w/ Dr. Ulrich on 11/5.   - Aspirin 325mg and Plavix 75mg daily   - Atorvastatin 20mg HS   - Continue comprehensive rehab program, 3 hours a day, 5 days a week. PT OT SLP  - will need right AFO for foot drop on dc  - Precautions: cardiac, DM, fall,     # right foot drop  - Right foot AFO: Patient with right hemiparesis secondary to left MCA stroke. He has right HF strength 4/5 quad 4+/5 ham 4/5 ankle PF 4-/5 DF 3.5 and currently walks 75 feet with a WBQC in therapies and will be a functional ambulator. He is also a diabetic. Recommend custom hinged AFO with varus flange to control inversion of ankle/foot, and to improve clearance during swing phase and stability/safety during gait/stance phase. Should be lined due to diabetes history. This cannot be accomplished with a stock brace and he will require for > 6 months.    # HLD/HTN  - Episodic hypotension, Likely 2/2 dehydration  - s/p 250 cc bolus. Encourage po fluids  - metoprolol  - Atorvastatin 20mg HS   - BP 94/61 - 130/89 11/21    # NSTEMI  # CHF  - EKG 11/11:  Normal sinus rhythm, ST and T wave abnormality, consider anterolateral ischemia. (similar to prior 11/6)  - TTE: EF 45-50%.  - CXR 11/17: Mild congestive heart failure. Small pleural effusions. No pneumothorax. Discussed with patient  - Metoprolol 12.5mg BID   - Aspirin 325mg daily, plavix  - statin  - Cardiac cath deferred outpatient f/u d/t acute CVA    # Asthma  - Albuterol PRN  - in remission.     # T2DM, uncontrolled with hyperglycemia  - A1c 9.6  - Lantus increased 15u 11/12  - admelog 4u qAC  - FBG ACHS + Mod ISS    # leukocytosis  - afebrile, no tachy. Will monitor for now  - UA 11/27 negative, CXR without evidence infection  - WBC 14.27 11/6.--> 11.17 11/4 --> 9.49 11/18 resolved  - CBC 11/21    # mild transaminitis  -  AST  46[H]  /  ALT  72[H]  /  AlkPhos  75  11-14 --> AST  38  /  ALT  75[H]  /  AlkPhos  66  11-18 --> AST  39  /  ALT  64[H]  /  AlkPhos  74  11-21 stable/improved  - avoid hepatotoxic meds. DC tylenol  - continue low dose statin for now given CVA and cAD    # Sleep:   - Melatonin 6mg HS PRN     # Pain Management:  - Tylenol PRN    # GI/Bowel:  - Senna QHS, Miralax PRN Daily  - GI ppx: famotidine 40mg daily   - dulcolax suppository PRN     # Diet  - Regular, carb consistent diet, DASH/TLC    # DVT ppx:   - Lovenox 40mg daily, SCDs    # Case discussed in IDT rounds 11/20 (follow up)  - skin intact, occ incontinent urine, regular solid thin liquids, mild-mod non fluent aphasia, improved comprehension, set up eating/supervision oral hygiene, max toilet hygiene and showering, mod UB/LB, max footwear, min-mod toilet and shower transfers, needs lots of cues for marija dressing techniques ambulates 75 feet with WBQC and min-mod assist, negotiates 4 steps with 1 HR and min-mod assist  - barriers: right hemiparesis, stairs, incontinence, aphasia, reduced caregiver support, reduced endurance  - goals: intermittent supervision waking hours, CG transfers, supervision bADLs, CS transfers and ambulation household distances "Pepe" "to get it back". Ambulate to bathroom with CS (progressing), communicate wants and needs min assist (progressing)  - target: dc home 12/3 with caregiver support and home PT OT SLP, Cg/min assist dressing, BADLs, supervision transfers and ambulation level surfaces home environment  - caregiver training      # LABS  CBC CMP 11/21  AFO  ---------------  Code Status: FULL  Emergency Contact:    Outpatient Follow-up (Specialty/Name of physician):    Farhat Ulrich  Radiology  [address], [address]  [place] [address]  Phone: [phone]  Fax: [phone]      - PEPE KIM is a 68 y/o M with PMHx of HTN, HLD, DM, who presented to [facility] on 10/27/24 with right sided weakness/code stroke, pulmonary edema and hypertensive emergency in the setting of NSTEMI/HF. 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 for angio/LICA stent w/ Dr. Ulrich on 11/5/24.     # L MCA Infarct with right body involvement  - CTA +right carotid stenosis of 50% and left carotid stenosis of 90%  - S/p angio/LICA stent 11/5.   - Aspirin 325mg and Plavix 75mg daily   - Atorvastatin 20mg HS   - Continue comprehensive rehab program, 3 hours a day, 5 days a week. PT OT SLP  - will need right AFO for foot drop on dc  - Precautions: cardiac, DM, fall,     # right foot drop  - Right foot AFO: Patient with right hemiparesis secondary to left MCA stroke. He has right HF strength 4/5 quad 4+/5 ham 4/5 ankle PF 4-/5 DF 3.5 and currently walks 75 feet with a WBQC in therapies and will be a functional ambulator. He is also a diabetic. Recommend custom hinged AFO with varus flange to control inversion of ankle/foot, and to improve clearance during swing phase and stability/safety during gait/stance phase. Should be lined due to diabetes history. This cannot be accomplished with a stock brace and he will require for > 6 months.    # HLD/HTN  - Episodic hypotension, Likely 2/2 dehydration  - s/p 250 cc bolus. Encourage po fluids  - metoprolol  - Atorvastatin 20mg HS   - BP 94/61 - 130/89 11/21    # NSTEMI  # CHF  - EKG 11/11:  Normal sinus rhythm, ST and T wave abnormality, consider anterolateral ischemia. (similar to prior 11/6)  - TTE: EF 45-50%.  - CXR 11/17: Mild congestive heart failure. Small pleural effusions. No pneumothorax. Discussed with patient  - Metoprolol 12.5mg BID   - Aspirin 325mg daily, plavix  - statin  - Cardiac cath deferred outpatient f/u d/t acute CVA    # Asthma  - Albuterol PRN  - in remission.     # T2DM, uncontrolled with hyperglycemia  - A1c 9.6  - Lantus increased 15u 11/12  - admelog 4u qAC  - FBG ACHS + Mod ISS    # leukocytosis  - afebrile, no tachy. Will monitor for now  - UA 11/27 negative, CXR without evidence infection  - WBC 14.27 11/6.-->9 11/21    # mild transaminitis  - avoid hepatotoxic meds. DC tylenol  - continue low dose statin for now given CVA and cAD  -  AST  46[H]  /  ALT  72[H]  /  AlkPhos  75  11-14 --> AST  38  /  ALT  75[H]  /  AlkPhos  66  11-18 --> AST  39  /  ALT  64[H]  /  AlkPhos  74  11-21 stable/improved    # Sleep:   - Melatonin 6mg HS PRN     # Pain Management:  - Tylenol PRN    # GI/Bowel:  - Senna QHS, Miralax PRN Daily  - GI ppx: famotidine 40mg daily   - dulcolax suppository PRN     # Diet  - Regular, carb consistent diet, DASH/TLC    # DVT ppx:   - Lovenox 40mg daily, SCDs    # Case discussed in IDT rounds 11/20 (follow up)  - skin intact, occ incontinent urine, regular solid thin liquids, mild-mod non fluent aphasia, improved comprehension, set up eating/supervision oral hygiene, max toilet hygiene and showering, mod UB/LB, max footwear, min-mod toilet and shower transfers, needs lots of cues for marija dressing techniques ambulates 75 feet with WBQC and min-mod assist, negotiates 4 steps with 1 HR and min-mod assist  - barriers: right hemiparesis, stairs, incontinence, aphasia, reduced caregiver support, reduced endurance  - goals: intermittent supervision waking hours, CG transfers, supervision bADLs, CS transfers and ambulation household distances "Pepe" "to get it back". Ambulate to bathroom with CS (progressing), communicate wants and needs min assist (progressing)  - target: dc home 12/3 with caregiver support and home PT OT SLP, Cg/min assist dressing, BADLs, supervision transfers and ambulation level surfaces home environment  - caregiver training      # LABS  CBC CMP 11/25  AFO  ---------------  Code Status: FULL  Emergency Contact:    Outpatient Follow-up (Specialty/Name of physician):    Farhat Ulrich  Radiology  [address], [address]  [place] [address]  Phone: [phone]  Fax: [phone]      -

## 2024-11-21 NOTE — PROGRESS NOTE ADULT - ATTENDING COMMENTS
Progress note amended to include my discussions with patient, resident, hospitalist, RN, SW, PT and my findings    Patient was seen with [facility] language line  #[id number]    Patient doing well, sitting in wheelchair, ate 100% breakfast tray. He has better control using his left hand, reduced spillage, no coughing during meal. . His mood overall is stable, good spirits, complianct and cooperative with treatment and therapies. He has improved initiation, vocal volume improved.     lungs: clear, no wheezing, cor RRR. right UE: hypotonic, no pain with ROM right shoulder trace shoulder shrug. 0/5 finger flexors, grasp.  right HF strength 4/5 quad 4+/5 ham 4/5 ankle PF 4-/5 DF 3/5     He denies any dizziness or lightheadedness although BP remains soft. No tachycardia, palpitaitons, SOB.  Hydration status labs : mild dryness. Continue to encourage po fluids/. Continue program, for AFO fitting today    RECENT LABS    Vital Signs Last 24 Hrs  T(C): 36.4 (21 Nov 2024 07:30), Max: 36.8 (20 Nov 2024 19:46)  T(F): 97.5 (21 Nov 2024 07:30), Max: 98.3 (20 Nov 2024 19:46)  HR: 75 (21 Nov 2024 07:30) (75 - 86)  BP: 94/616 (21 Nov 2024 07:30) (94/616 - 130/89)  BP(mean): --  RR: 17 (21 Nov 2024 07:30) (15 - 17)  SpO2: 98% (21 Nov 2024 07:30) (95% - 98%)    Parameters below as of 21 Nov 2024 07:30  Patient On (Oxygen Delivery Method): room air                              12.9   9.00  )-----------( 432      ( 21 Nov 2024 06:48 )             39.4     11-21    138  |  103  |  26[H]  ----------------------------<  139[H]  4.0   |  27  |  1.08    Ca    9.6      21 Nov 2024 06:48    TPro  7.6  /  Alb  2.9[L]  /  TBili  0.4  /  DBili  x   /  AST  39  /  ALT  64[H]  /  AlkPhos  67  11-21      Urinalysis Basic - ( 21 Nov 2024 06:48 )    Color: x / Appearance: x / SG: x / pH: x  Gluc: 139 mg/dL / Ketone: x  / Bili: x / Urobili: x   Blood: x / Protein: x / Nitrite: x   Leuk Esterase: x / RBC: x / WBC x   Sq Epi: x / Non Sq Epi: x / Bacteria: x      CAPILLARY BLOOD GLUCOSE      POCT Blood Glucose.: 118 mg/dL (21 Nov 2024 11:16)  POCT Blood Glucose.: 180 mg/dL (21 Nov 2024 07:41)  POCT Blood Glucose.: 184 mg/dL (20 Nov 2024 21:59)  POCT Blood Glucose.: 135 mg/dL (20 Nov 2024 16:42)

## 2024-11-21 NOTE — PROGRESS NOTE ADULT - SUBJECTIVE AND OBJECTIVE BOX
Patient is a 69y old  Male who presents with a chief complaint of L MCA Infarct with right body involvement (21 Nov 2024 09:27)    Patient seen and examined at bedside. No acute overnight events.     ALLERGIES:  No Known Allergies    MEDICATIONS  (STANDING):  albuterol    90 MICROgram(s) HFA Inhaler 2 Puff(s) Inhalation every 12 hours  aspirin 325 milliGRAM(s) Oral <User Schedule>  atorvastatin 20 milliGRAM(s) Oral at bedtime  clopidogrel Tablet 75 milliGRAM(s) Oral daily  dextrose 5%. 1000 milliLiter(s) (100 mL/Hr) IV Continuous <Continuous>  dextrose 5%. 1000 milliLiter(s) (50 mL/Hr) IV Continuous <Continuous>  dextrose 50% Injectable 25 Gram(s) IV Push once  dextrose 50% Injectable 12.5 Gram(s) IV Push once  dextrose 50% Injectable 25 Gram(s) IV Push once  enoxaparin Injectable 40 milliGRAM(s) SubCutaneous every 24 hours  famotidine    Tablet 40 milliGRAM(s) Oral daily  glucagon  Injectable 1 milliGRAM(s) IntraMuscular once  insulin glargine Injectable (LANTUS) 15 Unit(s) SubCutaneous at bedtime  insulin lispro (ADMELOG) corrective regimen sliding scale   SubCutaneous three times a day before meals  insulin lispro (ADMELOG) corrective regimen sliding scale   SubCutaneous at bedtime  insulin lispro Injectable (ADMELOG) 4 Unit(s) SubCutaneous before breakfast  insulin lispro Injectable (ADMELOG) 4 Unit(s) SubCutaneous before lunch  insulin lispro Injectable (ADMELOG) 4 Unit(s) SubCutaneous before dinner  metFORMIN 500 milliGRAM(s) Oral two times a day  metoprolol tartrate 12.5 milliGRAM(s) Oral every 12 hours  polyethylene glycol 3350 17 Gram(s) Oral daily  senna 2 Tablet(s) Oral at bedtime    MEDICATIONS  (PRN):  bisacodyl Suppository 10 milliGRAM(s) Rectal daily PRN Constipation  dextrose Oral Gel 15 Gram(s) Oral once PRN Blood Glucose LESS THAN 70 milliGRAM(s)/deciliter  melatonin 6 milliGRAM(s) Oral at bedtime PRN Insomnia    Vital Signs Last 24 Hrs  T(F): 97.5 (21 Nov 2024 07:30), Max: 98.3 (20 Nov 2024 19:46)  HR: 75 (21 Nov 2024 07:30) (75 - 86)  BP: 94/616 (21 Nov 2024 07:30) (94/616 - 130/89)  RR: 17 (21 Nov 2024 07:30) (15 - 17)  SpO2: 98% (21 Nov 2024 07:30) (95% - 98%)  I&O's Summary    PHYSICAL EXAM:  General: NAD, awake, alert   ENT: MMM, no tonsilar exudate  Neck: Supple, No JVD  Lungs: Clear to auscultation bilaterally, no wheezes. Good air entry bilaterally   Cardio: RRR, S1/S2, No murmurs  Abdomen: Soft, Nontender, Nondistended; Bowel sounds present  Extremities: No calf tenderness, No pitting edema    LABS:                        12.9   9.00  )-----------( 432      ( 21 Nov 2024 06:48 )             39.4       11-21    138  |  103  |  26  ----------------------------<  139  4.0   |  27  |  1.08    Ca    9.6      21 Nov 2024 06:48    TPro  7.6  /  Alb  2.9  /  TBili  0.4  /  DBili  x   /  AST  39  /  ALT  64  /  AlkPhos  67  11-21     10-30 Chol 138 mg/dL LDL -- HDL 28 mg/dL Trig 163 mg/dL    POCT Blood Glucose.: 118 mg/dL (21 Nov 2024 11:16)  POCT Blood Glucose.: 180 mg/dL (21 Nov 2024 07:41)  POCT Blood Glucose.: 184 mg/dL (20 Nov 2024 21:59)  POCT Blood Glucose.: 135 mg/dL (20 Nov 2024 16:42)  POCT Blood Glucose.: 114 mg/dL (20 Nov 2024 12:17)    Urinalysis Basic - ( 21 Nov 2024 06:48 )    Color: x / Appearance: x / SG: x / pH: x  Gluc: 139 mg/dL / Ketone: x  / Bili: x / Urobili: x   Blood: x / Protein: x / Nitrite: x   Leuk Esterase: x / RBC: x / WBC x   Sq Epi: x / Non Sq Epi: x / Bacteria: x    COVID-19 PCR: NotDetec (11-11-24 @ 16:20)    RADIOLOGY & ADDITIONAL TESTS:     Care Discussed with Consultants/Other Providers:

## 2024-11-21 NOTE — PROGRESS NOTE ADULT - ASSESSMENT
PEPE KIM is a 68 y/o M with PMHx of HTN, HLD, DM, who presented to [facility] on 10/27 with right sided weakness/code stroke, pulmonary edema and hypertensive emergency in the setting of NSTEMI/HF. 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 for angio/LICA stent w/ Dr. Ulrich on 11/5/24.  Now admitted to [facility] with right sided deficits for initiation of a multidisciplinary rehab program consisting focused on functional mobility, transfers and ADLs.    # L MCA Infarct with right body involvement  - CTA +right carotid stenosis of 50% and left carotid stenosis of 90%  - MR brain w/ L watershed/MCA strokes in the setting of high grade proximal LICA stenosis >90%  - S/p angio/LICA stent w/ Dr. Ulrich on 11/5.   - Aspirin 325mg daily  - Plavix 75mg daily   - Lipitor     # NSTEMI  # CHF  # HLD/HTN  - EKG (11/6)-  NSR with occasional PVC ,  Incomplete RBBB, T wave abnormality, consider inferolateral ischemia   - Elevated troponins---> (10/27: 104--> 148--> 190--> 10/28: 236-->10/2--> 312--> 10/30: 298)   - TTE at [facility] showed EF 45-50%.  - Metoprolol 12.5mg BID   - Aspirin 325mg daily  - Plavix 75mg daily   - Atorvastatin 20mg HS (LDL 77, )   - Cardiac cath deferred outpatient f/u d/t acute CVA    # Asthma  - Albuterol PRN    # T2DM with hyperglycemia   - A1c 9.6  - c/w Lispro 4u with meals and Lantus 15U   - Sugars acceptable   - FBG ACHS + Mod ISS  - metformin  BID    #Transaminitis  - improving   - monitor, pt w/o abd TTP    # DVT ppx:   - Lovenox 40mg daily, SCDs

## 2024-11-22 LAB
GLUCOSE BLDC GLUCOMTR-MCNC: 138 MG/DL — HIGH (ref 70–99)
GLUCOSE BLDC GLUCOMTR-MCNC: 180 MG/DL — HIGH (ref 70–99)
GLUCOSE BLDC GLUCOMTR-MCNC: 202 MG/DL — HIGH (ref 70–99)
GLUCOSE BLDC GLUCOMTR-MCNC: 76 MG/DL — SIGNIFICANT CHANGE UP (ref 70–99)

## 2024-11-22 PROCEDURE — 99232 SBSQ HOSP IP/OBS MODERATE 35: CPT

## 2024-11-22 PROCEDURE — 99232 SBSQ HOSP IP/OBS MODERATE 35: CPT | Mod: GC

## 2024-11-22 RX ORDER — METOPROLOL TARTRATE 100 MG/1
25 TABLET, FILM COATED ORAL DAILY
Refills: 0 | Status: DISCONTINUED | OUTPATIENT
Start: 2024-11-23 | End: 2024-12-03

## 2024-11-22 RX ORDER — METOPROLOL TARTRATE 100 MG/1
12.5 TABLET, FILM COATED ORAL ONCE
Refills: 0 | Status: COMPLETED | OUTPATIENT
Start: 2024-11-22 | End: 2024-11-22

## 2024-11-22 RX ADMIN — Medication 4 UNIT(S): at 07:41

## 2024-11-22 RX ADMIN — Medication 20 MILLIGRAM(S): at 21:36

## 2024-11-22 RX ADMIN — POLYETHYLENE GLYCOL 3350 17 GRAM(S): 17 POWDER, FOR SOLUTION ORAL at 11:42

## 2024-11-22 RX ADMIN — CLOPIDOGREL 75 MILLIGRAM(S): 75 TABLET, FILM COATED ORAL at 11:42

## 2024-11-22 RX ADMIN — Medication 2: at 11:40

## 2024-11-22 RX ADMIN — Medication 2 PUFF(S): at 20:00

## 2024-11-22 RX ADMIN — Medication 500 MILLIGRAM(S): at 05:45

## 2024-11-22 RX ADMIN — METOPROLOL TARTRATE 12.5 MILLIGRAM(S): 100 TABLET, FILM COATED ORAL at 05:44

## 2024-11-22 RX ADMIN — Medication 4 UNIT(S): at 11:41

## 2024-11-22 RX ADMIN — Medication 500 MILLIGRAM(S): at 17:57

## 2024-11-22 RX ADMIN — ENOXAPARIN SODIUM 40 MILLIGRAM(S): 30 INJECTION SUBCUTANEOUS at 22:25

## 2024-11-22 RX ADMIN — INSULIN GLARGINE 15 UNIT(S): 100 INJECTION, SOLUTION SUBCUTANEOUS at 21:36

## 2024-11-22 RX ADMIN — Medication 325 MILLIGRAM(S): at 05:44

## 2024-11-22 RX ADMIN — FAMOTIDINE 40 MILLIGRAM(S): 20 TABLET, FILM COATED ORAL at 11:42

## 2024-11-22 RX ADMIN — Medication 2 TABLET(S): at 21:36

## 2024-11-22 NOTE — PROGRESS NOTE ADULT - ASSESSMENT
PEPE KIM is a 68 y/o M with PMHx of HTN, HLD, DM, who presented to [facility] on 10/27/24 with right sided weakness/code stroke, pulmonary edema and hypertensive emergency in the setting of NSTEMI/HF. 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 Ulrich on 11/5/24.     # L MCA Infarct with right body involvement  - CTA +right carotid stenosis of 50% and left carotid stenosis of 90%  - S/p angio/LICA stent 11/5.   - Aspirin 325mg and Plavix 75mg daily   - Atorvastatin 20mg HS   - Continue comprehensive rehab program, 3 hours a day, 5 days a week. PT OT SLP  - will need right AFO for foot drop on dc  - Precautions: cardiac, DM, fall    # right foot drop  - Right foot AFO: Patient with right hemiparesis secondary to left MCA stroke. He has right HF strength 4/5 quad 4+/5 ham 4/5 ankle PF 4-/5 DF 3.5 and currently walks 75 feet with a WBQC in therapies and will be a functional ambulator. He is also a diabetic. Recommend custom hinged AFO with varus flange to control inversion of ankle/foot, and to improve clearance during swing phase and stability/safety during gait/stance phase. Should be lined due to diabetes history. This cannot be accomplished with a stock brace and he will require for > 6 months.    # HLD/HTN  - Episodic hypotension, Likely 2/2 dehydration  - s/p 250 cc bolus. Encourage po fluids  - metoprolol  - Atorvastatin 20mg HS   - /81 this AM    # NSTEMI  # CHF  - EKG 11/11:  Normal sinus rhythm, ST and T wave abnormality, consider anterolateral ischemia. (similar to prior 11/6)  - TTE: EF 45-50%.  - CXR 11/17: Mild congestive heart failure. Small pleural effusions. No pneumothorax. Discussed with patient  - Metoprolol 12.5mg BID   - Aspirin 325mg daily, plavix  - statin  - Cardiac cath deferred outpatient f/u d/t acute CVA    # Asthma  - Albuterol PRN  - in remission.     # T2DM, uncontrolled with hyperglycemia  - A1c 9.6  - Lantus increased 15u 11/12  - admelog 4u qAC  - FBG ACHS + Mod ISS    # leukocytosis  - afebrile, no tachy. Will monitor for now  - UA 11/27 negative, CXR without evidence infection  - WBC 14.27 11/6.-->9 (11/21)-resolved    # mild transaminitis  - avoid hepatotoxic meds. DC tylenol  - continue low dose statin for now given CVA and cAD  -  AST  46[H]  /  ALT  72[H]  /  AlkPhos  75  11-14 --> AST  38  /  ALT  75[H]  /  AlkPhos  66  11-18 --> AST  39  /  ALT  64[H]  /  AlkPhos  74  11-21 stable/improved  - repeat with next lab draw    # Sleep:   - Melatonin 6mg HS PRN     # Pain Management:  - Tylenol PRN    # GI/Bowel:  - Senna QHS, Miralax PRN Daily  - GI ppx: famotidine 40mg daily   - dulcolax suppository PRN     # Diet  - Regular, carb consistent diet, DASH/TLC    # DVT ppx:   - Lovenox 40mg daily, SCDs

## 2024-11-22 NOTE — PROGRESS NOTE ADULT - ASSESSMENT
69 year old male with PMHx of HTN, HLD, DM, who presented to [facility] on 10/27 with right sided weakness/code stroke, pulmonary edema and hypertensive emergency in the setting of NSTEMI/HF. 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 Ulrich on 11/5/24.  Now admitted to [facility] with right sided deficits for initiation of a multidisciplinary rehab program consisting focused on functional mobility, transfers and ADLs.    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 Ulrich on 11/5.   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   # HLD/HTN  - TTE at [facility] showed EF 45-50% w/ Regional wall motion abnormalities - Cardiac cath deferred outpatient f/u d/t acute CVA  - Continue aspirin, plavix,   - Change metoprolol tartrate 12.5mg BID to ER 25mg QD  - Would defer further GDMT at this time given borderline BP   - Continue atorvastatin  - Outpatient follow up with cardiology    # Asthma - stable  - Albuterol BID    # T2DM with hyperglycemia   - HbA1c 9.6 on 10/28  - Continue metformin 500mg BID   - Continue Lantus 15units QHS and premeal admelog 4units TID w/ meals   - RAISS  - Monitor FS, overall at goal     #Mild Transaminitis (Downtrending)  - Continue to monitor CMP    #GI PPx  -Pepcid     # DVT ppx:   - Lovenox 40mg daily    Discussed with rehab team

## 2024-11-22 NOTE — PROGRESS NOTE ADULT - SUBJECTIVE AND OBJECTIVE BOX
Interval History  Patient seen and examined at bedside. No acute events noted.  Patient reports feeling well, denies any acute complaints.     ALLERGIES:  No Known Allergies    MEDICATIONS  (STANDING):  albuterol    90 MICROgram(s) HFA Inhaler 2 Puff(s) Inhalation every 12 hours  aspirin 325 milliGRAM(s) Oral <User Schedule>  atorvastatin 20 milliGRAM(s) Oral at bedtime  clopidogrel Tablet 75 milliGRAM(s) Oral daily  dextrose 5%. 1000 milliLiter(s) (50 mL/Hr) IV Continuous <Continuous>  dextrose 5%. 1000 milliLiter(s) (100 mL/Hr) IV Continuous <Continuous>  dextrose 50% Injectable 25 Gram(s) IV Push once  dextrose 50% Injectable 12.5 Gram(s) IV Push once  dextrose 50% Injectable 25 Gram(s) IV Push once  enoxaparin Injectable 40 milliGRAM(s) SubCutaneous every 24 hours  famotidine    Tablet 40 milliGRAM(s) Oral daily  glucagon  Injectable 1 milliGRAM(s) IntraMuscular once  insulin glargine Injectable (LANTUS) 15 Unit(s) SubCutaneous at bedtime  insulin lispro (ADMELOG) corrective regimen sliding scale   SubCutaneous three times a day before meals  insulin lispro (ADMELOG) corrective regimen sliding scale   SubCutaneous at bedtime  insulin lispro Injectable (ADMELOG) 4 Unit(s) SubCutaneous before lunch  insulin lispro Injectable (ADMELOG) 4 Unit(s) SubCutaneous before dinner  insulin lispro Injectable (ADMELOG) 4 Unit(s) SubCutaneous before breakfast  metFORMIN 500 milliGRAM(s) Oral two times a day  metoprolol tartrate 12.5 milliGRAM(s) Oral every 12 hours  polyethylene glycol 3350 17 Gram(s) Oral daily  senna 2 Tablet(s) Oral at bedtime    MEDICATIONS  (PRN):  bisacodyl Suppository 10 milliGRAM(s) Rectal daily PRN Constipation  dextrose Oral Gel 15 Gram(s) Oral once PRN Blood Glucose LESS THAN 70 milliGRAM(s)/deciliter  melatonin 6 milliGRAM(s) Oral at bedtime PRN Insomnia    Vital Signs Last 24 Hrs  T(F): 98.1 (22 Nov 2024 07:43), Max: 98.1 (22 Nov 2024 07:43)  HR: 73 (22 Nov 2024 07:43) (68 - 78)  BP: 116/78 (22 Nov 2024 07:43) (107/71 - 116/78)  RR: 16 (22 Nov 2024 07:43) (15 - 16)  SpO2: 97% (22 Nov 2024 07:43) (96% - 97%)  I&O's Summary    PHYSICAL EXAM:  GENERAL: NAD  HEENT: NCAT  CHEST/LUNG: Clear to percussion bilaterally; No rales, rhonchi, wheezing  HEART: Regular rate and rhythm; No murmurs  ABDOMEN: Soft, Nontender, Nondistended; Bowel sounds present  MUSCULOSKELETAL/EXTREMITIES:  2+ Peripheral Pulses, No LE edema  PSYCH: Appropriate affect  NEURO: Alert & Oriented, right facial asymmetry, right sided weakness (RUE > RLE)    I personally reviewed the below data/images/labs:    LABS:                        12.9   9.00  )-----------( 432      ( 21 Nov 2024 06:48 )             39.4       11-21    138  |  103  |  26  ----------------------------<  139  4.0   |  27  |  1.08    Ca    9.6      21 Nov 2024 06:48    TPro  7.6  /  Alb  2.9  /  TBili  0.4  /  DBili  x   /  AST  39  /  ALT  64  /  AlkPhos  67  11-21                10-30 Chol 138 mg/dL LDL -- HDL 28 mg/dL Trig 163 mg/dL              POCT Blood Glucose.: 180 mg/dL (22 Nov 2024 11:38)  POCT Blood Glucose.: 138 mg/dL (22 Nov 2024 07:39)  POCT Blood Glucose.: 164 mg/dL (21 Nov 2024 21:09)  POCT Blood Glucose.: 130 mg/dL (21 Nov 2024 16:51)      Urinalysis Basic - ( 21 Nov 2024 06:48 )    Color: x / Appearance: x / SG: x / pH: x  Gluc: 139 mg/dL / Ketone: x  / Bili: x / Urobili: x   Blood: x / Protein: x / Nitrite: x   Leuk Esterase: x / RBC: x / WBC x   Sq Epi: x / Non Sq Epi: x / Bacteria: x        COVID-19 PCR: NotDetec (11-11-24 @ 16:20)      Consultant(s) Notes Reviewed:   Care Discused with Consultants/Other Providers:  Imaging Personally Reviewed:

## 2024-11-22 NOTE — PROGRESS NOTE ADULT - SUBJECTIVE AND OBJECTIVE BOX
Patient is a 69y old  Male who presents with a chief complaint of L MCA Infarct with right body involvement (21 Nov 2024 11:31)      HPI:  68 y/o M with PMHx of HTN, HLD, DM, originally presented to [facility] on 10/27 as a code stroke after developed right sided weakness, pulmonary edema and hypertensive emergency in the setting of NSTEMI/HF. 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 to [facility] for angio/LICA stent w/ Dr. Ulrich on 11/5/24. [facility]  #[id number] used.     MR brain revealed L watershed/MCA strokes in the setting of high grade proximal LICA stenosis >90% at bifurcation. Patient underwent catheter cerebral angiogram, angioplasty and left carotid wall stent placement with Dr. Farhat Ulrich on 11/6/2024. Right groin was accessed but catheter could not be advanced 2/2 to near occlusion of the right iliac, puncture closed with closure device and procedure proceeded through right radial artery. Case was complicated by hypotension requiring multiple neosticks and IVF boluses to maintain hemodynamic stability. Post-op Carotid dopplers completed on 11/7/2024 demonstrated patent left ICA stent.    Cardiology was consulted for remote history of NSTEMI, f/u Echo shows EF 45% segmental wall motion abnormalities, Recommended to continue ASA/ Plavix and f/u outpatient with another cardiac cath which was deferred d/t acute stroke. Post op course c/b fluctuating RLE MMT exam, repeat CTH stable.     Patient was cleared for discharge to [facility]F on 11/11/24.  (11 Nov 2024 14:19)      SUBJECTIVE: Patient seen and examined. [facility]  ID [id number] assisted with translation. No acute overnight events, slept well. Denies pain.      REVIEW OF SYSTEMS  Constitutional: No fever, + fatigue  Cardio: No chest pain, No palpitations  Resp: No SOB, no respiratory distress   Neuro: No headaches +weakness      VITALS  69y  Vital Signs Last 24 Hrs  T(C): 36.7 (22 Nov 2024 07:43), Max: 36.7 (22 Nov 2024 07:43)  T(F): 98.1 (22 Nov 2024 07:43), Max: 98.1 (22 Nov 2024 07:43)  HR: 73 (22 Nov 2024 07:43) (68 - 78)  BP: 116/78 (22 Nov 2024 07:43) (107/71 - 116/78)  BP(mean): --  RR: 16 (22 Nov 2024 07:43) (15 - 16)  SpO2: 97% (22 Nov 2024 07:43) (96% - 97%)    Parameters below as of 22 Nov 2024 07:43  Patient On (Oxygen Delivery Method): room air    PHYSICAL EXAM  Constitutional - NAD, Comfortable  HEENT - NCAT, EOMI  Neck - Supple, No limited ROM  Chest - Breathing comfortably, No wheezing  Cardiovascular - S1S2   Abdomen - Soft   Extremities - , No calf tenderness   Neurologic Exam -                    Cognitive - AAO to self, place and year     Motor -                     RIGHT UE - ShAB 0/5, EF 1/5, EE 0/5, WE 0/5,  0/5                    RIGHT LE - HF 3/5, KE 4/5    Psychiatric - Mood stable, Affect WNL    RECENT LABS:                        12.9   9.00  )-----------( 432      ( 21 Nov 2024 06:48 )             39.4     11-21    138  |  103  |  26[H]  ----------------------------<  139[H]  4.0   |  27  |  1.08    Ca    9.6      21 Nov 2024 06:48    TPro  7.6  /  Alb  2.9[L]  /  TBili  0.4  /  DBili  x   /  AST  39  /  ALT  64[H]  /  AlkPhos  67  11-21    LIVER FUNCTIONS - ( 21 Nov 2024 06:48 )  Alb: 2.9 g/dL / Pro: 7.6 g/dL / ALK PHOS: 67 U/L / ALT: 64 U/L / AST: 39 U/L / GGT: x             Urinalysis Basic - ( 21 Nov 2024 06:48 )    Color: x / Appearance: x / SG: x / pH: x  Gluc: 139 mg/dL / Ketone: x  / Bili: x / Urobili: x   Blood: x / Protein: x / Nitrite: x   Leuk Esterase: x / RBC: x / WBC x   Sq Epi: x / Non Sq Epi: x / Bacteria: x          CAPILLARY BLOOD GLUCOSE      POCT Blood Glucose.: 180 mg/dL (22 Nov 2024 11:38)  POCT Blood Glucose.: 138 mg/dL (22 Nov 2024 07:39)  POCT Blood Glucose.: 164 mg/dL (21 Nov 2024 21:09)  POCT Blood Glucose.: 130 mg/dL (21 Nov 2024 16:51)        MEDICATIONS:  MEDICATIONS  (STANDING):  albuterol    90 MICROgram(s) HFA Inhaler 2 Puff(s) Inhalation every 12 hours  aspirin 325 milliGRAM(s) Oral <User Schedule>  atorvastatin 20 milliGRAM(s) Oral at bedtime  clopidogrel Tablet 75 milliGRAM(s) Oral daily  dextrose 5%. 1000 milliLiter(s) (50 mL/Hr) IV Continuous <Continuous>  dextrose 5%. 1000 milliLiter(s) (100 mL/Hr) IV Continuous <Continuous>  dextrose 50% Injectable 25 Gram(s) IV Push once  dextrose 50% Injectable 12.5 Gram(s) IV Push once  dextrose 50% Injectable 25 Gram(s) IV Push once  enoxaparin Injectable 40 milliGRAM(s) SubCutaneous every 24 hours  famotidine    Tablet 40 milliGRAM(s) Oral daily  glucagon  Injectable 1 milliGRAM(s) IntraMuscular once  insulin glargine Injectable (LANTUS) 15 Unit(s) SubCutaneous at bedtime  insulin lispro (ADMELOG) corrective regimen sliding scale   SubCutaneous three times a day before meals  insulin lispro (ADMELOG) corrective regimen sliding scale   SubCutaneous at bedtime  insulin lispro Injectable (ADMELOG) 4 Unit(s) SubCutaneous before lunch  insulin lispro Injectable (ADMELOG) 4 Unit(s) SubCutaneous before dinner  insulin lispro Injectable (ADMELOG) 4 Unit(s) SubCutaneous before breakfast  metFORMIN 500 milliGRAM(s) Oral two times a day  metoprolol tartrate 12.5 milliGRAM(s) Oral every 12 hours  polyethylene glycol 3350 17 Gram(s) Oral daily  senna 2 Tablet(s) Oral at bedtime    MEDICATIONS  (PRN):  bisacodyl Suppository 10 milliGRAM(s) Rectal daily PRN Constipation  dextrose Oral Gel 15 Gram(s) Oral once PRN Blood Glucose LESS THAN 70 milliGRAM(s)/deciliter  melatonin 6 milliGRAM(s) Oral at bedtime PRN Insomnia

## 2024-11-23 LAB
GLUCOSE BLDC GLUCOMTR-MCNC: 140 MG/DL — HIGH (ref 70–99)
GLUCOSE BLDC GLUCOMTR-MCNC: 159 MG/DL — HIGH (ref 70–99)
GLUCOSE BLDC GLUCOMTR-MCNC: 168 MG/DL — HIGH (ref 70–99)
GLUCOSE BLDC GLUCOMTR-MCNC: 238 MG/DL — HIGH (ref 70–99)

## 2024-11-23 PROCEDURE — 99232 SBSQ HOSP IP/OBS MODERATE 35: CPT

## 2024-11-23 RX ADMIN — Medication 500 MILLIGRAM(S): at 09:00

## 2024-11-23 RX ADMIN — Medication 4 UNIT(S): at 08:21

## 2024-11-23 RX ADMIN — INSULIN GLARGINE 15 UNIT(S): 100 INJECTION, SOLUTION SUBCUTANEOUS at 22:33

## 2024-11-23 RX ADMIN — ENOXAPARIN SODIUM 40 MILLIGRAM(S): 30 INJECTION SUBCUTANEOUS at 22:33

## 2024-11-23 RX ADMIN — Medication 2 PUFF(S): at 09:18

## 2024-11-23 RX ADMIN — Medication 325 MILLIGRAM(S): at 05:27

## 2024-11-23 RX ADMIN — Medication 20 MILLIGRAM(S): at 22:33

## 2024-11-23 RX ADMIN — FAMOTIDINE 40 MILLIGRAM(S): 20 TABLET, FILM COATED ORAL at 12:09

## 2024-11-23 RX ADMIN — Medication 2 TABLET(S): at 22:33

## 2024-11-23 RX ADMIN — POLYETHYLENE GLYCOL 3350 17 GRAM(S): 17 POWDER, FOR SOLUTION ORAL at 12:08

## 2024-11-23 RX ADMIN — Medication 2: at 08:20

## 2024-11-23 RX ADMIN — CLOPIDOGREL 75 MILLIGRAM(S): 75 TABLET, FILM COATED ORAL at 12:08

## 2024-11-23 RX ADMIN — Medication 2 PUFF(S): at 22:07

## 2024-11-23 RX ADMIN — Medication 500 MILLIGRAM(S): at 17:33

## 2024-11-23 RX ADMIN — Medication 2: at 16:13

## 2024-11-23 RX ADMIN — METOPROLOL TARTRATE 25 MILLIGRAM(S): 100 TABLET, FILM COATED ORAL at 05:27

## 2024-11-23 RX ADMIN — Medication 4 UNIT(S): at 12:08

## 2024-11-23 RX ADMIN — Medication 4 UNIT(S): at 17:33

## 2024-11-23 NOTE — PROGRESS NOTE ADULT - SUBJECTIVE AND OBJECTIVE BOX
CC: Patient is a 69y old  Male who presents with a chief complaint of L MCA Infarct with right body involvement     Interval History:  Patient seen and examined at bedside.  No overnight events  No complaints this morning    ALLERGIES:  No Known Allergies    MEDICATIONS  (STANDING):  albuterol    90 MICROgram(s) HFA Inhaler 2 Puff(s) Inhalation every 12 hours  aspirin 325 milliGRAM(s) Oral <User Schedule>  atorvastatin 20 milliGRAM(s) Oral at bedtime  clopidogrel Tablet 75 milliGRAM(s) Oral daily  dextrose 5%. 1000 milliLiter(s) (100 mL/Hr) IV Continuous <Continuous>  dextrose 5%. 1000 milliLiter(s) (50 mL/Hr) IV Continuous <Continuous>  dextrose 50% Injectable 25 Gram(s) IV Push once  dextrose 50% Injectable 12.5 Gram(s) IV Push once  dextrose 50% Injectable 25 Gram(s) IV Push once  enoxaparin Injectable 40 milliGRAM(s) SubCutaneous every 24 hours  famotidine    Tablet 40 milliGRAM(s) Oral daily  glucagon  Injectable 1 milliGRAM(s) IntraMuscular once  insulin glargine Injectable (LANTUS) 15 Unit(s) SubCutaneous at bedtime  insulin lispro (ADMELOG) corrective regimen sliding scale   SubCutaneous three times a day before meals  insulin lispro (ADMELOG) corrective regimen sliding scale   SubCutaneous at bedtime  insulin lispro Injectable (ADMELOG) 4 Unit(s) SubCutaneous before breakfast  insulin lispro Injectable (ADMELOG) 4 Unit(s) SubCutaneous before lunch  insulin lispro Injectable (ADMELOG) 4 Unit(s) SubCutaneous before dinner  metFORMIN 500 milliGRAM(s) Oral two times a day  metoprolol succinate ER 25 milliGRAM(s) Oral daily  polyethylene glycol 3350 17 Gram(s) Oral daily  senna 2 Tablet(s) Oral at bedtime    MEDICATIONS  (PRN):  bisacodyl Suppository 10 milliGRAM(s) Rectal daily PRN Constipation  dextrose Oral Gel 15 Gram(s) Oral once PRN Blood Glucose LESS THAN 70 milliGRAM(s)/deciliter  melatonin 6 milliGRAM(s) Oral at bedtime PRN Insomnia    Vital Signs Last 24 Hrs  T(F): 97.7 (23 Nov 2024 09:26), Max: 97.9 (22 Nov 2024 19:42)  HR: 73 (23 Nov 2024 09:26) (67 - 88)  BP: 121/82 (23 Nov 2024 09:26) (102/65 - 121/82)  RR: 14 (23 Nov 2024 09:26) (14 - 16)  SpO2: 97% (23 Nov 2024 09:26) (95% - 99%)  I&O's Summary        PHYSICAL EXAM:  GENERAL: NAD  NERVOUS SYSTEM:  CN II - XII intact; Sensation intact; follows commands  CHEST/LUNG: Clear to percussion bilaterally; No rales, rhonchi, wheezing, or rubs; normal respiratory effort, no intercostal retractions  HEART: Regular rate and rhythm; No murmurs, rubs, or gallops; No pitting edema  ABDOMEN: Soft, Nontender, Nondistended; Bowel sounds present; No HSM or masses  MUSCULOSKELETAL/EXTREMITIES:  2+ Peripheral Pulses, No clubbing or digital cyanosis; FROM of extremeties (pain, crepitation or contracture)  PSYCH: Appropriate affect, Alert & Oriented x 3, Good Memory; Good insight    LABS:                        12.9   9.00  )-----------( 432      ( 21 Nov 2024 06:48 )             39.4       11-21    138  |  103  |  26  ----------------------------<  139  4.0   |  27  |  1.08    Ca    9.6      21 Nov 2024 06:48    TPro  7.6  /  Alb  2.9  /  TBili  0.4  /  DBili  x   /  AST  39  /  ALT  64  /  AlkPhos  67  11-21                10-30 Chol 138 mg/dL LDL -- HDL 28 mg/dL Trig 163 mg/dL              POCT Blood Glucose.: 168 mg/dL (23 Nov 2024 16:13)  POCT Blood Glucose.: 140 mg/dL (23 Nov 2024 12:02)  POCT Blood Glucose.: 159 mg/dL (23 Nov 2024 08:18)  POCT Blood Glucose.: 202 mg/dL (22 Nov 2024 21:32)      Urinalysis Basic - ( 21 Nov 2024 06:48 )    Color: x / Appearance: x / SG: x / pH: x  Gluc: 139 mg/dL / Ketone: x  / Bili: x / Urobili: x   Blood: x / Protein: x / Nitrite: x   Leuk Esterase: x / RBC: x / WBC x   Sq Epi: x / Non Sq Epi: x / Bacteria: x        COVID-19 PCR: NotDetec (11-11-24 @ 16:20)      Care Discussed with Consultants/Other Providers: Yes

## 2024-11-23 NOTE — PROGRESS NOTE ADULT - SUBJECTIVE AND OBJECTIVE BOX
Cc: Gait dysfunction    HPI: Patient seen and examined at bedside. No acute events overnight.   Has been tolerating rehabilitation program.    albuterol    90 MICROgram(s) HFA Inhaler 2 Puff(s) Inhalation every 12 hours  aspirin 325 milliGRAM(s) Oral <User Schedule>  atorvastatin 20 milliGRAM(s) Oral at bedtime  bisacodyl Suppository 10 milliGRAM(s) Rectal daily PRN  clopidogrel Tablet 75 milliGRAM(s) Oral daily  dextrose 5%. 1000 milliLiter(s) IV Continuous <Continuous>  dextrose 5%. 1000 milliLiter(s) IV Continuous <Continuous>  dextrose 50% Injectable 25 Gram(s) IV Push once  dextrose 50% Injectable 12.5 Gram(s) IV Push once  dextrose 50% Injectable 25 Gram(s) IV Push once  dextrose Oral Gel 15 Gram(s) Oral once PRN  enoxaparin Injectable 40 milliGRAM(s) SubCutaneous every 24 hours  famotidine    Tablet 40 milliGRAM(s) Oral daily  glucagon  Injectable 1 milliGRAM(s) IntraMuscular once  insulin glargine Injectable (LANTUS) 15 Unit(s) SubCutaneous at bedtime  insulin lispro (ADMELOG) corrective regimen sliding scale   SubCutaneous three times a day before meals  insulin lispro (ADMELOG) corrective regimen sliding scale   SubCutaneous at bedtime  insulin lispro Injectable (ADMELOG) 4 Unit(s) SubCutaneous before breakfast  insulin lispro Injectable (ADMELOG) 4 Unit(s) SubCutaneous before lunch  insulin lispro Injectable (ADMELOG) 4 Unit(s) SubCutaneous before dinner  melatonin 6 milliGRAM(s) Oral at bedtime PRN  metFORMIN 500 milliGRAM(s) Oral two times a day  metoprolol succinate ER 25 milliGRAM(s) Oral daily  polyethylene glycol 3350 17 Gram(s) Oral daily  senna 2 Tablet(s) Oral at bedtime      T(C): 36.5 (11-23-24 @ 09:26), Max: 36.6 (11-22-24 @ 19:42)  HR: 73 (11-23-24 @ 09:26) (67 - 88)  BP: 121/82 (11-23-24 @ 09:26) (102/65 - 121/82)  RR: 14 (11-23-24 @ 09:26) (14 - 16)  SpO2: 97% (11-23-24 @ 09:26) (95% - 99%)    In NAD  HEENT- EOMI  Heart- S1S2  Lungs- CTA bl.  Abd- + BS, NT  Ext- No calf pain  Neuro- Exam unchanged    CBC 11/21/2024 reviewed  CMP 11/21/2024 reviewed  CMP 11/18/2024      Imp: Patient with diagnosis of CVA admitted for comprehensive acute rehabilitation.    Plan:  - Continue PT/OT/SLP as indicated  - DVT prophylaxis - Lovenox  - Skin- Turn q2h, check skin daily  - Continue current medications  -Active issues-   CVA - Continue ASA/Plavix/Statin  - Patient is stable to continue current rehabilitation program.

## 2024-11-23 NOTE — PROGRESS NOTE ADULT - ASSESSMENT
69 year old male with PMHx of HTN, HLD, DM, who presented to [facility] on 10/27 with right sided weakness/code stroke, pulmonary edema and hypertensive emergency in the setting of NSTEMI/HF. 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 Ulrich on 11/5/24.  Now admitted to [facility] with right sided deficits for initiation of a multidisciplinary rehab program consisting focused on functional mobility, transfers and ADLs.    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 Ulrich on 11/5.   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   # HLD/HTN  - TTE at [facility] showed EF 45-50% w/ Regional wall motion abnormalities - Cardiac cath deferred outpatient f/u d/t acute CVA  - Continue aspirin, plavix,   - Change metoprolol tartrate 12.5mg BID to ER 25mg QD  - Would defer further GDMT at this time given borderline BP   - Continue atorvastatin  - Outpatient follow up with cardiology    # Asthma - stable  - Albuterol BID    # T2DM with hyperglycemia   - HbA1c 9.6 on 10/28  - Continue metformin 500mg BID   - Continue Lantus 15units QHS and premeal admelog 4units TID w/ meals   - RAISS  - Monitor FS, overall at goal     #Mild Transaminitis (Downtrending)  - Continue to monitor CMP  - Repeat LFT    #GI PPx  -Pepcid     # DVT ppx:   - Lovenox 40mg daily    Discussed with rehab team

## 2024-11-24 LAB
GLUCOSE BLDC GLUCOMTR-MCNC: 125 MG/DL — HIGH (ref 70–99)
GLUCOSE BLDC GLUCOMTR-MCNC: 140 MG/DL — HIGH (ref 70–99)
GLUCOSE BLDC GLUCOMTR-MCNC: 151 MG/DL — HIGH (ref 70–99)
GLUCOSE BLDC GLUCOMTR-MCNC: 201 MG/DL — HIGH (ref 70–99)

## 2024-11-24 PROCEDURE — 99232 SBSQ HOSP IP/OBS MODERATE 35: CPT

## 2024-11-24 RX ADMIN — Medication 2 TABLET(S): at 22:08

## 2024-11-24 RX ADMIN — Medication 4: at 11:49

## 2024-11-24 RX ADMIN — CLOPIDOGREL 75 MILLIGRAM(S): 75 TABLET, FILM COATED ORAL at 11:51

## 2024-11-24 RX ADMIN — Medication 4 UNIT(S): at 11:50

## 2024-11-24 RX ADMIN — Medication 325 MILLIGRAM(S): at 05:27

## 2024-11-24 RX ADMIN — Medication 2 PUFF(S): at 09:13

## 2024-11-24 RX ADMIN — Medication 2: at 16:34

## 2024-11-24 RX ADMIN — Medication 4 UNIT(S): at 07:59

## 2024-11-24 RX ADMIN — Medication 20 MILLIGRAM(S): at 22:08

## 2024-11-24 RX ADMIN — POLYETHYLENE GLYCOL 3350 17 GRAM(S): 17 POWDER, FOR SOLUTION ORAL at 11:51

## 2024-11-24 RX ADMIN — Medication 500 MILLIGRAM(S): at 07:59

## 2024-11-24 RX ADMIN — METOPROLOL TARTRATE 25 MILLIGRAM(S): 100 TABLET, FILM COATED ORAL at 05:27

## 2024-11-24 RX ADMIN — INSULIN GLARGINE 15 UNIT(S): 100 INJECTION, SOLUTION SUBCUTANEOUS at 22:08

## 2024-11-24 RX ADMIN — FAMOTIDINE 40 MILLIGRAM(S): 20 TABLET, FILM COATED ORAL at 11:51

## 2024-11-24 RX ADMIN — Medication 4 UNIT(S): at 16:35

## 2024-11-24 RX ADMIN — Medication 2 PUFF(S): at 22:03

## 2024-11-24 RX ADMIN — Medication 500 MILLIGRAM(S): at 17:04

## 2024-11-24 RX ADMIN — ENOXAPARIN SODIUM 40 MILLIGRAM(S): 30 INJECTION SUBCUTANEOUS at 22:09

## 2024-11-24 NOTE — PROGRESS NOTE ADULT - SUBJECTIVE AND OBJECTIVE BOX
Cc: Gait dysfunction    HPI: Patient seen and examined at bedside. No acute events overnight.   Has been tolerating rehabilitation program.    albuterol    90 MICROgram(s) HFA Inhaler 2 Puff(s) Inhalation every 12 hours  aspirin 325 milliGRAM(s) Oral <User Schedule>  atorvastatin 20 milliGRAM(s) Oral at bedtime  bisacodyl Suppository 10 milliGRAM(s) Rectal daily PRN  clopidogrel Tablet 75 milliGRAM(s) Oral daily  dextrose 5%. 1000 milliLiter(s) IV Continuous <Continuous>  dextrose 5%. 1000 milliLiter(s) IV Continuous <Continuous>  dextrose 50% Injectable 25 Gram(s) IV Push once  dextrose 50% Injectable 12.5 Gram(s) IV Push once  dextrose 50% Injectable 25 Gram(s) IV Push once  dextrose Oral Gel 15 Gram(s) Oral once PRN  enoxaparin Injectable 40 milliGRAM(s) SubCutaneous every 24 hours  famotidine    Tablet 40 milliGRAM(s) Oral daily  glucagon  Injectable 1 milliGRAM(s) IntraMuscular once  insulin glargine Injectable (LANTUS) 15 Unit(s) SubCutaneous at bedtime  insulin lispro (ADMELOG) corrective regimen sliding scale   SubCutaneous three times a day before meals  insulin lispro (ADMELOG) corrective regimen sliding scale   SubCutaneous at bedtime  insulin lispro Injectable (ADMELOG) 4 Unit(s) SubCutaneous before breakfast  insulin lispro Injectable (ADMELOG) 4 Unit(s) SubCutaneous before lunch  insulin lispro Injectable (ADMELOG) 4 Unit(s) SubCutaneous before dinner  melatonin 6 milliGRAM(s) Oral at bedtime PRN  metFORMIN 500 milliGRAM(s) Oral two times a day  metoprolol succinate ER 25 milliGRAM(s) Oral daily  polyethylene glycol 3350 17 Gram(s) Oral daily  senna 2 Tablet(s) Oral at bedtime      T(C): 36.9 (11-23-24 @ 19:59), Max: 36.9 (11-23-24 @ 19:59)  HR: 71 (11-24-24 @ 05:24) (71 - 79)  BP: 132/84 (11-24-24 @ 05:24) (104/70 - 132/84)  RR: 15 (11-23-24 @ 19:59) (15 - 15)  SpO2: 95% (11-23-24 @ 19:59) (95% - 95%)    In NAD  HEENT- EOMI  Heart- S1S2  Lungs- CTA bl.  Abd- + BS, NT  Ext- No calf pain  Neuro- Exam unchanged    CT Head 11/7/24 reviewed and interpreted by me: subacute infarcts of L frontal lobe seen    ACC: [id number] EXAM: CT BRAIN ORDERED BY: LEONA HALE    PROCEDURE DATE: 11/07/2024        INTERPRETATION: Exam Date: 11/7/2024 3:37 PM    CT head without IV contrast    CLINICAL INFORMATION: RLE weakness Admitting Dxs: I65.29 OCCLUSION AND STENOSIS OF UNSPECIFIED CAROTID ARTERY    TECHNIQUE: Contiguous axial sections were obtained through the head. Coronal and sagittal reformats were obtained.    COMPARISON: CT head 11/1/2024    FINDINGS:    Reidentified is a subacute infarct in the left frontal lobe and left centrum semiovale, as seen on prior exam. There is no evidence of intraparenchymal or extraaxial hemorrhage. There is no CT evidence of large vessel acute infarct. No mass effect is found in the brain. No evidence of midline shift or herniation pattern.    The ventricles, sulci and basal cisterns appear unremarkable.    Visualized paranasal sinuses are clear.    IMPRESSION:    No new acute infarct or hemorrhage. Subacute infarcts in the left frontal lobe and left centrum semiovale, as seen on prior exam.    --- End of Report ---            YOVANI FERRARO MD; Attending Radiologist  This document has been electronically signed. Nov 7 2024 4:17PM    Imp: Patient with diagnosis of CVA admitted for comprehensive acute rehabilitation.    Plan:  - Continue PT/OT/SLP as indicated  - DVT prophylaxis - Lovenox  - Skin- Turn q2h, check skin daily  - Continue current medications  -Active issues-   CVA - Continue ASA/Plavix/Statin  - Patient is stable to continue current rehabilitation program.

## 2024-11-25 LAB
ALBUMIN SERPL ELPH-MCNC: 3 G/DL — LOW (ref 3.3–5)
ALP SERPL-CCNC: 69 U/L — SIGNIFICANT CHANGE UP (ref 40–120)
ALT FLD-CCNC: 44 U/L — SIGNIFICANT CHANGE UP (ref 10–45)
ANION GAP SERPL CALC-SCNC: 6 MMOL/L — SIGNIFICANT CHANGE UP (ref 5–17)
AST SERPL-CCNC: 27 U/L — SIGNIFICANT CHANGE UP (ref 10–40)
BASOPHILS # BLD AUTO: 0.06 K/UL — SIGNIFICANT CHANGE UP (ref 0–0.2)
BASOPHILS NFR BLD AUTO: 0.6 % — SIGNIFICANT CHANGE UP (ref 0–2)
BILIRUB SERPL-MCNC: 0.4 MG/DL — SIGNIFICANT CHANGE UP (ref 0.2–1.2)
BUN SERPL-MCNC: 26 MG/DL — HIGH (ref 7–23)
CALCIUM SERPL-MCNC: 10 MG/DL — SIGNIFICANT CHANGE UP (ref 8.4–10.5)
CHLORIDE SERPL-SCNC: 102 MMOL/L — SIGNIFICANT CHANGE UP (ref 96–108)
CO2 SERPL-SCNC: 29 MMOL/L — SIGNIFICANT CHANGE UP (ref 22–31)
CREAT SERPL-MCNC: 1.08 MG/DL — SIGNIFICANT CHANGE UP (ref 0.5–1.3)
EGFR: 74 ML/MIN/1.73M2 — SIGNIFICANT CHANGE UP
EOSINOPHIL # BLD AUTO: 0.41 K/UL — SIGNIFICANT CHANGE UP (ref 0–0.5)
EOSINOPHIL NFR BLD AUTO: 4.3 % — SIGNIFICANT CHANGE UP (ref 0–6)
GLUCOSE BLDC GLUCOMTR-MCNC: 125 MG/DL — HIGH (ref 70–99)
GLUCOSE BLDC GLUCOMTR-MCNC: 127 MG/DL — HIGH (ref 70–99)
GLUCOSE BLDC GLUCOMTR-MCNC: 140 MG/DL — HIGH (ref 70–99)
GLUCOSE BLDC GLUCOMTR-MCNC: 148 MG/DL — HIGH (ref 70–99)
GLUCOSE SERPL-MCNC: 163 MG/DL — HIGH (ref 70–99)
HCT VFR BLD CALC: 41.3 % — SIGNIFICANT CHANGE UP (ref 39–50)
HGB BLD-MCNC: 13.3 G/DL — SIGNIFICANT CHANGE UP (ref 13–17)
IMM GRANULOCYTES NFR BLD AUTO: 0.4 % — SIGNIFICANT CHANGE UP (ref 0–0.9)
LYMPHOCYTES # BLD AUTO: 2.75 K/UL — SIGNIFICANT CHANGE UP (ref 1–3.3)
LYMPHOCYTES # BLD AUTO: 28.8 % — SIGNIFICANT CHANGE UP (ref 13–44)
MCHC RBC-ENTMCNC: 28.4 PG — SIGNIFICANT CHANGE UP (ref 27–34)
MCHC RBC-ENTMCNC: 32.2 G/DL — SIGNIFICANT CHANGE UP (ref 32–36)
MCV RBC AUTO: 88.2 FL — SIGNIFICANT CHANGE UP (ref 80–100)
MONOCYTES # BLD AUTO: 0.72 K/UL — SIGNIFICANT CHANGE UP (ref 0–0.9)
MONOCYTES NFR BLD AUTO: 7.5 % — SIGNIFICANT CHANGE UP (ref 2–14)
NEUTROPHILS # BLD AUTO: 5.57 K/UL — SIGNIFICANT CHANGE UP (ref 1.8–7.4)
NEUTROPHILS NFR BLD AUTO: 58.4 % — SIGNIFICANT CHANGE UP (ref 43–77)
NRBC # BLD: 0 /100 WBCS — SIGNIFICANT CHANGE UP (ref 0–0)
PLATELET # BLD AUTO: 408 K/UL — HIGH (ref 150–400)
POTASSIUM SERPL-MCNC: 4.6 MMOL/L — SIGNIFICANT CHANGE UP (ref 3.5–5.3)
POTASSIUM SERPL-SCNC: 4.6 MMOL/L — SIGNIFICANT CHANGE UP (ref 3.5–5.3)
PROT SERPL-MCNC: 7.7 G/DL — SIGNIFICANT CHANGE UP (ref 6–8.3)
RBC # BLD: 4.68 M/UL — SIGNIFICANT CHANGE UP (ref 4.2–5.8)
RBC # FLD: 13.4 % — SIGNIFICANT CHANGE UP (ref 10.3–14.5)
SODIUM SERPL-SCNC: 137 MMOL/L — SIGNIFICANT CHANGE UP (ref 135–145)
WBC # BLD: 9.55 K/UL — SIGNIFICANT CHANGE UP (ref 3.8–10.5)
WBC # FLD AUTO: 9.55 K/UL — SIGNIFICANT CHANGE UP (ref 3.8–10.5)

## 2024-11-25 PROCEDURE — 99232 SBSQ HOSP IP/OBS MODERATE 35: CPT

## 2024-11-25 RX ORDER — ACETAMINOPHEN 500MG 500 MG/1
650 TABLET, COATED ORAL EVERY 6 HOURS
Refills: 0 | Status: DISCONTINUED | OUTPATIENT
Start: 2024-11-25 | End: 2024-12-03

## 2024-11-25 RX ADMIN — ENOXAPARIN SODIUM 40 MILLIGRAM(S): 30 INJECTION SUBCUTANEOUS at 22:22

## 2024-11-25 RX ADMIN — Medication 20 MILLIGRAM(S): at 22:22

## 2024-11-25 RX ADMIN — Medication 2 TABLET(S): at 22:22

## 2024-11-25 RX ADMIN — Medication 500 MILLIGRAM(S): at 17:47

## 2024-11-25 RX ADMIN — METOPROLOL TARTRATE 25 MILLIGRAM(S): 100 TABLET, FILM COATED ORAL at 05:34

## 2024-11-25 RX ADMIN — POLYETHYLENE GLYCOL 3350 17 GRAM(S): 17 POWDER, FOR SOLUTION ORAL at 12:18

## 2024-11-25 RX ADMIN — FAMOTIDINE 40 MILLIGRAM(S): 20 TABLET, FILM COATED ORAL at 12:17

## 2024-11-25 RX ADMIN — Medication 4 UNIT(S): at 17:47

## 2024-11-25 RX ADMIN — CLOPIDOGREL 75 MILLIGRAM(S): 75 TABLET, FILM COATED ORAL at 12:17

## 2024-11-25 RX ADMIN — INSULIN GLARGINE 15 UNIT(S): 100 INJECTION, SOLUTION SUBCUTANEOUS at 22:22

## 2024-11-25 RX ADMIN — Medication 500 MILLIGRAM(S): at 05:34

## 2024-11-25 RX ADMIN — Medication 4 UNIT(S): at 08:05

## 2024-11-25 RX ADMIN — Medication 4 UNIT(S): at 12:17

## 2024-11-25 RX ADMIN — Medication 325 MILLIGRAM(S): at 05:34

## 2024-11-25 RX ADMIN — Medication 2 PUFF(S): at 22:15

## 2024-11-25 NOTE — PROGRESS NOTE ADULT - SUBJECTIVE AND OBJECTIVE BOX
CC: Patient is a 69y old  Male who presents with a chief complaint of L MCA Infarct with right body involvement (24 Nov 2024 11:13)      Interval History:  Patient seen and examined at bedside.  No overnight events  No complaints this morning  Denies CP, SOB, abd pain, N/V/D    ALLERGIES:  No Known Allergies    MEDICATIONS  (STANDING):  albuterol    90 MICROgram(s) HFA Inhaler 2 Puff(s) Inhalation every 12 hours  aspirin 325 milliGRAM(s) Oral <User Schedule>  atorvastatin 20 milliGRAM(s) Oral at bedtime  clopidogrel Tablet 75 milliGRAM(s) Oral daily  dextrose 5%. 1000 milliLiter(s) (100 mL/Hr) IV Continuous <Continuous>  dextrose 5%. 1000 milliLiter(s) (50 mL/Hr) IV Continuous <Continuous>  dextrose 50% Injectable 25 Gram(s) IV Push once  dextrose 50% Injectable 12.5 Gram(s) IV Push once  dextrose 50% Injectable 25 Gram(s) IV Push once  enoxaparin Injectable 40 milliGRAM(s) SubCutaneous every 24 hours  famotidine    Tablet 40 milliGRAM(s) Oral daily  glucagon  Injectable 1 milliGRAM(s) IntraMuscular once  insulin glargine Injectable (LANTUS) 15 Unit(s) SubCutaneous at bedtime  insulin lispro (ADMELOG) corrective regimen sliding scale   SubCutaneous three times a day before meals  insulin lispro (ADMELOG) corrective regimen sliding scale   SubCutaneous at bedtime  insulin lispro Injectable (ADMELOG) 4 Unit(s) SubCutaneous before breakfast  insulin lispro Injectable (ADMELOG) 4 Unit(s) SubCutaneous before lunch  insulin lispro Injectable (ADMELOG) 4 Unit(s) SubCutaneous before dinner  metFORMIN 500 milliGRAM(s) Oral two times a day  metoprolol succinate ER 25 milliGRAM(s) Oral daily  polyethylene glycol 3350 17 Gram(s) Oral daily  senna 2 Tablet(s) Oral at bedtime    MEDICATIONS  (PRN):  bisacodyl Suppository 10 milliGRAM(s) Rectal daily PRN Constipation  dextrose Oral Gel 15 Gram(s) Oral once PRN Blood Glucose LESS THAN 70 milliGRAM(s)/deciliter  melatonin 6 milliGRAM(s) Oral at bedtime PRN Insomnia    Vital Signs Last 24 Hrs  T(F): 97.8 (25 Nov 2024 07:29), Max: 98.2 (24 Nov 2024 19:46)  HR: 69 (25 Nov 2024 07:29) (69 - 83)  BP: 119/82 (25 Nov 2024 07:29) (100/67 - 148/96)  RR: 15 (25 Nov 2024 07:29) (14 - 15)  SpO2: 97% (25 Nov 2024 07:29) (96% - 97%)  I&O's Summary        PHYSICAL EXAM:    General: NAD, awake, alert   ENT: MMM, no tonsilar exudate  Neck: Supple, No JVD  Lungs: Clear to auscultation bilaterally, no wheezes. Good air entry bilaterally   Cardio: RRR, S1/S2, No murmurs  Abdomen: Soft, Nontender, Nondistended; Bowel sounds present  Extremities: No calf tenderness, No pitting edema    LABS:                        13.3   9.55  )-----------( 408      ( 25 Nov 2024 07:05 )             41.3       11-25    137  |  102  |  26  ----------------------------<  163  4.6   |  29  |  1.08    Ca    10.0      25 Nov 2024 07:05    TPro  7.7  /  Alb  3.0  /  TBili  0.4  /  DBili  x   /  AST  27  /  ALT  44  /  AlkPhos  69  11-25                10-30 Chol 138 mg/dL LDL -- HDL 28 mg/dL Trig 163 mg/dL              POCT Blood Glucose.: 148 mg/dL (25 Nov 2024 07:13)  POCT Blood Glucose.: 125 mg/dL (24 Nov 2024 22:07)  POCT Blood Glucose.: 151 mg/dL (24 Nov 2024 16:28)  POCT Blood Glucose.: 201 mg/dL (24 Nov 2024 11:48)      Urinalysis Basic - ( 25 Nov 2024 07:05 )    Color: x / Appearance: x / SG: x / pH: x  Gluc: 163 mg/dL / Ketone: x  / Bili: x / Urobili: x   Blood: x / Protein: x / Nitrite: x   Leuk Esterase: x / RBC: x / WBC x   Sq Epi: x / Non Sq Epi: x / Bacteria: x        COVID-19 PCR: NotDetec (11-11-24 @ 16:20)      Care Discussed with Consultants/Other Providers: Yes. Rehab provider

## 2024-11-25 NOTE — PROGRESS NOTE ADULT - ASSESSMENT
PEPE KIM is a 70 y/o M with PMHx of HTN, HLD, DM, who presented to [facility] on 10/27/24 with right sided weakness/code stroke, pulmonary edema and hypertensive emergency in the setting of NSTEMI/HF. 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 Ulrich on 11/5/24.     # L MCA Infarct with right body involvement  - CTA +right carotid stenosis of 50% and left carotid stenosis of 90%  - S/p angio/LICA stent 11/5.   - Aspirin 325mg and Plavix 75mg daily   - Atorvastatin 20mg HS   - Continue comprehensive rehab program, 3 hours a day, 5 days a week. PT OT SLP  - will need right AFO for foot drop on dc. Orthotist evaluated 11/21  - Precautions: cardiac, DM, fall    # HLD/HTN  - Episodic hypotension, Likely 2/2 dehydration  - s/p 250 cc bolus. Encourage po fluids  - metoprolol  - Atorvastatin 20mg HS   - /81 this AM    # NSTEMI  # CHF  - EKG 11/11:  Normal sinus rhythm, ST and T wave abnormality, consider anterolateral ischemia. (similar to prior 11/6)  - TTE: EF 45-50%.  - CXR 11/17: Mild congestive heart failure. Small pleural effusions. No pneumothorax. Discussed with patient  - Metoprolol 25mg daily   - Aspirin 325mg daily, plavix  - statin  - Cardiac cath deferred outpatient f/u d/t acute CVA    # Asthma  - Albuterol PRN  - in remission.     # T2DM, uncontrolled with hyperglycemia  - A1c 9.6  - Lantus increased 15u 11/12  - admelog 4u qAC  - FBG ACHS + Mod ISS    # leukocytosis, resolved  - afebrile, no tachy. Will monitor for now  - UA 11/27 negative, CXR without evidence infection  - WNL 11/25    # mild transaminitis, resolved  - avoid hepatotoxic meds.  - continue low dose statin for now given CVA and cAD  -  AST  27  /  ALT  44  /  AlkPhos  69  11-25 WNL    # Sleep:   - Melatonin 6mg HS PRN     # Pain Management:  - Tylenol PRN. Resumed 11/25    # GI/Bowel:  - Senna QHS, Miralax PRN Daily  - GI ppx: famotidine 40mg daily   - dulcolax suppository PRN     # Diet  - Regular, carb consistent diet, DASH/TLC    # DVT ppx:   - Lovenox 40mg daily, SCDs    # Case discussed in IDT rounds 11/20 (follow up)  - skin intact, occ incontinent urine, regular solid thin liquids, mild-mod non fluent aphasia, improved comprehension, set up eating/supervision oral hygiene, max toilet hygiene and showering, mod UB/LB, max footwear, min-mod toilet and shower transfers, needs lots of cues for marija dressing techniques ambulates 75 feet with WBQC and min-mod assist, negotiates 4 steps with 1 HR and min-mod assist  - barriers: right hemiparesis, stairs, incontinence, aphasia, reduced caregiver support, reduced endurance  - goals: intermittent supervision waking hours, CG transfers, supervision bADLs, CS transfers and ambulation household distances "Pepe" "to get it back". Ambulate to bathroom with CS (progressing), communicate wants and needs min assist (progressing)  - target: dc home 12/3 with caregiver support and home PT OT SLP, Cg/min assist dressing, BADLs, supervision transfers and ambulation level surfaces home environment  - caregiver training      # LABS  CBC CMP 11/28  AFO  ---------------  Code Status: FULL  Emergency Contact:    Outpatient Follow-up (Specialty/Name of physician):    Farhat Ulrich  Radiology  [address], [address]  [place] [address]  Phone: [phone]  Fax: [phone]

## 2024-11-25 NOTE — PROGRESS NOTE ADULT - ATTENDING COMMENTS
Progress note amended to include my discussions with patient, fellow, hospitalist, RN, SW, PT and my findings    Patient was seen with fellow, and assistance of language line  in [facility] #[id number] 10:50 AM. Patient was in bed, easily arousable, in good spirits, pleasant. He had increased verbal output today, with longer sentence with  in [facility] (8 words) and improved volume. Increased initiation, and attempted to perform rolling in bed, although had difficult time due ot right UE paresis. Denies headache, pain, no issues over the weekend. No questions for examiner    Vitals reviewed, stable: Labs reviewed, mild NOVA, encourage po fluids    right UE: flaccid, 0/5 elbow flexion or extension 0/5 finger flexion or wrist flexion/extension. Does attempt but using other body muscles to move. right HF 4-/5 quad 4+/5 ankle PF 4-/5 DF 4-/5. no clonus. No calf swelling or pedal edema. left UE and LE motor 5/5, normal tone. Improved simple attention, initiation and vocal volume    Continue program    RECENT LABS    Vital Signs Last 24 Hrs  T(C): 36.6 (25 Nov 2024 07:29), Max: 36.8 (24 Nov 2024 19:46)  T(F): 97.8 (25 Nov 2024 07:29), Max: 98.2 (24 Nov 2024 19:46)  HR: 69 (25 Nov 2024 07:29) (69 - 83)  BP: 119/82 (25 Nov 2024 07:29) (100/67 - 148/96)  BP(mean): --  RR: 15 (25 Nov 2024 07:29) (14 - 15)  SpO2: 97% (25 Nov 2024 07:29) (96% - 97%)    Parameters below as of 25 Nov 2024 07:29  Patient On (Oxygen Delivery Method): room air                              13.3   9.55  )-----------( 408      ( 25 Nov 2024 07:05 )             41.3     11-25    137  |  102  |  26[H]  ----------------------------<  163[H]  4.6   |  29  |  1.08    Ca    10.0      25 Nov 2024 07:05    TPro  7.7  /  Alb  3.0[L]  /  TBili  0.4  /  DBili  x   /  AST  27  /  ALT  44  /  AlkPhos  69  11-25      Urinalysis Basic - ( 25 Nov 2024 07:05 )    Color: x / Appearance: x / SG: x / pH: x  Gluc: 163 mg/dL / Ketone: x  / Bili: x / Urobili: x   Blood: x / Protein: x / Nitrite: x   Leuk Esterase: x / RBC: x / WBC x   Sq Epi: x / Non Sq Epi: x / Bacteria: x      CAPILLARY BLOOD GLUCOSE      POCT Blood Glucose.: 127 mg/dL (25 Nov 2024 12:15)  POCT Blood Glucose.: 148 mg/dL (25 Nov 2024 07:13)  POCT Blood Glucose.: 125 mg/dL (24 Nov 2024 22:07)  POCT Blood Glucose.: 151 mg/dL (24 Nov 2024 16:28)

## 2024-11-25 NOTE — PROGRESS NOTE ADULT - SUBJECTIVE AND OBJECTIVE BOX
HPI:  70 y/o M with PMHx of HTN, HLD, DM, originally presented to [facility] on 10/27 as a code stroke after developed right sided weakness, pulmonary edema and hypertensive emergency in the setting of NSTEMI/HF. 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 Ulrich on 11/5/24. [facility]  #[id number] used.     MR brain revealed L watershed/MCA strokes in the setting of high grade proximal LICA stenosis >90% at bifurcation. Patient underwent catheter cerebral angiogram, angioplasty and left carotid wall stent placement with Dr. Farhat Ulrich on 11/6/2024. Right groin was accessed but catheter could not be advanced 2/2 to near occlusion of the right iliac, puncture closed with closure device and procedure proceeded through right radial artery. Case was complicated by hypotension requiring multiple neosticks and IVF boluses to maintain hemodynamic stability. Post-op Carotid dopplers completed on 11/7/2024 demonstrated patent left ICA stent.    Cardiology was consulted for remote history of NSTEMI, f/u Echo shows EF 45% segmental wall motion abnormalities, Recommended to continue ASA/ Plavix and f/u outpatient with another cardiac cath which was deferred d/t acute stroke. Post op course c/b fluctuating RLE MMT exam, repeat CTH stable.     Patient was cleared for discharge to [facility] IRF on 11/11/24.  (11 Nov 2024 14:19)          Subjective:  Mr Kim is seen today, and communicated with in [facility] with the help of  #[id number].  He is seen rolling from front to back in bed, and states that he was just "trying to get comfortable".  He denies headache, abdominal pain, or acute concerns at this time.  He is very agreeable to participate in exam, but continues to have significant difficulty with right upper extremity mobilization.       VITALS  Vital Signs Last 24 Hrs  T(C): 36.6 (25 Nov 2024 07:29), Max: 36.8 (24 Nov 2024 19:46)  T(F): 97.8 (25 Nov 2024 07:29), Max: 98.2 (24 Nov 2024 19:46)  HR: 69 (25 Nov 2024 07:29) (69 - 83)  BP: 119/82 (25 Nov 2024 07:29) (100/67 - 148/96)  BP(mean): --  RR: 15 (25 Nov 2024 07:29) (14 - 15)  SpO2: 97% (25 Nov 2024 07:29) (96% - 97%)    Parameters below as of 25 Nov 2024 07:29  Patient On (Oxygen Delivery Method): room air        REVIEW OF SYMPTOMS  Constitutional: No fever  Resp: No SOB, no respiratory distress   Neuro: No headaches +weakness      PHYSICAL EXAM  Constitutional - NAD, Comfortable  HEENT - NCAT  Chest - CTA bilaterally, Breathing comfortably on RA  Cardiovascular - extremities warm well perfused  Abdomen - Non- distended  Extremities - No significant LE edema  Neurologic Exam -                    Cognitive - Awake, Alert, AAO to self, place, year,      Communication - Short responses (roughly 3-5 words)     Motor -                    LEFT    UE - ShAB 5/5, EF 5/5, EE 5/5, WE 5/5,  5/5                    RIGHT UE - ShAB, EF, EE, WE,  0/5                     LEFT    LE - HF 5/5, KE 5/5, DF 5/5, PF 5/5                    RIGHT LE - HF 4/5, KE 4/5 (full extension limited by hamstring tightness), DF 3/5, PF 4/5     Psychiatric - Mood stable, Affect WNL      RECENT LABS                        13.3   9.55  )-----------( 408      ( 25 Nov 2024 07:05 )             41.3     11-25    137  |  102  |  26[H]  ----------------------------<  163[H]  4.6   |  29  |  1.08    Ca    10.0      25 Nov 2024 07:05    TPro  7.7  /  Alb  3.0[L]  /  TBili  0.4  /  DBili  x   /  AST  27  /  ALT  44  /  AlkPhos  69  11-25      Urinalysis Basic - ( 25 Nov 2024 07:05 )    Color: x / Appearance: x / SG: x / pH: x  Gluc: 163 mg/dL / Ketone: x  / Bili: x / Urobili: x   Blood: x / Protein: x / Nitrite: x   Leuk Esterase: x / RBC: x / WBC x   Sq Epi: x / Non Sq Epi: x / Bacteria: x          RADIOLOGY/OTHER RESULTS      MEDICATIONS  (STANDING):  albuterol    90 MICROgram(s) HFA Inhaler 2 Puff(s) Inhalation every 12 hours  aspirin 325 milliGRAM(s) Oral <User Schedule>  atorvastatin 20 milliGRAM(s) Oral at bedtime  clopidogrel Tablet 75 milliGRAM(s) Oral daily  dextrose 5%. 1000 milliLiter(s) (100 mL/Hr) IV Continuous <Continuous>  dextrose 5%. 1000 milliLiter(s) (50 mL/Hr) IV Continuous <Continuous>  dextrose 50% Injectable 25 Gram(s) IV Push once  dextrose 50% Injectable 12.5 Gram(s) IV Push once  dextrose 50% Injectable 25 Gram(s) IV Push once  enoxaparin Injectable 40 milliGRAM(s) SubCutaneous every 24 hours  famotidine    Tablet 40 milliGRAM(s) Oral daily  glucagon  Injectable 1 milliGRAM(s) IntraMuscular once  insulin glargine Injectable (LANTUS) 15 Unit(s) SubCutaneous at bedtime  insulin lispro (ADMELOG) corrective regimen sliding scale   SubCutaneous three times a day before meals  insulin lispro (ADMELOG) corrective regimen sliding scale   SubCutaneous at bedtime  insulin lispro Injectable (ADMELOG) 4 Unit(s) SubCutaneous before breakfast  insulin lispro Injectable (ADMELOG) 4 Unit(s) SubCutaneous before lunch  insulin lispro Injectable (ADMELOG) 4 Unit(s) SubCutaneous before dinner  metFORMIN 500 milliGRAM(s) Oral two times a day  metoprolol succinate ER 25 milliGRAM(s) Oral daily  polyethylene glycol 3350 17 Gram(s) Oral daily  senna 2 Tablet(s) Oral at bedtime    MEDICATIONS  (PRN):  bisacodyl Suppository 10 milliGRAM(s) Rectal daily PRN Constipation  dextrose Oral Gel 15 Gram(s) Oral once PRN Blood Glucose LESS THAN 70 milliGRAM(s)/deciliter  melatonin 6 milliGRAM(s) Oral at bedtime PRN Insomnia        Assessment and Plan:   · Assessment	  PEPE KIM is a 70 y/o M with PMHx of HTN, HLD, DM, who presented to [facility] on 10/27/24 with right sided weakness/code stroke, pulmonary edema and hypertensive emergency in the setting of NSTEMI/HF. 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 Ulrich on 11/5/24.     # L MCA Infarct with right body involvement  - CTA +right carotid stenosis of 50% and left carotid stenosis of 90%  - S/p angio/LICA stent 11/5.   - Aspirin 325mg and Plavix 75mg daily   - Atorvastatin 20mg HS   - Continue comprehensive rehab program, 3 hours a day, 5 days a week. PT OT SLP  - will need right AFO for foot drop on dc  - Precautions: cardiac, DM, fall    # right foot drop  - Right foot AFO: Patient with right hemiparesis secondary to left MCA stroke. He has right HF strength 4/5 quad 4+/5 ham 4/5 ankle PF 4-/5 DF 3.5 and currently walks 75 feet with a WBQC in therapies and will be a functional ambulator. He is also a diabetic. Recommend custom hinged AFO with varus flange to control inversion of ankle/foot, and to improve clearance during swing phase and stability/safety during gait/stance phase. Should be lined due to diabetes history. This cannot be accomplished with a stock brace and he will require for > 6 months.    # HLD/HTN  - Episodic hypotension, Likely 2/2 dehydration  - s/p 250 cc bolus. Encourage po fluids  - metoprolol  - Atorvastatin 20mg HS   - /81 this AM    # NSTEMI  # CHF  - EKG 11/11:  Normal sinus rhythm, ST and T wave abnormality, consider anterolateral ischemia. (similar to prior 11/6)  - TTE: EF 45-50%.  - CXR 11/17: Mild congestive heart failure. Small pleural effusions. No pneumothorax. Discussed with patient  - Metoprolol 25mg daily   - Aspirin 325mg daily, plavix  - statin  - Cardiac cath deferred outpatient f/u d/t acute CVA    # Asthma  - Albuterol PRN  - in remission.     # T2DM, uncontrolled with hyperglycemia  - A1c 9.6  - Lantus increased 15u 11/12  - admelog 4u qAC  - FBG ACHS + Mod ISS    # leukocytosis, resolved  - afebrile, no tachy. Will monitor for now  - UA 11/27 negative, CXR without evidence infection  - WBC 14.27 11/6.-->9 (11/21)-resolved, still WNL 11/25    # mild transaminitis, resolved  - avoid hepatotoxic meds. DC tylenol  - continue low dose statin for now given CVA and cAD  -  AST  46[H]  /  ALT  72[H]  /  AlkPhos  75  11-14 --> AST  38  /  ALT  75[H]  /  AlkPhos  66  11-18 --> AST  39  /  ALT  64[H]  /  AlkPhos  74  11-21 stable/improved  - repeat with next lab draw, WNL 11/25    # Sleep:   - Melatonin 6mg HS PRN     # Pain Management:  - Tylenol PRN    # GI/Bowel:  - Senna QHS, Miralax PRN Daily  - GI ppx: famotidine 40mg daily   - dulcolax suppository PRN     # Diet  - Regular, carb consistent diet, DASH/TLC    # DVT ppx:   - Lovenox 40mg daily, SCDs     HPI:  70 y/o M with PMHx of HTN, HLD, DM, originally presented to [facility] on 10/27 as a code stroke after developed right sided weakness, pulmonary edema and hypertensive emergency in the setting of NSTEMI/HF. 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 Ulrich on 11/5/24. [facility]  #[id number] used.     MR brain revealed L watershed/MCA strokes in the setting of high grade proximal LICA stenosis >90% at bifurcation. Patient underwent catheter cerebral angiogram, angioplasty and left carotid wall stent placement with Dr. Farhat Ulrich on 11/6/2024. Right groin was accessed but catheter could not be advanced 2/2 to near occlusion of the right iliac, puncture closed with closure device and procedure proceeded through right radial artery. Case was complicated by hypotension requiring multiple neosticks and IVF boluses to maintain hemodynamic stability. Post-op Carotid dopplers completed on 11/7/2024 demonstrated patent left ICA stent.    Cardiology was consulted for remote history of NSTEMI, f/u Echo shows EF 45% segmental wall motion abnormalities, Recommended to continue ASA/ Plavix and f/u outpatient with another cardiac cath which was deferred d/t acute stroke. Post op course c/b fluctuating RLE MMT exam, repeat CTH stable.     Patient was cleared for discharge to [facility] IRF on 11/11/24.  (11 Nov 2024 14:19)          Subjective:  Mr Taylor is seen today, and communicated with in [facility] with the help of  #[id number].  He is seen rolling from front to back in bed, and states that he was just "trying to get comfortable".  He denies headache, abdominal pain, or acute concerns at this time.  He is very agreeable to participate in exam, but continues to have significant difficulty with right upper extremity mobilization.       VITALS  Vital Signs Last 24 Hrs  T(C): 36.6 (25 Nov 2024 07:29), Max: 36.8 (24 Nov 2024 19:46)  T(F): 97.8 (25 Nov 2024 07:29), Max: 98.2 (24 Nov 2024 19:46)  HR: 69 (25 Nov 2024 07:29) (69 - 83)  BP: 119/82 (25 Nov 2024 07:29) (100/67 - 148/96)  BP(mean): --  RR: 15 (25 Nov 2024 07:29) (14 - 15)  SpO2: 97% (25 Nov 2024 07:29) (96% - 97%)    Parameters below as of 25 Nov 2024 07:29  Patient On (Oxygen Delivery Method): room air        REVIEW OF SYMPTOMS  Constitutional: No fever  Resp: No SOB, no respiratory distress   Neuro: No headaches +weakness      PHYSICAL EXAM  Constitutional - NAD, Comfortable    Chest - CTA bilaterally, Breathing comfortably on RA  Cardiovascular - extremities warm well perfused  Abdomen - Non- distended  Extremities - No significant LE edema  Neurologic Exam -                    Cognitive - Awake, Alert, AAO to self, place, year,      Communication - Short responses (roughly 3-5 words)     Motor -                    LEFT    UE - ShAB 5/5, EF 5/5, EE 5/5, WE 5/5,  5/5                    RIGHT UE - ShAB, EF, EE, WE,  0/5                     LEFT    LE - HF 5/5, KE 5/5, DF 5/5, PF 5/5                    RIGHT LE - HF 4/5, KE 4/5 (full extension limited by hamstring tightness), DF 3/5, PF 4/5     Psychiatric - Mood stable, Affect WNL      RECENT LABS                        13.3   9.55  )-----------( 408      ( 25 Nov 2024 07:05 )             41.3     11-25    137  |  102  |  26[H]  ----------------------------<  163[H]  4.6   |  29  |  1.08    Ca    10.0      25 Nov 2024 07:05    TPro  7.7  /  Alb  3.0[L]  /  TBili  0.4  /  DBili  x   /  AST  27  /  ALT  44  /  AlkPhos  69  11-25      Urinalysis Basic - ( 25 Nov 2024 07:05 )    Color: x / Appearance: x / SG: x / pH: x  Gluc: 163 mg/dL / Ketone: x  / Bili: x / Urobili: x   Blood: x / Protein: x / Nitrite: x   Leuk Esterase: x / RBC: x / WBC x   Sq Epi: x / Non Sq Epi: x / Bacteria: x          RADIOLOGY/OTHER RESULTS      MEDICATIONS  (STANDING):  albuterol    90 MICROgram(s) HFA Inhaler 2 Puff(s) Inhalation every 12 hours  aspirin 325 milliGRAM(s) Oral <User Schedule>  atorvastatin 20 milliGRAM(s) Oral at bedtime  clopidogrel Tablet 75 milliGRAM(s) Oral daily  dextrose 5%. 1000 milliLiter(s) (100 mL/Hr) IV Continuous <Continuous>  dextrose 5%. 1000 milliLiter(s) (50 mL/Hr) IV Continuous <Continuous>  dextrose 50% Injectable 25 Gram(s) IV Push once  dextrose 50% Injectable 12.5 Gram(s) IV Push once  dextrose 50% Injectable 25 Gram(s) IV Push once  enoxaparin Injectable 40 milliGRAM(s) SubCutaneous every 24 hours  famotidine    Tablet 40 milliGRAM(s) Oral daily  glucagon  Injectable 1 milliGRAM(s) IntraMuscular once  insulin glargine Injectable (LANTUS) 15 Unit(s) SubCutaneous at bedtime  insulin lispro (ADMELOG) corrective regimen sliding scale   SubCutaneous three times a day before meals  insulin lispro (ADMELOG) corrective regimen sliding scale   SubCutaneous at bedtime  insulin lispro Injectable (ADMELOG) 4 Unit(s) SubCutaneous before breakfast  insulin lispro Injectable (ADMELOG) 4 Unit(s) SubCutaneous before lunch  insulin lispro Injectable (ADMELOG) 4 Unit(s) SubCutaneous before dinner  metFORMIN 500 milliGRAM(s) Oral two times a day  metoprolol succinate ER 25 milliGRAM(s) Oral daily  polyethylene glycol 3350 17 Gram(s) Oral daily  senna 2 Tablet(s) Oral at bedtime    MEDICATIONS  (PRN):  bisacodyl Suppository 10 milliGRAM(s) Rectal daily PRN Constipation  dextrose Oral Gel 15 Gram(s) Oral once PRN Blood Glucose LESS THAN 70 milliGRAM(s)/deciliter  melatonin 6 milliGRAM(s) Oral at bedtime PRN Insomnia

## 2024-11-25 NOTE — PROGRESS NOTE ADULT - ASSESSMENT
69 year old male with PMHx of HTN, HLD, DM, who presented to [facility] on 10/27 with right sided weakness/code stroke, pulmonary edema and hypertensive emergency in the setting of NSTEMI/HF. 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 for angio/LICA stent w/ Dr. Ulrich on 11/5/24.  Now admitted to [facility] with right sided deficits for initiation of a multidisciplinary rehab program consisting focused on functional mobility, transfers and ADLs.    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 Ulrich on 11/5.   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   # HLD/HTN  - TTE at [facility] showed EF 45-50% w/ Regional wall motion abnormalities - Cardiac cath deferred outpatient f/u d/t acute CVA  - Continue aspirin, plavix,   - continue metoprolol ER 25mg QD  - Would defer further GDMT at this time given borderline BP   - Continue atorvastatin  - Outpatient follow up with cardiology    # Asthma - stable  - Albuterol BID    # T2DM with hyperglycemia   - HbA1c 9.6 on 10/28  - Continue metformin 500mg BID   - Continue Lantus 15units QHS and premeal admelog 4units TID w/ meals   - RAISS  - Monitor FS, overall at goal     #Mild Transaminitis   - resolved  - Continue to monitor CMP with routine labs      #GI PPx  -Pepcid     # DVT ppx:   - Lovenox 40mg daily    Discussed with rehab team

## 2024-11-26 LAB
GLUCOSE BLDC GLUCOMTR-MCNC: 133 MG/DL — HIGH (ref 70–99)
GLUCOSE BLDC GLUCOMTR-MCNC: 151 MG/DL — HIGH (ref 70–99)
GLUCOSE BLDC GLUCOMTR-MCNC: 249 MG/DL — HIGH (ref 70–99)
GLUCOSE BLDC GLUCOMTR-MCNC: 253 MG/DL — HIGH (ref 70–99)

## 2024-11-26 PROCEDURE — 85027 COMPLETE CBC AUTOMATED: CPT

## 2024-11-26 PROCEDURE — C1760: CPT

## 2024-11-26 PROCEDURE — 97129 THER IVNTJ 1ST 15 MIN: CPT

## 2024-11-26 PROCEDURE — C1725: CPT

## 2024-11-26 PROCEDURE — 94640 AIRWAY INHALATION TREATMENT: CPT

## 2024-11-26 PROCEDURE — 93005 ELECTROCARDIOGRAM TRACING: CPT

## 2024-11-26 PROCEDURE — 85025 COMPLETE CBC W/AUTO DIFF WBC: CPT

## 2024-11-26 PROCEDURE — 83735 ASSAY OF MAGNESIUM: CPT

## 2024-11-26 PROCEDURE — 99232 SBSQ HOSP IP/OBS MODERATE 35: CPT

## 2024-11-26 PROCEDURE — 86901 BLOOD TYPING SEROLOGIC RH(D): CPT

## 2024-11-26 PROCEDURE — 87640 STAPH A DNA AMP PROBE: CPT

## 2024-11-26 PROCEDURE — 86850 RBC ANTIBODY SCREEN: CPT

## 2024-11-26 PROCEDURE — 37215 TRANSCATH STENT CCA W/EPS: CPT

## 2024-11-26 PROCEDURE — 92523 SPEECH SOUND LANG COMPREHEN: CPT

## 2024-11-26 PROCEDURE — 97530 THERAPEUTIC ACTIVITIES: CPT

## 2024-11-26 PROCEDURE — 97161 PT EVAL LOW COMPLEX 20 MIN: CPT

## 2024-11-26 PROCEDURE — C1884: CPT

## 2024-11-26 PROCEDURE — C1887: CPT

## 2024-11-26 PROCEDURE — 93308 TTE F-UP OR LMTD: CPT

## 2024-11-26 PROCEDURE — 97110 THERAPEUTIC EXERCISES: CPT

## 2024-11-26 PROCEDURE — 70450 CT HEAD/BRAIN W/O DYE: CPT | Mod: MC

## 2024-11-26 PROCEDURE — 85730 THROMBOPLASTIN TIME PARTIAL: CPT

## 2024-11-26 PROCEDURE — 87641 MR-STAPH DNA AMP PROBE: CPT

## 2024-11-26 PROCEDURE — 85610 PROTHROMBIN TIME: CPT

## 2024-11-26 PROCEDURE — 86900 BLOOD TYPING SEROLOGIC ABO: CPT

## 2024-11-26 PROCEDURE — 84100 ASSAY OF PHOSPHORUS: CPT

## 2024-11-26 PROCEDURE — 36226 PLACE CATH VERTEBRAL ART: CPT

## 2024-11-26 PROCEDURE — 92507 TX SP LANG VOICE COMM INDIV: CPT

## 2024-11-26 PROCEDURE — C1874: CPT

## 2024-11-26 PROCEDURE — 82962 GLUCOSE BLOOD TEST: CPT

## 2024-11-26 PROCEDURE — 80048 BASIC METABOLIC PNL TOTAL CA: CPT

## 2024-11-26 PROCEDURE — 80053 COMPREHEN METABOLIC PANEL: CPT

## 2024-11-26 PROCEDURE — 97166 OT EVAL MOD COMPLEX 45 MIN: CPT

## 2024-11-26 PROCEDURE — 93880 EXTRACRANIAL BILAT STUDY: CPT

## 2024-11-26 PROCEDURE — C1769: CPT

## 2024-11-26 PROCEDURE — 93970 EXTREMITY STUDY: CPT

## 2024-11-26 PROCEDURE — C1894: CPT

## 2024-11-26 RX ADMIN — Medication 325 MILLIGRAM(S): at 06:21

## 2024-11-26 RX ADMIN — Medication 6: at 16:21

## 2024-11-26 RX ADMIN — Medication 4 UNIT(S): at 16:22

## 2024-11-26 RX ADMIN — INSULIN GLARGINE 15 UNIT(S): 100 INJECTION, SOLUTION SUBCUTANEOUS at 21:22

## 2024-11-26 RX ADMIN — FAMOTIDINE 40 MILLIGRAM(S): 20 TABLET, FILM COATED ORAL at 11:40

## 2024-11-26 RX ADMIN — Medication 4: at 11:45

## 2024-11-26 RX ADMIN — ENOXAPARIN SODIUM 40 MILLIGRAM(S): 30 INJECTION SUBCUTANEOUS at 22:24

## 2024-11-26 RX ADMIN — POLYETHYLENE GLYCOL 3350 17 GRAM(S): 17 POWDER, FOR SOLUTION ORAL at 11:40

## 2024-11-26 RX ADMIN — Medication 20 MILLIGRAM(S): at 21:22

## 2024-11-26 RX ADMIN — Medication 500 MILLIGRAM(S): at 06:21

## 2024-11-26 RX ADMIN — Medication 2 PUFF(S): at 21:44

## 2024-11-26 RX ADMIN — CLOPIDOGREL 75 MILLIGRAM(S): 75 TABLET, FILM COATED ORAL at 11:40

## 2024-11-26 RX ADMIN — Medication 2 TABLET(S): at 21:22

## 2024-11-26 RX ADMIN — Medication 2 PUFF(S): at 08:52

## 2024-11-26 RX ADMIN — Medication 500 MILLIGRAM(S): at 16:28

## 2024-11-26 RX ADMIN — Medication 4 UNIT(S): at 08:06

## 2024-11-26 RX ADMIN — Medication 4 UNIT(S): at 11:45

## 2024-11-26 NOTE — PROGRESS NOTE ADULT - ATTENDING COMMENTS
Progress note amended to include my discussions with patient, resident, hospitalist, RN, SW, PT and my findings    Patient was also seen with OT and use of language line  in [facility] #[id number]    Patient does not remember whether he had been evaluated by orthotist (team later confirmed he was on 11/21) He is in good spirits, no complaints. Denies any pain, difficulty sleeping or B/B complaints. He does remember his last BM was the day before last (11/24) and denies abdominal discomfort. No acute issues overnight    Review of vitals shows BP and HR well controlled. He is not dyspneic, no cough, and no respiratory distress/no use of accessory muscles respiration. He was also evaluated earlier in day with PT, ambulating with 2 person assist, reduced initiation/activation of right hip flexor to advance leg. left ankle ACE/DF assist in place, no buckling noted during our observation. He also has reduced knee flexion, and there is significant ER RLE during gait, requiring frequent correction and cues. No LE swelling, satting well, and no signs of fluid overload    Continue current rehab program. Will need caregiver training for dc home next week    RECENT LABS    Vital Signs Last 24 Hrs  T(C): 36.4 (26 Nov 2024 07:57), Max: 36.7 (25 Nov 2024 20:34)  T(F): 97.5 (26 Nov 2024 07:57), Max: 98 (25 Nov 2024 20:34)  HR: 71 (26 Nov 2024 07:57) (68 - 73)  BP: 113/79 (26 Nov 2024 07:57) (100/69 - 113/79)  BP(mean): --  RR: 16 (26 Nov 2024 07:57) (16 - 16)  SpO2: 98% (26 Nov 2024 07:57) (95% - 98%)    Parameters below as of 26 Nov 2024 07:57  Patient On (Oxygen Delivery Method): room air                              13.3   9.55  )-----------( 408      ( 25 Nov 2024 07:05 )             41.3     11-25    137  |  102  |  26[H]  ----------------------------<  163[H]  4.6   |  29  |  1.08    Ca    10.0      25 Nov 2024 07:05    TPro  7.7  /  Alb  3.0[L]  /  TBili  0.4  /  DBili  x   /  AST  27  /  ALT  44  /  AlkPhos  69  11-25      Urinalysis Basic - ( 25 Nov 2024 07:05 )    Color: x / Appearance: x / SG: x / pH: x  Gluc: 163 mg/dL / Ketone: x  / Bili: x / Urobili: x   Blood: x / Protein: x / Nitrite: x   Leuk Esterase: x / RBC: x / WBC x   Sq Epi: x / Non Sq Epi: x / Bacteria: x      CAPILLARY BLOOD GLUCOSE      POCT Blood Glucose.: 249 mg/dL (26 Nov 2024 11:43)  POCT Blood Glucose.: 133 mg/dL (26 Nov 2024 07:54)  POCT Blood Glucose.: 140 mg/dL (25 Nov 2024 22:17)  POCT Blood Glucose.: 125 mg/dL (25 Nov 2024 16:20)

## 2024-11-26 NOTE — PROGRESS NOTE ADULT - SUBJECTIVE AND OBJECTIVE BOX
HPI:  70 y/o M with PMHx of HTN, HLD, DM, originally presented to [facility] on 10/27 as a code stroke after developed right sided weakness, pulmonary edema and hypertensive emergency in the setting of NSTEMI/HF. 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 Ulrich on 11/5/24. [facility]  #[id number] used.     MR brain revealed L watershed/MCA strokes in the setting of high grade proximal LICA stenosis >90% at bifurcation. Patient underwent catheter cerebral angiogram, angioplasty and left carotid wall stent placement with Dr. Farhat Ulrich on 11/6/2024. Right groin was accessed but catheter could not be advanced 2/2 to near occlusion of the right iliac, puncture closed with closure device and procedure proceeded through right radial artery. Case was complicated by hypotension requiring multiple neosticks and IVF boluses to maintain hemodynamic stability. Post-op Carotid dopplers completed on 11/7/2024 demonstrated patent left ICA stent.    Cardiology was consulted for remote history of NSTEMI, f/u Echo shows EF 45% segmental wall motion abnormalities, Recommended to continue ASA/ Plavix and f/u outpatient with another cardiac cath which was deferred d/t acute stroke. Post op course c/b fluctuating RLE MMT exam, repeat CTH stable.     Patient was cleared for discharge to [facility] IRF on 11/11/24.  (11 Nov 2024 14:19)          Subjective:  Mr Taylor is seen today, and communicated with in [facility] with the help of  #[id number]. Seen resting comfortably in WC. No overnight events. He denies F/C, CP, SOB, abdominal pain, or acute concerns at this time.    He is very agreeable to participate in exam, but continues to have significant difficulty with right upper extremity mobilization.       VITALS  T(C): 36.4 (11-26-24 @ 07:57), Max: 36.7 (11-25-24 @ 20:34)  T(F): 97.5 (11-26-24 @ 07:57), Max: 98 (11-25-24 @ 20:34)  HR: 71 (11-26-24 @ 07:57) (68 - 73)  BP: 113/79 (11-26-24 @ 07:57) (100/69 - 113/79)  ABP: --  ABP(mean): --  RR: 16 (11-26-24 @ 07:57) (16 - 16)  SpO2: 98% (11-26-24 @ 07:57) (95% - 98%)          REVIEW OF SYMPTOMS  Constitutional: No fever  Resp: No SOB, no respiratory distress   Neuro: No headaches +weakness      PHYSICAL EXAM  Constitutional - NAD, Comfortable  Chest - CTA bilaterally, Breathing comfortably on RA  Cardiovascular - extremities warm well perfused  Abdomen - Non- distended  Extremities - No significant LE edema  Neurologic Exam -                    Cognitive - Awake, Alert, AAO to self, place, year,      Communication - Short responses (roughly 3-5 words)  Psychiatric - Mood stable, Affect WNL      LAB                        13.3   9.55  )-----------( 408      ( 25 Nov 2024 07:05 )             41.3     11-25    137  |  102  |  26[H]  ----------------------------<  163[H]  4.6   |  29  |  1.08    Ca    10.0      25 Nov 2024 07:05    TPro  7.7  /  Alb  3.0[L]  /  TBili  0.4  /  DBili  x   /  AST  27  /  ALT  44  /  AlkPhos  69  11-25    LIVER FUNCTIONS - ( 25 Nov 2024 07:05 )  Alb: 3.0 g/dL / Pro: 7.7 g/dL / ALK PHOS: 69 U/L / ALT: 44 U/L / AST: 27 U/L / GGT: x                 Urinalysis Basic - ( 25 Nov 2024 07:05 )    Color: x / Appearance: x / SG: x / pH: x  Gluc: 163 mg/dL / Ketone: x  / Bili: x / Urobili: x   Blood: x / Protein: x / Nitrite: x   Leuk Esterase: x / RBC: x / WBC x   Sq Epi: x / Non Sq Epi: x / Bacteria: x                  RADIOLOGY/OTHER RESULTS      MEDICATIONS  (STANDING):  albuterol    90 MICROgram(s) HFA Inhaler 2 Puff(s) Inhalation every 12 hours  aspirin 325 milliGRAM(s) Oral <User Schedule>  atorvastatin 20 milliGRAM(s) Oral at bedtime  clopidogrel Tablet 75 milliGRAM(s) Oral daily  dextrose 5%. 1000 milliLiter(s) (100 mL/Hr) IV Continuous <Continuous>  dextrose 5%. 1000 milliLiter(s) (50 mL/Hr) IV Continuous <Continuous>  dextrose 50% Injectable 25 Gram(s) IV Push once  dextrose 50% Injectable 12.5 Gram(s) IV Push once  dextrose 50% Injectable 25 Gram(s) IV Push once  enoxaparin Injectable 40 milliGRAM(s) SubCutaneous every 24 hours  famotidine    Tablet 40 milliGRAM(s) Oral daily  glucagon  Injectable 1 milliGRAM(s) IntraMuscular once  insulin glargine Injectable (LANTUS) 15 Unit(s) SubCutaneous at bedtime  insulin lispro (ADMELOG) corrective regimen sliding scale   SubCutaneous three times a day before meals  insulin lispro (ADMELOG) corrective regimen sliding scale   SubCutaneous at bedtime  insulin lispro Injectable (ADMELOG) 4 Unit(s) SubCutaneous before breakfast  insulin lispro Injectable (ADMELOG) 4 Unit(s) SubCutaneous before lunch  insulin lispro Injectable (ADMELOG) 4 Unit(s) SubCutaneous before dinner  metFORMIN 500 milliGRAM(s) Oral two times a day  metoprolol succinate ER 25 milliGRAM(s) Oral daily  polyethylene glycol 3350 17 Gram(s) Oral daily  senna 2 Tablet(s) Oral at bedtime    MEDICATIONS  (PRN):  acetaminophen     Tablet .. 650 milliGRAM(s) Oral every 6 hours PRN Temp greater or equal to 38C (100.4F), Mild Pain (1 - 3)  bisacodyl Suppository 10 milliGRAM(s) Rectal daily PRN Constipation  dextrose Oral Gel 15 Gram(s) Oral once PRN Blood Glucose LESS THAN 70 milliGRAM(s)/deciliter  melatonin 6 milliGRAM(s) Oral at bedtime PRN Insomnia  +           HPI:  70 y/o M with PMHx of HTN, HLD, DM, originally presented to [facility] on 10/27 as a code stroke after developed right sided weakness, pulmonary edema and hypertensive emergency in the setting of NSTEMI/HF. 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 Ulrich on 11/5/24. [facility]  #[id number] used.     MR brain revealed L watershed/MCA strokes in the setting of high grade proximal LICA stenosis >90% at bifurcation. Patient underwent catheter cerebral angiogram, angioplasty and left carotid wall stent placement with Dr. Farhat Ulrich on 11/6/2024. Right groin was accessed but catheter could not be advanced 2/2 to near occlusion of the right iliac, puncture closed with closure device and procedure proceeded through right radial artery. Case was complicated by hypotension requiring multiple neosticks and IVF boluses to maintain hemodynamic stability. Post-op Carotid dopplers completed on 11/7/2024 demonstrated patent left ICA stent.    Cardiology was consulted for remote history of NSTEMI, f/u Echo shows EF 45% segmental wall motion abnormalities, Recommended to continue ASA/ Plavix and f/u outpatient with another cardiac cath which was deferred d/t acute stroke. Post op course c/b fluctuating RLE MMT exam, repeat CTH stable.     Patient was cleared for discharge to [facility] IRF on 11/11/24.  (11 Nov 2024 14:19)          Subjective:  Mr Taylor is seen today, and communicated with in [facility] with the help of  #[id number]. Seen resting comfortably in WC. No overnight events. He denies F/C, CP, SOB, abdominal pain, or acute concerns at this time.    He is very agreeable to participate in exam, but continues to have significant difficulty with right upper extremity mobilization.       VITALS  T(C): 36.4 (11-26-24 @ 07:57), Max: 36.7 (11-25-24 @ 20:34)  T(F): 97.5 (11-26-24 @ 07:57), Max: 98 (11-25-24 @ 20:34)  HR: 71 (11-26-24 @ 07:57) (68 - 73)  BP: 113/79 (11-26-24 @ 07:57) (100/69 - 113/79)  ABP: --  ABP(mean): --  RR: 16 (11-26-24 @ 07:57) (16 - 16)  SpO2: 98% (11-26-24 @ 07:57) (95% - 98%)          REVIEW OF SYMPTOMS  Constitutional: No fever  Resp: No SOB, no respiratory distress   Neuro: No headaches +weakness      PHYSICAL EXAM  Constitutional - NAD, Comfortable  Chest - CTA bilaterally, Breathing comfortably on RA  Cardiovascular - extremities warm well perfused  Abdomen - Non- distended  Extremities - No significant LE edema  Neurologic Exam -                    Cognitive - Awake, Alert, AAO to self, place, year,      Communication - Short responses (roughly 3-5 words)  Psychiatric - Mood stable, Affect WNL      LAB                        13.3   9.55  )-----------( 408      ( 25 Nov 2024 07:05 )             41.3     11-25    137  |  102  |  26[H]  ----------------------------<  163[H]  4.6   |  29  |  1.08    Ca    10.0      25 Nov 2024 07:05    TPro  7.7  /  Alb  3.0[L]  /  TBili  0.4  /  DBili  x   /  AST  27  /  ALT  44  /  AlkPhos  69  11-25    LIVER FUNCTIONS - ( 25 Nov 2024 07:05 )  Alb: 3.0 g/dL / Pro: 7.7 g/dL / ALK PHOS: 69 U/L / ALT: 44 U/L / AST: 27 U/L / GGT: x                 Urinalysis Basic - ( 25 Nov 2024 07:05 )    Color: x / Appearance: x / SG: x / pH: x  Gluc: 163 mg/dL / Ketone: x  / Bili: x / Urobili: x   Blood: x / Protein: x / Nitrite: x   Leuk Esterase: x / RBC: x / WBC x   Sq Epi: x / Non Sq Epi: x / Bacteria: x                  RADIOLOGY/OTHER RESULTS      MEDICATIONS  (STANDING):  albuterol    90 MICROgram(s) HFA Inhaler 2 Puff(s) Inhalation every 12 hours  aspirin 325 milliGRAM(s) Oral <User Schedule>  atorvastatin 20 milliGRAM(s) Oral at bedtime  clopidogrel Tablet 75 milliGRAM(s) Oral daily  dextrose 5%. 1000 milliLiter(s) (100 mL/Hr) IV Continuous <Continuous>  dextrose 5%. 1000 milliLiter(s) (50 mL/Hr) IV Continuous <Continuous>  dextrose 50% Injectable 25 Gram(s) IV Push once  dextrose 50% Injectable 12.5 Gram(s) IV Push once  dextrose 50% Injectable 25 Gram(s) IV Push once  enoxaparin Injectable 40 milliGRAM(s) SubCutaneous every 24 hours  famotidine    Tablet 40 milliGRAM(s) Oral daily  glucagon  Injectable 1 milliGRAM(s) IntraMuscular once  insulin glargine Injectable (LANTUS) 15 Unit(s) SubCutaneous at bedtime  insulin lispro (ADMELOG) corrective regimen sliding scale   SubCutaneous three times a day before meals  insulin lispro (ADMELOG) corrective regimen sliding scale   SubCutaneous at bedtime  insulin lispro Injectable (ADMELOG) 4 Unit(s) SubCutaneous before breakfast  insulin lispro Injectable (ADMELOG) 4 Unit(s) SubCutaneous before lunch  insulin lispro Injectable (ADMELOG) 4 Unit(s) SubCutaneous before dinner  metFORMIN 500 milliGRAM(s) Oral two times a day  metoprolol succinate ER 25 milliGRAM(s) Oral daily  polyethylene glycol 3350 17 Gram(s) Oral daily  senna 2 Tablet(s) Oral at bedtime    MEDICATIONS  (PRN):  acetaminophen     Tablet .. 650 milliGRAM(s) Oral every 6 hours PRN Temp greater or equal to 38C (100.4F), Mild Pain (1 - 3)  bisacodyl Suppository 10 milliGRAM(s) Rectal daily PRN Constipation  dextrose Oral Gel 15 Gram(s) Oral once PRN Blood Glucose LESS THAN 70 milliGRAM(s)/deciliter  melatonin 6 milliGRAM(s) Oral at bedtime PRN Insomnia  +

## 2024-11-26 NOTE — PROGRESS NOTE ADULT - SUBJECTIVE AND OBJECTIVE BOX
CC: Patient is a 69y old  Male who presents with a chief complaint of L MCA Infarct with right body involvement (26 Nov 2024 11:08)      Interval History:  Patient seen and examined at bedside.  No overnight events  No complaints this morning  Denies CP, SOB, abd pain, N/V/D    ALLERGIES:  No Known Allergies    MEDICATIONS  (STANDING):  albuterol    90 MICROgram(s) HFA Inhaler 2 Puff(s) Inhalation every 12 hours  aspirin 325 milliGRAM(s) Oral <User Schedule>  atorvastatin 20 milliGRAM(s) Oral at bedtime  clopidogrel Tablet 75 milliGRAM(s) Oral daily  dextrose 5%. 1000 milliLiter(s) (100 mL/Hr) IV Continuous <Continuous>  dextrose 5%. 1000 milliLiter(s) (50 mL/Hr) IV Continuous <Continuous>  dextrose 50% Injectable 25 Gram(s) IV Push once  dextrose 50% Injectable 12.5 Gram(s) IV Push once  dextrose 50% Injectable 25 Gram(s) IV Push once  enoxaparin Injectable 40 milliGRAM(s) SubCutaneous every 24 hours  famotidine    Tablet 40 milliGRAM(s) Oral daily  glucagon  Injectable 1 milliGRAM(s) IntraMuscular once  insulin glargine Injectable (LANTUS) 15 Unit(s) SubCutaneous at bedtime  insulin lispro (ADMELOG) corrective regimen sliding scale   SubCutaneous three times a day before meals  insulin lispro (ADMELOG) corrective regimen sliding scale   SubCutaneous at bedtime  insulin lispro Injectable (ADMELOG) 4 Unit(s) SubCutaneous before breakfast  insulin lispro Injectable (ADMELOG) 4 Unit(s) SubCutaneous before lunch  insulin lispro Injectable (ADMELOG) 4 Unit(s) SubCutaneous before dinner  metFORMIN 500 milliGRAM(s) Oral two times a day  metoprolol succinate ER 25 milliGRAM(s) Oral daily  polyethylene glycol 3350 17 Gram(s) Oral daily  senna 2 Tablet(s) Oral at bedtime    MEDICATIONS  (PRN):  acetaminophen     Tablet .. 650 milliGRAM(s) Oral every 6 hours PRN Temp greater or equal to 38C (100.4F), Mild Pain (1 - 3)  bisacodyl Suppository 10 milliGRAM(s) Rectal daily PRN Constipation  dextrose Oral Gel 15 Gram(s) Oral once PRN Blood Glucose LESS THAN 70 milliGRAM(s)/deciliter  melatonin 6 milliGRAM(s) Oral at bedtime PRN Insomnia    Vital Signs Last 24 Hrs  T(F): 97.5 (26 Nov 2024 07:57), Max: 98 (25 Nov 2024 20:34)  HR: 71 (26 Nov 2024 07:57) (68 - 73)  BP: 113/79 (26 Nov 2024 07:57) (100/69 - 113/79)  RR: 16 (26 Nov 2024 07:57) (16 - 16)  SpO2: 98% (26 Nov 2024 07:57) (95% - 98%)  I&O's Summary        PHYSICAL EXAM:    General: NAD, awake, alert   ENT: MMM, no tonsilar exudate  Neck: Supple, No JVD  Lungs: Clear to auscultation bilaterally, no wheezes. Good air entry bilaterally   Cardio: RRR, S1/S2, No murmurs  Abdomen: Soft, Nontender, Nondistended; Bowel sounds present  Extremities: No calf tenderness, No pitting edema    LABS:                        13.3   9.55  )-----------( 408      ( 25 Nov 2024 07:05 )             41.3       11-25    137  |  102  |  26  ----------------------------<  163  4.6   |  29  |  1.08    Ca    10.0      25 Nov 2024 07:05    TPro  7.7  /  Alb  3.0  /  TBili  0.4  /  DBili  x   /  AST  27  /  ALT  44  /  AlkPhos  69  11-25                10-30 Chol 138 mg/dL LDL -- HDL 28 mg/dL Trig 163 mg/dL              POCT Blood Glucose.: 249 mg/dL (26 Nov 2024 11:43)  POCT Blood Glucose.: 133 mg/dL (26 Nov 2024 07:54)  POCT Blood Glucose.: 140 mg/dL (25 Nov 2024 22:17)  POCT Blood Glucose.: 125 mg/dL (25 Nov 2024 16:20)      Urinalysis Basic - ( 25 Nov 2024 07:05 )    Color: x / Appearance: x / SG: x / pH: x  Gluc: 163 mg/dL / Ketone: x  / Bili: x / Urobili: x   Blood: x / Protein: x / Nitrite: x   Leuk Esterase: x / RBC: x / WBC x   Sq Epi: x / Non Sq Epi: x / Bacteria: x        COVID-19 PCR: NotDetec (11-11-24 @ 16:20)      Care Discussed with Consultants/Other Providers: Yes

## 2024-11-26 NOTE — PROGRESS NOTE ADULT - ASSESSMENT
PEPE KIM is a 70 y/o M with PMHx of HTN, HLD, DM, who presented to [facility] on 10/27/24 with right sided weakness/code stroke, pulmonary edema and hypertensive emergency in the setting of NSTEMI/HF. 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 Ulrich on 11/5/24.     # L MCA Infarct with right body involvement  - CTA +right carotid stenosis of 50% and left carotid stenosis of 90%  - S/p angio/LICA stent 11/5.   - Aspirin 325mg and Plavix 75mg daily   - Atorvastatin 20mg HS   - Continue comprehensive rehab program, 3 hours a day, 5 days a week. PT OT SLP  - will need right AFO for foot drop on dc. Orthotist evaluated 11/21  - Precautions: cardiac, DM, fall    # HLD/HTN  - Episodic hypotension, Likely 2/2 dehydration  - s/p 250 cc bolus. Encourage po fluids  - metoprolol  - Atorvastatin 20mg HS   - /79    # NSTEMI  # CHF  - EKG 11/11:  Normal sinus rhythm, ST and T wave abnormality, consider anterolateral ischemia. (similar to prior 11/6)  - TTE: EF 45-50%.  - CXR 11/17: Mild congestive heart failure. Small pleural effusions. No pneumothorax. Discussed with patient  - Metoprolol 25mg daily   - Aspirin 325mg daily, plavix  - statin  - Cardiac cath deferred outpatient f/u d/t acute CVA    # Asthma  - Albuterol PRN  - in remission.     # T2DM, uncontrolled with hyperglycemia  - A1c 9.6  - Lantus increased 15u 11/12  - admelog 4u qAC  - FBG ACHS + Mod ISS    # leukocytosis, resolved  - afebrile, no tachy. Will monitor for now  - UA 11/27 negative, CXR without evidence infection  - WNL 11/25    # mild transaminitis, resolved  - avoid hepatotoxic meds.  - continue low dose statin for now given CVA and cAD  -  AST  27  /  ALT  44  /  AlkPhos  69  11-25 WNL    # Sleep:   - Melatonin 6mg HS PRN     # Pain Management:  - Tylenol PRN. Resumed 11/25    # GI/Bowel:  - Senna QHS, Miralax PRN Daily  - GI ppx: famotidine 40mg daily   - dulcolax suppository PRN     # Diet  - Regular, carb consistent diet, DASH/TLC    # DVT ppx:   - Lovenox 40mg daily, SCDs    # Case discussed in IDT rounds 11/20 (follow up)  - skin intact, occ incontinent urine, regular solid thin liquids, mild-mod non fluent aphasia, improved comprehension, set up eating/supervision oral hygiene, max toilet hygiene and showering, mod UB/LB, max footwear, min-mod toilet and shower transfers, needs lots of cues for marija dressing techniques ambulates 75 feet with WBQC and min-mod assist, negotiates 4 steps with 1 HR and min-mod assist  - barriers: right hemiparesis, stairs, incontinence, aphasia, reduced caregiver support, reduced endurance  - goals: intermittent supervision waking hours, CG transfers, supervision bADLs, CS transfers and ambulation household distances "Pepe" "to get it back". Ambulate to bathroom with CS (progressing), communicate wants and needs min assist (progressing)  - target: dc home 12/3 with caregiver support and home PT OT SLP, Cg/min assist dressing, BADLs, supervision transfers and ambulation level surfaces home environment  - caregiver training      # LABS  CBC CMP 11/28  AFO  ---------------  Code Status: FULL  Emergency Contact:    Outpatient Follow-up (Specialty/Name of physician):    Farhat Ulrich  Radiology  [address], [address]  [place] [address]  Phone: [phone]  Fax: [phone]     PEPE KIM is a 68 y/o M with PMHx of HTN, HLD, DM, who presented to [facility] on 10/27/24 with right sided weakness/code stroke, pulmonary edema and hypertensive emergency in the setting of NSTEMI/HF. 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 Ulrich on 11/5/24.     # L MCA Infarct with right body involvement  - CTA +right carotid stenosis of 50% and left carotid stenosis of 90%  - S/p angio/LICA stent 11/5.   - Aspirin 325mg and Plavix 75mg daily   - Atorvastatin 20mg HS   - Continue comprehensive rehab program, 3 hours a day, 5 days a week. PT OT SLP  - will need right AFO for foot drop on dc. Orthotist evaluated 11/21  - Precautions: cardiac, DM, fall    # HLD/HTN  - Episodic hypotension, Likely 2/2 dehydration  - s/p 250 cc bolus. Encourage po fluids  - metoprolol  - Atorvastatin 20mg HS   - /79 11/26    # NSTEMI  # CHF  - TTE: EF 45-50%.  - CXR 11/17: Mild congestive heart failure. Small pleural effusions. No pneumothorax  - EKG 11/11:  Normal sinus rhythm, ST and T wave abnormality, consider anterolateral ischemia. (similar to prior 11/6)  - Metoprolol 25mg daily   - Aspirin 325mg daily, plavix  - statin  - Cardiac cath deferred outpatient f/u d/t acute CVA    # Asthma  - Albuterol PRN  - in remission.     # T2DM, uncontrolled with hyperglycemia  - A1c 9.6  - Lantus increased 15u 11/12  - admelog 4u qAC  - FBG ACHS + Mod ISS    # leukocytosis, resolved  - afebrile, no tachy. Will monitor for now  - UA 11/27 negative, CXR without evidence infection  - WNL 11/25    # mild transaminitis, resolved  - avoid hepatotoxic meds.  - continue low dose statin for now given CVA and cAD  -  AST  27  /  ALT  44  /  AlkPhos  69  11-25 WNL    # Sleep:   - Melatonin 6mg HS PRN     # Pain Management:  - Tylenol PRN. Resumed 11/25    # GI/Bowel:  - Senna QHS, Miralax PRN Daily  - GI ppx: famotidine 40mg daily   - dulcolax suppository PRN     # Diet  - Regular, carb consistent diet, DASH/TLC    # DVT ppx:   - Lovenox 40mg daily, SCDs    # Case discussed in IDT rounds 11/20 (follow up)  - skin intact, occ incontinent urine, regular solid thin liquids, mild-mod non fluent aphasia, improved comprehension, set up eating/supervision oral hygiene, max toilet hygiene and showering, mod UB/LB, max footwear, min-mod toilet and shower transfers, needs lots of cues for marija dressing techniques ambulates 75 feet with WBQC and min-mod assist, negotiates 4 steps with 1 HR and min-mod assist  - barriers: right hemiparesis, stairs, incontinence, aphasia, reduced caregiver support, reduced endurance  - goals: intermittent supervision waking hours, CG transfers, supervision bADLs, CS transfers and ambulation household distances "Pepe" "to get it back". Ambulate to bathroom with CS (progressing), communicate wants and needs min assist (progressing)  - target: dc home 12/3 with caregiver support and home PT OT SLP, Cg/min assist dressing, BADLs, supervision transfers and ambulation level surfaces home environment  - caregiver training      # LABS  CBC CMP 11/28  AFO  ---------------  Code Status: FULL  Emergency Contact:    Outpatient Follow-up (Specialty/Name of physician):    Farhat Ulrich  Radiology  [address], [address]  [place] [address]  Phone: [phone]  Fax: [phone]

## 2024-11-26 NOTE — PROGRESS NOTE ADULT - ASSESSMENT
69 year old male with PMHx of HTN, HLD, DM, who presented to [facility] on 10/27 with right sided weakness/code stroke, pulmonary edema and hypertensive emergency in the setting of NSTEMI/HF. 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 Ulrich on 11/5/24.  Now admitted to [facility] with right sided deficits for initiation of a multidisciplinary rehab program consisting focused on functional mobility, transfers and ADLs.    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 Ulrich on 11/5.   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   # HLD/HTN  - TTE at [facility] showed EF 45-50% w/ Regional wall motion abnormalities - Cardiac cath deferred outpatient f/u d/t acute CVA  - Continue aspirin, plavix,   - continue metoprolol ER 25mg QD  - Would defer further GDMT at this time given borderline BP   - Continue atorvastatin  - Outpatient follow up with cardiology    # Asthma - stable  - Albuterol BID    # T2DM with hyperglycemia   - HbA1c 9.6 on 10/28  - Continue metformin 500mg BID   - Continue Lantus 15units QHS and premeal admelog 4units TID w/ meals   - RAISS  - Monitor FS, overall at goal     #Mild Transaminitis   - resolved  - Continue to monitor CMP with routine labs      #GI PPx  -Pepcid     # DVT ppx:   - Lovenox 40mg daily    Discussed with rehab team

## 2024-11-27 LAB
GLUCOSE BLDC GLUCOMTR-MCNC: 113 MG/DL — HIGH (ref 70–99)
GLUCOSE BLDC GLUCOMTR-MCNC: 125 MG/DL — HIGH (ref 70–99)
GLUCOSE BLDC GLUCOMTR-MCNC: 150 MG/DL — HIGH (ref 70–99)
GLUCOSE BLDC GLUCOMTR-MCNC: 159 MG/DL — HIGH (ref 70–99)

## 2024-11-27 PROCEDURE — 99232 SBSQ HOSP IP/OBS MODERATE 35: CPT

## 2024-11-27 RX ADMIN — Medication 500 MILLIGRAM(S): at 16:31

## 2024-11-27 RX ADMIN — Medication 500 MILLIGRAM(S): at 07:28

## 2024-11-27 RX ADMIN — Medication 4 UNIT(S): at 11:58

## 2024-11-27 RX ADMIN — Medication 2: at 11:57

## 2024-11-27 RX ADMIN — INSULIN GLARGINE 15 UNIT(S): 100 INJECTION, SOLUTION SUBCUTANEOUS at 21:55

## 2024-11-27 RX ADMIN — METOPROLOL TARTRATE 25 MILLIGRAM(S): 100 TABLET, FILM COATED ORAL at 05:36

## 2024-11-27 RX ADMIN — POLYETHYLENE GLYCOL 3350 17 GRAM(S): 17 POWDER, FOR SOLUTION ORAL at 11:55

## 2024-11-27 RX ADMIN — Medication 20 MILLIGRAM(S): at 21:55

## 2024-11-27 RX ADMIN — FAMOTIDINE 40 MILLIGRAM(S): 20 TABLET, FILM COATED ORAL at 11:54

## 2024-11-27 RX ADMIN — ENOXAPARIN SODIUM 40 MILLIGRAM(S): 30 INJECTION SUBCUTANEOUS at 22:38

## 2024-11-27 RX ADMIN — Medication 2 PUFF(S): at 19:53

## 2024-11-27 RX ADMIN — Medication 4 UNIT(S): at 16:32

## 2024-11-27 RX ADMIN — Medication 4 UNIT(S): at 07:32

## 2024-11-27 RX ADMIN — Medication 325 MILLIGRAM(S): at 05:36

## 2024-11-27 RX ADMIN — CLOPIDOGREL 75 MILLIGRAM(S): 75 TABLET, FILM COATED ORAL at 11:54

## 2024-11-27 RX ADMIN — Medication 2 TABLET(S): at 21:55

## 2024-11-27 NOTE — PROGRESS NOTE ADULT - SUBJECTIVE AND OBJECTIVE BOX
Patient is a 69y old  Male who presents with a chief complaint of L MCA Infarct with right body involvement (26 Nov 2024 14:14)      HPI:  70 y/o M with PMHx of HTN, HLD, DM, originally presented to [facility] on 10/27 as a code stroke after developed right sided weakness, pulmonary edema and hypertensive emergency in the setting of NSTEMI/HF. 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 Ulrich on 11/5/24. [facility]  #[id number] used.     MR brain revealed L watershed/MCA strokes in the setting of high grade proximal LICA stenosis >90% at bifurcation. Patient underwent catheter cerebral angiogram, angioplasty and left carotid wall stent placement with Dr. Farhat Ulrich on 11/6/2024. Right groin was accessed but catheter could not be advanced 2/2 to near occlusion of the right iliac, puncture closed with closure device and procedure proceeded through right radial artery. Case was complicated by hypotension requiring multiple neosticks and IVF boluses to maintain hemodynamic stability. Post-op Carotid dopplers completed on 11/7/2024 demonstrated patent left ICA stent.    Cardiology was consulted for remote history of NSTEMI, f/u Echo shows EF 45% segmental wall motion abnormalities, Recommended to continue ASA/ Plavix and f/u outpatient with another cardiac cath which was deferred d/t acute stroke. Post op course c/b fluctuating RLE MMT exam, repeat CTH stable.     Patient was cleared for discharge to [facility] IRF on 11/11/24.  (11 Nov 2024 14:19)      PAST MEDICAL & SURGICAL HISTORY:  Diabetes mellitus      HTN (hypertension)      Hyperlipidemia          MEDICATIONS  (STANDING):  albuterol    90 MICROgram(s) HFA Inhaler 2 Puff(s) Inhalation every 12 hours  aspirin 325 milliGRAM(s) Oral <User Schedule>  atorvastatin 20 milliGRAM(s) Oral at bedtime  clopidogrel Tablet 75 milliGRAM(s) Oral daily  dextrose 5%. 1000 milliLiter(s) (100 mL/Hr) IV Continuous <Continuous>  dextrose 5%. 1000 milliLiter(s) (50 mL/Hr) IV Continuous <Continuous>  dextrose 50% Injectable 25 Gram(s) IV Push once  dextrose 50% Injectable 12.5 Gram(s) IV Push once  dextrose 50% Injectable 25 Gram(s) IV Push once  enoxaparin Injectable 40 milliGRAM(s) SubCutaneous every 24 hours  famotidine    Tablet 40 milliGRAM(s) Oral daily  glucagon  Injectable 1 milliGRAM(s) IntraMuscular once  insulin glargine Injectable (LANTUS) 15 Unit(s) SubCutaneous at bedtime  insulin lispro (ADMELOG) corrective regimen sliding scale   SubCutaneous three times a day before meals  insulin lispro (ADMELOG) corrective regimen sliding scale   SubCutaneous at bedtime  insulin lispro Injectable (ADMELOG) 4 Unit(s) SubCutaneous before breakfast  insulin lispro Injectable (ADMELOG) 4 Unit(s) SubCutaneous before lunch  insulin lispro Injectable (ADMELOG) 4 Unit(s) SubCutaneous before dinner  metFORMIN 500 milliGRAM(s) Oral two times a day  metoprolol succinate ER 25 milliGRAM(s) Oral daily  polyethylene glycol 3350 17 Gram(s) Oral daily  senna 2 Tablet(s) Oral at bedtime    MEDICATIONS  (PRN):  acetaminophen     Tablet .. 650 milliGRAM(s) Oral every 6 hours PRN Temp greater or equal to 38C (100.4F), Mild Pain (1 - 3)  bisacodyl Suppository 10 milliGRAM(s) Rectal daily PRN Constipation  dextrose Oral Gel 15 Gram(s) Oral once PRN Blood Glucose LESS THAN 70 milliGRAM(s)/deciliter  melatonin 6 milliGRAM(s) Oral at bedtime PRN Insomnia      Allergies    No Known Allergies    Intolerances          VITALS  69y  Vital Signs Last 24 Hrs  T(C): 36.3 (27 Nov 2024 07:46), Max: 36.7 (26 Nov 2024 19:40)  T(F): 97.4 (27 Nov 2024 07:46), Max: 98 (26 Nov 2024 19:40)  HR: 67 (27 Nov 2024 07:46) (67 - 75)  BP: 113/85 (27 Nov 2024 07:46) (101/67 - 113/85)  BP(mean): --  RR: 15 (27 Nov 2024 07:46) (14 - 15)  SpO2: 95% (27 Nov 2024 07:46) (95% - 95%)    Parameters below as of 27 Nov 2024 07:46  Patient On (Oxygen Delivery Method): room air      Daily     Daily         RECENT LABS:                      CAPILLARY BLOOD GLUCOSE      POCT Blood Glucose.: 150 mg/dL (27 Nov 2024 07:30)  POCT Blood Glucose.: 151 mg/dL (26 Nov 2024 20:27)  POCT Blood Glucose.: 253 mg/dL (26 Nov 2024 16:20)  POCT Blood Glucose.: 249 mg/dL (26 Nov 2024 11:43)

## 2024-11-27 NOTE — CHART NOTE - NSCHARTNOTEFT_GEN_A_CORE
Pt evaluated at bedside for afo to control footdrop and inversion during swing phase. Pt has active DF 3/5 and PF 3/5,  ankle inversion was noted .   A hinged afo with free dordiflexion and plantarflexion stop with varus control will be best for him.    Dr Dalal will be contacted for rx and clinical notes to be added for insurance authorization.    Deuce Garibay [place]  [phone]
Nutrition Follow Up Note  Source: Medical Record [X] Patient [X] Family [ ]         Diet: Consistent carbohydrate (no snacks)  Pt tolerating diet with good PO intake, eating % of meals Per nursing flowsheets. Pt observed during meal rounds with good PO intake. Observed outside food - pt's family provided with written nutrition education on consistent carb diet. No digestive issues reported.    Enteral/Parenteral Nutrition: N/A    Current Weight:  148.5 lbs (11-19)    Pertinent Medications: MEDICATIONS  (STANDING):  albuterol    90 MICROgram(s) HFA Inhaler 2 Puff(s) Inhalation every 12 hours  aspirin 325 milliGRAM(s) Oral <User Schedule>  atorvastatin 20 milliGRAM(s) Oral at bedtime  clopidogrel Tablet 75 milliGRAM(s) Oral daily  dextrose 5%. 1000 milliLiter(s) (100 mL/Hr) IV Continuous <Continuous>  dextrose 5%. 1000 milliLiter(s) (50 mL/Hr) IV Continuous <Continuous>  dextrose 50% Injectable 25 Gram(s) IV Push once  dextrose 50% Injectable 12.5 Gram(s) IV Push once  dextrose 50% Injectable 25 Gram(s) IV Push once  enoxaparin Injectable 40 milliGRAM(s) SubCutaneous every 24 hours  famotidine    Tablet 40 milliGRAM(s) Oral daily  glucagon  Injectable 1 milliGRAM(s) IntraMuscular once  insulin glargine Injectable (LANTUS) 15 Unit(s) SubCutaneous at bedtime  insulin lispro (ADMELOG) corrective regimen sliding scale   SubCutaneous three times a day before meals  insulin lispro (ADMELOG) corrective regimen sliding scale   SubCutaneous at bedtime  insulin lispro Injectable (ADMELOG) 4 Unit(s) SubCutaneous before breakfast  insulin lispro Injectable (ADMELOG) 4 Unit(s) SubCutaneous before lunch  insulin lispro Injectable (ADMELOG) 4 Unit(s) SubCutaneous before dinner  metFORMIN 500 milliGRAM(s) Oral two times a day  metoprolol tartrate 12.5 milliGRAM(s) Oral every 12 hours  polyethylene glycol 3350 17 Gram(s) Oral daily  senna 2 Tablet(s) Oral at bedtime    MEDICATIONS  (PRN):  bisacodyl Suppository 10 milliGRAM(s) Rectal daily PRN Constipation  dextrose Oral Gel 15 Gram(s) Oral once PRN Blood Glucose LESS THAN 70 milliGRAM(s)/deciliter  melatonin 6 milliGRAM(s) Oral at bedtime PRN Insomnia      Pertinent Labs:  11-18 Na140 mmol/L Glu 131 mg/dL[H] K+ 4.0 mmol/L Cr  1.12 mg/dL BUN 29 mg/dL[H] 11-18 Alb 2.8 g/dL[L] 10-30 Chol 138 mg/dL LDL --    HDL 28 mg/dL[L] Trig 163 mg/dL[H]        Skin: Skin intact per nursing flow sheets     Edema: No edema per nursing flow sheets     Last BM: on 11-18 Per nursing flowsheets     Estimated Needs:   [X] No Change since Previous Assessment  [ ] Recalculated:     Previous Nutrition Diagnosis:   Altered nutrition related lab values    Nutrition Diagnosis is [X] Ongoing  - addressed with consistent carb diet    New Nutrition Diagnosis: [X] Not Applicable  [ ] Inadequate Protein Energy Intake   [ ] Inadequate Oral Intake   [ ] Excessive Energy Intake   [ ] Increased Nutrient Needs   [ ] Obesity   [ ] Altered GI Function   [ ] Unintended Weight Loss   [ ] Food & Nutrition Related Knowledge Deficit  [ ] Limited Adherence to nutrition related recommendations   [ ] Malnutrition      Interventions:   1. Recommend continuing with current plan of care  2. Encourage PO intake  3. Ongoing diet education    Monitoring & Evaluation:   [X] Weights   [X] PO Intake   [X] Follow Up (Per Protocol)  [X] Tolerance to Diet Prescription   [X] Other: Labs    RD Remains Available.  Nova Crenshaw RD
Nutrition Follow Up Note  Source: Medical Record [X] Patient [X] Family [X] pt's wife provided info/translation         Diet: Consistent carbohydrate no snacks, Halal, No pork  Pt tolerating diet with good PO intake, eating >75% of meals Per nursing flowsheets. Discussed consistent carb diet with pt and pt's wife who provided Khmer translation per pt preference. Educated pt on avoiding foods/beverages containing added sugars. Obtained food preferences, will honor as able. pt c/o constipation. Encouraged fluid and fiber intake.     Enteral/Parenteral Nutrition: N/A    Current Weight: 148.5 lbs (11-11)    Pertinent Medications: MEDICATIONS  (STANDING):  albuterol    90 MICROgram(s) HFA Inhaler 2 Puff(s) Inhalation every 12 hours  aspirin 325 milliGRAM(s) Oral <User Schedule>  atorvastatin 20 milliGRAM(s) Oral at bedtime  clopidogrel Tablet 75 milliGRAM(s) Oral daily  dextrose 5%. 1000 milliLiter(s) (100 mL/Hr) IV Continuous <Continuous>  dextrose 5%. 1000 milliLiter(s) (50 mL/Hr) IV Continuous <Continuous>  dextrose 50% Injectable 25 Gram(s) IV Push once  dextrose 50% Injectable 12.5 Gram(s) IV Push once  dextrose 50% Injectable 25 Gram(s) IV Push once  enoxaparin Injectable 40 milliGRAM(s) SubCutaneous every 24 hours  famotidine    Tablet 40 milliGRAM(s) Oral daily  glucagon  Injectable 1 milliGRAM(s) IntraMuscular once  insulin glargine Injectable (LANTUS) 15 Unit(s) SubCutaneous at bedtime  insulin lispro (ADMELOG) corrective regimen sliding scale   SubCutaneous three times a day before meals  insulin lispro (ADMELOG) corrective regimen sliding scale   SubCutaneous at bedtime  insulin lispro Injectable (ADMELOG) 4 Unit(s) SubCutaneous before breakfast  insulin lispro Injectable (ADMELOG) 4 Unit(s) SubCutaneous before lunch  insulin lispro Injectable (ADMELOG) 4 Unit(s) SubCutaneous before dinner  metFORMIN 500 milliGRAM(s) Oral two times a day  metoprolol succinate ER 25 milliGRAM(s) Oral daily  polyethylene glycol 3350 17 Gram(s) Oral daily  senna 2 Tablet(s) Oral at bedtime    MEDICATIONS  (PRN):  acetaminophen     Tablet .. 650 milliGRAM(s) Oral every 6 hours PRN Temp greater or equal to 38C (100.4F), Mild Pain (1 - 3)  bisacodyl Suppository 10 milliGRAM(s) Rectal daily PRN Constipation  dextrose Oral Gel 15 Gram(s) Oral once PRN Blood Glucose LESS THAN 70 milliGRAM(s)/deciliter  melatonin 6 milliGRAM(s) Oral at bedtime PRN Insomnia      Pertinent Labs:  11-25 Na137 mmol/L Glu 163 mg/dL[H] K+ 4.6 mmol/L Cr  1.08 mg/dL BUN 26 mg/dL[H] 11-25 Alb 3.0 g/dL[L] 10-30 Chol 138 mg/dL LDL --    HDL 28 mg/dL[L] Trig 163 mg/dL[H]  POCT glucose x 24 hours: 125-249 (H)        Skin: Skin intact per nursing flow sheets     Edema: No edema per nursing flow sheets     Last BM: on 11-24 Per nursing flowsheets     Estimated Needs:   [X] No Change since Previous Assessment  [ ] Recalculated:     Previous Nutrition Diagnosis:   Altered nutrition related lab values    Nutrition Diagnosis is [X] Ongoing         New Nutrition Diagnosis: [X] Not Applicable  [ ] Inadequate Protein Energy Intake   [ ] Inadequate Oral Intake   [ ] Excessive Energy Intake   [ ] Increased Nutrient Needs   [ ] Obesity   [ ] Altered GI Function   [ ] Unintended Weight Loss   [ ] Food & Nutrition Related Knowledge Deficit  [ ] Limited Adherence to nutrition related recommendations   [ ] Malnutrition      Interventions:   1. Recommend continuing with current plan of care  2. Encourage PO intake  3. Ongoing education on consistent carb nutrition therapy    Monitoring & Evaluation:   [X] Weights   [X] PO Intake   [X] Follow Up (Per Protocol)  [X] Tolerance to Diet Prescription   [X] Other: Labs     RD Remains Available.  Nova Crenshaw RD

## 2024-11-27 NOTE — PROGRESS NOTE ADULT - SUBJECTIVE AND OBJECTIVE BOX
CC: Patient is a 69y old  Male who presents with a chief complaint of L MCA Infarct with right body involvement (27 Nov 2024 08:45)      Interval History:  Patient seen and examined at bedside.  No overnight events  No complaints this morning  Denies CP, SOB, abd pain, N/V/D    ALLERGIES:  No Known Allergies    MEDICATIONS  (STANDING):  albuterol    90 MICROgram(s) HFA Inhaler 2 Puff(s) Inhalation every 12 hours  aspirin 325 milliGRAM(s) Oral <User Schedule>  atorvastatin 20 milliGRAM(s) Oral at bedtime  clopidogrel Tablet 75 milliGRAM(s) Oral daily  dextrose 5%. 1000 milliLiter(s) (100 mL/Hr) IV Continuous <Continuous>  dextrose 5%. 1000 milliLiter(s) (50 mL/Hr) IV Continuous <Continuous>  dextrose 50% Injectable 25 Gram(s) IV Push once  dextrose 50% Injectable 12.5 Gram(s) IV Push once  dextrose 50% Injectable 25 Gram(s) IV Push once  enoxaparin Injectable 40 milliGRAM(s) SubCutaneous every 24 hours  famotidine    Tablet 40 milliGRAM(s) Oral daily  glucagon  Injectable 1 milliGRAM(s) IntraMuscular once  insulin glargine Injectable (LANTUS) 15 Unit(s) SubCutaneous at bedtime  insulin lispro (ADMELOG) corrective regimen sliding scale   SubCutaneous three times a day before meals  insulin lispro (ADMELOG) corrective regimen sliding scale   SubCutaneous at bedtime  insulin lispro Injectable (ADMELOG) 4 Unit(s) SubCutaneous before breakfast  insulin lispro Injectable (ADMELOG) 4 Unit(s) SubCutaneous before lunch  insulin lispro Injectable (ADMELOG) 4 Unit(s) SubCutaneous before dinner  metFORMIN 500 milliGRAM(s) Oral two times a day  metoprolol succinate ER 25 milliGRAM(s) Oral daily  polyethylene glycol 3350 17 Gram(s) Oral daily  senna 2 Tablet(s) Oral at bedtime    MEDICATIONS  (PRN):  acetaminophen     Tablet .. 650 milliGRAM(s) Oral every 6 hours PRN Temp greater or equal to 38C (100.4F), Mild Pain (1 - 3)  bisacodyl Suppository 10 milliGRAM(s) Rectal daily PRN Constipation  dextrose Oral Gel 15 Gram(s) Oral once PRN Blood Glucose LESS THAN 70 milliGRAM(s)/deciliter  melatonin 6 milliGRAM(s) Oral at bedtime PRN Insomnia    Vital Signs Last 24 Hrs  T(F): 97.4 (27 Nov 2024 07:46), Max: 98 (26 Nov 2024 19:40)  HR: 67 (27 Nov 2024 07:46) (67 - 75)  BP: 113/85 (27 Nov 2024 07:46) (101/67 - 113/85)  RR: 15 (27 Nov 2024 07:46) (14 - 15)  SpO2: 95% (27 Nov 2024 07:46) (95% - 95%)  I&O's Summary        PHYSICAL EXAM:  General: NAD, awake, alert   ENT: MMM, no tonsilar exudate  Neck: Supple, No JVD  Lungs: Clear to auscultation bilaterally, no wheezes. Good air entry bilaterally   Cardio: RRR, S1/S2, No murmurs  Abdomen: Soft, Nontender, Nondistended; Bowel sounds present  Extremities: No calf tenderness, No pitting edema    LABS:                        13.3   9.55  )-----------( 408      ( 25 Nov 2024 07:05 )             41.3       11-25    137  |  102  |  26  ----------------------------<  163  4.6   |  29  |  1.08    Ca    10.0      25 Nov 2024 07:05    TPro  7.7  /  Alb  3.0  /  TBili  0.4  /  DBili  x   /  AST  27  /  ALT  44  /  AlkPhos  69  11-25                10-30 Chol 138 mg/dL LDL -- HDL 28 mg/dL Trig 163 mg/dL              POCT Blood Glucose.: 150 mg/dL (27 Nov 2024 07:30)  POCT Blood Glucose.: 151 mg/dL (26 Nov 2024 20:27)  POCT Blood Glucose.: 253 mg/dL (26 Nov 2024 16:20)  POCT Blood Glucose.: 249 mg/dL (26 Nov 2024 11:43)      Urinalysis Basic - ( 25 Nov 2024 07:05 )    Color: x / Appearance: x / SG: x / pH: x  Gluc: 163 mg/dL / Ketone: x  / Bili: x / Urobili: x   Blood: x / Protein: x / Nitrite: x   Leuk Esterase: x / RBC: x / WBC x   Sq Epi: x / Non Sq Epi: x / Bacteria: x        COVID-19 PCR: NotDetec (11-11-24 @ 16:20)      Care Discussed with Consultants/Other Providers: Yes. rehab provider

## 2024-11-27 NOTE — PROGRESS NOTE ADULT - ASSESSMENT
PEPE KIM is a 68 y/o M with PMHx of HTN, HLD, DM, who presented to [facility] on 10/27/24 with right sided weakness/code stroke, pulmonary edema and hypertensive emergency in the setting of NSTEMI/HF. 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 for angio/LICA stent w/ Dr. Ulrich on 11/5/24.     # L MCA Infarct with right body involvement  - CTA +right carotid stenosis of 50% and left carotid stenosis of 90%  - S/p angio/LICA stent 11/5.   - Aspirin 325mg and Plavix 75mg daily   - Atorvastatin 20mg HS   - Continue comprehensive rehab program, 3 hours a day, 5 days a week. PT OT SLP  - will need right AFO for foot drop on dc. Orthotist evaluated 11/21  - Precautions: cardiac, DM, fall    # HLD/HTN  - Episodic hypotension, Likely 2/2 dehydration  - s/p 250 cc bolus. Encourage po fluids  - metoprolol  - Atorvastatin 20mg HS   - BP ) (101/67 - 113/85) 11/27    # NSTEMI  # CHF  - TTE: EF 45-50%.  - CXR 11/17: Mild congestive heart failure. Small pleural effusions. No pneumothorax  - EKG 11/11:  Normal sinus rhythm, ST and T wave abnormality, consider anterolateral ischemia. (similar to prior 11/6)  - Metoprolol 25mg daily   - Aspirin 325mg daily, plavix  - statin  - Cardiac cath deferred outpatient f/u d/t acute CVA    # Asthma  - Albuterol PRN  - in remission.     # T2DM, uncontrolled with hyperglycemia  - A1c 9.6  - Lantus increased 15u 11/12  - admelog 4u qAC  - FBG ACHS + Mod ISS    # leukocytosis, resolved  - afebrile, no tachy. Will monitor for now  - UA 11/27 negative, CXR without evidence infection  - WNL 11/25    # mild transaminitis, resolved  - avoid hepatotoxic meds.  - continue low dose statin for now given CVA and cAD  -  AST  27  /  ALT  44  /  AlkPhos  69  11-25 WNL  - CMP 11/28    # Sleep:   - Melatonin 6mg HS PRN     # Pain Management:  - Tylenol PRN. Resumed 11/25    # GI/Bowel:  - Senna QHS, Miralax PRN Daily  - GI ppx: famotidine 40mg daily   - dulcolax suppository PRN     # Diet  - Regular, carb consistent diet, DASH/TLC    # DVT ppx:   - Lovenox 40mg daily, SCDs    # Case discussed in IDT rounds 11/27 (follow up)  -     - barriers: right hemiparesis, stairs, incontinence, aphasia, reduced caregiver support, reduced endurance  - goals: intermittent supervision waking hours, CG transfers, supervision bADLs, CS transfers and ambulation household distances "Pepe" "to get it back". Ambulate to bathroom with CS (progressing), communicate wants and needs min assist (progressing)  - target: dc home 12/3 with caregiver support and home PT OT SLP, Cg/min assist dressing, BADLs, supervision transfers and ambulation level surfaces home environment  - caregiver training      # LABS  CBC CMP 11/28  AFO  ---------------  Code Status: FULL  Emergency Contact:    Outpatient Follow-up (Specialty/Name of physician):    Farhat Ulrich  Radiology  [address], [address]  [place] [address]  Phone: [phone]  Fax: [phone]     PEPE KIM is a 68 y/o M with PMHx of HTN, HLD, DM, who presented to [facility] on 10/27/24 with right sided weakness/code stroke, pulmonary edema and hypertensive emergency in the setting of NSTEMI/HF. 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 Ulrich on 11/5/24.     # L MCA Infarct with right body involvement  - CTA +right carotid stenosis of 50% and left carotid stenosis of 90%  - S/p angio/LICA stent 11/5.   - Aspirin 325mg and Plavix 75mg daily   - Atorvastatin 20mg HS   - Continue comprehensive rehab program, 3 hours a day, 5 days a week. PT OT SLP  - will need right AFO for foot drop on dc. Orthotist evaluated 11/21  - Precautions: cardiac, DM, fall    # HLD/HTN  - Episodic hypotension, Likely 2/2 dehydration  - s/p 250 cc bolus. Encourage po fluids  - metoprolol  - Atorvastatin 20mg HS   - BP ) (101/67 - 113/85) 11/27    # NSTEMI  # CHF  - TTE: EF 45-50%.  - CXR 11/17: Mild congestive heart failure. Small pleural effusions. No pneumothorax  - EKG 11/11:  Normal sinus rhythm, ST and T wave abnormality, consider anterolateral ischemia. (similar to prior 11/6)  - Metoprolol 25mg daily   - Aspirin 325mg daily, plavix  - statin  - Cardiac cath deferred outpatient f/u d/t acute CVA    # Asthma  - Albuterol PRN  - in remission.     # T2DM, uncontrolled with hyperglycemia  - A1c 9.6  - Lantus increased 15u 11/12  - admelog 4u qAC  - FBG ACHS + Mod ISS    # leukocytosis, resolved  - afebrile, no tachy. Will monitor for now  - UA 11/27 negative, CXR without evidence infection  - WNL 11/25    # mild transaminitis, resolved  - avoid hepatotoxic meds.  - continue low dose statin for now given CVA and cAD  -  AST  27  /  ALT  44  /  AlkPhos  69  11-25 WNL  - CMP 11/28    # Sleep:   - Melatonin 6mg HS PRN     # Pain Management:  - Tylenol PRN. Resumed 11/25    # GI/Bowel:  - Senna QHS, Miralax PRN Daily  - GI ppx: famotidine 40mg daily   - dulcolax suppository PRN     # Diet  - Regular, carb consistent diet, DASH/TLC    # DVT ppx:   - Lovenox 40mg daily, SCDs    # Case discussed in IDT rounds 11/27 (follow up)  - continent, mild-mod non fluent aphasia, improved comprehension, 4-6 word phrases with mod cues, min assist stand-picot transfers, ambulates to commode with WBQC and max cues, WC level for showering and toileting due to need for max cues. improved strength and AROM RLE but fatigues, CG bed mobility, CG/min assist transfers, ambulates 75 feet min-mod assist and WBQC  - barriers: right hemiparesis, stairs, incontinence, aphasia, reduced caregiver support, reduced endurance  - goals: intermittent supervision waking hours, CG transfers, supervision bADLs, CS transfers and ambulation household distances "Pepe" "to get it back". Ambulate to bathroom with CS (progressing), communicate wants and needs min assist (progressing)  - target: dc home 12/3 with caregiver support and home PT OT SLP, Cg/min assist dressing, BADLs (progressing), supervision transfers and ambulation level surfaces home environment (not met), CG transfers and WC level transfers to commode ()new)  - caregiver training 11/29  at 1 PM with wife and son      # LABS  CBC CMP 11/28  AFO  ---------------  Code Status: FULL  Emergency Contact:    Outpatient Follow-up (Specialty/Name of physician):    Farhat Ulrich  Radiology  [address], [address]  [place] [address]  Phone: [phone]  Fax: [phone]     PEPE KIM is a 68 y/o M with PMHx of HTN, HLD, DM, who presented to [facility] on 10/27/24 with right sided weakness/code stroke, pulmonary edema and hypertensive emergency in the setting of NSTEMI/HF. 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 Ulrich on 11/5/24.     # L MCA Infarct with right body involvement  - CTA +right carotid stenosis of 50% and left carotid stenosis of 90%  - S/p angio/LICA stent 11/5.   - Aspirin 325mg and Plavix 75mg daily   - Atorvastatin 20mg HS   - Continue comprehensive rehab program, 3 hours a day, 5 days a week. PT OT SLP  - Benefits fromright AFO for foot drop on dc. Request/Rx submitted to orthotist last week  - Precautions: cardiac, DM, fall    # HLD/HTN  - Episodic hypotension, Likely 2/2 dehydration  - s/p 250 cc bolus. Encourage po fluids  - metoprolol  - Atorvastatin 20mg HS   - BP ) (101/67 - 113/85) 11/27    # NSTEMI  # CHF  - TTE: EF 45-50%.  - CXR 11/17: Mild congestive heart failure. Small pleural effusions. No pneumothorax  - EKG 11/11:  Normal sinus rhythm, ST and T wave abnormality, consider anterolateral ischemia. (similar to prior 11/6)  - Metoprolol 25mg daily   - Aspirin 325mg daily, plavix  - statin  - Cardiac cath deferred outpatient f/u d/t acute CVA    # Asthma  - Albuterol PRN  - in remission.     # T2DM, uncontrolled with hyperglycemia  - A1c 9.6  - Lantus increased 15u 11/12  - admelog 4u qAC  - FBG ACHS + Mod ISS    # leukocytosis, resolved  - afebrile, no tachy. Will monitor for now  - UA 11/27 negative, CXR without evidence infection  - WNL 11/25    # mild transaminitis, resolved  - avoid hepatotoxic meds.  - continue low dose statin for now given CVA and cAD  -  AST  27  /  ALT  44  /  AlkPhos  69  11-25 WNL  - CMP 11/28    # Sleep:   - Melatonin 6mg HS PRN     # Pain Management:  - Tylenol PRN. Resumed 11/25    # GI/Bowel:  - Senna QHS, Miralax PRN Daily  - GI ppx: famotidine 40mg daily   - dulcolax suppository PRN     # Diet  - Regular, carb consistent diet, DASH/TLC    # DVT ppx:   - Lovenox 40mg daily, SCDs    # Case discussed in IDT rounds 11/27 (follow up)  - continent, mild-mod non fluent aphasia, improved comprehension, 4-6 word phrases with mod cues, min assist stand-picot transfers, ambulates to commode with WBQC and max cues, WC level for showering and toileting due to need for max cues. improved strength and AROM RLE but fatigues, CG bed mobility, CG/min assist transfers, ambulates 75 feet min-mod assist and WBQC  - barriers: right hemiparesis, stairs, incontinence, aphasia, reduced caregiver support, reduced endurance  - goals: intermittent supervision waking hours, CG transfers, supervision bADLs, CS transfers and ambulation household distances "Pepe" "to get it back". Ambulate to bathroom with CS (progressing), communicate wants and needs min assist (progressing)  - target: dc home 12/3 with caregiver support and home PT OT SLP, Cg/min assist dressing, BADLs (progressing), supervision transfers and ambulation level surfaces home environment (not met), CG transfers and WC level transfers to commode ()new)  - caregiver training 11/29  at 1 PM with wife and son      # LABS  CBC CMP 11/29  AFO  ---------------  Code Status: FULL  Emergency Contact:    Outpatient Follow-up (Specialty/Name of physician):    Farhat Ulrich  Radiology  [address], [address]  [place] [address]  Phone: [phone]  Fax: [phone]

## 2024-11-27 NOTE — PROGRESS NOTE ADULT - COMMENTS
Patient seen with [facility] language line  #[id number]. Patient reports sleeping well. He does have a little abdominal discomfort--no BM since 11/24, however as soon as our exam ended, needed to use bathroom and had 2 BM. Was able to request assistance and need to go    No H/a, no pain. Has improvement in ankle DF strength

## 2024-11-27 NOTE — PROGRESS NOTE ADULT - ASSESSMENT
69 year old male with PMHx of HTN, HLD, DM, who presented to [facility] on 10/27 with right sided weakness/code stroke, pulmonary edema and hypertensive emergency in the setting of NSTEMI/HF. 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 Ulrich on 11/5/24.  Now admitted to [facility] with right sided deficits for initiation of a multidisciplinary rehab program consisting focused on functional mobility, transfers and ADLs.    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 Ulrich on 11/5.   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   # HLD/HTN  - TTE at [facility] showed EF 45-50% w/ Regional wall motion abnormalities - Cardiac cath deferred outpatient f/u d/t acute CVA  - Continue aspirin, plavix,   - continue metoprolol ER 25mg QD  - Would defer further GDMT at this time given borderline BP   - Continue atorvastatin  - Outpatient follow up with cardiology    # Asthma - stable  - Albuterol BID    # T2DM with hyperglycemia   - HbA1c 9.6 on 10/28  - Continue metformin 500mg BID   - Continue Lantus 15units QHS and premeal admelog 4units TID w/ meals   - RAISS  - Monitor FS, overall at goal     #Mild Transaminitis   - resolved  - Continue to monitor CMP with routine labs      #GI PPx  -Pepcid     # DVT ppx:   - Lovenox 40mg daily    Discussed with rehab team

## 2024-11-28 LAB
GLUCOSE BLDC GLUCOMTR-MCNC: 139 MG/DL — HIGH (ref 70–99)
GLUCOSE BLDC GLUCOMTR-MCNC: 139 MG/DL — HIGH (ref 70–99)
GLUCOSE BLDC GLUCOMTR-MCNC: 162 MG/DL — HIGH (ref 70–99)
GLUCOSE BLDC GLUCOMTR-MCNC: 200 MG/DL — HIGH (ref 70–99)

## 2024-11-28 PROCEDURE — 99232 SBSQ HOSP IP/OBS MODERATE 35: CPT

## 2024-11-28 RX ADMIN — INSULIN GLARGINE 15 UNIT(S): 100 INJECTION, SOLUTION SUBCUTANEOUS at 21:26

## 2024-11-28 RX ADMIN — METOPROLOL TARTRATE 25 MILLIGRAM(S): 100 TABLET, FILM COATED ORAL at 06:30

## 2024-11-28 RX ADMIN — ENOXAPARIN SODIUM 40 MILLIGRAM(S): 30 INJECTION SUBCUTANEOUS at 21:25

## 2024-11-28 RX ADMIN — Medication 4 UNIT(S): at 11:21

## 2024-11-28 RX ADMIN — Medication 20 MILLIGRAM(S): at 21:26

## 2024-11-28 RX ADMIN — Medication 325 MILLIGRAM(S): at 06:30

## 2024-11-28 RX ADMIN — Medication 4 UNIT(S): at 16:46

## 2024-11-28 RX ADMIN — Medication 2 TABLET(S): at 21:26

## 2024-11-28 RX ADMIN — Medication 500 MILLIGRAM(S): at 16:41

## 2024-11-28 RX ADMIN — CLOPIDOGREL 75 MILLIGRAM(S): 75 TABLET, FILM COATED ORAL at 11:17

## 2024-11-28 RX ADMIN — Medication 500 MILLIGRAM(S): at 08:00

## 2024-11-28 RX ADMIN — Medication 2: at 08:02

## 2024-11-28 RX ADMIN — POLYETHYLENE GLYCOL 3350 17 GRAM(S): 17 POWDER, FOR SOLUTION ORAL at 11:17

## 2024-11-28 RX ADMIN — Medication 4 UNIT(S): at 08:02

## 2024-11-28 RX ADMIN — FAMOTIDINE 40 MILLIGRAM(S): 20 TABLET, FILM COATED ORAL at 11:17

## 2024-11-28 NOTE — PROGRESS NOTE ADULT - SUBJECTIVE AND OBJECTIVE BOX
Cc: Impaired mobility      used for visit     HPI: Patient denies any complaints.  Resting comfortably.    Has been tolerating rehabilitation program.    acetaminophen     Tablet .. 650 milliGRAM(s) Oral every 6 hours PRN  albuterol    90 MICROgram(s) HFA Inhaler 2 Puff(s) Inhalation every 12 hours  aspirin 325 milliGRAM(s) Oral <User Schedule>  atorvastatin 20 milliGRAM(s) Oral at bedtime  bisacodyl Suppository 10 milliGRAM(s) Rectal daily PRN  clopidogrel Tablet 75 milliGRAM(s) Oral daily  dextrose 5%. 1000 milliLiter(s) IV Continuous <Continuous>  dextrose 5%. 1000 milliLiter(s) IV Continuous <Continuous>  dextrose 50% Injectable 25 Gram(s) IV Push once  dextrose 50% Injectable 12.5 Gram(s) IV Push once  dextrose 50% Injectable 25 Gram(s) IV Push once  dextrose Oral Gel 15 Gram(s) Oral once PRN  enoxaparin Injectable 40 milliGRAM(s) SubCutaneous every 24 hours  famotidine    Tablet 40 milliGRAM(s) Oral daily  glucagon  Injectable 1 milliGRAM(s) IntraMuscular once  insulin glargine Injectable (LANTUS) 15 Unit(s) SubCutaneous at bedtime  insulin lispro (ADMELOG) corrective regimen sliding scale   SubCutaneous three times a day before meals  insulin lispro (ADMELOG) corrective regimen sliding scale   SubCutaneous at bedtime  insulin lispro Injectable (ADMELOG) 4 Unit(s) SubCutaneous before breakfast  insulin lispro Injectable (ADMELOG) 4 Unit(s) SubCutaneous before lunch  insulin lispro Injectable (ADMELOG) 4 Unit(s) SubCutaneous before dinner  melatonin 6 milliGRAM(s) Oral at bedtime PRN  metFORMIN 500 milliGRAM(s) Oral two times a day  metoprolol succinate ER 25 milliGRAM(s) Oral daily  polyethylene glycol 3350 17 Gram(s) Oral daily  senna 2 Tablet(s) Oral at bedtime      T(C): 36.4 (11-28-24 @ 08:03), Max: 36.7 (11-27-24 @ 19:57)  HR: 59 (11-28-24 @ 08:03) (59 - 78)  BP: 101/67 (11-28-24 @ 08:03) (101/67 - 114/81)  RR: 16 (11-28-24 @ 08:03) (15 - 16)  SpO2: 96% (11-28-24 @ 08:03) (96% - 96%)    In NAD  HEENT- EOMI  Heart- No cyanosis  Lungs- No respiratory distress   Abd- + BS, NT  Ext- No calf pain  Neuro- Exam unchanged        Imp: Patient with diagnosis of L MCA Infarct admitted for comprehensive acute rehabilitation.    Plan:  -Impaired mobility - Continue PT/OT  - DVT prophylaxis - Lovenox  - Skin- Turn q2h, check skin daily  - Continue current medications; patient medically stable.   -Active issues-   -CVA - aspirin, atorvastatin  -DM - metformin, insulin  -Appreciate internal medicine consultation  - Patient to continue current rehabilitation program.

## 2024-11-29 DIAGNOSIS — E11.9 TYPE 2 DIABETES MELLITUS WITHOUT COMPLICATIONS: ICD-10-CM

## 2024-11-29 DIAGNOSIS — I21.4 NON-ST ELEVATION (NSTEMI) MYOCARDIAL INFARCTION: ICD-10-CM

## 2024-11-29 DIAGNOSIS — E78.5 HYPERLIPIDEMIA, UNSPECIFIED: ICD-10-CM

## 2024-11-29 DIAGNOSIS — J45.909 UNSPECIFIED ASTHMA, UNCOMPLICATED: ICD-10-CM

## 2024-11-29 DIAGNOSIS — I63.9 CEREBRAL INFARCTION, UNSPECIFIED: ICD-10-CM

## 2024-11-29 LAB
ALBUMIN SERPL ELPH-MCNC: 3.1 G/DL — LOW (ref 3.3–5)
ALP SERPL-CCNC: 72 U/L — SIGNIFICANT CHANGE UP (ref 40–120)
ALT FLD-CCNC: 40 U/L — SIGNIFICANT CHANGE UP (ref 10–45)
ANION GAP SERPL CALC-SCNC: 12 MMOL/L — SIGNIFICANT CHANGE UP (ref 5–17)
AST SERPL-CCNC: 25 U/L — SIGNIFICANT CHANGE UP (ref 10–40)
BASOPHILS # BLD AUTO: 0.06 K/UL — SIGNIFICANT CHANGE UP (ref 0–0.2)
BASOPHILS NFR BLD AUTO: 0.6 % — SIGNIFICANT CHANGE UP (ref 0–2)
BILIRUB SERPL-MCNC: 0.4 MG/DL — SIGNIFICANT CHANGE UP (ref 0.2–1.2)
BUN SERPL-MCNC: 27 MG/DL — HIGH (ref 7–23)
CALCIUM SERPL-MCNC: 9.5 MG/DL — SIGNIFICANT CHANGE UP (ref 8.4–10.5)
CHLORIDE SERPL-SCNC: 101 MMOL/L — SIGNIFICANT CHANGE UP (ref 96–108)
CO2 SERPL-SCNC: 25 MMOL/L — SIGNIFICANT CHANGE UP (ref 22–31)
CREAT SERPL-MCNC: 1.23 MG/DL — SIGNIFICANT CHANGE UP (ref 0.5–1.3)
EGFR: 64 ML/MIN/1.73M2 — SIGNIFICANT CHANGE UP
EOSINOPHIL # BLD AUTO: 0.45 K/UL — SIGNIFICANT CHANGE UP (ref 0–0.5)
EOSINOPHIL NFR BLD AUTO: 4.6 % — SIGNIFICANT CHANGE UP (ref 0–6)
GLUCOSE BLDC GLUCOMTR-MCNC: 148 MG/DL — HIGH (ref 70–99)
GLUCOSE BLDC GLUCOMTR-MCNC: 167 MG/DL — HIGH (ref 70–99)
GLUCOSE BLDC GLUCOMTR-MCNC: 172 MG/DL — HIGH (ref 70–99)
GLUCOSE BLDC GLUCOMTR-MCNC: 177 MG/DL — HIGH (ref 70–99)
GLUCOSE SERPL-MCNC: 149 MG/DL — HIGH (ref 70–99)
HCT VFR BLD CALC: 41.8 % — SIGNIFICANT CHANGE UP (ref 39–50)
HGB BLD-MCNC: 13.8 G/DL — SIGNIFICANT CHANGE UP (ref 13–17)
IMM GRANULOCYTES NFR BLD AUTO: 0.3 % — SIGNIFICANT CHANGE UP (ref 0–0.9)
LYMPHOCYTES # BLD AUTO: 3.32 K/UL — HIGH (ref 1–3.3)
LYMPHOCYTES # BLD AUTO: 33.9 % — SIGNIFICANT CHANGE UP (ref 13–44)
MCHC RBC-ENTMCNC: 28.8 PG — SIGNIFICANT CHANGE UP (ref 27–34)
MCHC RBC-ENTMCNC: 33 G/DL — SIGNIFICANT CHANGE UP (ref 32–36)
MCV RBC AUTO: 87.3 FL — SIGNIFICANT CHANGE UP (ref 80–100)
MONOCYTES # BLD AUTO: 0.82 K/UL — SIGNIFICANT CHANGE UP (ref 0–0.9)
MONOCYTES NFR BLD AUTO: 8.4 % — SIGNIFICANT CHANGE UP (ref 2–14)
NEUTROPHILS # BLD AUTO: 5.1 K/UL — SIGNIFICANT CHANGE UP (ref 1.8–7.4)
NEUTROPHILS NFR BLD AUTO: 52.2 % — SIGNIFICANT CHANGE UP (ref 43–77)
NRBC # BLD: 0 /100 WBCS — SIGNIFICANT CHANGE UP (ref 0–0)
PLATELET # BLD AUTO: 402 K/UL — HIGH (ref 150–400)
POTASSIUM SERPL-MCNC: 4 MMOL/L — SIGNIFICANT CHANGE UP (ref 3.5–5.3)
POTASSIUM SERPL-SCNC: 4 MMOL/L — SIGNIFICANT CHANGE UP (ref 3.5–5.3)
PROT SERPL-MCNC: 7.7 G/DL — SIGNIFICANT CHANGE UP (ref 6–8.3)
RBC # BLD: 4.79 M/UL — SIGNIFICANT CHANGE UP (ref 4.2–5.8)
RBC # FLD: 13.2 % — SIGNIFICANT CHANGE UP (ref 10.3–14.5)
SODIUM SERPL-SCNC: 138 MMOL/L — SIGNIFICANT CHANGE UP (ref 135–145)
WBC # BLD: 9.78 K/UL — SIGNIFICANT CHANGE UP (ref 3.8–10.5)
WBC # FLD AUTO: 9.78 K/UL — SIGNIFICANT CHANGE UP (ref 3.8–10.5)

## 2024-11-29 PROCEDURE — 99232 SBSQ HOSP IP/OBS MODERATE 35: CPT

## 2024-11-29 RX ADMIN — Medication 2: at 08:06

## 2024-11-29 RX ADMIN — Medication 20 MILLIGRAM(S): at 21:34

## 2024-11-29 RX ADMIN — Medication 500 MILLIGRAM(S): at 17:27

## 2024-11-29 RX ADMIN — Medication 4 UNIT(S): at 08:07

## 2024-11-29 RX ADMIN — Medication 2 PUFF(S): at 22:13

## 2024-11-29 RX ADMIN — Medication 2: at 12:04

## 2024-11-29 RX ADMIN — FAMOTIDINE 40 MILLIGRAM(S): 20 TABLET, FILM COATED ORAL at 12:03

## 2024-11-29 RX ADMIN — Medication 325 MILLIGRAM(S): at 05:39

## 2024-11-29 RX ADMIN — Medication 4 UNIT(S): at 16:45

## 2024-11-29 RX ADMIN — ENOXAPARIN SODIUM 40 MILLIGRAM(S): 30 INJECTION SUBCUTANEOUS at 22:35

## 2024-11-29 RX ADMIN — CLOPIDOGREL 75 MILLIGRAM(S): 75 TABLET, FILM COATED ORAL at 12:03

## 2024-11-29 RX ADMIN — POLYETHYLENE GLYCOL 3350 17 GRAM(S): 17 POWDER, FOR SOLUTION ORAL at 12:03

## 2024-11-29 RX ADMIN — INSULIN GLARGINE 15 UNIT(S): 100 INJECTION, SOLUTION SUBCUTANEOUS at 21:34

## 2024-11-29 RX ADMIN — Medication 4 UNIT(S): at 12:04

## 2024-11-29 RX ADMIN — Medication 2 TABLET(S): at 21:34

## 2024-11-29 RX ADMIN — Medication 500 MILLIGRAM(S): at 05:38

## 2024-11-29 NOTE — PROGRESS NOTE ADULT - SUBJECTIVE AND OBJECTIVE BOX
Patient is a 69y old  Male who presents with a chief complaint of L MCA Infarct with right body involvement (28 Nov 2024 09:46)      HPI:  68 y/o M with PMHx of HTN, HLD, DM, originally presented to [facility] on 10/27 as a code stroke after developed right sided weakness, pulmonary edema and hypertensive emergency in the setting of NSTEMI/HF. CTA + right carotid stenosis of 50% and left carotid stenosis of 90%, as well as segmental occlusion in thoracic aorta  Patient was transferred from [facility] to [facility] for angio/LICA stent w/ Dr. Ulrich on 11/5/24. [facility]  #[id number] used.     MR brain revealed L watershed/MCA strokes in the setting of high grade proximal LICA stenosis >90% at bifurcation. Patient underwent catheter cerebral angiogram, angioplasty and left carotid wall stent placement with Dr. Farhat Ulrich on 11/6/2024. Right groin was accessed but catheter could not be advanced 2/2 to near occlusion of the right iliac, puncture closed with closure device and procedure proceeded through right radial artery. Case was complicated by hypotension requiring multiple neosticks and IVF boluses to maintain hemodynamic stability. Post-op Carotid dopplers completed on 11/7/2024 demonstrated patent left ICA stent.    Cardiology was consulted for remote history of NSTEMI, f/u Echo shows EF 45% segmental wall motion abnormalities, Recommended to continue ASA/ Plavix and f/u outpatient with another cardiac cath which was deferred d/t acute stroke. Post op course c/b fluctuating RLE MMT exam, repeat CTH stable.     Patient was cleared for discharge to [facility] on 11/11/24.  (11 Nov 2024 14:19)      PAST MEDICAL & SURGICAL HISTORY:  Diabetes mellitus      HTN (hypertension)      Hyperlipidemia          MEDICATIONS  (STANDING):  albuterol    90 MICROgram(s) HFA Inhaler 2 Puff(s) Inhalation every 12 hours  aspirin 325 milliGRAM(s) Oral <User Schedule>  atorvastatin 20 milliGRAM(s) Oral at bedtime  clopidogrel Tablet 75 milliGRAM(s) Oral daily  dextrose 5%. 1000 milliLiter(s) (50 mL/Hr) IV Continuous <Continuous>  dextrose 5%. 1000 milliLiter(s) (100 mL/Hr) IV Continuous <Continuous>  dextrose 50% Injectable 25 Gram(s) IV Push once  dextrose 50% Injectable 12.5 Gram(s) IV Push once  dextrose 50% Injectable 25 Gram(s) IV Push once  enoxaparin Injectable 40 milliGRAM(s) SubCutaneous every 24 hours  famotidine    Tablet 40 milliGRAM(s) Oral daily  glucagon  Injectable 1 milliGRAM(s) IntraMuscular once  insulin glargine Injectable (LANTUS) 15 Unit(s) SubCutaneous at bedtime  insulin lispro (ADMELOG) corrective regimen sliding scale   SubCutaneous three times a day before meals  insulin lispro (ADMELOG) corrective regimen sliding scale   SubCutaneous at bedtime  insulin lispro Injectable (ADMELOG) 4 Unit(s) SubCutaneous before breakfast  insulin lispro Injectable (ADMELOG) 4 Unit(s) SubCutaneous before lunch  insulin lispro Injectable (ADMELOG) 4 Unit(s) SubCutaneous before dinner  metFORMIN 500 milliGRAM(s) Oral two times a day  metoprolol succinate ER 25 milliGRAM(s) Oral daily  polyethylene glycol 3350 17 Gram(s) Oral daily  senna 2 Tablet(s) Oral at bedtime    MEDICATIONS  (PRN):  acetaminophen     Tablet .. 650 milliGRAM(s) Oral every 6 hours PRN Temp greater or equal to 38C (100.4F), Mild Pain (1 - 3)  bisacodyl Suppository 10 milliGRAM(s) Rectal daily PRN Constipation  dextrose Oral Gel 15 Gram(s) Oral once PRN Blood Glucose LESS THAN 70 milliGRAM(s)/deciliter  melatonin 6 milliGRAM(s) Oral at bedtime PRN Insomnia      Allergies    No Known Allergies    Intolerances          VITALS  69y  Vital Signs Last 24 Hrs  T(C): 36.7 (29 Nov 2024 08:57), Max: 36.8 (28 Nov 2024 19:32)  T(F): 98 (29 Nov 2024 08:57), Max: 98.2 (28 Nov 2024 19:32)  HR: 82 (29 Nov 2024 08:57) (80 - 84)  BP: 115/76 (29 Nov 2024 08:57) (98/66 - 115/76)  BP(mean): --  RR: 16 (29 Nov 2024 08:57) (15 - 16)  SpO2: 97% (29 Nov 2024 08:57) (95% - 97%)    Parameters below as of 29 Nov 2024 08:57  Patient On (Oxygen Delivery Method): room air      Daily     Daily         RECENT LABS:                          13.8   9.78  )-----------( 402      ( 29 Nov 2024 06:00 )             41.8     11-29    138  |  101  |  27[H]  ----------------------------<  149[H]  4.0   |  25  |  1.23    Ca    9.5      29 Nov 2024 06:00    TPro  7.7  /  Alb  3.1[L]  /  TBili  0.4  /  DBili  x   /  AST  25  /  ALT  40  /  AlkPhos  72  11-29    LIVER FUNCTIONS - ( 29 Nov 2024 06:00 )  Alb: 3.1 g/dL / Pro: 7.7 g/dL / ALK PHOS: 72 U/L / ALT: 40 U/L / AST: 25 U/L / GGT: x             Urinalysis Basic - ( 29 Nov 2024 06:00 )    Color: x / Appearance: x / SG: x / pH: x  Gluc: 149 mg/dL / Ketone: x  / Bili: x / Urobili: x   Blood: x / Protein: x / Nitrite: x   Leuk Esterase: x / RBC: x / WBC x   Sq Epi: x / Non Sq Epi: x / Bacteria: x          CAPILLARY BLOOD GLUCOSE      POCT Blood Glucose.: 177 mg/dL (29 Nov 2024 08:05)  POCT Blood Glucose.: 200 mg/dL (28 Nov 2024 21:23)  POCT Blood Glucose.: 139 mg/dL (28 Nov 2024 16:45)  POCT Blood Glucose.: 139 mg/dL (28 Nov 2024 11:19)

## 2024-11-29 NOTE — PROGRESS NOTE ADULT - ASSESSMENT
69 year old male with PMHx of HTN, HLD, DM, who presented to [facility] on 10/27 with right sided weakness/code stroke, pulmonary edema and hypertensive emergency in the setting of NSTEMI/HF. CTA found to have right carotid stenosis of 50% and left carotid stenosis of 90%, as well as segmental occlusion in thoracic aorta  Patient was transferred from [facility] to [facility] for angio/LICA stent w/ Dr. Ulrich on 11/5/24.  Now admitted to [facility] with right sided deficits for initiation of a multidisciplinary rehab program consisting focused on functional mobility, transfers and ADLs.    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 Ulrich on 11/5.   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   # HLD  - TTE at [facility] showed EF 45-50% w/ Regional wall motion abnormalities - Cardiac cath deferred outpatient f/u d/t acute CVA  - Continue aspirin, plavix,   - continue metoprolol ER 25mg QD encourage PO fluids s/p IV Fluids for low BP  - Would defer further GDMT at this time given borderline BP   - Continue atorvastatin  - Outpatient follow up with cardiology    # Asthma - stable  - Albuterol BID    # T2DM with hyperglycemia   - HbA1c 9.6 on 10/28  - Continue metformin 500mg BID   - Continue Lantus 15units QHS and premeal admelog 4units TID w/ meals   - RAISS  - Monitor FS, overall at goal     #Mild Transaminitis   - resolved  - Continue to monitor CMP with routine labs      #GI PPx  -Pepcid     # DVT ppx:   - Lovenox 40mg daily    Discussed with rehab team

## 2024-11-29 NOTE — PROGRESS NOTE ADULT - ASSESSMENT
PEPE KIM is a 68 y/o M with PMHx of HTN, HLD, DM, who presented to [facility] on 10/27/24 with right sided weakness/code stroke, pulmonary edema and hypertensive emergency in the setting of NSTEMI/HF. 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 Ulrich on 11/5/24.     # L MCA Infarct with right body involvement  - CTA +right carotid stenosis of 50% and left carotid stenosis of 90%  - S/p angio/LICA stent 11/5.   - Aspirin 325mg and Plavix 75mg daily   - Atorvastatin 20mg HS   - Continue comprehensive rehab program, 3 hours a day, 5 days a week. PT OT SLP  - Benefits fromright AFO for foot drop on dc. Request/Rx submitted to orthotist last week  - Precautions: cardiac, DM, fall    # HLD/HTN  - Episodic hypotension, Likely 2/2 dehydration  - s/p 250 cc bolus. Encourage po fluids  - metoprolol 25 daily  - Atorvastatin 20mg HS   - BP (98/66 - 115/76) 11/29 Soft BP, monitor    # NSTEMI  # CHF  - TTE: EF 45-50%.  - CXR 11/17: Mild congestive heart failure. Small pleural effusions. No pneumothorax  - EKG 11/11:  Normal sinus rhythm, ST and T wave abnormality, consider anterolateral ischemia. (similar to prior 11/6)  - Metoprolol 25mg daily   - Aspirin 325mg daily, plavix  - statin  - Cardiac cath deferred outpatient f/u d/t acute CVA    # Asthma  - Albuterol PRN  - in remission.     # T2DM, uncontrolled with hyperglycemia  - A1c 9.6  - Lantus increased 15u 11/12  - admelog 4u qAC  - FBG ACHS + Mod ISS    # leukocytosis, resolved  - afebrile, no tachy  - UA 11/27 negative, CXR without evidence infection  - WBC 9.78 11/29    # mild transaminitis  - avoid hepatotoxic meds.  - on low dose statin  -  resolved    # Sleep:   - Melatonin 6mg HS PRN     # Pain Management:  - Tylenol PRN. Resumed 11/25    # GI/Bowel:  - Senna QHS, Miralax PRN Daily  - GI ppx: famotidine 40mg daily   - dulcolax suppository PRN     # Diet  - Regular, carb consistent diet, DASH/TLC    # DVT ppx:   - Lovenox 40mg daily, SCDs    # Case discussed in IDT rounds 11/27 (follow up)  - continent, mild-mod non fluent aphasia, improved comprehension, 4-6 word phrases with mod cues, min assist stand-picot transfers, ambulates to commode with WBQC and max cues, WC level for showering and toileting due to need for max cues. improved strength and AROM RLE but fatigues, CG bed mobility, CG/min assist transfers, ambulates 75 feet min-mod assist and WBQC  - barriers: right hemiparesis, stairs, incontinence, aphasia, reduced caregiver support, reduced endurance  - goals: intermittent supervision waking hours, CG transfers, supervision bADLs, CS transfers and ambulation household distances "Pepe" "to get it back". Ambulate to bathroom with CS (progressing), communicate wants and needs min assist (progressing)  - target: dc home 12/3 with caregiver support and home PT OT SLP, Cg/min assist dressing, BADLs (progressing), supervision transfers and ambulation level surfaces home environment (not met), CG transfers and WC level transfers to commode ()new)  - caregiver training 11/29  at 1 PM with wife and son      # LABS  CBC CMP 12/2  - monitor BP    ---------------  Code Status: FULL  Emergency Contact:    Outpatient Follow-up (Specialty/Name of physician):    Farhat Ulrich  Radiology  [address], [address]  [place] [address]  Phone: [phone]  Fax: [phone]     PEPE KIM is a 68 y/o M with PMHx of HTN, HLD, DM, who presented to [facility] on 10/27/24 with right sided weakness/code stroke, pulmonary edema and hypertensive emergency in the setting of NSTEMI/HF. 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 to [facility] for angio/LICA stent w/ Dr. Ulrich on 11/5/24.     # L MCA Infarct with right body involvement  - CTA +right carotid stenosis of 50% and left carotid stenosis of 90%  - S/p angio/LICA stent 11/5.   - Aspirin 325mg and Plavix 75mg daily   - Atorvastatin 20mg HS   - Continue comprehensive rehab program, 3 hours a day, 5 days a week. PT OT SLP  - Benefits from[facility] AFO for foot drop on dc. Request/Rx submitted to orthotist last week  - Precautions: cardiac, DM, fall    # HLD/HTN  - Episodic hypotension, Likely 2/2 dehydration  - s/p 250 cc bolus. Encourage po fluids  - metoprolol 25 daily  - Atorvastatin 20mg HS   - BP (98/66 - 115/76) 11/29 Soft BP, monitor    # NSTEMI  # CHF  - TTE: EF 45-50%.  - CXR 11/17: Mild congestive heart failure. Small pleural effusions. No pneumothorax  - EKG 11/11:  Normal sinus rhythm, ST and T wave abnormality, consider anterolateral ischemia. (similar to prior 11/6)  - Metoprolol 25mg daily   - Aspirin 325mg daily, plavix  - statin  - Cardiac cath deferred outpatient f/u d/t acute CVA    # Asthma  - Albuterol PRN  - in remission.     # T2DM, uncontrolled with hyperglycemia  - A1c 9.6  - Lantus increased 15u 11/12  - admelog 4u qAC  - FBG ACHS + Mod ISS    # leukocytosis, resolved  - afebrile, no tachy  - UA 11/27 negative, CXR without evidence infection  - WBC 9.78 11/29    # mild transaminitis  - avoid hepatotoxic meds.  - on low dose statin  -  resolved    # Sleep:   - Melatonin 6mg HS PRN     # Pain Management:  - Tylenol PRN. Resumed 11/25    # GI/Bowel:  - Senna QHS, Miralax PRN Daily  - GI ppx: famotidine 40mg daily   - dulcolax suppository PRN     # Diet  - Regular, carb consistent diet, DASH/TLC  - BUn/Cr 27/1.29 11/29. Encourage po fluids    # DVT ppx:   - Lovenox 40mg daily, SCDs    # Case discussed in IDT rounds 11/27 (follow up)  - continent, mild-mod non fluent aphasia, improved comprehension, 4-6 word phrases with mod cues, min assist stand-picot transfers, ambulates to commode with WBQC and max cues, WC level for showering and toileting due to need for max cues. improved strength and AROM RLE but fatigues, CG bed mobility, CG/min assist transfers, ambulates 75 feet min-mod assist and WBQC  - barriers: right hemiparesis, stairs, incontinence, aphasia, reduced caregiver support, reduced endurance  - goals: intermittent supervision waking hours, CG transfers, supervision bADLs, CS transfers and ambulation household distances "Pepe" "to get it back". Ambulate to bathroom with CS (progressing), communicate wants and needs min assist (progressing)  - target: dc home 12/3 with caregiver support and home PT OT SLP, Cg/min assist dressing, BADLs (progressing), supervision transfers and ambulation level surfaces home environment (not met), CG transfers and WC level transfers to commode ()new)  - caregiver training 11/29  at 1 PM with wife and son      # LABS  CBC CMP 12/2  - monitor BP    ---------------  Code Status: FULL  Emergency Contact:    Outpatient Follow-up (Specialty/Name of physician):    Farhat Ulrich  Radiology  [address], [address]  [place] [address]  Phone: [phone]  Fax: [phone]     PEPE KIM is a 70 y/o M with PMHx of HTN, HLD, DM, who presented to [facility] on 10/27/24 with right sided weakness/code stroke, pulmonary edema and hypertensive emergency in the setting of NSTEMI/HF. 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 Ulrich on 11/5/24.     # L MCA Infarct with right body involvement  - CTA +right carotid stenosis of 50% and left carotid stenosis of 90%  - S/p angio/LICA stent 11/5.   - Aspirin 325mg and Plavix 75mg daily   - Atorvastatin 20mg HS   - Continue comprehensive rehab program, 3 hours a day, 5 days a week. PT OT SLP  - Benefits [facility] A[facility] for foot drop on dc. Request/Rx submitted to orthotist last week  - Precautions: cardiac, DM, fall    # HLD/HTN  - Episodic hypotension, Likely 2/2 dehydration  - s/p 250 cc bolus. Encourage po fluids  - metoprolol 25 daily  - Atorvastatin 20mg HS   - BP (98/66 - 115/76) 11/29 Soft BP, encourage po fluids, discussed with patient and team, monitor    # NSTEMI  # CHF  - TTE: EF 45-50%.  - CXR 11/17: Mild congestive heart failure. Small pleural effusions. No pneumothorax  - EKG 11/11:  Normal sinus rhythm, ST and T wave abnormality, consider anterolateral ischemia. (similar to prior 11/6)  - Metoprolol 25mg daily   - Aspirin 325mg daily, plavix  - statin  - Cardiac cath deferred outpatient f/u d/t acute CVA    # Asthma  - Albuterol PRN  - in remission.     # T2DM, uncontrolled with hyperglycemia  - A1c 9.6  - Lantus increased 15u 11/12  - admelog 4u qAC  - FBG ACHS + Mod ISS    # leukocytosis, resolved  - afebrile, no tachy  - UA 11/27 negative, CXR without evidence infection  - WBC 9.78 11/29    # mild transaminitis  - avoid hepatotoxic meds.  - on low dose statin  -  resolved    # Sleep:   - Melatonin 6mg HS PRN     # Pain Management:  - Tylenol PRN. Resumed 11/25    # GI/Bowel:  - Senna QHS, Miralax PRN Daily  - GI ppx: famotidine 40mg daily   - dulcolax suppository PRN     # Diet  - Regular, carb consistent diet, DASH/TLC  - BUn/Cr 27/1.29 11/29. Encourage po fluids    # DVT ppx:   - Lovenox 40mg daily, SCDs    # Case discussed in IDT rounds 11/27 (follow up)  - continent, mild-mod non fluent aphasia, improved comprehension, 4-6 word phrases with mod cues, min assist stand-picot transfers, ambulates to commode with WBQC and max cues, WC level for showering and toileting due to need for max cues. improved strength and AROM RLE but fatigues, CG bed mobility, CG/min assist transfers, ambulates 75 feet min-mod assist and WBQC  - barriers: right hemiparesis, stairs, incontinence, aphasia, reduced caregiver support, reduced endurance  - goals: intermittent supervision waking hours, CG transfers, supervision bADLs, CS transfers and ambulation household distances "Pepe" "to get it back". Ambulate to bathroom with CS (progressing), communicate wants and needs min assist (progressing)  - target: dc home 12/3 with caregiver support and home PT OT SLP, Cg/min assist dressing, BADLs (progressing), supervision transfers and ambulation level surfaces home environment (not met), CG transfers and WC level transfers to commode ()new)  - caregiver training 11/29  at 1 PM with wife and son      # LABS  CBC CMP 12/2  - monitor BP    ---------------  Code Status: FULL  Emergency Contact:    Outpatient Follow-up (Specialty/Name of physician):    Farhat Ulrich  Radiology  [address], [address]  [place] [address]  Phone: [phone]  Fax: [phone]

## 2024-11-29 NOTE — PROGRESS NOTE ADULT - COMMENTS
Patient seen with Wyatt alejandre line intepreter #[id number] 9:15 AM. Patient doing well, denies any acute issues overnight. Denies any pain, SOB, dizziness, palpitations. He admits he is not drinking much; had soft BP today and metoprolol was held due to parameters. Importance of adequate fluids intake discussed, also discussed with team in huddle to encourage po

## 2024-11-29 NOTE — PROGRESS NOTE ADULT - SUBJECTIVE AND OBJECTIVE BOX
CC: Patient is a 69y old  Male who presents with a chief complaint of L MCA Infarct with right body involvement (27 Nov 2024 08:45)      Patient seen and examined at bedside w/ RN   No overnight events or acute medical issues althoug BP's running low  Denies CP, SOB, abd pain, N/V/D    ALLERGIES:  No Known Allergies    MEDICATIONS  (STANDING):  albuterol    90 MICROgram(s) HFA Inhaler 2 Puff(s) Inhalation every 12 hours  aspirin 325 milliGRAM(s) Oral <User Schedule>  atorvastatin 20 milliGRAM(s) Oral at bedtime  clopidogrel Tablet 75 milliGRAM(s) Oral daily  dextrose 5%. 1000 milliLiter(s) (100 mL/Hr) IV Continuous <Continuous>  dextrose 5%. 1000 milliLiter(s) (50 mL/Hr) IV Continuous <Continuous>  dextrose 50% Injectable 25 Gram(s) IV Push once  dextrose 50% Injectable 12.5 Gram(s) IV Push once  dextrose 50% Injectable 25 Gram(s) IV Push once  enoxaparin Injectable 40 milliGRAM(s) SubCutaneous every 24 hours  famotidine    Tablet 40 milliGRAM(s) Oral daily  glucagon  Injectable 1 milliGRAM(s) IntraMuscular once  insulin glargine Injectable (LANTUS) 15 Unit(s) SubCutaneous at bedtime  insulin lispro (ADMELOG) corrective regimen sliding scale   SubCutaneous three times a day before meals  insulin lispro (ADMELOG) corrective regimen sliding scale   SubCutaneous at bedtime  insulin lispro Injectable (ADMELOG) 4 Unit(s) SubCutaneous before breakfast  insulin lispro Injectable (ADMELOG) 4 Unit(s) SubCutaneous before lunch  insulin lispro Injectable (ADMELOG) 4 Unit(s) SubCutaneous before dinner  metFORMIN 500 milliGRAM(s) Oral two times a day  metoprolol succinate ER 25 milliGRAM(s) Oral daily  polyethylene glycol 3350 17 Gram(s) Oral daily  senna 2 Tablet(s) Oral at bedtime    MEDICATIONS  (PRN):  acetaminophen     Tablet .. 650 milliGRAM(s) Oral every 6 hours PRN Temp greater or equal to 38C (100.4F), Mild Pain (1 - 3)  bisacodyl Suppository 10 milliGRAM(s) Rectal daily PRN Constipation  dextrose Oral Gel 15 Gram(s) Oral once PRN Blood Glucose LESS THAN 70 milliGRAM(s)/deciliter  melatonin 6 milliGRAM(s) Oral at bedtime PRN Insomnia    Vital Signs Last 24 Hrs  T(F): 97.4 (27 Nov 2024 07:46), Max: 98 (26 Nov 2024 19:40)  HR: 67 (27 Nov 2024 07:46) (67 - 75)  BP: 113/85 (27 Nov 2024 07:46) (101/67 - 113/85)  RR: 15 (27 Nov 2024 07:46) (14 - 15)  SpO2: 95% (27 Nov 2024 07:46) (95% - 95%)  I&O's Summary        PHYSICAL EXAM:  General: NAD, awake, alert   ENT: MMM, no tonsilar exudate  Neck: Supple, No JVD  Lungs: Clear to auscultation bilaterally, no wheezes. Good air entry bilaterally   Cardio: RRR, S1/S2, No murmurs  Abdomen: Soft, Nontender, Nondistended; Bowel sounds present  Extremities: No calf tenderness, No pitting edema  Neuro rt weakness no new deficits    LABS:                        13.3   9.55  )-----------( 408      ( 25 Nov 2024 07:05 )             41.3       11-25    137  |  102  |  26  ----------------------------<  163  4.6   |  29  |  1.08    Ca    10.0      25 Nov 2024 07:05    TPro  7.7  /  Alb  3.0  /  TBili  0.4  /  DBili  x   /  AST  27  /  ALT  44  /  AlkPhos  69  11-25                10-30 Chol 138 mg/dL LDL -- HDL 28 mg/dL Trig 163 mg/dL              POCT Blood Glucose.: 150 mg/dL (27 Nov 2024 07:30)  POCT Blood Glucose.: 151 mg/dL (26 Nov 2024 20:27)  POCT Blood Glucose.: 253 mg/dL (26 Nov 2024 16:20)  POCT Blood Glucose.: 249 mg/dL (26 Nov 2024 11:43)      Urinalysis Basic - ( 25 Nov 2024 07:05 )    Color: x / Appearance: x / SG: x / pH: x  Gluc: 163 mg/dL / Ketone: x  / Bili: x / Urobili: x   Blood: x / Protein: x / Nitrite: x   Leuk Esterase: x / RBC: x / WBC x   Sq Epi: x / Non Sq Epi: x / Bacteria: x        COVID-19 PCR: NotDetec (11-11-24 @ 16:20)      Care Discussed with Consultants/Other Providers: Yes. rehab provider

## 2024-11-30 LAB
GLUCOSE BLDC GLUCOMTR-MCNC: 118 MG/DL — HIGH (ref 70–99)
GLUCOSE BLDC GLUCOMTR-MCNC: 127 MG/DL — HIGH (ref 70–99)
GLUCOSE BLDC GLUCOMTR-MCNC: 167 MG/DL — HIGH (ref 70–99)
GLUCOSE BLDC GLUCOMTR-MCNC: 96 MG/DL — SIGNIFICANT CHANGE UP (ref 70–99)

## 2024-11-30 PROCEDURE — 99232 SBSQ HOSP IP/OBS MODERATE 35: CPT

## 2024-11-30 RX ADMIN — Medication 2 PUFF(S): at 21:35

## 2024-11-30 RX ADMIN — Medication 4 UNIT(S): at 16:32

## 2024-11-30 RX ADMIN — Medication 2: at 11:32

## 2024-11-30 RX ADMIN — Medication 325 MILLIGRAM(S): at 05:40

## 2024-11-30 RX ADMIN — Medication 500 MILLIGRAM(S): at 07:48

## 2024-11-30 RX ADMIN — METOPROLOL TARTRATE 25 MILLIGRAM(S): 100 TABLET, FILM COATED ORAL at 05:43

## 2024-11-30 RX ADMIN — Medication 2 PUFF(S): at 09:46

## 2024-11-30 RX ADMIN — ENOXAPARIN SODIUM 40 MILLIGRAM(S): 30 INJECTION SUBCUTANEOUS at 22:28

## 2024-11-30 RX ADMIN — INSULIN GLARGINE 15 UNIT(S): 100 INJECTION, SOLUTION SUBCUTANEOUS at 22:07

## 2024-11-30 RX ADMIN — CLOPIDOGREL 75 MILLIGRAM(S): 75 TABLET, FILM COATED ORAL at 11:32

## 2024-11-30 RX ADMIN — FAMOTIDINE 40 MILLIGRAM(S): 20 TABLET, FILM COATED ORAL at 11:32

## 2024-11-30 RX ADMIN — POLYETHYLENE GLYCOL 3350 17 GRAM(S): 17 POWDER, FOR SOLUTION ORAL at 11:32

## 2024-11-30 RX ADMIN — Medication 500 MILLIGRAM(S): at 17:16

## 2024-11-30 RX ADMIN — Medication 4 UNIT(S): at 11:33

## 2024-11-30 RX ADMIN — Medication 4 UNIT(S): at 07:47

## 2024-11-30 RX ADMIN — Medication 20 MILLIGRAM(S): at 22:07

## 2024-11-30 RX ADMIN — Medication 2 TABLET(S): at 22:07

## 2024-11-30 NOTE — PROGRESS NOTE ADULT - SUBJECTIVE AND OBJECTIVE BOX
Patient is a 69y old  Male who presents with a chief complaint of L MCA Infarct with right body involvement (29 Nov 2024 09:43)      There were no acute medical issues or events last night and patient is without new complaints or issues this AM  Per nursing at bedside he slept well    ALLERGIES:  No Known Allergies    MEDICATIONS  (STANDING):  albuterol    90 MICROgram(s) HFA Inhaler 2 Puff(s) Inhalation every 12 hours  aspirin 325 milliGRAM(s) Oral <User Schedule>  atorvastatin 20 milliGRAM(s) Oral at bedtime  clopidogrel Tablet 75 milliGRAM(s) Oral daily  dextrose 5%. 1000 milliLiter(s) (100 mL/Hr) IV Continuous <Continuous>  dextrose 5%. 1000 milliLiter(s) (50 mL/Hr) IV Continuous <Continuous>  dextrose 50% Injectable 25 Gram(s) IV Push once  dextrose 50% Injectable 12.5 Gram(s) IV Push once  dextrose 50% Injectable 25 Gram(s) IV Push once  enoxaparin Injectable 40 milliGRAM(s) SubCutaneous every 24 hours  famotidine    Tablet 40 milliGRAM(s) Oral daily  glucagon  Injectable 1 milliGRAM(s) IntraMuscular once  insulin glargine Injectable (LANTUS) 15 Unit(s) SubCutaneous at bedtime  insulin lispro (ADMELOG) corrective regimen sliding scale   SubCutaneous three times a day before meals  insulin lispro (ADMELOG) corrective regimen sliding scale   SubCutaneous at bedtime  insulin lispro Injectable (ADMELOG) 4 Unit(s) SubCutaneous before breakfast  insulin lispro Injectable (ADMELOG) 4 Unit(s) SubCutaneous before lunch  insulin lispro Injectable (ADMELOG) 4 Unit(s) SubCutaneous before dinner  metFORMIN 500 milliGRAM(s) Oral two times a day  metoprolol succinate ER 25 milliGRAM(s) Oral daily  polyethylene glycol 3350 17 Gram(s) Oral daily  senna 2 Tablet(s) Oral at bedtime    MEDICATIONS  (PRN):  acetaminophen     Tablet .. 650 milliGRAM(s) Oral every 6 hours PRN Temp greater or equal to 38C (100.4F), Mild Pain (1 - 3)  bisacodyl Suppository 10 milliGRAM(s) Rectal daily PRN Constipation  dextrose Oral Gel 15 Gram(s) Oral once PRN Blood Glucose LESS THAN 70 milliGRAM(s)/deciliter  melatonin 6 milliGRAM(s) Oral at bedtime PRN Insomnia    Vital Signs Last 24 Hrs  T(F): 97.5 (30 Nov 2024 07:48), Max: 97.7 (29 Nov 2024 20:51)  HR: 80 (30 Nov 2024 07:48) (78 - 84)  BP: 124/84 (30 Nov 2024 07:48) (114/76 - 124/84)  RR: 15 (30 Nov 2024 07:48) (15 - 15)  SpO2: 95% (30 Nov 2024 07:48) (95% - 97%)  I&O's Summary            POCT Blood Glucose.: 127 mg/dL (30 Nov 2024 07:45)  POCT Blood Glucose.: 167 mg/dL (29 Nov 2024 21:33)  POCT Blood Glucose.: 148 mg/dL (29 Nov 2024 16:44)  POCT Blood Glucose.: 172 mg/dL (29 Nov 2024 12:01)    PHYSICAL EXAM:  General: NAD, Alert and responsive  ENT: MMM  Neck: Supple, No JVD  Lungs: Clear to auscultation bilaterally  Cardio: RRR, S1/S2, No murmurs  Abdomen: Soft, Nontender, Nondistended; Bowel sounds present  Extremities: No calf tenderness, No pitting edema  Neuro rt sided weakness no new deficits      LABS:                        13.8   9.78  )-----------( 402      ( 29 Nov 2024 06:00 )             41.8       11-29    138  |  101  |  27  ----------------------------<  149  4.0   |  25  |  1.23    Ca    9.5      29 Nov 2024 06:00    TPro  7.7  /  Alb  3.1  /  TBili  0.4  /  DBili  x   /  AST  25  /  ALT  40  /  AlkPhos  72  11-29                10-30 Chol 138 mg/dL LDL -- HDL 28 mg/dL Trig 163 mg/dL            Urinalysis Basic - ( 29 Nov 2024 06:00 )    Color: x / Appearance: x / SG: x / pH: x  Gluc: 149 mg/dL / Ketone: x  / Bili: x / Urobili: x   Blood: x / Protein: x / Nitrite: x   Leuk Esterase: x / RBC: x / WBC x   Sq Epi: x / Non Sq Epi: x / Bacteria: x        COVID-19 PCR: NotOdette (11-11-24 @ 16:20)

## 2024-11-30 NOTE — PROGRESS NOTE ADULT - SUBJECTIVE AND OBJECTIVE BOX
Cc: Impaired mobility      used for visit.     HPI: Patient resting comfortably.  Denies any pain.  No new complaints.   Has been tolerating rehabilitation program.    acetaminophen     Tablet .. 650 milliGRAM(s) Oral every 6 hours PRN  albuterol    90 MICROgram(s) HFA Inhaler 2 Puff(s) Inhalation every 12 hours  aspirin 325 milliGRAM(s) Oral <User Schedule>  atorvastatin 20 milliGRAM(s) Oral at bedtime  bisacodyl Suppository 10 milliGRAM(s) Rectal daily PRN  clopidogrel Tablet 75 milliGRAM(s) Oral daily  dextrose 5%. 1000 milliLiter(s) IV Continuous <Continuous>  dextrose 5%. 1000 milliLiter(s) IV Continuous <Continuous>  dextrose 50% Injectable 25 Gram(s) IV Push once  dextrose 50% Injectable 12.5 Gram(s) IV Push once  dextrose 50% Injectable 25 Gram(s) IV Push once  dextrose Oral Gel 15 Gram(s) Oral once PRN  enoxaparin Injectable 40 milliGRAM(s) SubCutaneous every 24 hours  famotidine    Tablet 40 milliGRAM(s) Oral daily  glucagon  Injectable 1 milliGRAM(s) IntraMuscular once  insulin glargine Injectable (LANTUS) 15 Unit(s) SubCutaneous at bedtime  insulin lispro (ADMELOG) corrective regimen sliding scale   SubCutaneous three times a day before meals  insulin lispro (ADMELOG) corrective regimen sliding scale   SubCutaneous at bedtime  insulin lispro Injectable (ADMELOG) 4 Unit(s) SubCutaneous before breakfast  insulin lispro Injectable (ADMELOG) 4 Unit(s) SubCutaneous before lunch  insulin lispro Injectable (ADMELOG) 4 Unit(s) SubCutaneous before dinner  melatonin 6 milliGRAM(s) Oral at bedtime PRN  metFORMIN 500 milliGRAM(s) Oral two times a day  metoprolol succinate ER 25 milliGRAM(s) Oral daily  polyethylene glycol 3350 17 Gram(s) Oral daily  senna 2 Tablet(s) Oral at bedtime      T(C): 36.4 (11-30-24 @ 07:48), Max: 36.5 (11-29-24 @ 20:51)  HR: 80 (11-30-24 @ 07:48) (78 - 84)  BP: 124/84 (11-30-24 @ 07:48) (114/76 - 124/84)  RR: 15 (11-30-24 @ 07:48) (15 - 15)  SpO2: 95% (11-30-24 @ 07:48) (95% - 97%)    In NAD  HEENT- EOMI  Heart- No cyanosis   Lungs- No respiratory distress   Abd- Non-distended, Non-tender   Ext- No ertyhema   Neuro- Exam unchanged                  Imp: Patient with diagnosis of L MCA Infarct admitted for comprehensive acute rehabilitation.    Plan:  -Impaired mobility - Continue PT/OT  - DVT prophylaxis - Lovenox  - Skin- Turn q2h, check skin daily  - Continue current medications  -Active issues-   -CVA - aspirin, atorvastatin  -DM - metformin, insulin  -Constipation/bowel program - miralax, senna   -Appreciate internal medicine consultation  - Patient to continue current rehabilitation program.

## 2024-11-30 NOTE — PROGRESS NOTE ADULT - ASSESSMENT
69 year old male with PMHx of HTN, HLD, DM, who presented to [facility] on 10/27 with right sided weakness/code stroke, pulmonary edema and hypertensive emergency in the setting of NSTEMI/HF. 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 Ulrich on 11/5/24.  Now admitted to [facility] with right sided deficits for initiation of a multidisciplinary rehab program consisting focused on functional mobility, transfers and ADLs.    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 Ulrich on 11/5.   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   # HLD  - TTE at [facility] showed EF 45-50% w/ Regional wall motion abnormalities - Cardiac cath deferred outpatient f/u d/t acute CVA  - Continue aspirin, plavix,   - continue metoprolol ER 25mg QD encourage PO fluids s/p IV Fluids for low BP  - Would defer further GDMT at this time given borderline BP   - Continue atorvastatin  - Outpatient follow up with cardiology    # Asthma - stable  - Albuterol BID    # T2DM with hyperglycemia   - HbA1c 9.6 on 10/28  - Continue metformin 500mg BID   - Continue Lantus 15units QHS and premeal admelog 4units TID w/ meals   - RAISS  - Monitor FS, overall at goal     #Mild Transaminitis   - resolved  - Continue to monitor CMP with routine labs      #GI PPx  -Pepcid     # DVT ppx:   - Lovenox 40mg daily    Discussed with rehab team

## 2024-12-01 LAB
GLUCOSE BLDC GLUCOMTR-MCNC: 116 MG/DL — HIGH (ref 70–99)
GLUCOSE BLDC GLUCOMTR-MCNC: 128 MG/DL — HIGH (ref 70–99)
GLUCOSE BLDC GLUCOMTR-MCNC: 131 MG/DL — HIGH (ref 70–99)
GLUCOSE BLDC GLUCOMTR-MCNC: 153 MG/DL — HIGH (ref 70–99)

## 2024-12-01 PROCEDURE — 99232 SBSQ HOSP IP/OBS MODERATE 35: CPT

## 2024-12-01 RX ADMIN — POLYETHYLENE GLYCOL 3350 17 GRAM(S): 17 POWDER, FOR SOLUTION ORAL at 12:08

## 2024-12-01 RX ADMIN — Medication 4 UNIT(S): at 12:06

## 2024-12-01 RX ADMIN — Medication 2 TABLET(S): at 21:40

## 2024-12-01 RX ADMIN — Medication 2: at 08:02

## 2024-12-01 RX ADMIN — Medication 500 MILLIGRAM(S): at 17:03

## 2024-12-01 RX ADMIN — Medication 4 UNIT(S): at 08:01

## 2024-12-01 RX ADMIN — Medication 2 PUFF(S): at 21:01

## 2024-12-01 RX ADMIN — ENOXAPARIN SODIUM 40 MILLIGRAM(S): 30 INJECTION SUBCUTANEOUS at 22:19

## 2024-12-01 RX ADMIN — Medication 4 UNIT(S): at 17:01

## 2024-12-01 RX ADMIN — CLOPIDOGREL 75 MILLIGRAM(S): 75 TABLET, FILM COATED ORAL at 12:08

## 2024-12-01 RX ADMIN — METOPROLOL TARTRATE 25 MILLIGRAM(S): 100 TABLET, FILM COATED ORAL at 05:41

## 2024-12-01 RX ADMIN — Medication 20 MILLIGRAM(S): at 21:39

## 2024-12-01 RX ADMIN — FAMOTIDINE 40 MILLIGRAM(S): 20 TABLET, FILM COATED ORAL at 12:09

## 2024-12-01 RX ADMIN — INSULIN GLARGINE 15 UNIT(S): 100 INJECTION, SOLUTION SUBCUTANEOUS at 21:40

## 2024-12-01 RX ADMIN — Medication 500 MILLIGRAM(S): at 08:01

## 2024-12-01 RX ADMIN — Medication 325 MILLIGRAM(S): at 05:41

## 2024-12-01 NOTE — PROGRESS NOTE ADULT - SUBJECTIVE AND OBJECTIVE BOX
Patient is a 69y old  Male who presents with a chief complaint of L MCA Infarct with right body involvement (30 Nov 2024 09:27)      Patient examined sitting up before breakfast at the nurses station   There were no acute medical events yesterday or overnight and patient is without complaints this morning.      ALLERGIES:  No Known Allergies    MEDICATIONS  (STANDING):  albuterol    90 MICROgram(s) HFA Inhaler 2 Puff(s) Inhalation every 12 hours  aspirin 325 milliGRAM(s) Oral <User Schedule>  atorvastatin 20 milliGRAM(s) Oral at bedtime  clopidogrel Tablet 75 milliGRAM(s) Oral daily  dextrose 5%. 1000 milliLiter(s) (100 mL/Hr) IV Continuous <Continuous>  dextrose 5%. 1000 milliLiter(s) (50 mL/Hr) IV Continuous <Continuous>  dextrose 50% Injectable 25 Gram(s) IV Push once  dextrose 50% Injectable 12.5 Gram(s) IV Push once  dextrose 50% Injectable 25 Gram(s) IV Push once  enoxaparin Injectable 40 milliGRAM(s) SubCutaneous every 24 hours  famotidine    Tablet 40 milliGRAM(s) Oral daily  glucagon  Injectable 1 milliGRAM(s) IntraMuscular once  insulin glargine Injectable (LANTUS) 15 Unit(s) SubCutaneous at bedtime  insulin lispro (ADMELOG) corrective regimen sliding scale   SubCutaneous three times a day before meals  insulin lispro (ADMELOG) corrective regimen sliding scale   SubCutaneous at bedtime  insulin lispro Injectable (ADMELOG) 4 Unit(s) SubCutaneous before breakfast  insulin lispro Injectable (ADMELOG) 4 Unit(s) SubCutaneous before lunch  insulin lispro Injectable (ADMELOG) 4 Unit(s) SubCutaneous before dinner  metFORMIN 500 milliGRAM(s) Oral two times a day  metoprolol succinate ER 25 milliGRAM(s) Oral daily  polyethylene glycol 3350 17 Gram(s) Oral daily  senna 2 Tablet(s) Oral at bedtime    MEDICATIONS  (PRN):  acetaminophen     Tablet .. 650 milliGRAM(s) Oral every 6 hours PRN Temp greater or equal to 38C (100.4F), Mild Pain (1 - 3)  bisacodyl Suppository 10 milliGRAM(s) Rectal daily PRN Constipation  dextrose Oral Gel 15 Gram(s) Oral once PRN Blood Glucose LESS THAN 70 milliGRAM(s)/deciliter  melatonin 6 milliGRAM(s) Oral at bedtime PRN Insomnia    Vital Signs Last 24 Hrs  T(F): 98 (30 Nov 2024 20:32), Max: 98 (30 Nov 2024 20:32)  HR: 79 (01 Dec 2024 05:39) (79 - 84)  BP: 112/82 (01 Dec 2024 05:39) (102/69 - 112/82)  RR: 14 (30 Nov 2024 20:32) (14 - 14)  SpO2: 97% (30 Nov 2024 20:32) (95% - 97%)  I&O's Summary    30 Nov 2024 07:01  -  01 Dec 2024 07:00  --------------------------------------------------------  IN: 0 mL / OUT: 1 mL / NET: -1 mL              POCT Blood Glucose.: 153 mg/dL (01 Dec 2024 07:59)  POCT Blood Glucose.: 118 mg/dL (30 Nov 2024 22:04)  POCT Blood Glucose.: 96 mg/dL (30 Nov 2024 16:29)  POCT Blood Glucose.: 167 mg/dL (30 Nov 2024 11:28)    PHYSICAL EXAM:  General: NAD, Alert  ENT: MMM  Neck: Supple, No JVD  Lungs: Clear to auscultation bilaterally  Cardio: RRR, S1/S2, No murmurs  Abdomen: Soft, Nontender, Nondistended; Bowel sounds present  Extremities: No calf tenderness, No pitting edema  Neuro rt sided weakness no new deficits    LABS:                        13.8   9.78  )-----------( 402      ( 29 Nov 2024 06:00 )             41.8       11-29    138  |  101  |  27  ----------------------------<  149  4.0   |  25  |  1.23    Ca    9.5      29 Nov 2024 06:00    TPro  7.7  /  Alb  3.1  /  TBili  0.4  /  DBili  x   /  AST  25  /  ALT  40  /  AlkPhos  72  11-29                10-30 Chol 138 mg/dL LDL -- HDL 28 mg/dL Trig 163 mg/dL            Urinalysis Basic - ( 29 Nov 2024 06:00 )    Color: x / Appearance: x / SG: x / pH: x  Gluc: 149 mg/dL / Ketone: x  / Bili: x / Urobili: x   Blood: x / Protein: x / Nitrite: x   Leuk Esterase: x / RBC: x / WBC x   Sq Epi: x / Non Sq Epi: x / Bacteria: x        COVID-19 PCR: NotDetec (11-11-24 @ 16:20)

## 2024-12-01 NOTE — PROGRESS NOTE ADULT - NSPROGADDITIONALINFOA_GEN_ALL_CORE
I have personally interviewed and examined this patient, reviewed pertinent clinical information, and performed the evaluation and management services provided at today's visit for inpatient medical follow up    I am available to discuss any issues related to the medical care of this patient on the unit this morning, through [employer] Teams Chat or by phone at [phone]    d/w Dr Mulugeta macdonald d/w team at [facility]
I have personally interviewed and examined this patient, reviewed pertinent clinical information, and performed the evaluation and management services provided at today's visit for inpatient medical follow up    I am available to discuss any issues related to the medical care of this patient on the unit this morning, through [employer] Teams Chat or by phone at [phone]    Will discuss with multidisciplinary team at [facility] today
I have personally interviewed and examined this patient, reviewed pertinent clinical information, and performed the evaluation and management services provided at today's visit for inpatient medical follow up    I am available to discuss any issues related to the medical care of this patient on the unit this morning, through [employer] Teams Chat or by phone at [phone]    d/w Dr Mulugeta macdonald d/w team at [facility]

## 2024-12-01 NOTE — PROGRESS NOTE ADULT - SUBJECTIVE AND OBJECTIVE BOX
Cc: Impaired mobility      used for evaluation.      HPI: Patient resting comfortably.  Denies pain.   Has been tolerating rehabilitation program.    acetaminophen     Tablet .. 650 milliGRAM(s) Oral every 6 hours PRN  albuterol    90 MICROgram(s) HFA Inhaler 2 Puff(s) Inhalation every 12 hours  aspirin 325 milliGRAM(s) Oral <User Schedule>  atorvastatin 20 milliGRAM(s) Oral at bedtime  bisacodyl Suppository 10 milliGRAM(s) Rectal daily PRN  clopidogrel Tablet 75 milliGRAM(s) Oral daily  dextrose 5%. 1000 milliLiter(s) IV Continuous <Continuous>  dextrose 5%. 1000 milliLiter(s) IV Continuous <Continuous>  dextrose 50% Injectable 25 Gram(s) IV Push once  dextrose 50% Injectable 12.5 Gram(s) IV Push once  dextrose 50% Injectable 25 Gram(s) IV Push once  dextrose Oral Gel 15 Gram(s) Oral once PRN  enoxaparin Injectable 40 milliGRAM(s) SubCutaneous every 24 hours  famotidine    Tablet 40 milliGRAM(s) Oral daily  glucagon  Injectable 1 milliGRAM(s) IntraMuscular once  insulin glargine Injectable (LANTUS) 15 Unit(s) SubCutaneous at bedtime  insulin lispro (ADMELOG) corrective regimen sliding scale   SubCutaneous three times a day before meals  insulin lispro (ADMELOG) corrective regimen sliding scale   SubCutaneous at bedtime  insulin lispro Injectable (ADMELOG) 4 Unit(s) SubCutaneous before breakfast  insulin lispro Injectable (ADMELOG) 4 Unit(s) SubCutaneous before lunch  insulin lispro Injectable (ADMELOG) 4 Unit(s) SubCutaneous before dinner  melatonin 6 milliGRAM(s) Oral at bedtime PRN  metFORMIN 500 milliGRAM(s) Oral two times a day  metoprolol succinate ER 25 milliGRAM(s) Oral daily  polyethylene glycol 3350 17 Gram(s) Oral daily  senna 2 Tablet(s) Oral at bedtime      T(C): 36.7 (11-30-24 @ 20:32), Max: 36.7 (11-30-24 @ 20:32)  HR: 79 (12-01-24 @ 05:39) (79 - 84)  BP: 112/82 (12-01-24 @ 05:39) (102/69 - 112/82)  RR: 14 (11-30-24 @ 20:32) (14 - 14)  SpO2: 97% (11-30-24 @ 20:32) (95% - 97%)    In NAD  HEENT- EOMI  Heart- No cyanosis   Lungs- No respiratory distress   Abd- Non-distended, Non-tender   Ext- No erythema   Neuro- Exam unchanged      Imp: Patient with diagnosis of L MCA Infarct admitted for comprehensive acute rehabilitation.    Plan:  -Impaired mobility - Continue PT/OT  - DVT prophylaxis - Lovenox  - Skin- Turn q2h, check skin daily  - Continue current medications  -Active issues-   -CVA - aspirin, atorvastatin  -DM - metformin, insulin  -Constipation/bowel program - miralax, senna   -Appreciate internal medicine consultation  - Patient to continue current rehabilitation program.

## 2024-12-01 NOTE — PROGRESS NOTE ADULT - ASSESSMENT
69 year old male with PMHx of HTN, HLD, DM, who presented to [facility] on 10/27 with right sided weakness/code stroke, pulmonary edema and hypertensive emergency in the setting of NSTEMI/HF. 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 Ulrich on 11/5/24.  Now admitted to [facility] with right sided deficits for initiation of a multidisciplinary rehab program consisting focused on functional mobility, transfers and ADLs.    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 Ulrich on 11/5.   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   # HLD  - TTE at [facility] showed EF 45-50% w/ Regional wall motion abnormalities - Cardiac cath deferred outpatient f/u d/t acute CVA  - Continue aspirin, plavix,   - continue metoprolol ER 25mg QD encourage PO fluids s/p IV Fluids for low BP  - Would defer further GDMT at this time given borderline BP   - Continue atorvastatin  - Outpatient follow up with cardiology    # Asthma - stable  - Albuterol BID    # T2DM with hyperglycemia   - HbA1c 9.6 on 10/28  - Continue metformin 500mg BID   - Continue Lantus 15units QHS and premeal admelog 4units TID w/ meals   - RAISS  - Monitor FS, overall at goal     #Mild Transaminitis   - resolved  - Continue to monitor CMP with routine labs      #GI PPx  -Pepcid     # DVT ppx:   - Lovenox 40mg daily    Discussed with rehab team

## 2024-12-02 LAB
ALBUMIN SERPL ELPH-MCNC: 3.2 G/DL — LOW (ref 3.3–5)
ALP SERPL-CCNC: 67 U/L — SIGNIFICANT CHANGE UP (ref 40–120)
ALT FLD-CCNC: 42 U/L — SIGNIFICANT CHANGE UP (ref 10–45)
ANION GAP SERPL CALC-SCNC: 8 MMOL/L — SIGNIFICANT CHANGE UP (ref 5–17)
AST SERPL-CCNC: 28 U/L — SIGNIFICANT CHANGE UP (ref 10–40)
BASOPHILS # BLD AUTO: 0.05 K/UL — SIGNIFICANT CHANGE UP (ref 0–0.2)
BASOPHILS NFR BLD AUTO: 0.6 % — SIGNIFICANT CHANGE UP (ref 0–2)
BILIRUB SERPL-MCNC: 0.5 MG/DL — SIGNIFICANT CHANGE UP (ref 0.2–1.2)
BUN SERPL-MCNC: 21 MG/DL — SIGNIFICANT CHANGE UP (ref 7–23)
CALCIUM SERPL-MCNC: 9.5 MG/DL — SIGNIFICANT CHANGE UP (ref 8.4–10.5)
CHLORIDE SERPL-SCNC: 101 MMOL/L — SIGNIFICANT CHANGE UP (ref 96–108)
CO2 SERPL-SCNC: 29 MMOL/L — SIGNIFICANT CHANGE UP (ref 22–31)
CREAT SERPL-MCNC: 1.17 MG/DL — SIGNIFICANT CHANGE UP (ref 0.5–1.3)
EGFR: 68 ML/MIN/1.73M2 — SIGNIFICANT CHANGE UP
EOSINOPHIL # BLD AUTO: 0.3 K/UL — SIGNIFICANT CHANGE UP (ref 0–0.5)
EOSINOPHIL NFR BLD AUTO: 3.4 % — SIGNIFICANT CHANGE UP (ref 0–6)
GLUCOSE BLDC GLUCOMTR-MCNC: 114 MG/DL — HIGH (ref 70–99)
GLUCOSE BLDC GLUCOMTR-MCNC: 120 MG/DL — HIGH (ref 70–99)
GLUCOSE BLDC GLUCOMTR-MCNC: 145 MG/DL — HIGH (ref 70–99)
GLUCOSE BLDC GLUCOMTR-MCNC: 186 MG/DL — HIGH (ref 70–99)
GLUCOSE SERPL-MCNC: 138 MG/DL — HIGH (ref 70–99)
HCT VFR BLD CALC: 42.6 % — SIGNIFICANT CHANGE UP (ref 39–50)
HGB BLD-MCNC: 13.9 G/DL — SIGNIFICANT CHANGE UP (ref 13–17)
IMM GRANULOCYTES NFR BLD AUTO: 0.2 % — SIGNIFICANT CHANGE UP (ref 0–0.9)
LYMPHOCYTES # BLD AUTO: 2.55 K/UL — SIGNIFICANT CHANGE UP (ref 1–3.3)
LYMPHOCYTES # BLD AUTO: 28.6 % — SIGNIFICANT CHANGE UP (ref 13–44)
MCHC RBC-ENTMCNC: 28.2 PG — SIGNIFICANT CHANGE UP (ref 27–34)
MCHC RBC-ENTMCNC: 32.6 G/DL — SIGNIFICANT CHANGE UP (ref 32–36)
MCV RBC AUTO: 86.4 FL — SIGNIFICANT CHANGE UP (ref 80–100)
MONOCYTES # BLD AUTO: 0.75 K/UL — SIGNIFICANT CHANGE UP (ref 0–0.9)
MONOCYTES NFR BLD AUTO: 8.4 % — SIGNIFICANT CHANGE UP (ref 2–14)
NEUTROPHILS # BLD AUTO: 5.25 K/UL — SIGNIFICANT CHANGE UP (ref 1.8–7.4)
NEUTROPHILS NFR BLD AUTO: 58.8 % — SIGNIFICANT CHANGE UP (ref 43–77)
NRBC # BLD: 0 /100 WBCS — SIGNIFICANT CHANGE UP (ref 0–0)
PLATELET # BLD AUTO: 336 K/UL — SIGNIFICANT CHANGE UP (ref 150–400)
POTASSIUM SERPL-MCNC: 4 MMOL/L — SIGNIFICANT CHANGE UP (ref 3.5–5.3)
POTASSIUM SERPL-SCNC: 4 MMOL/L — SIGNIFICANT CHANGE UP (ref 3.5–5.3)
PROT SERPL-MCNC: 7.7 G/DL — SIGNIFICANT CHANGE UP (ref 6–8.3)
RBC # BLD: 4.93 M/UL — SIGNIFICANT CHANGE UP (ref 4.2–5.8)
RBC # FLD: 13.1 % — SIGNIFICANT CHANGE UP (ref 10.3–14.5)
SODIUM SERPL-SCNC: 138 MMOL/L — SIGNIFICANT CHANGE UP (ref 135–145)
WBC # BLD: 8.92 K/UL — SIGNIFICANT CHANGE UP (ref 3.8–10.5)
WBC # FLD AUTO: 8.92 K/UL — SIGNIFICANT CHANGE UP (ref 3.8–10.5)

## 2024-12-02 PROCEDURE — 99232 SBSQ HOSP IP/OBS MODERATE 35: CPT

## 2024-12-02 RX ADMIN — CLOPIDOGREL 75 MILLIGRAM(S): 75 TABLET, FILM COATED ORAL at 12:07

## 2024-12-02 RX ADMIN — ENOXAPARIN SODIUM 40 MILLIGRAM(S): 30 INJECTION SUBCUTANEOUS at 23:32

## 2024-12-02 RX ADMIN — Medication 500 MILLIGRAM(S): at 07:58

## 2024-12-02 RX ADMIN — Medication 20 MILLIGRAM(S): at 21:41

## 2024-12-02 RX ADMIN — Medication 325 MILLIGRAM(S): at 06:03

## 2024-12-02 RX ADMIN — INSULIN GLARGINE 15 UNIT(S): 100 INJECTION, SOLUTION SUBCUTANEOUS at 21:41

## 2024-12-02 RX ADMIN — Medication 2 PUFF(S): at 08:08

## 2024-12-02 RX ADMIN — POLYETHYLENE GLYCOL 3350 17 GRAM(S): 17 POWDER, FOR SOLUTION ORAL at 12:08

## 2024-12-02 RX ADMIN — METOPROLOL TARTRATE 25 MILLIGRAM(S): 100 TABLET, FILM COATED ORAL at 06:04

## 2024-12-02 RX ADMIN — FAMOTIDINE 40 MILLIGRAM(S): 20 TABLET, FILM COATED ORAL at 12:08

## 2024-12-02 RX ADMIN — Medication 2 TABLET(S): at 21:41

## 2024-12-02 RX ADMIN — Medication 4 UNIT(S): at 07:57

## 2024-12-02 RX ADMIN — Medication 2 PUFF(S): at 21:19

## 2024-12-02 RX ADMIN — Medication 1000 MILLIGRAM(S): at 17:47

## 2024-12-02 RX ADMIN — Medication 2: at 12:06

## 2024-12-02 NOTE — PROGRESS NOTE ADULT - SUBJECTIVE AND OBJECTIVE BOX
Patient is a 69y old  Male who presents with a chief complaint of L MCA Infarct with right body involvement (01 Dec 2024 09:18)      HPI:  70 y/o M with PMHx of HTN, HLD, DM, originally presented to [facility] on 10/27 as a code stroke after developed right sided weakness, pulmonary edema and hypertensive emergency in the setting of NSTEMI/HF. CTA + right carotid stenosis of 50% and left carotid stenosis of 90%, as well as segmental occlusion in thoracic aorta  Patient was transferred from [facility] to [facility] for angio/LICA stent w/ Dr. Ulrich on 11/5/24. [facility]  #[id number] used.     MR brain revealed L watershed/MCA strokes in the setting of high grade proximal LICA stenosis >90% at bifurcation. Patient underwent catheter cerebral angiogram, angioplasty and left carotid wall stent placement with Dr. Farhat Ulrich on 11/6/2024. Right groin was accessed but catheter could not be advanced 2/2 to near occlusion of the right iliac, puncture closed with closure device and procedure proceeded through right radial artery. Case was complicated by hypotension requiring multiple neosticks and IVF boluses to maintain hemodynamic stability. Post-op Carotid dopplers completed on 11/7/2024 demonstrated patent left ICA stent.    Cardiology was consulted for remote history of NSTEMI, f/u Echo shows EF 45% segmental wall motion abnormalities, Recommended to continue ASA/ Plavix and f/u outpatient with another cardiac cath which was deferred d/t acute stroke. Post op course c/b fluctuating RLE MMT exam, repeat CTH stable.     Patient was cleared for discharge to [facility] on 11/11/24.  (11 Nov 2024 14:19)      PAST MEDICAL & SURGICAL HISTORY:  Diabetes mellitus      HTN (hypertension)      Hyperlipidemia          MEDICATIONS  (STANDING):  albuterol    90 MICROgram(s) HFA Inhaler 2 Puff(s) Inhalation every 12 hours  aspirin 325 milliGRAM(s) Oral <User Schedule>  atorvastatin 20 milliGRAM(s) Oral at bedtime  clopidogrel Tablet 75 milliGRAM(s) Oral daily  dextrose 5%. 1000 milliLiter(s) (100 mL/Hr) IV Continuous <Continuous>  dextrose 5%. 1000 milliLiter(s) (50 mL/Hr) IV Continuous <Continuous>  dextrose 50% Injectable 25 Gram(s) IV Push once  dextrose 50% Injectable 12.5 Gram(s) IV Push once  dextrose 50% Injectable 25 Gram(s) IV Push once  enoxaparin Injectable 40 milliGRAM(s) SubCutaneous every 24 hours  famotidine    Tablet 40 milliGRAM(s) Oral daily  glucagon  Injectable 1 milliGRAM(s) IntraMuscular once  insulin glargine Injectable (LANTUS) 15 Unit(s) SubCutaneous at bedtime  insulin lispro (ADMELOG) corrective regimen sliding scale   SubCutaneous three times a day before meals  insulin lispro (ADMELOG) corrective regimen sliding scale   SubCutaneous at bedtime  insulin lispro Injectable (ADMELOG) 4 Unit(s) SubCutaneous before breakfast  insulin lispro Injectable (ADMELOG) 4 Unit(s) SubCutaneous before lunch  insulin lispro Injectable (ADMELOG) 4 Unit(s) SubCutaneous before dinner  metFORMIN 500 milliGRAM(s) Oral two times a day  metoprolol succinate ER 25 milliGRAM(s) Oral daily  polyethylene glycol 3350 17 Gram(s) Oral daily  senna 2 Tablet(s) Oral at bedtime    MEDICATIONS  (PRN):  acetaminophen     Tablet .. 650 milliGRAM(s) Oral every 6 hours PRN Temp greater or equal to 38C (100.4F), Mild Pain (1 - 3)  bisacodyl Suppository 10 milliGRAM(s) Rectal daily PRN Constipation  dextrose Oral Gel 15 Gram(s) Oral once PRN Blood Glucose LESS THAN 70 milliGRAM(s)/deciliter  melatonin 6 milliGRAM(s) Oral at bedtime PRN Insomnia      Allergies    No Known Allergies    Intolerances          VITALS  69y  Vital Signs Last 24 Hrs  T(C): 36.8 (02 Dec 2024 08:04), Max: 36.8 (02 Dec 2024 08:04)  T(F): 98.2 (02 Dec 2024 08:04), Max: 98.2 (02 Dec 2024 08:04)  HR: 73 (02 Dec 2024 08:08) (73 - 81)  BP: 112/74 (02 Dec 2024 08:04) (103/60 - 122/83)  BP(mean): --  RR: 16 (02 Dec 2024 08:04) (14 - 16)  SpO2: 96% (02 Dec 2024 08:08) (94% - 97%)    Parameters below as of 02 Dec 2024 08:08  Patient On (Oxygen Delivery Method): room air      Daily     Daily         RECENT LABS:                          13.9   8.92  )-----------( 336      ( 02 Dec 2024 05:57 )             42.6     12-02    138  |  101  |  21  ----------------------------<  138[H]  4.0   |  29  |  1.17    Ca    9.5      02 Dec 2024 05:57    TPro  7.7  /  Alb  3.2[L]  /  TBili  0.5  /  DBili  x   /  AST  28  /  ALT  42  /  AlkPhos  67  12-02    LIVER FUNCTIONS - ( 02 Dec 2024 05:57 )  Alb: 3.2 g/dL / Pro: 7.7 g/dL / ALK PHOS: 67 U/L / ALT: 42 U/L / AST: 28 U/L / GGT: x             Urinalysis Basic - ( 02 Dec 2024 05:57 )    Color: x / Appearance: x / SG: x / pH: x  Gluc: 138 mg/dL / Ketone: x  / Bili: x / Urobili: x   Blood: x / Protein: x / Nitrite: x   Leuk Esterase: x / RBC: x / WBC x   Sq Epi: x / Non Sq Epi: x / Bacteria: x          CAPILLARY BLOOD GLUCOSE      POCT Blood Glucose.: 145 mg/dL (02 Dec 2024 07:55)  POCT Blood Glucose.: 131 mg/dL (01 Dec 2024 21:38)  POCT Blood Glucose.: 128 mg/dL (01 Dec 2024 16:40)  POCT Blood Glucose.: 116 mg/dL (01 Dec 2024 12:02)

## 2024-12-02 NOTE — PROGRESS NOTE ADULT - TIME BILLING
Time spent includes direct patient care  (interview and examination of patient), discussion with other providers, support staff and/or patient's family members, review of medical records, ordering diagnostic tests and analyzing results, and documentation, excluding time spent in ACP discussions.
Time spent includes direct patient care  (interview and examination of patient), discussion with other providers, support staff and/or patient's family members, review of medical records, ordering diagnostic tests and analyzing results, and documentation.
Time spent includes direct patient care  (interview and examination of patient), discussion with other providers, support staff and/or patient's family members, review of medical records, ordering diagnostic tests and analyzing results, and documentation.
Time spent includes direct patient care (interview and examination of patient), discussion with other providers, support staff and/or patient's family members, review of medical records, ordering diagnostic tests and analyzing results, and documentation
This includes reviewing results/imaging and discussions with specialists, nursing, case management/social work. Further tests, medications, and procedures have been ordered as indicated. Results and the plan of care were communicated to the patient and/or their family member. Supporting documentation was completed and added to the patient's chart.
Time spent includes direct patient care  (interview and examination of patient), discussion with other providers, support staff and/or patient's family members, review of medical records, ordering diagnostic tests and analyzing results, and documentation.
Time spent includes direct patient care  (interview and examination of patient), discussion with other providers, support staff and/or patient's family members, review of medical records, ordering diagnostic tests and analyzing results, and documentation.

## 2024-12-02 NOTE — PROGRESS NOTE ADULT - ASSESSMENT
PEPE KIM is a 68 y/o M with PMHx of HTN, HLD, DM, who presented to [facility] on 10/27/24 with right sided weakness/code stroke, pulmonary edema and hypertensive emergency in the setting of NSTEMI/HF. 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 Ulrich on 11/5/24.     # L MCA Infarct with right body involvement  - CTA +right carotid stenosis of 50% and left carotid stenosis of 90%  - S/p angio/LICA stent 11/5.   - Aspirin 325mg and Plavix 75mg daily   - Atorvastatin 20mg HS   - Continue comprehensive rehab program, 3 hours a day, 5 days a week. PT OT SLP  - Benefits fromright AFO for foot drop on dc. Request/Rx submitted to orthotist last week  - Precautions: cardiac, DM, fall    # HLD/HTN  - Episodic hypotension, Likely 2/2 dehydration  - s/p 250 cc bolus. Encourage po fluids  - metoprolol 25 daily  - Atorvastatin 20mg HS   - BP (103/60 - 122/83) 12/2 improved    # NSTEMI  # CHF  - TTE: EF 45-50%.  - CXR 11/17: Mild congestive heart failure. Small pleural effusions. No pneumothorax  - EKG 11/11:  Normal sinus rhythm, ST and T wave abnormality, consider anterolateral ischemia. (similar to prior 11/6)  - Metoprolol 25mg daily   - Aspirin 325mg daily, plavix  - statin  - Cardiac cath deferred outpatient f/u d/t acute CVA    # Asthma  - Albuterol PRN  - in remission.     # T2DM, uncontrolled with hyperglycemia  - A1c 9.6  - Lantus increased 15u 11/12  - admelog 4u qAC  - FBG ACHS + Mod ISS    # leukocytosis, resolved  - afebrile, no tachy  - UA 11/27 negative, CXR without evidence infection  - WBC 9.78 11/29--> 8.92 12/2    # mild transaminitis  - avoid hepatotoxic meds.  - on low dose statin  - AST  28  /  ALT  42  /  AlkPhos  67  12-02  resolved    # Sleep:   - Melatonin 6mg HS PRN     # Pain Management:  - Tylenol PRN. Resumed 11/25    # GI/Bowel:  - Senna QHS, Miralax PRN Daily  - GI ppx: famotidine 40mg daily   - dulcolax suppository PRN     # Diet  - Regular, carb consistent diet, DASH/TLC  - BUn/Cr 27/1.29 11/29--> 21/1.17 12/2 improved.  - conitnue to encourage po fluids    # DVT ppx:   - Lovenox 40mg daily, SCDs    # Case discussed in IDT rounds 11/27 (follow up)  - continent, mild-mod non fluent aphasia, improved comprehension, 4-6 word phrases with mod cues, min assist stand-picot transfers, ambulates to commode with WBQC and max cues, WC level for showering and toileting due to need for max cues. improved strength and AROM RLE but fatigues, CG bed mobility, CG/min assist transfers, ambulates 75 feet min-mod assist and WBQC  - barriers: right hemiparesis, stairs, incontinence, aphasia, reduced caregiver support, reduced endurance  - goals: intermittent supervision waking hours, CG transfers, supervision bADLs, CS transfers and ambulation household distances "Pepe" "to get it back". Ambulate to bathroom with CS (progressing), communicate wants and needs min assist (progressing)  - target: dc home 12/3 with caregiver support and home PT OT SLP, Cg/min assist dressing, BADLs (progressing), supervision transfers and ambulation level surfaces home environment (not met), CG transfers and WC level transfers to commode ()new)  - caregiver training 11/29  at 1 PM with wife and son      # LABS  CBC CMP 12/5    ---------------  Code Status: FULL  Emergency Contact:    Outpatient Follow-up (Specialty/Name of physician):    Farhat Ulirch  Radiology  [address], [address]  [place] [address]  Phone: [phone]  Fax: [phone]     PEPE KIM is a 70 y/o M with PMHx of HTN, HLD, DM, who presented to [facility] on 10/27/24 with right sided weakness/code stroke, pulmonary edema and hypertensive emergency in the setting of NSTEMI/HF. 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 Ulrich on 11/5/24.     # L MCA Infarct with right body involvement  - CTA +right carotid stenosis of 50% and left carotid stenosis of 90%  - S/p angio/LICA stent 11/5.   - Aspirin 325mg and Plavix 75mg daily   - Atorvastatin 20mg HS   - Continue comprehensive rehab program, 3 hours a day, 5 days a week. PT OT SLP  - Precautions: cardiac, DM, fall    # right  foot drop:   - Right foot AFO: Patient with right hemiparesis secondary to left MCA stroke. He has right HF strength 4-/5 quad 4/5 ham 4-/5 DF 3/5 mild heel cord tightness PF 4-/5 and will be a functional ambulator. He is also a diabetic. Recommend custom hinged AFO  to improve clearance during swing phase and stability/safety during gait/stance phase. Should be lined due to diabetes history. This cannot be accomplished with a stock brace and he will require for > 6 months.    # HLD/HTN  - Episodic hypotension, Likely 2/2 dehydration  - s/p 250 cc bolus. Encourage po fluids  - metoprolol 25 daily  - Atorvastatin 20mg HS   - BP (103/60 - 122/83) 12/2 improved    # NSTEMI  # CHF  - TTE: EF 45-50%.  - CXR 11/17: Mild congestive heart failure. Small pleural effusions. No pneumothorax  - EKG 11/11:  Normal sinus rhythm, ST and T wave abnormality, consider anterolateral ischemia. (similar to prior 11/6)  - Metoprolol 25mg daily   - Aspirin 325mg daily, plavix  - statin  - Cardiac cath deferred outpatient f/u d/t acute CVA    # Asthma  - Albuterol PRN  - in remission.     # T2DM, uncontrolled with hyperglycemia  - A1c 9.6  - Lantus increased 15u 11/12  - admelog 4u qAC  - FBG ACHS + Mod ISS    # leukocytosis, resolved  - afebrile, no tachy  - UA 11/27 negative, CXR without evidence infection  - WBC 9.78 11/29--> 8.92 12/2    # mild transaminitis  - avoid hepatotoxic meds.  - on low dose statin  - AST  28  /  ALT  42  /  AlkPhos  67  12-02  resolved    # Sleep:   - Melatonin 6mg HS PRN     # Pain Management:  - Tylenol PRN. Resumed 11/25    # GI/Bowel:  - Senna QHS, Miralax PRN Daily  - GI ppx: famotidine 40mg daily   - dulcolax suppository PRN     # Diet  - Regular, carb consistent diet, DASH/TLC  - BUn/Cr 27/1.29 11/29--> 21/1.17 12/2 improved.  - conitnue to encourage po fluids    # DVT ppx:   - Lovenox 40mg daily, SCDs    # Case discussed in IDT rounds 11/27 (follow up)  - continent, mild-mod non fluent aphasia, improved comprehension, 4-6 word phrases with mod cues, min assist stand-picot transfers, ambulates to commode with WBQC and max cues, WC level for showering and toileting due to need for max cues. improved strength and AROM RLE but fatigues, CG bed mobility, CG/min assist transfers, ambulates 75 feet min-mod assist and WBQC  - barriers: right hemiparesis, stairs, incontinence, aphasia, reduced caregiver support, reduced endurance  - goals: intermittent supervision waking hours, CG transfers, supervision bADLs, CS transfers and ambulation household distances "Pepe" "to get it back". Ambulate to bathroom with CS (progressing), communicate wants and needs min assist (progressing)  - target: dc home 12/3 with caregiver support and home PT OT SLP, Cg/min assist dressing, BADLs (progressing), supervision transfers and ambulation level surfaces home environment (not met), CG transfers and WC level transfers to commode ()new)  - caregiver training 11/29  at 1 PM with wife and son      # LABS  CBC CMP 12/5    ---------------  Code Status: FULL  Emergency Contact:    Outpatient Follow-up (Specialty/Name of physician):    Farhat Ulrich  Radiology  [address] [place] [address], [address]  [place] [address]  Phone: [phone]  Fax: [phone]     PEPE KIM is a 70 y/o M with PMHx of HTN, HLD, DM, who presented to [facility] on 10/27/24 with right sided weakness/code stroke, pulmonary edema and hypertensive emergency in the setting of NSTEMI/HF. 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 Ulrich on 11/5/24.     # L MCA Infarct with right body involvement  - CTA +right carotid stenosis of 50% and left carotid stenosis of 90%  - S/p angio/LICA stent 11/5.   - Aspirin 325mg and Plavix 75mg daily   - Atorvastatin 20mg HS   - Continue comprehensive rehab program, 3 hours a day, 5 days a week. PT OT SLP  - Precautions: cardiac, DM, fall    # right  foot drop:   - Right foot AFO: Patient with right hemiparesis secondary to left MCA stroke. He has right HF strength 4-/5 quad 4/5 ham 4-/5 DF 3/5 mild heel cord tightness PF 4-/5 and will be a functional ambulator. The custom molded, hinged AFO will help preserve ankle motion while stabilizing the talar and subtalar joints and improve gait efficiency and safety. He is also a diabetic. Recommend custom hinged AFO  to improve clearance during swing phase and stability/safety during gait/stance phase. Should be lined due to diabetes history to protect the prominent bony areas of skin. This cannot be accomplished with a stock brace and he will require for > 9 months.    # HLD/HTN  - Episodic hypotension, Likely 2/2 dehydration  - s/p 250 cc bolus. Encourage po fluids  - metoprolol 25 daily  - Atorvastatin 20mg HS   - BP (103/60 - 122/83) 12/2 improved    # NSTEMI  # CHF  - TTE: EF 45-50%.  - CXR 11/17: Mild congestive heart failure. Small pleural effusions. No pneumothorax  - EKG 11/11:  Normal sinus rhythm, ST and T wave abnormality, consider anterolateral ischemia. (similar to prior 11/6)  - Metoprolol 25mg daily   - Aspirin 325mg daily, plavix  - statin  - Cardiac cath deferred outpatient f/u d/t acute CVA    # Asthma  - Albuterol PRN  - in remission.     # T2DM, uncontrolled with hyperglycemia  - A1c 9.6  - Lantus increased 15u 11/12  - admelog 4u qAC  - FBG ACHS + Mod ISS    # leukocytosis, resolved  - afebrile, no tachy  - UA 11/27 negative, CXR without evidence infection  - WBC 9.78 11/29--> 8.92 12/2    # mild transaminitis  - avoid hepatotoxic meds.  - on low dose statin  - AST  28  /  ALT  42  /  AlkPhos  67  12-02  resolved    # Sleep:   - Melatonin 6mg HS PRN     # Pain Management:  - Tylenol PRN. Resumed 11/25    # GI/Bowel:  - Senna QHS, Miralax PRN Daily  - GI ppx: famotidine 40mg daily   - dulcolax suppository PRN     # Diet  - Regular, carb consistent diet, DASH/TLC  - BUn/Cr 27/1.29 11/29--> 21/1.17 12/2 improved.  - conitnue to encourage po fluids    # DVT ppx:   - Lovenox 40mg daily, SCDs    # Case discussed in IDT rounds 11/27 (follow up)  - continent, mild-mod non fluent aphasia, improved comprehension, 4-6 word phrases with mod cues, min assist stand-picot transfers, ambulates to commode with WBQC and max cues, WC level for showering and toileting due to need for max cues. improved strength and AROM RLE but fatigues, CG bed mobility, CG/min assist transfers, ambulates 75 feet min-mod assist and WBQC  - barriers: right hemiparesis, stairs, incontinence, aphasia, reduced caregiver support, reduced endurance  - goals: intermittent supervision waking hours, CG transfers, supervision bADLs, CS transfers and ambulation household distances "Pepe" "to get it back". Ambulate to bathroom with CS (progressing), communicate wants and needs min assist (progressing)  - target: dc home 12/3 with caregiver support and home PT OT SLP, Cg/min assist dressing, BADLs (progressing), supervision transfers and ambulation level surfaces home environment (not met), CG transfers and WC level transfers to commode ()new)  - caregiver training 11/29  at 1 PM with wife and son      # LABS  CBC CMP 12/5    ---------------  Code Status: FULL  Emergency Contact:    Outpatient Follow-up (Specialty/Name of physician):    Farhat Ulrich  Radiology  [address], [address]  [place] [address]  Phone: [phone]  Fax: [phone]

## 2024-12-02 NOTE — PROGRESS NOTE ADULT - SUBJECTIVE AND OBJECTIVE BOX
Patient is a 69y old  Male who presents with a chief complaint of L MCA Infarct with right body involvement (30 Nov 2024 09:27)    Patient examined and examined. No new complaints. No acute evnts overnight.    ALLERGIES:  No Known Allergies    MEDICATIONS  (STANDING):  albuterol    90 MICROgram(s) HFA Inhaler 2 Puff(s) Inhalation every 12 hours  aspirin 325 milliGRAM(s) Oral <User Schedule>  atorvastatin 20 milliGRAM(s) Oral at bedtime  clopidogrel Tablet 75 milliGRAM(s) Oral daily  dextrose 5%. 1000 milliLiter(s) (100 mL/Hr) IV Continuous <Continuous>  dextrose 5%. 1000 milliLiter(s) (50 mL/Hr) IV Continuous <Continuous>  dextrose 50% Injectable 25 Gram(s) IV Push once  dextrose 50% Injectable 12.5 Gram(s) IV Push once  dextrose 50% Injectable 25 Gram(s) IV Push once  enoxaparin Injectable 40 milliGRAM(s) SubCutaneous every 24 hours  famotidine    Tablet 40 milliGRAM(s) Oral daily  glucagon  Injectable 1 milliGRAM(s) IntraMuscular once  insulin glargine Injectable (LANTUS) 15 Unit(s) SubCutaneous at bedtime  insulin lispro (ADMELOG) corrective regimen sliding scale   SubCutaneous three times a day before meals  insulin lispro (ADMELOG) corrective regimen sliding scale   SubCutaneous at bedtime  insulin lispro Injectable (ADMELOG) 4 Unit(s) SubCutaneous before breakfast  insulin lispro Injectable (ADMELOG) 4 Unit(s) SubCutaneous before lunch  insulin lispro Injectable (ADMELOG) 4 Unit(s) SubCutaneous before dinner  metFORMIN 500 milliGRAM(s) Oral two times a day  metoprolol succinate ER 25 milliGRAM(s) Oral daily  polyethylene glycol 3350 17 Gram(s) Oral daily  senna 2 Tablet(s) Oral at bedtime    MEDICATIONS  (PRN):  acetaminophen     Tablet .. 650 milliGRAM(s) Oral every 6 hours PRN Temp greater or equal to 38C (100.4F), Mild Pain (1 - 3)  bisacodyl Suppository 10 milliGRAM(s) Rectal daily PRN Constipation  dextrose Oral Gel 15 Gram(s) Oral once PRN Blood Glucose LESS THAN 70 milliGRAM(s)/deciliter  melatonin 6 milliGRAM(s) Oral at bedtime PRN Insomnia    T(C): 36.8 (12-02-24 @ 08:04), Max: 36.8 (12-02-24 @ 08:04)  T(F): 98.2 (12-02-24 @ 08:04), Max: 98.2 (12-02-24 @ 08:04)  HR: 73 (12-02-24 @ 08:08) (73 - 81)  BP: 112/74 (12-02-24 @ 08:04) (112/74 - 122/83)  ABP: --  ABP(mean): --  RR: 16 (12-02-24 @ 08:04) (16 - 16)  SpO2: 96% (12-02-24 @ 08:08) (95% - 97%)    PHYSICAL EXAM:  General: NAD, Alert  ENT: MMM  Neck: Supple, No JVD  Lungs: Clear to auscultation bilaterally  Cardio: RRR, S1/S2, No murmurs  Abdomen: Soft, Nontender, Nondistended; Bowel sounds present  Extremities: No calf tenderness, No pitting edema  Neuro rt sided weakness no new deficits    LABS:                        13.9   8.92  )-----------( 336      ( 02 Dec 2024 05:57 )             42.6     12-02    138  |  101  |  21  ----------------------------<  138[H]  4.0   |  29  |  1.17    Ca    9.5      02 Dec 2024 05:57    TPro  7.7  /  Alb  3.2[L]  /  TBili  0.5  /  DBili  x   /  AST  28  /  ALT  42  /  AlkPhos  67  12-02      Urinalysis Basic - ( 02 Dec 2024 05:57 )    Color: x / Appearance: x / SG: x / pH: x  Gluc: 138 mg/dL / Ketone: x  / Bili: x / Urobili: x   Blood: x / Protein: x / Nitrite: x   Leuk Esterase: x / RBC: x / WBC x   Sq Epi: x / Non Sq Epi: x / Bacteria: x       CAPILLARY BLOOD GLUCOSE      POCT Blood Glucose.: 145 mg/dL (02 Dec 2024 07:55)  POCT Blood Glucose.: 131 mg/dL (01 Dec 2024 21:38)  POCT Blood Glucose.: 128 mg/dL (01 Dec 2024 16:40)  POCT Blood Glucose.: 116 mg/dL (01 Dec 2024 12:02)        Urinalysis Basic - ( 02 Dec 2024 05:57 )    Color: x / Appearance: x / SG: x / pH: x  Gluc: 138 mg/dL / Ketone: x  / Bili: x / Urobili: x   Blood: x / Protein: x / Nitrite: x   Leuk Esterase: x / RBC: x / WBC x   Sq Epi: x / Non Sq Epi: x / Bacteria: x        RADIOLOGY & ADDITIONAL TESTS:    Consultant(s) Notes Reviewed:  [x ] YES  [ ] NO  Care Discussed with Consultants/Other Providers [ x] YES  [ ] NO  Imaging Personally Reviewed:  [x ] YES  [ ] NO

## 2024-12-02 NOTE — PROGRESS NOTE ADULT - COMMENTS
Patient was seen with [facility] language line  9:15 AM    Patient stable over the weekend. Has BM 12/1, . BP stable. No complaints of pain, no questions. BUN/Cr labs today improved. No cough or SOB Patient was seen with [facility] language line  9:15 AM # [id number]    Patient stable over the weekend. Has BM 12/1, . BP stable. No complaints of pain, no questions. BUN/Cr labs today improved. No cough or SOB

## 2024-12-02 NOTE — PROGRESS NOTE ADULT - ASSESSMENT
69 year old male with PMHx of HTN, HLD, DM, who presented to [facility] on 10/27 with right sided weakness/code stroke, pulmonary edema and hypertensive emergency in the setting of NSTEMI/HF. 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 Ulrich on 11/5/24.  Now admitted to [facility] with right sided deficits for initiation of a multidisciplinary rehab program consisting focused on functional mobility, transfers and ADLs.    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 Ulrich on 11/5.   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   # HLD  - TTE at [facility] showed EF 45-50% w/ Regional wall motion abnormalities - Cardiac cath deferred outpatient f/u d/t acute CVA  - Continue aspirin, plavix,   - continue metoprolol ER 25mg QD encourage PO fluids s/p IV Fluids for low BP  - Would defer further GDMT at this time given borderline BP   - Continue atorvastatin  - Outpatient follow up with cardiology    # Asthma - stable  - Albuterol BID    # T2DM with hyperglycemia   - HbA1c 9.6 on 10/28  - Continue metformin 500mg BID   - Continue Lantus 15units QHS and premeal admelog 4units TID w/ meals   - RAISS  - Monitor FS, overall at goal     #Mild Transaminitis   - resolved  - Continue to monitor CMP with routine labs      #GI PPx  -Pepcid     # DVT ppx:   - Lovenox 40mg daily    Discussed with rehab team, will follow

## 2024-12-03 ENCOUNTER — TRANSCRIPTION ENCOUNTER (OUTPATIENT)
Age: 69
End: 2024-12-03

## 2024-12-03 VITALS
HEART RATE: 77 BPM | OXYGEN SATURATION: 98 % | SYSTOLIC BLOOD PRESSURE: 118 MMHG | TEMPERATURE: 98 F | RESPIRATION RATE: 16 BRPM | DIASTOLIC BLOOD PRESSURE: 88 MMHG

## 2024-12-03 PROBLEM — Z00.00 ENCOUNTER FOR PREVENTIVE HEALTH EXAMINATION: Status: ACTIVE | Noted: 2024-12-03

## 2024-12-03 LAB
GLUCOSE BLDC GLUCOMTR-MCNC: 132 MG/DL — HIGH (ref 70–99)
GLUCOSE BLDC GLUCOMTR-MCNC: 143 MG/DL — HIGH (ref 70–99)

## 2024-12-03 PROCEDURE — 93005 ELECTROCARDIOGRAM TRACING: CPT

## 2024-12-03 PROCEDURE — 97112 NEUROMUSCULAR REEDUCATION: CPT | Mod: GP

## 2024-12-03 PROCEDURE — 80053 COMPREHEN METABOLIC PANEL: CPT

## 2024-12-03 PROCEDURE — 82962 GLUCOSE BLOOD TEST: CPT

## 2024-12-03 PROCEDURE — 97161 PT EVAL LOW COMPLEX 20 MIN: CPT | Mod: GP

## 2024-12-03 PROCEDURE — 92610 EVALUATE SWALLOWING FUNCTION: CPT | Mod: GN

## 2024-12-03 PROCEDURE — 71045 X-RAY EXAM CHEST 1 VIEW: CPT

## 2024-12-03 PROCEDURE — 92507 TX SP LANG VOICE COMM INDIV: CPT | Mod: GN

## 2024-12-03 PROCEDURE — 94640 AIRWAY INHALATION TREATMENT: CPT

## 2024-12-03 PROCEDURE — 92523 SPEECH SOUND LANG COMPREHEN: CPT | Mod: GN

## 2024-12-03 PROCEDURE — 97530 THERAPEUTIC ACTIVITIES: CPT | Mod: GP

## 2024-12-03 PROCEDURE — 97116 GAIT TRAINING THERAPY: CPT | Mod: GP

## 2024-12-03 PROCEDURE — 85027 COMPLETE CBC AUTOMATED: CPT

## 2024-12-03 PROCEDURE — 87635 SARS-COV-2 COVID-19 AMP PRB: CPT

## 2024-12-03 PROCEDURE — 80048 BASIC METABOLIC PNL TOTAL CA: CPT

## 2024-12-03 PROCEDURE — 85025 COMPLETE CBC W/AUTO DIFF WBC: CPT

## 2024-12-03 PROCEDURE — 99239 HOSP IP/OBS DSCHRG MGMT >30: CPT

## 2024-12-03 PROCEDURE — 97535 SELF CARE MNGMENT TRAINING: CPT | Mod: GO

## 2024-12-03 PROCEDURE — 36415 COLL VENOUS BLD VENIPUNCTURE: CPT

## 2024-12-03 PROCEDURE — 97110 THERAPEUTIC EXERCISES: CPT | Mod: GO

## 2024-12-03 PROCEDURE — 97165 OT EVAL LOW COMPLEX 30 MIN: CPT | Mod: GO

## 2024-12-03 RX ORDER — METOPROLOL TARTRATE 100 MG/1
1 TABLET, FILM COATED ORAL
Qty: 30 | Refills: 0
Start: 2024-12-03 | End: 2025-01-01

## 2024-12-03 RX ORDER — INSULIN GLARGINE,HUM.REC.ANLOG 100/ML
12 VIAL (ML) SUBCUTANEOUS
Qty: 0 | Refills: 0 | DISCHARGE

## 2024-12-03 RX ORDER — METFORMIN HYDROCHLORIDE 500 MG/1
1 TABLET, EXTENDED RELEASE ORAL
Refills: 0 | DISCHARGE

## 2024-12-03 RX ORDER — DULAGLUTIDE 4.5 MG/.5ML
0.75 INJECTION, SOLUTION SUBCUTANEOUS
Qty: 2 | Refills: 0
Start: 2024-12-03

## 2024-12-03 RX ORDER — ACETAMINOPHEN 500MG 500 MG/1
2 TABLET, COATED ORAL
Qty: 0 | Refills: 0 | DISCHARGE
Start: 2024-12-03

## 2024-12-03 RX ORDER — FAMOTIDINE 20 MG/1
1 TABLET, FILM COATED ORAL
Qty: 30 | Refills: 0
Start: 2024-12-03 | End: 2025-01-01

## 2024-12-03 RX ORDER — ALBUTEROL 90 MCG
2 AEROSOL (GRAM) INHALATION
Qty: 2 | Refills: 0
Start: 2024-12-03 | End: 2025-01-01

## 2024-12-03 RX ORDER — DULAGLUTIDE 0.75 MG/.5ML
1.5 INJECTION, SOLUTION SUBCUTANEOUS
Refills: 0 | DISCHARGE

## 2024-12-03 RX ORDER — REPAGLINIDE 2 MG/1
1 TABLET ORAL
Refills: 0 | DISCHARGE

## 2024-12-03 RX ORDER — CLOPIDOGREL 75 MG/1
1 TABLET, FILM COATED ORAL
Qty: 30 | Refills: 0
Start: 2024-12-03 | End: 2025-01-01

## 2024-12-03 RX ADMIN — Medication 2 PUFF(S): at 08:00

## 2024-12-03 RX ADMIN — Medication 1000 MILLIGRAM(S): at 07:31

## 2024-12-03 RX ADMIN — FAMOTIDINE 40 MILLIGRAM(S): 20 TABLET, FILM COATED ORAL at 12:18

## 2024-12-03 RX ADMIN — METOPROLOL TARTRATE 25 MILLIGRAM(S): 100 TABLET, FILM COATED ORAL at 06:00

## 2024-12-03 RX ADMIN — CLOPIDOGREL 75 MILLIGRAM(S): 75 TABLET, FILM COATED ORAL at 12:18

## 2024-12-03 RX ADMIN — Medication 325 MILLIGRAM(S): at 05:59

## 2024-12-03 NOTE — DISCHARGE NOTE NURSING/CASE MANAGEMENT/SOCIAL WORK - FINANCIAL ASSISTANCE
[facility] provides services at a reduced cost to those who are determined to be eligible through [facility]’s financial assistance program. Information regarding [facility]’s financial assistance program can be found by going to [email or web address] or by calling [phone].

## 2024-12-03 NOTE — PROGRESS NOTE ADULT - GASTROINTESTINAL
soft/nontender/normal active bowel sounds
soft/nontender/normal active bowel sounds
soft/nontender/nondistended
soft/nontender/normal active bowel sounds
soft/nontender/normal active bowel sounds
soft/nontender/nondistended/normal active bowel sounds
soft/nontender/nondistended/normal active bowel sounds
soft/nontender/normal active bowel sounds
soft/nontender
soft/nontender/nondistended/normal active bowel sounds

## 2024-12-03 NOTE — ADVANCED PRACTICE NURSE CONSULT - ASSESSMENT
HPI 69 year old male with PMHx of HTN, HLD, Type 2 diabetes who presented to [facility] on 10/27 with right sided weakness/code stroke, pulmonary edema and hypertensive emergency in the setting of NSTEMI/HF. 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 Ulrich on 11/5/24.  Now admitted to [facility] with right sided deficits for initiation of a multidisciplinary rehab program consisting focused on functional mobility, transfers and ADLs.    HgbA1c- 9.4 (10/28/24  eGFR-  68 (12/2/24)   	  Current In-patient Diabetes Regimen-  On 12/2/24- insulin glargine Injectable (LANTUS) 15 Unit(s) SubCutaneous at bedtime  insulin lispro (ADMELOG) corrective regimen sliding scale   SubCutaneous three times a day before meals  insulin lispro (ADMELOG) corrective regimen sliding scale   SubCutaneous at bedtime  insulin lispro Injectable (ADMELOG) 4 Unit(s) SubCutaneous before breakfast  insulin lispro Injectable (ADMELOG) 4 Unit(s) SubCutaneous before lunch  insulin lispro Injectable (ADMELOG) 4 Unit(s) SubCutaneous before dinner  metFORMIN 500 milliGRAM(s) Oral two times a day      On 12/2/25- advise increase of Metformin to 1000 mg twice a day, orally with food and d/c Admelog 4 units SC before each meal. BG in goal range today, 12/3/24    Home Diabetes medication Regimen- Per chart – Trulicity 1.5 mg SC q week, Repaglinide 2 mg before meals, Basaglar 16 units? Daily, , metformin 500 mg twice a day. Per son -p t wife manages pt’s medication including administration and performs BG monitoring, and     Endocrinology- none- provided [facility] Contact Information to pt sami Taylor via TC. Advised son to schedule appt with diabetes educator also.     Pt is confused and has limited English proficiency. TC to pt son Maxwell Taylor who states that he and pt’s spouse is educated on Type 2 Diabetes disease process, progression, management including medication, healthy eating, as well as consequences of persistent hyperglycemia. Educated on A1c goal- 7.5 – 8.0. BG goals (100- 140), when to contact healthcare provider, when to check BG and keeping BG log- provided BG Log.   Educated pt on s/s of hyperglycemia and Tx and S/S of hypoglycemia and tx per 15/15 rule. Pt validated education via teach back.    Pt wife does know how to check BG and use BG meter and administer Trulicity injection q week.    Primary RN provided written resources of Leaving the hospital with Diabetes, A1c goal, BG Goals, BG Monitoring, Insulin pen instruction, sharps disposal, Hyper and Hypoglycemia s/s and tx, sick day management, and Preventing complications due to diabetes.

## 2024-12-03 NOTE — PROGRESS NOTE ADULT - ASSESSMENT
PEPE KIM is a 68 y/o M with PMHx of HTN, HLD, DM, who presented to [facility] on 10/27/24 with right sided weakness/code stroke, pulmonary edema and hypertensive emergency in the setting of NSTEMI/HF. 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 Ulrich on 11/5/24.     # L MCA Infarct with right body involvement  - CTA +right carotid stenosis of 50% and left carotid stenosis of 90%  - S/p angio/LICA stent 11/5.   - Aspirin 325mg and Plavix 75mg daily   - Atorvastatin 20mg HS   - Continue comprehensive rehab program, 3 hours a day, 5 days a week. PT OT SLP  - Precautions: cardiac, DM, fall    # right  foot drop:   - Right foot AFO: Patient with right hemiparesis secondary to left MCA stroke. He has right HF strength 4-/5 quad 4/5 ham 4-/5 DF 3/5 mild heel cord tightness PF 4-/5 and will be a functional ambulator. The custom molded, hinged AFO will help preserve ankle motion while stabilizing the talar and subtalar joints and improve gait efficiency and safety. He is also a diabetic. Recommend custom hinged AFO  to improve clearance during swing phase and stability/safety during gait/stance phase. Should be lined due to diabetes history to protect the prominent bony areas of skin. This cannot be accomplished with a stock brace and he will require for > 9 months.    # HLD/HTN  - Episodic hypotension, Likely 2/2 dehydration  - s/p 250 cc bolus. Encourage po fluids  - metoprolol 25 daily  - Atorvastatin 20mg HS   - BP (118/82 - 165/81) 12/3    # NSTEMI  # CHF  - TTE: EF 45-50%.  - CXR 11/17: Mild congestive heart failure. Small pleural effusions. No pneumothorax  - EKG 11/11:  Normal sinus rhythm, ST and T wave abnormality, consider anterolateral ischemia. (similar to prior 11/6)  - Metoprolol 25mg daily   - Aspirin 325mg daily, plavix  - statin  - Cardiac cath deferred outpatient f/u d/t acute CVA    # Asthma  - Albuterol PRN  - in remission.     # T2DM, uncontrolled with hyperglycemia  - A1c 9.6  - Lantus increased 15u 11/12  - admelog 4u qAC  - FBG ACHS + Mod ISS  - -186 12/3    # leukocytosis  - afebrile, no tachy  - UA 11/27 negative, CXR without evidence infection  - resolved    # mild transaminitis  - avoid hepatotoxic meds.  - on low dose statin  - resolved    # Sleep:   - Melatonin 6mg HS PRN     # Pain Management:  - Tylenol PRN. Resumed 11/25    # GI/Bowel:  - Senna QHS, Miralax PRN Daily  - GI ppx: famotidine 40mg daily   - dulcolax suppository PRN     # Diet  - Regular, carb consistent diet, DASH/TLC  - BUn/Cr 27/1.29 11/29--> 21/1.17 12/2 improved.  - conitnue to encourage po fluids BMP 12/5    # DVT ppx:   - Lovenox 40mg daily, SCDs    # Case discussed in IDT rounds 11/27 (follow up)  - continent, mild-mod non fluent aphasia, improved comprehension, 4-6 word phrases with mod cues, min assist stand-picot transfers, ambulates to commode with WBQC and max cues, WC level for showering and toileting due to need for max cues. improved strength and AROM RLE but fatigues, CG bed mobility, CG/min assist transfers, ambulates 75 feet min-mod assist and WBQC  - barriers: right hemiparesis, stairs, incontinence, aphasia, reduced caregiver support, reduced endurance  - goals: intermittent supervision waking hours, CG transfers, supervision bADLs, CS transfers and ambulation household distances "Pepe" "to get it back". Ambulate to bathroom with CS (progressing), communicate wants and needs min assist (progressing)  - target: dc home 12/3 with caregiver support and home PT OT SLP, Cg/min assist dressing, BADLs (progressing), supervision transfers and ambulation level surfaces home environment (not met), CG transfers and WC level transfers to commode ()new)  - caregiver training 11/29  at 1 PM with wife and son      # LABS  CBC CMP 12/5    ---------------  Code Status: FULL  Emergency Contact:    Outpatient Follow-up (Specialty/Name of physician):    Farhat Ulrich  Radiology  [address], [address]  [place] [address]  Phone: [phone]  Fax: [phone]     PEPE KIM is a 70 y/o M with PMHx of HTN, HLD, DM, who presented to [facility] on 10/27/24 with right sided weakness/code stroke, pulmonary edema and hypertensive emergency in the setting of NSTEMI/HF. 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 Ulrich on 11/5/24.     # L MCA Infarct with right body involvement  - CTA +right carotid stenosis of 50% and left carotid stenosis of 90%  - S/p angio/LICA stent 11/5.   - Aspirin 325mg and Plavix 75mg daily   - Atorvastatin 20mg HS   - For dc home today with home care referral and home PT Ot SLP. Caregiver training performed  - f/u neurology, PMR, cardiology on dc    # right  foot drop:   - Right foot AFO: Patient with right hemiparesis secondary to left MCA stroke. He has right HF strength 4-/5 quad 4/5 ham 4-/5 DF 3/5 mild heel cord tightness PF 4-/5 and will be a functional ambulator. The custom molded, hinged AFO will help preserve ankle motion while stabilizing the talar and subtalar joints and improve gait efficiency and safety. He is also a diabetic. Recommend custom hinged AFO  to improve clearance during swing phase and stability/safety during gait/stance phase. Should be lined due to diabetes history to protect the prominent bony areas of skin. This cannot be accomplished with a stock brace and he will require for > 9 months.    # HLD/HTN  - Episodic hypotension, Likely 2/2 dehydration  - s/p 250 cc bolus. Encourage po fluids  - metoprolol 25 daily  - Atorvastatin 20mg HS   - BP (118/82 - 165/81) 12/3    # NSTEMI  # CHF  - TTE: EF 45-50%.  - CXR 11/17: Mild congestive heart failure. Small pleural effusions. No pneumothorax  - EKG 11/11:  Normal sinus rhythm, ST and T wave abnormality, consider anterolateral ischemia. (similar to prior 11/6)  - Metoprolol 25mg daily   - Aspirin 325mg daily, plavix  - statin  - Cardiac cath deferred outpatient f/u d/t acute CVA    # Asthma  - Albuterol PRN  - in remission.     # T2DM, uncontrolled with hyperglycemia  - A1c 9.6  - Hospitalist and diabetic educator appreciated  - Metformin  mg tablet take 2 tablets twice a day with food  - Trulicity 0.75 mg/0.5 ml SubCutaneous once a week times 4 weeks then increase to 1.5 mg/0.5 ml SubCutaneous once a week  - FBG ACHS + Mod ISS  - -186 12/3  - endocrine f/u on dc    # leukocytosis  - afebrile, no tachy  - UA 11/27 negative, CXR without evidence infection  - resolved    # mild transaminitis  - avoid hepatotoxic meds.  - on low dose statin  - resolved    # Sleep:   - Melatonin 6mg HS PRN     # Pain Management:  - Tylenol PRN. Resumed 11/25    # GI/Bowel:  - Senna QHS, Miralax PRN Daily  - GI ppx: famotidine 40mg daily   - dulcolax suppository PRN     # Diet  - Regular, carb consistent diet, DASH/TLC  - BUn/Cr 27/1.29 11/29--> 21/1.17 12/2 improved.  - PCP f/u on dc aleah on metformin    # DVT ppx:   - Lovenox 40mg daily, SCDs    # Case discussed in IDT rounds 11/27 (follow up)  - continent, mild-mod non fluent aphasia, improved comprehension, 4-6 word phrases with mod cues, min assist stand-picot transfers, ambulates to commode with WBQC and max cues, WC level for showering and toileting due to need for max cues. improved strength and AROM RLE but fatigues, CG bed mobility, CG/min assist transfers, ambulates 75 feet min-mod assist and WBQC  - barriers: right hemiparesis, stairs, incontinence, aphasia, reduced caregiver support, reduced endurance  - goals: intermittent supervision waking hours, CG transfers, supervision bADLs, CS transfers and ambulation household distances "Pepe" "to get it back". Ambulate to bathroom with CS (progressing), communicate wants and needs min assist (progressing)  - target: dc home 12/3 with caregiver support and home PT OT SLP, Cg/min assist dressing, BADLs (progressing), supervision transfers and ambulation level surfaces home environment (not met), CG transfers and WC level transfers to commode ()new)  - caregiver training 11/29  at 1 PM with wife and son      # Patient is medically stable for dc home with caregiver support and home care services. CAregiver training has been completed. Will request review diabetes meds and education prior ot dc as he will go home on combination metformin and trulicity. Will send medications to [facility] [phone]. Time spent for evaluation, discussions with consultants, coordination care for dc 45 min    ---------------  Code Status: FULL  Emergency Contact:    Outpatient Follow-up (Specialty/Name of physician):    Farhat Ulrich  Radiology  [address], [address]  [place] [address]  Phone: [phone]  Fax: [phone]

## 2024-12-03 NOTE — PROGRESS NOTE ADULT - MOTOR
right UE: flaccid 1/5 shoulder shrug 0/5 elbow flexion and extension, 0/5 finger and wrist flexion. no hand swelling no pain with ROM  left UE motor 5/5 shoulder, elbow flexion/ext, grasp  right HF 4-/5 quad 4/5 ham 4-/5 DF 3/5 mild heel cord tightness PF 4-/5  no calf swelling or pretibial pitting edema   left LE 5/5
RUE shoulder shrug 1/5 0/5 elbow flexion 0/.5 finger flexors  right HF 2/5 quad 2-/5 0/5  no hand swelling  calves soft no swelling
right HF 4+/5 quad 5-/5 ham 4+/5 ankle DF 4/5 PF 4+/5  calves soft no TTP  RUE 0/5 AROM
bilateral calves soft no TTP  no pedal edema  no erythema or warmth
right shoulder 1/5 elbow flexion and finger flexion 0/5  no hand swelling  right HF 4-/5 quad 5-/5 ham 4+/5 ankle PF 4+/5
right UE 0/5 AROM hand, elbow, no withdrawal to painful stim  right LE: withdraws 2/5 HF
no pretibial edema  calves soft no TTP bilaterally  right shoulder 1/5 elbow extension 1/5 0.5 flexion and wrist/fingers hypotonic
right shoulder 1 finger breadth subluxation, non painful GH joint palpation. Sling in place for support  0/5 AROM elbow, wrist, fingers  right HF 4/5, KE 4+/5  DF 3/5, PF 4/5     calves soft no tTP
calves soft no TTP  n opedal edema  no calf swelling , no pitting edema  right UE 0/5
no pain PROM right shoulder  right shoulder shrug 1/5 elbow flexion/ext and grasp 0/5  right HF 3+/5 quad 4-/5 DF 3/5 PF 4-/5  calves sof tno TTP bilaterally
actual

## 2024-12-03 NOTE — ADVANCED PRACTICE NURSE CONSULT - RECOMMEDATIONS
Discharge Recommendations:  1.  Alcohol swabs- (on favorite list)  2. BG meter per Insurance- (on favorite list)  3. BG test strips per insurance ( on favorite list)  4. Lancets- per insurance -(on favorite list)  5. Follow up with PCP  6. Follow up with [facility] Endocrine and diabetes Educator  7. Metformin  mg tablet take 2 tablets twice a day with food  8. Trulicity 0.75 mg/0.5 ml SubCutaneous once a week times 4 weeks then increase to 1.5 mg/0.5 ml SubCutaneous once a week  9. Advised to continue healthy eating of consistent carb intake  10. If BG trending, 120 mg/dl or > 180 mg/dL, contact PCP for medication titration.

## 2024-12-03 NOTE — DISCHARGE NOTE NURSING/CASE MANAGEMENT/SOCIAL WORK - NSSCNAMETXT_GEN_ALL_CORE
On antidepressant and seems to be stable Yes [facility]

## 2024-12-03 NOTE — PROGRESS NOTE ADULT - RESPIRATORY
clear to auscultation bilaterally/no wheezes/no respiratory distress
clear to auscultation bilaterally/no wheezes/no respiratory distress
normal/clear to auscultation bilaterally/no wheezes/no rales/no rhonchi
clear to auscultation bilaterally/no wheezes/no rales/no rhonchi/no respiratory distress
fair- inspiratory effort/normal/clear to auscultation bilaterally/no wheezes/no rales/no rhonchi
no rales/crackles appreciated/clear to auscultation bilaterally/no respiratory distress
normal/clear to auscultation bilaterally/no wheezes/no rales/no rhonchi
normal/clear to auscultation bilaterally/no wheezes/no rales/no rhonchi/no respiratory distress
clear to auscultation bilaterally/no respiratory distress
clear to auscultation bilaterally/no wheezes/no rales/no respiratory distress

## 2024-12-03 NOTE — PROGRESS NOTE ADULT - SUBJECTIVE AND OBJECTIVE BOX
Patient is a 69y old  Male who presents with a chief complaint of L MCA Infarct with right body involvement (02 Dec 2024 10:50)      HPI:  70 y/o M with PMHx of HTN, HLD, DM, originally presented to [facility] on 10/27 as a code stroke after developed right sided weakness, pulmonary edema and hypertensive emergency in the setting of NSTEMI/HF. 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 to [facility] for angio/LICA stent w/ Dr. Ulrich on 11/5/24. [facility]  #[id number] used.     MR brain revealed L watershed/MCA strokes in the setting of high grade proximal LICA stenosis >90% at bifurcation. Patient underwent catheter cerebral angiogram, angioplasty and left carotid wall stent placement with Dr. Farhat Ulrich on 11/6/2024. Right groin was accessed but catheter could not be advanced 2/2 to near occlusion of the right iliac, puncture closed with closure device and procedure proceeded through right radial artery. Case was complicated by hypotension requiring multiple neosticks and IVF boluses to maintain hemodynamic stability. Post-op Carotid dopplers completed on 11/7/2024 demonstrated patent left ICA stent.    Cardiology was consulted for remote history of NSTEMI, f/u Echo shows EF 45% segmental wall motion abnormalities, Recommended to continue ASA/ Plavix and f/u outpatient with another cardiac cath which was deferred d/t acute stroke. Post op course c/b fluctuating RLE MMT exam, repeat CTH stable.     Patient was cleared for discharge to [facility] on 11/11/24.  (11 Nov 2024 14:19)      PAST MEDICAL & SURGICAL HISTORY:  Diabetes mellitus      HTN (hypertension)      Hyperlipidemia          MEDICATIONS  (STANDING):  albuterol    90 MICROgram(s) HFA Inhaler 2 Puff(s) Inhalation every 12 hours  aspirin 325 milliGRAM(s) Oral <User Schedule>  atorvastatin 20 milliGRAM(s) Oral at bedtime  clopidogrel Tablet 75 milliGRAM(s) Oral daily  dextrose 5%. 1000 milliLiter(s) (100 mL/Hr) IV Continuous <Continuous>  dextrose 5%. 1000 milliLiter(s) (50 mL/Hr) IV Continuous <Continuous>  dextrose 50% Injectable 25 Gram(s) IV Push once  dextrose 50% Injectable 12.5 Gram(s) IV Push once  dextrose 50% Injectable 25 Gram(s) IV Push once  enoxaparin Injectable 40 milliGRAM(s) SubCutaneous every 24 hours  famotidine    Tablet 40 milliGRAM(s) Oral daily  glucagon  Injectable 1 milliGRAM(s) IntraMuscular once  insulin glargine Injectable (LANTUS) 15 Unit(s) SubCutaneous at bedtime  insulin lispro (ADMELOG) corrective regimen sliding scale   SubCutaneous three times a day before meals  insulin lispro (ADMELOG) corrective regimen sliding scale   SubCutaneous at bedtime  metFORMIN 1000 milliGRAM(s) Oral two times a day  metoprolol succinate ER 25 milliGRAM(s) Oral daily  polyethylene glycol 3350 17 Gram(s) Oral daily  senna 2 Tablet(s) Oral at bedtime    MEDICATIONS  (PRN):  acetaminophen     Tablet .. 650 milliGRAM(s) Oral every 6 hours PRN Temp greater or equal to 38C (100.4F), Mild Pain (1 - 3)  bisacodyl Suppository 10 milliGRAM(s) Rectal daily PRN Constipation  dextrose Oral Gel 15 Gram(s) Oral once PRN Blood Glucose LESS THAN 70 milliGRAM(s)/deciliter  melatonin 6 milliGRAM(s) Oral at bedtime PRN Insomnia      Allergies    No Known Allergies    Intolerances          VITALS  69y  Vital Signs Last 24 Hrs  T(C): 36.6 (03 Dec 2024 08:32), Max: 36.7 (02 Dec 2024 20:26)  T(F): 97.9 (03 Dec 2024 08:32), Max: 98.1 (02 Dec 2024 20:26)  HR: 77 (03 Dec 2024 08:32) (73 - 81)  BP: 118/88 (03 Dec 2024 08:32) (118/82 - 165/81)  BP(mean): --  RR: 16 (03 Dec 2024 08:32) (16 - 16)  SpO2: 98% (03 Dec 2024 08:32) (96% - 98%)    Parameters below as of 03 Dec 2024 08:32  Patient On (Oxygen Delivery Method): room air      Daily     Daily         RECENT LABS:                          13.9   8.92  )-----------( 336      ( 02 Dec 2024 05:57 )             42.6     12-02    138  |  101  |  21  ----------------------------<  138[H]  4.0   |  29  |  1.17    Ca    9.5      02 Dec 2024 05:57    TPro  7.7  /  Alb  3.2[L]  /  TBili  0.5  /  DBili  x   /  AST  28  /  ALT  42  /  AlkPhos  67  12-02    LIVER FUNCTIONS - ( 02 Dec 2024 05:57 )  Alb: 3.2 g/dL / Pro: 7.7 g/dL / ALK PHOS: 67 U/L / ALT: 42 U/L / AST: 28 U/L / GGT: x             Urinalysis Basic - ( 02 Dec 2024 05:57 )    Color: x / Appearance: x / SG: x / pH: x  Gluc: 138 mg/dL / Ketone: x  / Bili: x / Urobili: x   Blood: x / Protein: x / Nitrite: x   Leuk Esterase: x / RBC: x / WBC x   Sq Epi: x / Non Sq Epi: x / Bacteria: x          CAPILLARY BLOOD GLUCOSE      POCT Blood Glucose.: 143 mg/dL (03 Dec 2024 07:35)  POCT Blood Glucose.: 114 mg/dL (02 Dec 2024 21:35)  POCT Blood Glucose.: 120 mg/dL (02 Dec 2024 16:41)  POCT Blood Glucose.: 186 mg/dL (02 Dec 2024 12:05)

## 2024-12-03 NOTE — PROGRESS NOTE ADULT - CONSTITUTIONAL COMMENTS
alert, improved simple attention, no hypoarousal. improved command following
low volume, usually single to 2-3 word output. Reduced initiation but sl improved processing overall
Patient comfortable NAD, On RA, no cough or diaphoresis. Good spirits, cooeprative, good- simple attention
alert, NAD, Right UE in sling, right LE with ACE wrap DF assist
alert, mood stable. Remains aphasic, expressive >receptive deficits, does verbalize but still min output, non fluent. Benefits from cues for command following. NAD
sleepy but arousable. Continues to have reduced verbal fluency with reduced volume and dysarthria/reduced speech intelligbility
afebrile, appears comfortable, pleasant. Less fatigued
alert O x3 grossly. Low vocal volume, paucity speech but salient content is accurate. Does not remember specific location of pharmacy "[facility]". Can follow simple commands but benefits from cues
Alert, mood stable, no lability. Follows 1-2 step commands. Appears comfortable. Some increased output but still aphasic and apraxic
Alert, mood appears stable, NAD +verbalizing but reduced output/aphasic, hypophonic. content appropriate

## 2024-12-03 NOTE — DISCHARGE NOTE NURSING/CASE MANAGEMENT/SOCIAL WORK - NSDCPEFALRISK_GEN_ALL_CORE
For information on Fall & Injury Prevention, visit: [email or web address] OR  [email or web address] OR  https://www.cdc.gov/steadi/patient.html

## 2024-12-03 NOTE — PROGRESS NOTE ADULT - REASON FOR ADMISSION
L MCA Infarct with right body involvement

## 2024-12-03 NOTE — DISCHARGE NOTE NURSING/CASE MANAGEMENT/SOCIAL WORK - PATIENT PORTAL LINK FT
You can access the FollowMyHealth Patient Portal offered by [facility] by registering at the following website: [email or web address]. By joining [employer]’s FollowMyHealth portal, you will also be able to view your health information using other applications (apps) compatible with our system.

## 2024-12-03 NOTE — PROGRESS NOTE ADULT - CARDIOVASCULAR
regular rate and rhythm
normal/regular rate and rhythm/S1 S2 present/no gallops/no rub/no murmur
regular rate and rhythm
normal/regular rate and rhythm/S1 S2 present/no gallops/no rub/no murmur
regular rate and rhythm
regular rate and rhythm

## 2024-12-03 NOTE — PROGRESS NOTE ADULT - COMMENTS
Patient seen with [facility] language line  #[id number].     Patient doing well, no H/A, CP, SOB, cough, no issues overnight. Hospitalist and diabetic nurse educator recommendations greatly appreciated. Patient is switched from insulin to oral medications on dc; will have increased metformin dose to 1000 bid as well as trulicity once a week injectable on dc

## 2024-12-09 DIAGNOSIS — I65.22 OCCLUSION AND STENOSIS OF LEFT CAROTID ARTERY: ICD-10-CM

## 2024-12-09 DIAGNOSIS — I65.23 OCCLUSION AND STENOSIS OF BILATERAL CAROTID ARTERIES: ICD-10-CM

## 2024-12-09 DIAGNOSIS — I65.29 OCCLUSION AND STENOSIS OF UNSPECIFIED CAROTID ARTERY: ICD-10-CM

## 2024-12-11 ENCOUNTER — APPOINTMENT (OUTPATIENT)
Facility: CLINIC | Age: 69
End: 2024-12-11

## 2024-12-17 RX ORDER — DULAGLUTIDE 4.5 MG/.5ML
1.5 INJECTION, SOLUTION SUBCUTANEOUS
Qty: 1 | Refills: 0
Start: 2024-12-17 | End: 2024-12-17

## 2024-12-26 ENCOUNTER — APPOINTMENT (OUTPATIENT)
Dept: ENDOCRINOLOGY | Facility: CLINIC | Age: 69
End: 2024-12-26
Payer: MEDICARE

## 2024-12-26 VITALS
BODY MASS INDEX: 27.31 KG/M2 | WEIGHT: 160 LBS | RESPIRATION RATE: 16 BRPM | TEMPERATURE: 96.8 F | HEIGHT: 64 IN | HEART RATE: 82 BPM | SYSTOLIC BLOOD PRESSURE: 136 MMHG | OXYGEN SATURATION: 98 % | DIASTOLIC BLOOD PRESSURE: 78 MMHG

## 2024-12-26 DIAGNOSIS — E78.5 HYPERLIPIDEMIA, UNSPECIFIED: ICD-10-CM

## 2024-12-26 DIAGNOSIS — E11.9 TYPE 2 DIABETES MELLITUS W/OUT COMPLICATIONS: ICD-10-CM

## 2024-12-26 DIAGNOSIS — I10 ESSENTIAL (PRIMARY) HYPERTENSION: ICD-10-CM

## 2024-12-26 PROCEDURE — 99214 OFFICE O/P EST MOD 30 MIN: CPT

## 2024-12-26 PROCEDURE — G2211 COMPLEX E/M VISIT ADD ON: CPT

## 2024-12-26 RX ORDER — ATORVASTATIN CALCIUM 20 MG/1
20 TABLET, FILM COATED ORAL
Qty: 90 | Refills: 3 | Status: ACTIVE | COMMUNITY
Start: 1900-01-01 | End: 1900-01-01

## 2024-12-26 RX ORDER — DULAGLUTIDE 1.5 MG/.5ML
1.5 INJECTION, SOLUTION SUBCUTANEOUS
Qty: 12 | Refills: 3 | Status: ACTIVE | COMMUNITY
Start: 1900-01-01 | End: 1900-01-01

## 2024-12-26 RX ORDER — METFORMIN HYDROCHLORIDE 1000 MG/1
1000 TABLET, COATED ORAL TWICE DAILY
Qty: 180 | Refills: 3 | Status: ACTIVE | COMMUNITY
Start: 1900-01-01 | End: 1900-01-01

## 2024-12-26 RX ORDER — ASPIRIN 325 MG/1
325 TABLET, FILM COATED ORAL DAILY
Qty: 90 | Refills: 3 | Status: ACTIVE | COMMUNITY
Start: 1900-01-01 | End: 1900-01-01

## 2024-12-26 RX ORDER — METOPROLOL SUCCINATE 25 MG/1
25 TABLET, EXTENDED RELEASE ORAL
Refills: 0 | Status: ACTIVE | COMMUNITY

## 2024-12-30 PROBLEM — I63.512 ACUTE ISCHEMIC LEFT MCA STROKE: Status: ACTIVE | Noted: 2024-12-30

## 2025-01-02 ENCOUNTER — NON-APPOINTMENT (OUTPATIENT)
Age: 70
End: 2025-01-02

## 2025-01-02 ENCOUNTER — APPOINTMENT (OUTPATIENT)
Dept: PHYSICAL MEDICINE AND REHAB | Facility: CLINIC | Age: 70
End: 2025-01-02
Payer: MEDICARE

## 2025-01-02 VITALS
SYSTOLIC BLOOD PRESSURE: 112 MMHG | WEIGHT: 160 LBS | BODY MASS INDEX: 27.31 KG/M2 | HEART RATE: 89 BPM | DIASTOLIC BLOOD PRESSURE: 83 MMHG | OXYGEN SATURATION: 94 % | TEMPERATURE: 98.1 F | HEIGHT: 64 IN

## 2025-01-02 DIAGNOSIS — I63.512 CEREBRAL INFARCTION DUE TO UNSPECIFIED OCCLUSION OR STENOSIS OF LEFT MIDDLE CEREBRAL ARTERY: ICD-10-CM

## 2025-01-02 DIAGNOSIS — R26.89 OTHER ABNORMALITIES OF GAIT AND MOBILITY: ICD-10-CM

## 2025-01-02 DIAGNOSIS — G81.91 HEMIPLEGIA, UNSPECIFIED AFFECTING RIGHT DOMINANT SIDE: ICD-10-CM

## 2025-01-02 DIAGNOSIS — R47.01 APHASIA: ICD-10-CM

## 2025-01-02 PROCEDURE — 99214 OFFICE O/P EST MOD 30 MIN: CPT

## 2025-04-30 ENCOUNTER — APPOINTMENT (OUTPATIENT)
Dept: HOME HEALTH SERVICES | Facility: HOME HEALTH | Age: 70
End: 2025-04-30

## 2025-04-30 VITALS — DIASTOLIC BLOOD PRESSURE: 66 MMHG | HEART RATE: 92 BPM | OXYGEN SATURATION: 97 % | SYSTOLIC BLOOD PRESSURE: 100 MMHG

## 2025-04-30 DIAGNOSIS — Z86.79 PERSONAL HISTORY OF OTHER DISEASES OF THE CIRCULATORY SYSTEM: ICD-10-CM

## 2025-04-30 DIAGNOSIS — R39.81 FUNCTIONAL URINARY INCONTINENCE: ICD-10-CM

## 2025-04-30 DIAGNOSIS — I25.10 TYPE 2 DIABETES MELLITUS WITH OTHER CIRCULATORY COMPLICATIONS: ICD-10-CM

## 2025-04-30 DIAGNOSIS — E11.59 TYPE 2 DIABETES MELLITUS WITH OTHER CIRCULATORY COMPLICATIONS: ICD-10-CM

## 2025-04-30 DIAGNOSIS — Z23 ENCOUNTER FOR IMMUNIZATION: ICD-10-CM

## 2025-04-30 DIAGNOSIS — I50.20 UNSPECIFIED SYSTOLIC (CONGESTIVE) HEART FAILURE: ICD-10-CM

## 2025-04-30 DIAGNOSIS — I63.512 CEREBRAL INFARCTION DUE TO UNSPECIFIED OCCLUSION OR STENOSIS OF LEFT MIDDLE CEREBRAL ARTERY: ICD-10-CM

## 2025-04-30 DIAGNOSIS — K59.09 OTHER CONSTIPATION: ICD-10-CM

## 2025-04-30 PROCEDURE — 99345 HOME/RES VST NEW HIGH MDM 75: CPT

## 2025-04-30 RX ORDER — POLYETHYLENE GLYCOL 3350
POWDER (GRAM) MISCELLANEOUS
Refills: 0 | Status: ACTIVE | COMMUNITY

## 2025-04-30 RX ORDER — SENNOSIDES 8.6 MG TABLETS 8.6 MG/1
8.6 TABLET ORAL AT BEDTIME
Refills: 0 | Status: ACTIVE | COMMUNITY

## 2025-04-30 RX ORDER — CLOPIDOGREL BISULFATE 75 MG/1
75 TABLET, FILM COATED ORAL DAILY
Qty: 90 | Refills: 3 | Status: ACTIVE | COMMUNITY
Start: 2025-04-30

## 2025-05-06 ENCOUNTER — NON-APPOINTMENT (OUTPATIENT)
Age: 70
End: 2025-05-06

## 2025-05-08 ENCOUNTER — APPOINTMENT (OUTPATIENT)
Dept: ENDOCRINOLOGY | Facility: CLINIC | Age: 70
End: 2025-05-08
Payer: MEDICARE

## 2025-05-08 ENCOUNTER — OUTPATIENT (OUTPATIENT)
Dept: OUTPATIENT SERVICES | Facility: HOSPITAL | Age: 70
LOS: 1 days | End: 2025-05-08
Payer: COMMERCIAL

## 2025-05-08 ENCOUNTER — RESULT REVIEW (OUTPATIENT)
Age: 70
End: 2025-05-08

## 2025-05-08 ENCOUNTER — APPOINTMENT (OUTPATIENT)
Dept: ULTRASOUND IMAGING | Facility: IMAGING CENTER | Age: 70
End: 2025-05-08

## 2025-05-08 ENCOUNTER — APPOINTMENT (OUTPATIENT)
Dept: PHYSICAL MEDICINE AND REHAB | Facility: CLINIC | Age: 70
End: 2025-05-08

## 2025-05-08 VITALS
WEIGHT: 126 LBS | BODY MASS INDEX: 21.51 KG/M2 | SYSTOLIC BLOOD PRESSURE: 110 MMHG | HEIGHT: 64 IN | OXYGEN SATURATION: 98 % | DIASTOLIC BLOOD PRESSURE: 68 MMHG | TEMPERATURE: 97.7 F | HEART RATE: 94 BPM | RESPIRATION RATE: 16 BRPM

## 2025-05-08 VITALS
WEIGHT: 126 LBS | SYSTOLIC BLOOD PRESSURE: 88 MMHG | TEMPERATURE: 97.2 F | HEART RATE: 81 BPM | HEIGHT: 64 IN | DIASTOLIC BLOOD PRESSURE: 61 MMHG | BODY MASS INDEX: 21.51 KG/M2 | OXYGEN SATURATION: 99 %

## 2025-05-08 DIAGNOSIS — I63.512 CEREBRAL INFARCTION DUE TO UNSPECIFIED OCCLUSION OR STENOSIS OF LEFT MIDDLE CEREBRAL ARTERY: ICD-10-CM

## 2025-05-08 DIAGNOSIS — I65.29 OCCLUSION AND STENOSIS OF UNSPECIFIED CAROTID ARTERY: ICD-10-CM

## 2025-05-08 DIAGNOSIS — R26.89 OTHER ABNORMALITIES OF GAIT AND MOBILITY: ICD-10-CM

## 2025-05-08 DIAGNOSIS — I10 ESSENTIAL (PRIMARY) HYPERTENSION: ICD-10-CM

## 2025-05-08 DIAGNOSIS — E78.5 HYPERLIPIDEMIA, UNSPECIFIED: ICD-10-CM

## 2025-05-08 DIAGNOSIS — S43.001A UNSPECIFIED SUBLUXATION OF RIGHT SHOULDER JOINT, INITIAL ENCOUNTER: ICD-10-CM

## 2025-05-08 DIAGNOSIS — R47.01 APHASIA: ICD-10-CM

## 2025-05-08 DIAGNOSIS — I65.22 OCCLUSION AND STENOSIS OF LEFT CAROTID ARTERY: ICD-10-CM

## 2025-05-08 DIAGNOSIS — E11.9 TYPE 2 DIABETES MELLITUS W/OUT COMPLICATIONS: ICD-10-CM

## 2025-05-08 DIAGNOSIS — G81.91 HEMIPLEGIA, UNSPECIFIED AFFECTING RIGHT DOMINANT SIDE: ICD-10-CM

## 2025-05-08 DIAGNOSIS — R26.81 UNSTEADINESS ON FEET: ICD-10-CM

## 2025-05-08 PROCEDURE — G2211 COMPLEX E/M VISIT ADD ON: CPT

## 2025-05-08 PROCEDURE — 93880 EXTRACRANIAL BILAT STUDY: CPT

## 2025-05-08 PROCEDURE — 99214 OFFICE O/P EST MOD 30 MIN: CPT

## 2025-05-08 PROCEDURE — 73020 X-RAY EXAM OF SHOULDER: CPT | Mod: 26,RT

## 2025-05-08 PROCEDURE — 73020 X-RAY EXAM OF SHOULDER: CPT

## 2025-05-08 PROCEDURE — 93880 EXTRACRANIAL BILAT STUDY: CPT | Mod: 26

## 2025-05-13 ENCOUNTER — APPOINTMENT (OUTPATIENT)
Dept: NEUROSURGERY | Facility: CLINIC | Age: 70
End: 2025-05-13

## 2025-05-20 ENCOUNTER — NON-APPOINTMENT (OUTPATIENT)
Age: 70
End: 2025-05-20

## 2025-05-20 ENCOUNTER — APPOINTMENT (OUTPATIENT)
Dept: NEUROSURGERY | Facility: CLINIC | Age: 70
End: 2025-05-20
Payer: MEDICARE

## 2025-05-20 VITALS
OXYGEN SATURATION: 98 % | HEIGHT: 62 IN | SYSTOLIC BLOOD PRESSURE: 98 MMHG | WEIGHT: 127 LBS | BODY MASS INDEX: 23.37 KG/M2 | HEART RATE: 86 BPM | DIASTOLIC BLOOD PRESSURE: 61 MMHG | TEMPERATURE: 97.6 F

## 2025-05-20 DIAGNOSIS — I65.29 OCCLUSION AND STENOSIS OF UNSPECIFIED CAROTID ARTERY: ICD-10-CM

## 2025-05-20 PROCEDURE — 99214 OFFICE O/P EST MOD 30 MIN: CPT

## 2025-06-09 ENCOUNTER — APPOINTMENT (OUTPATIENT)
Dept: PHYSICAL MEDICINE AND REHAB | Facility: CLINIC | Age: 70
End: 2025-06-09
Payer: MEDICARE

## 2025-06-09 VITALS
TEMPERATURE: 97.7 F | BODY MASS INDEX: 23.37 KG/M2 | DIASTOLIC BLOOD PRESSURE: 91 MMHG | SYSTOLIC BLOOD PRESSURE: 132 MMHG | HEIGHT: 62 IN | OXYGEN SATURATION: 97 % | WEIGHT: 127 LBS | HEART RATE: 81 BPM

## 2025-06-09 PROCEDURE — 20610 DRAIN/INJ JOINT/BURSA W/O US: CPT | Mod: RT

## 2025-06-19 ENCOUNTER — APPOINTMENT (OUTPATIENT)
Dept: HOME HEALTH SERVICES | Facility: HOME HEALTH | Age: 70
End: 2025-06-19

## 2025-07-14 ENCOUNTER — APPOINTMENT (OUTPATIENT)
Dept: HOME HEALTH SERVICES | Facility: HOME HEALTH | Age: 70
End: 2025-07-14
Payer: MEDICARE

## 2025-07-14 VITALS — OXYGEN SATURATION: 100 % | SYSTOLIC BLOOD PRESSURE: 108 MMHG | DIASTOLIC BLOOD PRESSURE: 80 MMHG | HEART RATE: 90 BPM

## 2025-07-14 PROCEDURE — G0439: CPT

## 2025-07-14 PROCEDURE — G0506: CPT

## 2025-07-14 RX ORDER — SENNOSIDES 8.6 MG/1
8.6 TABLET ORAL
Qty: 180 | Refills: 3 | Status: ACTIVE | COMMUNITY
Start: 2025-07-14 | End: 1900-01-01

## 2025-07-19 PROBLEM — I63.512 ACUTE ISCHEMIC LEFT MCA STROKE: Status: RESOLVED | Noted: 2024-12-30 | Resolved: 2025-07-19

## 2025-07-19 PROBLEM — I50.20: Status: ACTIVE | Noted: 2025-04-30

## 2025-08-12 ENCOUNTER — APPOINTMENT (OUTPATIENT)
Dept: ENDOCRINOLOGY | Facility: CLINIC | Age: 70
End: 2025-08-12

## 2025-09-16 ENCOUNTER — APPOINTMENT (OUTPATIENT)
Dept: PHYSICAL MEDICINE AND REHAB | Facility: CLINIC | Age: 70
End: 2025-09-16